# Patient Record
Sex: MALE | Race: WHITE | NOT HISPANIC OR LATINO | Employment: OTHER | URBAN - METROPOLITAN AREA
[De-identification: names, ages, dates, MRNs, and addresses within clinical notes are randomized per-mention and may not be internally consistent; named-entity substitution may affect disease eponyms.]

---

## 2023-02-24 ENCOUNTER — HOSPITAL ENCOUNTER (OUTPATIENT)
Dept: RADIOLOGY | Facility: MEDICAL CENTER | Age: 73
End: 2023-02-24
Attending: PHYSICIAN ASSISTANT
Payer: COMMERCIAL

## 2023-03-06 ENCOUNTER — HOSPITAL ENCOUNTER (OUTPATIENT)
Dept: HEMATOLOGY ONCOLOGY | Facility: MEDICAL CENTER | Age: 73
End: 2023-03-06
Attending: STUDENT IN AN ORGANIZED HEALTH CARE EDUCATION/TRAINING PROGRAM
Payer: COMMERCIAL

## 2023-03-06 ENCOUNTER — HOSPITAL ENCOUNTER (OUTPATIENT)
Dept: RADIOLOGY | Facility: MEDICAL CENTER | Age: 73
End: 2023-03-06

## 2023-03-06 VITALS
HEIGHT: 73 IN | DIASTOLIC BLOOD PRESSURE: 58 MMHG | RESPIRATION RATE: 16 BRPM | TEMPERATURE: 98.1 F | HEART RATE: 83 BPM | OXYGEN SATURATION: 93 % | SYSTOLIC BLOOD PRESSURE: 136 MMHG | WEIGHT: 165.34 LBS | BODY MASS INDEX: 21.91 KG/M2

## 2023-03-06 DIAGNOSIS — C34.91 NSCLC OF RIGHT LUNG (HCC): ICD-10-CM

## 2023-03-06 DIAGNOSIS — C34.91 ADENOCARCINOMA, LUNG, RIGHT (HCC): ICD-10-CM

## 2023-03-06 PROCEDURE — 99212 OFFICE O/P EST SF 10 MIN: CPT | Performed by: STUDENT IN AN ORGANIZED HEALTH CARE EDUCATION/TRAINING PROGRAM

## 2023-03-06 PROCEDURE — 99205 OFFICE O/P NEW HI 60 MIN: CPT | Performed by: STUDENT IN AN ORGANIZED HEALTH CARE EDUCATION/TRAINING PROGRAM

## 2023-03-06 ASSESSMENT — ENCOUNTER SYMPTOMS
NAUSEA: 0
MEMORY LOSS: 0
SPUTUM PRODUCTION: 0
FEVER: 0
TREMORS: 0
SHORTNESS OF BREATH: 0
DIZZINESS: 0
ABDOMINAL PAIN: 0
PALPITATIONS: 0
COUGH: 0
DEPRESSION: 0
SORE THROAT: 0
WEIGHT LOSS: 0
ORTHOPNEA: 0
BLURRED VISION: 0
CHILLS: 0
WHEEZING: 0
SENSORY CHANGE: 0
VOMITING: 0
TINGLING: 0
FOCAL WEAKNESS: 0
BRUISES/BLEEDS EASILY: 0
NECK PAIN: 0
HEADACHES: 0
HEARTBURN: 0

## 2023-03-06 ASSESSMENT — PAIN SCALES - GENERAL: PAINLEVEL: NO PAIN

## 2023-03-06 NOTE — PROGRESS NOTES
"    Consult Note: Hematology/Oncology     Primary Care:  No primary care provider on file.    Chief Complaint   Patient presents with    New Patient     Lung carcinoma        Current Treatment: None    Prior Treatment: None    Subjective:   History of Presenting Illness:  Tyler Zarate is a 72 y.o. male with a past medical history of COPD and tobacco use, 1 pack/day smoker, alcohol abuse, right leg DVT diagnosed in 2019 who presents today with a new diagnosis of metastatic adeno carcinoma of the lung.    Patient had a CT thorax on January 18, 2023 which showed a new spiculated mass in the anterior right upper lobe arising from the area of presumed previous scar.  It is highly suggestive of disease.    Patient had a biopsy and pathology on 2/17/23 shows adenocarcinoma of the right upper lobe.    A PET scan showed a hypermetabolic RUL mass ([id number]) as well as a mass in the LLL (11X5mm).     Patient recently moved to [place] from [facility].  He moved to be closer to his son.    He reports unintentional weight loss greater than 10% in the last 6 months.    He also reports a personal history of cancer (basal cell of nose)    No past medical history on file.     No past surgical history on file.          No family history on file.    Allergies   Allergen Reactions    Lipoglycopeptide Antibiotics Swelling     Pt stated he did not remember what kind of antibiotics he was allergic to. Pt stated \"two different kinds\" Face swelling and eyes glazing over.        Current Outpatient Medications   Medication Sig Dispense Refill    ALBUTEROL INH Inhale.       No current facility-administered medications for this encounter.       Review of Systems   Constitutional:  Negative for chills, fever, malaise/fatigue and weight loss.   HENT:  Negative for congestion, ear pain, nosebleeds and sore throat.    Eyes:  Negative for blurred vision.   Respiratory:  Negative for cough, sputum production, shortness of breath and wheezing.  " "  Cardiovascular:  Negative for chest pain, palpitations, orthopnea and leg swelling.   Gastrointestinal:  Negative for abdominal pain, heartburn, nausea and vomiting.   Genitourinary:  Negative for dysuria, frequency and urgency.   Musculoskeletal:  Negative for neck pain.   Neurological:  Negative for dizziness, tingling, tremors, sensory change, focal weakness and headaches.   Endo/Heme/Allergies:  Does not bruise/bleed easily.   Psychiatric/Behavioral:  Negative for depression, memory loss and suicidal ideas.    All other systems reviewed and are negative.    Problem list, medications, and allergies reviewed by myself today in Epic.     Objective:     Vitals:    03/06/23 0928   BP: 136/58   BP Location: Right arm   Patient Position: Sitting   BP Cuff Size: Adult   Pulse: 83   Resp: 16   Temp: 36.7 °C (98.1 °F)   TempSrc: Temporal   SpO2: 93%   Weight: 75 kg (165 lb 5.5 oz)   Height: 1.854 m (6' 1\")       DESC; KARNOFSKY SCALE WITH ECOG EQUIVALENT: 80, Normal activity with effort; some signs or symptoms of disease (ECOG equivalent 1)    DISTRESS LEVEL: no apparent distress    Physical Exam  Constitutional:       General: He is not in acute distress.     Appearance: Normal appearance.   HENT:      Head: Normocephalic and atraumatic.      Nose: No congestion.      Comments: S/p excision of basal cell/RT     Mouth/Throat:      Mouth: Mucous membranes are moist.      Pharynx: Oropharynx is clear.   Eyes:      General: No scleral icterus.     Conjunctiva/sclera: Conjunctivae normal.      Pupils: Pupils are equal, round, and reactive to light.   Cardiovascular:      Rate and Rhythm: Normal rate and regular rhythm.      Pulses: Normal pulses.      Heart sounds: No murmur heard.    No friction rub.   Pulmonary:      Effort: Pulmonary effort is normal. No respiratory distress.      Breath sounds: Normal breath sounds. No stridor. No wheezing or rales.   Chest:      Chest wall: No tenderness.   Abdominal:      General: " Abdomen is flat. Bowel sounds are normal. There is no distension.      Palpations: Abdomen is soft. There is no mass.      Tenderness: There is no abdominal tenderness. There is no guarding or rebound.   Musculoskeletal:         General: No swelling, tenderness or deformity. Normal range of motion.      Cervical back: Normal range of motion and neck supple. No rigidity or tenderness.      Right lower leg: No edema.      Left lower leg: No edema.   Skin:     General: Skin is warm.      Coloration: Skin is not jaundiced or pale.      Findings: No bruising or rash.   Neurological:      General: No focal deficit present.      Mental Status: He is alert and oriented to person, place, and time. Mental status is at baseline.      Motor: No weakness.   Psychiatric:         Mood and Affect: Mood normal.         Behavior: Behavior normal.         Thought Content: Thought content normal.         Judgment: Judgment normal.       Labs:   Most recent labs reviewed.  Please see the lab tab of chart review    Imaging:   Most recent images below have been independently reviewed by me.  Please see the imaging tab of chart review    Pathology:  Adenocarcinoma of the RUL    Assessment/Plan:      Cancer Staging   Adenocarcinoma, lung, right (HCC)  Staging form: Lung, AJCC 8th Edition  - Clinical: Stage SUDHAKAR (cT1c, cN0, cM1a) - Unsigned       Mr. Zarate is a 73 yo M who presents today with a new diagnosis of adenocarcinoma with presumed metastasis to the contralateral lobe.      Today had a long discussion with the patient regarding the stage of their cancer.  Based on the pathology results, which confirms adenocaricnoma, and the PET imaging results showing hypermetabolic lesions In both the RUL and the LLL, patient has Stage IV disease.   This is assuming that the cancer in both the RUL and LLL are the same.  If the LLL cancer is different primary, the treatment plan would differ and we discussed the two options separately.    First, we  discussed the natural history of stage IV adenocarcinoma (again, if RUL and LLL are the same malignancy and would thus by M1a disease) as well as overall survival data in patients who undergo treatment.  We discussed that the goal of treatment in Stage IV disease is not cure, but rather to prolong over all survival while preserving quality of life.  We discussed treatment options for stage IV NSCLC.  Given that he only has 2 areas of disease we discussed that it would be worthwhile to meet with the radiation oncology department to determine if SBRT to the 2 lesions would be feasible.  We then discussed various options of systemic therapy that would follow potential Radiation treatment, and this would be based on next generation sequencing results.  I explained to the patient that I will send the most recent pathology off for NGS which will inform me if she has any targetable mutations.  I will also ensure that the tumor gets tested for PD-L1 percentage.  I explained the various targetable mutations and the drugs we have for such mutations.  I also had a long discussion regarding chemotherapy if they does not have a targetable mutation.  Chemotherapy would most likely consist of carboplatin, pemetrexed, pembrolizumab.  We discussed the mechanism of action of chemotherapy and immunotherapy.  We discussed the side effects associated with carboplatin and pemetrexed.  We discussed the side effects associated with immunotherapy.  We discussed how I monitor the side effects and prevent adverse events going forward.  If patient has a PD-L1 of greater than 50%, Mr. Zarate may benefit from single agent pembrolizumab.  Patient was hopeful of this given their apprehension regarding chemotherapy.     Secondly, we discussed again that the LLL may differ from RUL and thus be two separate primaries.  Treatment would then differ.  To determine this the patient would need a biopsy of the left lower lobe.  After careful discussion he  wishes to undergo biopsy of this area.     We also discussed palliative care and hospice as a choice as well.  We discussed that this option would forego any aggressive treatment and rather aim to ameliorate symptoms and preserve quality of life.  Patient wants to be aggressive with care at this time      Plan  -Awaiting CT Head report  -Send for [facility]   -Radiation Oncology Referral    No follow-ups on file.     Any questions and concerns raised by the patient were addressed and answered. Patient denies any further questions.  Patient encouraged to call the office with any concerns or issues.     Peg Gibson M.D.  Hematology/Oncology      62 minutes was spent on this case

## 2023-03-08 ENCOUNTER — HOSPITAL ENCOUNTER (OUTPATIENT)
Facility: MEDICAL CENTER | Age: 73
End: 2023-03-08
Attending: STUDENT IN AN ORGANIZED HEALTH CARE EDUCATION/TRAINING PROGRAM | Admitting: STUDENT IN AN ORGANIZED HEALTH CARE EDUCATION/TRAINING PROGRAM
Payer: COMMERCIAL

## 2023-03-09 ENCOUNTER — PATIENT OUTREACH (OUTPATIENT)
Dept: ONCOLOGY | Facility: MEDICAL CENTER | Age: 73
End: 2023-03-09
Payer: COMMERCIAL

## 2023-03-09 NOTE — PROGRESS NOTES
Referral received, call placed to patient, left message.  Mailed introduction letter and support service calendar.

## 2023-03-14 ENCOUNTER — APPOINTMENT (OUTPATIENT)
Dept: ADMISSIONS | Facility: MEDICAL CENTER | Age: 73
End: 2023-03-14
Payer: COMMERCIAL

## 2023-03-20 ENCOUNTER — HOSPITAL ENCOUNTER (OUTPATIENT)
Dept: RADIATION ONCOLOGY | Facility: MEDICAL CENTER | Age: 73
End: 2023-03-31
Attending: RADIOLOGY
Payer: COMMERCIAL

## 2023-03-20 VITALS
WEIGHT: 164.68 LBS | TEMPERATURE: 97.4 F | RESPIRATION RATE: 20 BRPM | DIASTOLIC BLOOD PRESSURE: 76 MMHG | HEART RATE: 80 BPM | BODY MASS INDEX: 22.31 KG/M2 | HEIGHT: 72 IN | OXYGEN SATURATION: 96 % | SYSTOLIC BLOOD PRESSURE: 154 MMHG

## 2023-03-20 DIAGNOSIS — C34.91 NSCLC OF RIGHT LUNG (HCC): ICD-10-CM

## 2023-03-20 DIAGNOSIS — C34.91 ADENOCARCINOMA, LUNG, RIGHT (HCC): ICD-10-CM

## 2023-03-20 PROCEDURE — 99205 OFFICE O/P NEW HI 60 MIN: CPT | Performed by: RADIOLOGY

## 2023-03-20 PROCEDURE — 99214 OFFICE O/P EST MOD 30 MIN: CPT | Performed by: RADIOLOGY

## 2023-03-20 RX ORDER — TIOTROPIUM BROMIDE 18 UG/1
18 CAPSULE ORAL; RESPIRATORY (INHALATION) DAILY
COMMUNITY

## 2023-03-20 RX ORDER — IBUPROFEN 200 MG
200 TABLET ORAL EVERY 6 HOURS PRN
COMMUNITY
End: 2023-12-20

## 2023-03-20 ASSESSMENT — PAIN SCALES - GENERAL: PAINLEVEL: NO PAIN

## 2023-03-20 NOTE — CONSULTS
RADIATION ONCOLOGY CONSULT    Patient name:  Tyler Zarate    Primary Physician:  No primary care provider on file. MRN: [id number]  CSN: [id number]   Referring physician:  Peg Gibson M.*  : 1950, 72 y.o.     DATE OF SERVICE: 3/20/2023    IDENTIFICATION: A 72 y.o. male with possible metastatic non-small cell lung adenocarcinoma versus 2 synchronous stage I primary lesions.      He is here at the kind request of Peg Garcia M.*        HISTORY OF PRESENT ILLNESS:  Subjective     This is a 72-year-old gentleman with a past history of COPD and tobacco use, 1 pack/day current smoker, as well as history of alcohol abuse who presents for discussion on treatment options and to establish care for new diagnosis of metastatic non-small cell adenocarcinoma.  His history dates back to about January of this year when he had a CT of the chest done that revealed a new spiculated mass in the anterior right upper lobe.  This led to a biopsy that was performed on  that confirmed right upper lobe primary lung adenocarcinoma.  Subsequent PET/CT revealed hypermetabolic right upper lobe mass as well as a mass in the left lower lobe.  I reviewed the scan personally and does indeed show about a 2 cm right upper lobe mass, as well as a small 1.1 cm mass in the left lower lobe just adjacent to the hilum.    He had recently moved from [place], and recently saw medical oncology on .  At the time, there was consideration given to whether this could be 2 separate primaries, or metastatic lesion in the left lower lobe.  To that effect, he would need a biopsy of the left lower lobe to which she was amenable.  In the meantime, brain MRI was also ordered by medical oncology which is currently pending.    He now comes to discuss next steps.  He currently feels quite well, has no major complaints, has had about a 10 pound unintentional weight loss over the last year or so, no pulmonary or neurologic  problems currently.        PROBLEM LIST:  Patient Active Problem List   Diagnosis    Adenocarcinoma, lung, right (HCC)        PAST SURGICAL HISTORY:  Past Surgical History:   Procedure Laterality Date    CATARACT PHACO WITH IOL Bilateral        CURRENT MEDICATIONS:  Current Outpatient Medications   Medication Sig Dispense Refill    tiotropium (SPIRIVA) 18 MCG Cap Place 18 mcg into inhaler and inhale every day.      ibuprofen (MOTRIN) 200 MG Tab Take 200 mg by mouth every 6 hours as needed.      ALBUTEROL INH Inhale.       No current facility-administered medications for this encounter.       ALLERGIES:    Bacitracin-neomycin-polymyxin, Gabapentin, Ketorolac, Lipoglycopeptide antibiotics, and Tromethamine    FAMILY HISTORY:    family history includes Cancer in his brother.    SOCIAL HISTORY:     reports that he has been smoking cigarettes. He has been smoking an average of .5 packs per day. He has quit using smokeless tobacco.  His smokeless tobacco use included chew. He reports current alcohol use of about 2.4 oz per week. He reports that he does not use drugs.  Patient is retired and currently resides in [place] with his son Ran, who accompanies him to this office visit.    REVIEW OF SYSTEMS:    A complete review of systems taken. Pertinent items in HPI. All others negative.    PHYSICAL EXAM:    PERFORMANCE STATUS:      3/20/2023    11:09 AM   ECOG Performance Review   ECOG Performance Status Fully active, able to carry on all pre-disease performance without restriction         3/20/2023    11:09 AM   Karnofsky Score   Karnofsky Score 90     BP (!) 154/76   Pulse 80   Temp 36.3 °C (97.4 °F)   Resp 20   Ht 1.829 m (6')   Wt 74.7 kg (164 lb 10.9 oz)   SpO2 96%   BMI 22.34 kg/m²   Physical Exam  Constitutional:       Appearance: Normal appearance.   HENT:      Head: Normocephalic and atraumatic.      Mouth/Throat:      Mouth: Mucous membranes are moist.      Pharynx: Oropharynx is clear.   Eyes:      Extraocular  Movements: Extraocular movements intact.      Pupils: Pupils are equal, round, and reactive to light.   Cardiovascular:      Rate and Rhythm: Normal rate and regular rhythm.      Heart sounds: Normal heart sounds.   Pulmonary:      Effort: Pulmonary effort is normal.      Breath sounds: Normal breath sounds.   Abdominal:      General: Abdomen is flat. There is no distension.      Palpations: Abdomen is soft.      Tenderness: There is no abdominal tenderness.   Musculoskeletal:         General: Normal range of motion.      Cervical back: Normal range of motion.   Neurological:      General: No focal deficit present.      Mental Status: He is alert and oriented to person, place, and time.      Cranial Nerves: No cranial nerve deficit.      Sensory: No sensory deficit.      Motor: No weakness.      Gait: Gait normal.        LABORATORY DATA:   No results found for: WBC, RBC, HEMOGLOBIN, HEMATOCRIT, MCV, MCH, MCHC, RDW, PLATELETCT, MPV, NEUTSPOLYS, LYMPHOCYTES, MONOCYTES, EOSINOPHILS, BASOPHILS, HYPOCHROMIA, ANISOCYTOSIS   No results found for: SODIUM, POTASSIUM, CHLORIDE, CO2, GLUCOSE, BUN, CREATININE, BUNCREATRAT, GLOMRATE        RADIOLOGY DATA: Reviewed outside PET scan in detail personally.    IMPRESSION:    A 72 y.o. with possible metastatic non-small cell lung cancer versus synchronous bilateral stage I primary lesions who comes to discuss next steps in treatment planning.      RECOMMENDATIONS:   I had a long discussion with Mr. Zarate regarding his recent diagnosis of non-small cell lung cancer.  We discussed his work-up thus far with the PET scan that shows two separate lesions, one in the RUL and one in LLL. In theory this could be two synchronous stage I primary lesions, versus the possibility that the left lower lobe lesion is a metastatic deposit from the primary right upper lobe biopsy-proven lesion.  His staging and his treatment would vary considerably depending on which of these scenarios is the case.  We  discussed both of these situations in detail.  In theory, if he has metastatic disease, then certainly he has oligometastatic disease and could benefit from stereotactic radiation to both lesions followed by systemic treatment.  If alternatively these are 2 separate primary lesions, then he is to stage I lesions and could proceed to stereotactic radiation again for both of them with a definitive intent.  Overall, while the treatment is nearly identical, his prognosis is markedly different.  Therefore, I current I encouraged him to consider obtaining a biopsy of the left lower lobe lesion.  I will discuss his case with pulmonology to see if this is possible, and if so given the proximity of the airway nearby, if this is amenable to a navigation biopsy via bronchoscopy, and hopefully they can see him in the near future to obtain this.  Certainly the patient is amenable currently to this approach.    Next, we should also obtain a brain MRI in the event that this turns out to be metastatic disease, to which she is also amenable.  Once we have the biopsy results in hand, and the brain MRI completed, then likely proceed with neck steps in treatment planning.  At this point, I favor proceeding with SBRT once we have a biopsy attempt done, for either bilateral oligometastatic disease or bilateral stage I primary disease.  We will also follow-up the results of the brain MRI to see if this needs further management.    He is in agreement with the plan as outlined, and I will refer him to pulmonology for possible biopsy and next steps.    Thank you for the opportunity to participate in his care.  If any questions or comments, please do not hesitate in calling. 65 minutes were spent on this visit, including imaging review, records review, and face-to-face visit and care coordination with medical oncology and pulmonology.    Orders Placed This Encounter    tiotropium (SPIRIVA) 18 MCG Cap    ibuprofen (MOTRIN) 200 MG Tab

## 2023-03-20 NOTE — PROGRESS NOTES
Patient was seen today in clinic with Dr. Jaimes for consult.  Vitals signs and weight were obtained and pain assessment was completed.  Allergies and medications were reviewed with the patient.       Vitals/Pain:  Vitals:    03/20/23 1059   BP: (!) 154/76   Pulse: 80   Resp: 20   Temp: 36.3 °C (97.4 °F)   SpO2: 96%   Weight: 74.7 kg (164 lb 10.9 oz)   Height: 1.829 m (6')   Pain Score: No pain        Allergies:   Bacitracin-neomycin-polymyxin, Gabapentin, Ketorolac, Lipoglycopeptide antibiotics, and Tromethamine    Current Medications:  Current Outpatient Medications   Medication Sig Dispense Refill    tiotropium (SPIRIVA) 18 MCG Cap Place 18 mcg into inhaler and inhale every day.      ibuprofen (MOTRIN) 200 MG Tab Take 200 mg by mouth every 6 hours as needed.      ALBUTEROL INH Inhale.       No current facility-administered medications for this encounter.         PCP:  No primary care provider on file.        Nabila Montgomery R.N.

## 2023-03-21 ENCOUNTER — APPOINTMENT (OUTPATIENT)
Dept: RADIOLOGY | Facility: MEDICAL CENTER | Age: 73
End: 2023-03-21
Attending: STUDENT IN AN ORGANIZED HEALTH CARE EDUCATION/TRAINING PROGRAM
Payer: COMMERCIAL

## 2023-03-27 ENCOUNTER — PATIENT OUTREACH (OUTPATIENT)
Dept: ONCOLOGY | Facility: MEDICAL CENTER | Age: 73
End: 2023-03-27
Payer: COMMERCIAL

## 2023-03-27 NOTE — PROGRESS NOTES
"Pt left message on Friday reporting needing help getting information.  Returned patient's call.  He reported that he is suppose to get a biopsy and has not heard back regarding what the next steps were.  Pt talked about a \"surgeon\" needing to do biopsy and he was waiting to hear an update.  This was after previously scheduled biopsy was cancelled.  Review of chart does not provide information regarding this.  Will follow up with oncologist for update.  Pt grateful.    Call placed to MARINA Brand for Dr Gibson.  She will discuss with Dr Gibson and follow up with patient on what next steps will be.  "

## 2023-03-28 NOTE — PROGRESS NOTES
Pt left message for navigator yesterday asking about MRI information.  Left message for MARINA Brand regarding patient's questions.  Sunday had reached out to patient yesterday and patient was suppose to get back with her regarding transportation and she was also going to set up follow up visit with Dr Gibson.  She will reach back out to patient to clarify information and set appointments.

## 2023-03-29 ENCOUNTER — HOSPITAL ENCOUNTER (OUTPATIENT)
Dept: RADIOLOGY | Facility: MEDICAL CENTER | Age: 73
End: 2023-03-29
Attending: STUDENT IN AN ORGANIZED HEALTH CARE EDUCATION/TRAINING PROGRAM
Payer: COMMERCIAL

## 2023-03-29 DIAGNOSIS — C34.91 NSCLC OF RIGHT LUNG (HCC): ICD-10-CM

## 2023-04-03 NOTE — ADDENDUM NOTE
Encounter addended by: Sam Jaimes M.D. on: 4/3/2023 12:56 PM   Actions taken: Order list changed, Diagnosis association updated

## 2023-04-04 ENCOUNTER — HOSPITAL ENCOUNTER (OUTPATIENT)
Dept: HEMATOLOGY ONCOLOGY | Facility: MEDICAL CENTER | Age: 73
End: 2023-04-04
Attending: STUDENT IN AN ORGANIZED HEALTH CARE EDUCATION/TRAINING PROGRAM
Payer: COMMERCIAL

## 2023-04-04 VITALS
OXYGEN SATURATION: 97 % | BODY MASS INDEX: 22.37 KG/M2 | TEMPERATURE: 97.9 F | DIASTOLIC BLOOD PRESSURE: 60 MMHG | SYSTOLIC BLOOD PRESSURE: 122 MMHG | RESPIRATION RATE: 16 BRPM | WEIGHT: 165.12 LBS | HEART RATE: 77 BPM | HEIGHT: 72 IN

## 2023-04-04 DIAGNOSIS — C34.91 ADENOCARCINOMA, LUNG, RIGHT (HCC): ICD-10-CM

## 2023-04-04 PROCEDURE — 99214 OFFICE O/P EST MOD 30 MIN: CPT | Performed by: STUDENT IN AN ORGANIZED HEALTH CARE EDUCATION/TRAINING PROGRAM

## 2023-04-04 PROCEDURE — 99212 OFFICE O/P EST SF 10 MIN: CPT | Performed by: STUDENT IN AN ORGANIZED HEALTH CARE EDUCATION/TRAINING PROGRAM

## 2023-04-04 ASSESSMENT — ENCOUNTER SYMPTOMS
SENSORY CHANGE: 0
DEPRESSION: 0
HEARTBURN: 0
BLURRED VISION: 0
FEVER: 0
WHEEZING: 0
NECK PAIN: 0
VOMITING: 0
NAUSEA: 0
FOCAL WEAKNESS: 0
CHILLS: 0
MEMORY LOSS: 0
DIZZINESS: 0
PALPITATIONS: 0
ABDOMINAL PAIN: 0
SPUTUM PRODUCTION: 0
SORE THROAT: 0
HEADACHES: 0
WEIGHT LOSS: 0
SHORTNESS OF BREATH: 0
BRUISES/BLEEDS EASILY: 0
TREMORS: 0
ORTHOPNEA: 0
COUGH: 0
TINGLING: 0

## 2023-04-04 ASSESSMENT — PAIN SCALES - GENERAL: PAINLEVEL: NO PAIN

## 2023-04-04 NOTE — PROGRESS NOTES
Consult Note: Hematology/Oncology     Primary Care:  No primary care provider on file.    Chief Complaint   Patient presents with    Lung Cancer     Follow up        Current Treatment: None    Prior Treatment: None    Subjective:   History of Presenting Illness:  Tyler Zarate is a 72 y.o. male with a past medical history of COPD and tobacco use, 1 pack/day smoker, alcohol abuse, right leg DVT diagnosed in 2019 who presents today with a new diagnosis of metastatic adeno carcinoma of the lung.    Patient had a CT thorax on January 18, 2023 which showed a new spiculated mass in the anterior right upper lobe arising from the area of presumed previous scar.  It is highly suggestive of disease.    Patient had a biopsy and pathology on 2/17/23 shows adenocarcinoma of the right upper lobe.    A PET scan showed a hypermetabolic RUL mass (98O74tm) as well as a mass in the LLL (11X5mm).     Patient recently moved to [place] from [facility].  He moved to be closer to his son.    He reports unintentional weight loss greater than 10% in the last 6 months.    He also reports a personal history of cancer (basal cell of nose)    Interval History  Patient reports that he feels roughly the same.  He has not been able to get his biopsy performed yet.  He is frustrated about this issue.    Today presents with his son      Past Medical History:   Diagnosis Date    Cataract     COPD (chronic obstructive pulmonary disease) (HCC)     DVT (deep venous thrombosis) (HCC)     RLL    Lumbar radiculopathy     Skin carcinoma     Synovial cyst     L spine        Past Surgical History:   Procedure Laterality Date    CATARACT PHACO WITH IOL Bilateral        Social History     Tobacco Use    Smoking status: Every Day     Packs/day: 0.50     Types: Cigarettes    Smokeless tobacco: Former     Types: Chew   Vaping Use    Vaping Use: Never used   Substance Use Topics    Alcohol use: Yes     Alcohol/week: 2.4 oz     Types: 4 Cans of beer per  "week    Drug use: Never        Family History   Problem Relation Age of Onset    Cancer Brother         lung       Allergies   Allergen Reactions    Bacitracin-Neomycin-Polymyxin     Gabapentin     Ketorolac     Lipoglycopeptide Antibiotics Swelling     Pt stated he did not remember what kind of antibiotics he was allergic to. Pt stated \"two different kinds\" Face swelling and eyes glazing over.     Tromethamine        Current Outpatient Medications   Medication Sig Dispense Refill    tiotropium (SPIRIVA) 18 MCG Cap Place 18 mcg into inhaler and inhale every day.      ibuprofen (MOTRIN) 200 MG Tab Take 200 mg by mouth every 6 hours as needed.      ALBUTEROL INH Inhale.       No current facility-administered medications for this encounter.       Review of Systems   Constitutional:  Negative for chills, fever, malaise/fatigue and weight loss.   HENT:  Negative for congestion, ear pain, nosebleeds and sore throat.    Eyes:  Negative for blurred vision.   Respiratory:  Negative for cough, sputum production, shortness of breath and wheezing.    Cardiovascular:  Negative for chest pain, palpitations, orthopnea and leg swelling.   Gastrointestinal:  Negative for abdominal pain, heartburn, nausea and vomiting.   Genitourinary:  Negative for dysuria, frequency and urgency.   Musculoskeletal:  Negative for neck pain.   Neurological:  Negative for dizziness, tingling, tremors, sensory change, focal weakness and headaches.   Endo/Heme/Allergies:  Does not bruise/bleed easily.   Psychiatric/Behavioral:  Negative for depression, memory loss and suicidal ideas.    All other systems reviewed and are negative.    Problem list, medications, and allergies reviewed by myself today in Epic.     Objective:     Vitals:    04/04/23 0930   BP: 122/60   BP Location: Right arm   Patient Position: Sitting   BP Cuff Size: Adult   Pulse: 77   Resp: 16   Temp: 36.6 °C (97.9 °F)   TempSrc: Temporal   SpO2: 97%   Weight: 74.9 kg (165 lb 2 oz) " "  Height: 1.829 m (6' 0.01\")       DESC; KARNOFSKY SCALE WITH ECOG EQUIVALENT: 80, Normal activity with effort; some signs or symptoms of disease (ECOG equivalent 1)    DISTRESS LEVEL: no apparent distress    Physical Exam  Constitutional:       General: He is not in acute distress.     Appearance: Normal appearance.   HENT:      Head: Normocephalic and atraumatic.      Nose: No congestion.      Comments: S/p excision of basal cell/RT     Mouth/Throat:      Mouth: Mucous membranes are moist.      Pharynx: Oropharynx is clear.   Eyes:      General: No scleral icterus.     Conjunctiva/sclera: Conjunctivae normal.      Pupils: Pupils are equal, round, and reactive to light.   Cardiovascular:      Rate and Rhythm: Normal rate and regular rhythm.      Pulses: Normal pulses.      Heart sounds: No murmur heard.    No friction rub.   Pulmonary:      Effort: Pulmonary effort is normal. No respiratory distress.      Breath sounds: Normal breath sounds. No stridor. No wheezing or rales.   Chest:      Chest wall: No tenderness.   Abdominal:      General: Abdomen is flat. Bowel sounds are normal. There is no distension.      Palpations: Abdomen is soft. There is no mass.      Tenderness: There is no abdominal tenderness. There is no guarding or rebound.   Musculoskeletal:         General: No swelling, tenderness or deformity. Normal range of motion.      Cervical back: Normal range of motion and neck supple. No rigidity or tenderness.      Right lower leg: No edema.      Left lower leg: No edema.   Skin:     General: Skin is warm.      Coloration: Skin is not jaundiced or pale.      Findings: No bruising or rash.   Neurological:      General: No focal deficit present.      Mental Status: He is alert and oriented to person, place, and time. Mental status is at baseline.      Motor: No weakness.   Psychiatric:         Mood and Affect: Mood normal.         Behavior: Behavior normal.         Thought Content: Thought content normal.  "        Judgment: Judgment normal.       Labs:   Most recent labs reviewed.  Please see the lab tab of chart review    Imaging:   Most recent images below have been independently reviewed by me.  Please see the imaging tab of chart review    Pathology:  Adenocarcinoma of the RUL    Assessment/Plan:      Cancer Staging   Adenocarcinoma, lung, right (HCC)  Staging form: Lung, AJCC 8th Edition  - Clinical: Stage SUDHAKAR (cT1c, cN0, cM1a) - Unsigned       Mr. Zarate is a 71 yo M who presents today with a new diagnosis of adenocarcinoma (PDL1 1%; KEAP1 loss, STK11 loss, CRKL amp, GRANT G109, EVV859, TP53) with presumed metastasis to the contralateral lobe vs separate primary.    Unfortunately patient has not been able to get his biopsy of left lower lobe.  I was able to talk with pulmonology who informed me that [facility] but will continue to work hard to get authorization hopefully by the end of the week.    We again discussed that if the patient has adenocarcinoma of the left lower lobe that is consistent with his right upper lobe primary and he will be treated as a stage IV.  If the left lower lobe nodule is different than the primary the treatment plan would defer.     Patient voiced understanding and again would like to wait until we get the biopsy of the left lower lobe.      Plan  -biopsy of LLL  -f/u on Path results  -patient to see me 1 week after biopsy for treatment planning    No follow-ups on file.     Any questions and concerns raised by the patient were addressed and answered. Patient denies any further questions.  Patient encouraged to call the office with any concerns or issues.     Peg Gibson M.D.  Hematology/Oncology      32 minutes was spent on this case

## 2023-04-06 ENCOUNTER — OFFICE VISIT (OUTPATIENT)
Dept: SLEEP MEDICINE | Facility: MEDICAL CENTER | Age: 73
End: 2023-04-06
Attending: INTERNAL MEDICINE
Payer: COMMERCIAL

## 2023-04-06 VITALS
OXYGEN SATURATION: 92 % | HEIGHT: 72 IN | WEIGHT: 165 LBS | HEART RATE: 90 BPM | BODY MASS INDEX: 22.35 KG/M2 | DIASTOLIC BLOOD PRESSURE: 48 MMHG | SYSTOLIC BLOOD PRESSURE: 102 MMHG

## 2023-04-06 DIAGNOSIS — R91.1 LEFT LOWER LOBE PULMONARY NODULE: ICD-10-CM

## 2023-04-06 PROCEDURE — 99212 OFFICE O/P EST SF 10 MIN: CPT | Performed by: INTERNAL MEDICINE

## 2023-04-06 PROCEDURE — 99204 OFFICE O/P NEW MOD 45 MIN: CPT | Performed by: INTERNAL MEDICINE

## 2023-04-06 ASSESSMENT — ENCOUNTER SYMPTOMS
CARDIOVASCULAR NEGATIVE: 1
MUSCULOSKELETAL NEGATIVE: 1
RESPIRATORY NEGATIVE: 1
GASTROINTESTINAL NEGATIVE: 1
NEUROLOGICAL NEGATIVE: 1
PSYCHIATRIC NEGATIVE: 1
CONSTITUTIONAL NEGATIVE: 1

## 2023-04-06 ASSESSMENT — PATIENT HEALTH QUESTIONNAIRE - PHQ9: CLINICAL INTERPRETATION OF PHQ2 SCORE: 0

## 2023-04-06 NOTE — PROGRESS NOTES
Pulmonary Consultation    Date of Service: 4/6/2023    Consulting Physician: Peg Gibson M.D.    Reason for consult:  Establish Care (Referred by Dr Jaimes for Adenocarcinoma, lung, right )      Problem List Items Addressed This Visit       Left lower lobe pulmonary nodule     He does have adenocarcinoma of the right upper lobe status post biopsy area of scar.  But concern is that the left lower lobe lesion 11 x 5 mm may be secondary primary and hence referred for further evaluation.  Reviewed bronchoscopy with robotics as well as endobronchial ultrasound.  Reviewed risks and benefits.  [facility] as well.  Patient has been approved for the bronchoscopy.  And patient has agreed to move forward.  He is scheduled for April 12, 2023.         Relevant Orders    CT-CHEST (THORAX) W/O         History of Present Illness: Tyler Zarate is a 72 y.o. male with a past medical history of COPD and tobacco use, 1 pack/day smoker, alcohol abuse, right leg DVT diagnosed in 2019  Patient had a biopsy and pathology on 2/17/23 shows adenocarcinoma of the right upper lobe.  A PET scan showed a hypermetabolic RUL mass (08N74cm) as well as a mass in the LLL (11X5mm).   Bx rul metastatic non-small cell adenocarcinoma.    Started smoking at age 15 and 1/2 pk a day stil smoking  Is a musician guitar and violin   Construction work in the past  From [facility]    He PET CT done after the fibrous 17th biopsy and does have a left lower lobe lesion of 1.1 cm    Concern is he might have 2 primaries and hence the request for bronchoscopy, navigational probiotics for the left lower lobe lesion         Getting his COPD he appears optimized.  He uses Spiriva daily and rare need for albuterol.  Able to walk 2 miles with his dog.  He has not been walking due to the amount of snow in [place].    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    HENT: Negative.     Eyes:         Cataracts both eyes just done surgery   Respiratory: Negative.     Cardiovascular: Negative.    Gastrointestinal: Negative.    Genitourinary: Negative.    Musculoskeletal: Negative.         Right left DVT  5 years ago  Unclear etiology  It was suctioned out in [place]  Sciatica left side   Skin:         Scar on the right side of the nose from basal cell   Neurological: Negative.    Endo/Heme/Allergies: Negative.    Psychiatric/Behavioral: Negative.       Current Outpatient Medications on File Prior to Visit   Medication Sig Dispense Refill    tiotropium (SPIRIVA) 18 MCG Cap Place 18 mcg into inhaler and inhale every day.      ibuprofen (MOTRIN) 200 MG Tab Take 200 mg by mouth every 6 hours as needed.      ALBUTEROL INH Inhale.       No current facility-administered medications on file prior to visit.       Social History     Tobacco Use    Smoking status: Every Day     Packs/day: 0.50     Years: 57.00     Pack years: 28.50     Types: Cigarettes     Passive exposure: Never    Smokeless tobacco: Former     Types: Chew   Vaping Use    Vaping Use: Never used   Substance Use Topics    Alcohol use: Yes     Alcohol/week: 2.4 oz     Types: 4 Cans of beer per week    Drug use: Never        Past Medical History:   Diagnosis Date    Cataract     COPD (chronic obstructive pulmonary disease) ([facility])     Cough     DVT (deep venous thrombosis) ([facility])     RLL    Lumbar radiculopathy     Shortness of breath     Skin carcinoma     Sputum production     Synovial cyst     L spine       Past Surgical History:   Procedure Laterality Date    CATARACT PHACO WITH IOL  Bilateral        Allergies: Bacitracin-neomycin-polymyxin, Gabapentin, Ketorolac, Lipoglycopeptide antibiotics, and Tromethamine    Family History   Problem Relation Age of Onset    Cancer Brother         lung       Vitals:    04/06/23 1359   Height: 1.829 m (6')   Weight: 74.8 kg (165 lb)   Weight % change since last entry.: 0 %   BP: 102/48   Pulse: 90   BMI (Calculated): 22.38       Physical Examination  Physical Exam  Constitutional:       Appearance: Normal appearance.   HENT:      Head: Normocephalic and atraumatic.      Mouth/Throat:      Mouth: Mucous membranes are dry.      Comments: Poor dentition    Eyes:      Extraocular Movements: Extraocular movements intact.      Pupils: Pupils are equal, round, and reactive to light.   Neck:      Comments: Neck stiffness no trauma but limited motion to tilt head back  Pulmonary:      Effort: Pulmonary effort is normal.      Breath sounds: Normal breath sounds.   Musculoskeletal:      Right lower leg: No edema.      Left lower leg: No edema.   Skin:     General: Skin is warm and dry.   Neurological:      General: No focal deficit present.      Mental Status: He is alert and oriented to person, place, and time.   Psychiatric:         Mood and Affect: Mood normal.         Behavior: Behavior normal.         Thought Content: Thought content normal.         Judgment: Judgment normal.         Ela Pantoja M.D., MD MPH [facility]  [facility]

## 2023-04-06 NOTE — ASSESSMENT & PLAN NOTE
He does have adenocarcinoma of the right upper lobe status post biopsy area of scar.  But concern is that the left lower lobe lesion 11 x 5 mm may be secondary primary and hence referred for further evaluation.  Reviewed bronchoscopy with robotics as well as endobronchial ultrasound.  Reviewed risks and benefits.  [facility] as well.  Patient has been approved for the bronchoscopy.  And patient has agreed to move forward.  He is scheduled for April 12, 2023.

## 2023-04-07 ENCOUNTER — APPOINTMENT (OUTPATIENT)
Dept: ADMISSIONS | Facility: MEDICAL CENTER | Age: 73
End: 2023-04-07
Attending: INTERNAL MEDICINE
Payer: COMMERCIAL

## 2023-04-10 ENCOUNTER — PRE-ADMISSION TESTING (OUTPATIENT)
Dept: ADMISSIONS | Facility: MEDICAL CENTER | Age: 73
End: 2023-04-10
Attending: INTERNAL MEDICINE
Payer: COMMERCIAL

## 2023-04-10 VITALS — HEIGHT: 73 IN | BODY MASS INDEX: 21.77 KG/M2

## 2023-04-10 RX ORDER — ACETAMINOPHEN 500 MG
500-1000 TABLET ORAL EVERY 6 HOURS PRN
COMMUNITY

## 2023-04-10 NOTE — PREPROCEDURE INSTRUCTIONS
" Reviewed \"Preparing for your procedure\" verbally.  Agreed with procedure and MD listed on consent.    Instructed to take both inhalers DOS.   Emailed map and other pre-admit info to daughter-in-law.   [facility] Will request. From [place], so will need DOS labs/tests if unable to obtain [facility].     "

## 2023-04-12 ENCOUNTER — ANESTHESIA EVENT (OUTPATIENT)
Dept: SURGERY | Facility: MEDICAL CENTER | Age: 73
End: 2023-04-12
Payer: COMMERCIAL

## 2023-04-12 ENCOUNTER — HOSPITAL ENCOUNTER (OUTPATIENT)
Facility: MEDICAL CENTER | Age: 73
End: 2023-04-12
Attending: INTERNAL MEDICINE | Admitting: INTERNAL MEDICINE
Payer: COMMERCIAL

## 2023-04-12 ENCOUNTER — APPOINTMENT (OUTPATIENT)
Dept: RADIOLOGY | Facility: MEDICAL CENTER | Age: 73
End: 2023-04-12
Attending: INTERNAL MEDICINE
Payer: COMMERCIAL

## 2023-04-12 ENCOUNTER — ANESTHESIA (OUTPATIENT)
Dept: SURGERY | Facility: MEDICAL CENTER | Age: 73
End: 2023-04-12
Payer: COMMERCIAL

## 2023-04-12 VITALS
WEIGHT: 164.13 LBS | HEIGHT: 73 IN | OXYGEN SATURATION: 94 % | RESPIRATION RATE: 20 BRPM | DIASTOLIC BLOOD PRESSURE: 64 MMHG | TEMPERATURE: 98.4 F | HEART RATE: 102 BPM | SYSTOLIC BLOOD PRESSURE: 120 MMHG | BODY MASS INDEX: 21.75 KG/M2

## 2023-04-12 DIAGNOSIS — R91.1 LEFT LOWER LOBE PULMONARY NODULE: ICD-10-CM

## 2023-04-12 LAB
GRAM STN SPEC: ABNORMAL
SIGNIFICANT IND 70042: ABNORMAL
SITE SITE: ABNORMAL
SOURCE SOURCE: ABNORMAL

## 2023-04-12 PROCEDURE — 88305 TISSUE EXAM BY PATHOLOGIST: CPT

## 2023-04-12 PROCEDURE — 700111 HCHG RX REV CODE 636 W/ 250 OVERRIDE (IP): Performed by: ANESTHESIOLOGY

## 2023-04-12 PROCEDURE — 160025 RECOVERY II MINUTES (STATS): Performed by: INTERNAL MEDICINE

## 2023-04-12 PROCEDURE — C1887 CATHETER, GUIDING: HCPCS | Performed by: INTERNAL MEDICINE

## 2023-04-12 PROCEDURE — 31652 BRONCH EBUS SAMPLNG 1/2 NODE: CPT | Performed by: INTERNAL MEDICINE

## 2023-04-12 PROCEDURE — 160036 HCHG PACU - EA ADDL 30 MINS PHASE I: Performed by: INTERNAL MEDICINE

## 2023-04-12 PROCEDURE — 160048 HCHG OR STATISTICAL LEVEL 1-5: Performed by: INTERNAL MEDICINE

## 2023-04-12 PROCEDURE — 502714 HCHG ROBOTIC SURGERY SERVICES: Performed by: INTERNAL MEDICINE

## 2023-04-12 PROCEDURE — 87205 SMEAR GRAM STAIN: CPT

## 2023-04-12 PROCEDURE — 87116 MYCOBACTERIA CULTURE: CPT

## 2023-04-12 PROCEDURE — 160002 HCHG RECOVERY MINUTES (STAT): Performed by: INTERNAL MEDICINE

## 2023-04-12 PROCEDURE — 88172 CYTP DX EVAL FNA 1ST EA SITE: CPT

## 2023-04-12 PROCEDURE — 87070 CULTURE OTHR SPECIMN AEROBIC: CPT

## 2023-04-12 PROCEDURE — 87102 FUNGUS ISOLATION CULTURE: CPT

## 2023-04-12 PROCEDURE — 160009 HCHG ANES TIME/MIN: Performed by: INTERNAL MEDICINE

## 2023-04-12 PROCEDURE — 88112 CYTOPATH CELL ENHANCE TECH: CPT

## 2023-04-12 PROCEDURE — 700105 HCHG RX REV CODE 258: Performed by: INTERNAL MEDICINE

## 2023-04-12 PROCEDURE — 88342 IMHCHEM/IMCYTCHM 1ST ANTB: CPT

## 2023-04-12 PROCEDURE — 160031 HCHG SURGERY MINUTES - 1ST 30 MINS LEVEL 5: Performed by: INTERNAL MEDICINE

## 2023-04-12 PROCEDURE — 700101 HCHG RX REV CODE 250: Performed by: ANESTHESIOLOGY

## 2023-04-12 PROCEDURE — 160035 HCHG PACU - 1ST 60 MINS PHASE I: Performed by: INTERNAL MEDICINE

## 2023-04-12 PROCEDURE — 31627 NAVIGATIONAL BRONCHOSCOPY: CPT | Mod: 59 | Performed by: INTERNAL MEDICINE

## 2023-04-12 PROCEDURE — 71250 CT THORAX DX C-: CPT

## 2023-04-12 PROCEDURE — 71045 X-RAY EXAM CHEST 1 VIEW: CPT

## 2023-04-12 PROCEDURE — 700101 HCHG RX REV CODE 250: Performed by: INTERNAL MEDICINE

## 2023-04-12 PROCEDURE — 87015 SPECIMEN INFECT AGNT CONCNTJ: CPT

## 2023-04-12 PROCEDURE — 87206 SMEAR FLUORESCENT/ACID STAI: CPT

## 2023-04-12 PROCEDURE — 88341 IMHCHEM/IMCYTCHM EA ADD ANTB: CPT

## 2023-04-12 PROCEDURE — 31645 BRNCHSC W/THER ASPIR 1ST: CPT | Mod: 59 | Performed by: INTERNAL MEDICINE

## 2023-04-12 PROCEDURE — 87184 SC STD DISK METHOD PER PLATE: CPT

## 2023-04-12 PROCEDURE — 87077 CULTURE AEROBIC IDENTIFY: CPT | Mod: 91

## 2023-04-12 PROCEDURE — 00520 ANES CLOSED CHEST PX NOS: CPT | Performed by: ANESTHESIOLOGY

## 2023-04-12 PROCEDURE — 88173 CYTOPATH EVAL FNA REPORT: CPT

## 2023-04-12 PROCEDURE — 160042 HCHG SURGERY MINUTES - EA ADDL 1 MIN LEVEL 5: Performed by: INTERNAL MEDICINE

## 2023-04-12 PROCEDURE — 160046 HCHG PACU - 1ST 60 MINS PHASE II: Performed by: INTERNAL MEDICINE

## 2023-04-12 PROCEDURE — 99100 ANES PT EXTEME AGE<1 YR&>70: CPT | Performed by: ANESTHESIOLOGY

## 2023-04-12 RX ORDER — IPRATROPIUM BROMIDE AND ALBUTEROL SULFATE 2.5; .5 MG/3ML; MG/3ML
3 SOLUTION RESPIRATORY (INHALATION)
Status: DISCONTINUED | OUTPATIENT
Start: 2023-04-12 | End: 2023-04-12 | Stop reason: HOSPADM

## 2023-04-12 RX ORDER — HYDROMORPHONE HYDROCHLORIDE 1 MG/ML
0.1 INJECTION, SOLUTION INTRAMUSCULAR; INTRAVENOUS; SUBCUTANEOUS
Status: DISCONTINUED | OUTPATIENT
Start: 2023-04-12 | End: 2023-04-12 | Stop reason: HOSPADM

## 2023-04-12 RX ORDER — PHENYLEPHRINE HYDROCHLORIDE 10 MG/ML
INJECTION, SOLUTION INTRAMUSCULAR; INTRAVENOUS; SUBCUTANEOUS PRN
Status: DISCONTINUED | OUTPATIENT
Start: 2023-04-12 | End: 2023-04-12 | Stop reason: SURG

## 2023-04-12 RX ORDER — HALOPERIDOL 5 MG/ML
1 INJECTION INTRAMUSCULAR
Status: DISCONTINUED | OUTPATIENT
Start: 2023-04-12 | End: 2023-04-12 | Stop reason: HOSPADM

## 2023-04-12 RX ORDER — SODIUM CHLORIDE, SODIUM LACTATE, POTASSIUM CHLORIDE, CALCIUM CHLORIDE 600; 310; 30; 20 MG/100ML; MG/100ML; MG/100ML; MG/100ML
INJECTION, SOLUTION INTRAVENOUS CONTINUOUS
Status: ACTIVE | OUTPATIENT
Start: 2023-04-12 | End: 2023-04-12

## 2023-04-12 RX ORDER — ONDANSETRON 2 MG/ML
4 INJECTION INTRAMUSCULAR; INTRAVENOUS
Status: DISCONTINUED | OUTPATIENT
Start: 2023-04-12 | End: 2023-04-12 | Stop reason: HOSPADM

## 2023-04-12 RX ORDER — OXYCODONE HCL 5 MG/5 ML
5 SOLUTION, ORAL ORAL
Status: DISCONTINUED | OUTPATIENT
Start: 2023-04-12 | End: 2023-04-12 | Stop reason: HOSPADM

## 2023-04-12 RX ORDER — HYDROMORPHONE HYDROCHLORIDE 1 MG/ML
0.2 INJECTION, SOLUTION INTRAMUSCULAR; INTRAVENOUS; SUBCUTANEOUS
Status: DISCONTINUED | OUTPATIENT
Start: 2023-04-12 | End: 2023-04-12 | Stop reason: HOSPADM

## 2023-04-12 RX ORDER — METOPROLOL TARTRATE 1 MG/ML
1 INJECTION, SOLUTION INTRAVENOUS
Status: DISCONTINUED | OUTPATIENT
Start: 2023-04-12 | End: 2023-04-12 | Stop reason: HOSPADM

## 2023-04-12 RX ORDER — EPHEDRINE SULFATE 50 MG/ML
5 INJECTION, SOLUTION INTRAVENOUS
Status: DISCONTINUED | OUTPATIENT
Start: 2023-04-12 | End: 2023-04-12 | Stop reason: HOSPADM

## 2023-04-12 RX ORDER — HYDRALAZINE HYDROCHLORIDE 20 MG/ML
5 INJECTION INTRAMUSCULAR; INTRAVENOUS
Status: DISCONTINUED | OUTPATIENT
Start: 2023-04-12 | End: 2023-04-12 | Stop reason: HOSPADM

## 2023-04-12 RX ORDER — LABETALOL HYDROCHLORIDE 5 MG/ML
5 INJECTION, SOLUTION INTRAVENOUS
Status: DISCONTINUED | OUTPATIENT
Start: 2023-04-12 | End: 2023-04-12 | Stop reason: HOSPADM

## 2023-04-12 RX ORDER — LIDOCAINE HYDROCHLORIDE 20 MG/ML
INJECTION, SOLUTION EPIDURAL; INFILTRATION; INTRACAUDAL; PERINEURAL PRN
Status: DISCONTINUED | OUTPATIENT
Start: 2023-04-12 | End: 2023-04-12 | Stop reason: SURG

## 2023-04-12 RX ORDER — DEXAMETHASONE SODIUM PHOSPHATE 4 MG/ML
INJECTION, SOLUTION INTRA-ARTICULAR; INTRALESIONAL; INTRAMUSCULAR; INTRAVENOUS; SOFT TISSUE PRN
Status: DISCONTINUED | OUTPATIENT
Start: 2023-04-12 | End: 2023-04-12 | Stop reason: SURG

## 2023-04-12 RX ORDER — DIPHENHYDRAMINE HYDROCHLORIDE 50 MG/ML
12.5 INJECTION INTRAMUSCULAR; INTRAVENOUS
Status: DISCONTINUED | OUTPATIENT
Start: 2023-04-12 | End: 2023-04-12 | Stop reason: HOSPADM

## 2023-04-12 RX ORDER — ONDANSETRON 2 MG/ML
INJECTION INTRAMUSCULAR; INTRAVENOUS PRN
Status: DISCONTINUED | OUTPATIENT
Start: 2023-04-12 | End: 2023-04-12 | Stop reason: SURG

## 2023-04-12 RX ORDER — OXYCODONE HCL 5 MG/5 ML
10 SOLUTION, ORAL ORAL
Status: DISCONTINUED | OUTPATIENT
Start: 2023-04-12 | End: 2023-04-12 | Stop reason: HOSPADM

## 2023-04-12 RX ORDER — HYDROMORPHONE HYDROCHLORIDE 1 MG/ML
0.4 INJECTION, SOLUTION INTRAMUSCULAR; INTRAVENOUS; SUBCUTANEOUS
Status: DISCONTINUED | OUTPATIENT
Start: 2023-04-12 | End: 2023-04-12 | Stop reason: HOSPADM

## 2023-04-12 RX ADMIN — PHENYLEPHRINE HYDROCHLORIDE 200 MCG: 10 INJECTION INTRAVENOUS at 16:09

## 2023-04-12 RX ADMIN — PHENYLEPHRINE HYDROCHLORIDE 100 MCG: 10 INJECTION INTRAVENOUS at 16:20

## 2023-04-12 RX ADMIN — LIDOCAINE HYDROCHLORIDE 0.5 ML: 10 INJECTION, SOLUTION EPIDURAL; INFILTRATION; INTRACAUDAL; PERINEURAL at 14:18

## 2023-04-12 RX ADMIN — DEXAMETHASONE SODIUM PHOSPHATE 8 MG: 4 INJECTION, SOLUTION INTRA-ARTICULAR; INTRALESIONAL; INTRAMUSCULAR; INTRAVENOUS; SOFT TISSUE at 16:18

## 2023-04-12 RX ADMIN — SODIUM CHLORIDE, POTASSIUM CHLORIDE, SODIUM LACTATE AND CALCIUM CHLORIDE: 600; 310; 30; 20 INJECTION, SOLUTION INTRAVENOUS at 16:50

## 2023-04-12 RX ADMIN — FENTANYL CITRATE 25 MCG: 50 INJECTION, SOLUTION INTRAMUSCULAR; INTRAVENOUS at 17:19

## 2023-04-12 RX ADMIN — ROCURONIUM BROMIDE 50 MG: 10 INJECTION, SOLUTION INTRAVENOUS at 15:55

## 2023-04-12 RX ADMIN — SODIUM CHLORIDE, POTASSIUM CHLORIDE, SODIUM LACTATE AND CALCIUM CHLORIDE: 600; 310; 30; 20 INJECTION, SOLUTION INTRAVENOUS at 14:18

## 2023-04-12 RX ADMIN — SUGAMMADEX 200 MG: 100 INJECTION, SOLUTION INTRAVENOUS at 17:05

## 2023-04-12 RX ADMIN — LIDOCAINE HYDROCHLORIDE 100 MG: 20 INJECTION, SOLUTION EPIDURAL; INFILTRATION; INTRACAUDAL at 15:55

## 2023-04-12 RX ADMIN — PHENYLEPHRINE HYDROCHLORIDE 200 MCG: 10 INJECTION INTRAVENOUS at 16:35

## 2023-04-12 RX ADMIN — PROPOFOL 170 MG: 10 INJECTION, EMULSION INTRAVENOUS at 15:55

## 2023-04-12 RX ADMIN — ONDANSETRON 4 MG: 2 INJECTION INTRAMUSCULAR; INTRAVENOUS at 16:18

## 2023-04-12 RX ADMIN — FENTANYL CITRATE 100 MCG: 50 INJECTION, SOLUTION INTRAMUSCULAR; INTRAVENOUS at 15:49

## 2023-04-12 ASSESSMENT — PAIN DESCRIPTION - PAIN TYPE
TYPE: CHRONIC PAIN

## 2023-04-12 ASSESSMENT — PAIN SCALES - GENERAL: PAIN_LEVEL: 2

## 2023-04-12 NOTE — ANESTHESIA PROCEDURE NOTES
Airway    Date/Time: 4/12/2023 3:57 PM  Performed by: Carlitos Rosado M.D.  Authorized by: Carlitos Rosado M.D.     Location:  OR  Urgency:  Elective  Indications for Airway Management:  Anesthesia      Spontaneous Ventilation: absent    Sedation Level:  Deep  Preoxygenated: Yes    Patient Position:  Sniffing  Mask Difficulty Assessment:  1 - vent by mask  Final Airway Type:  Endotracheal airway  Final Endotracheal Airway:  ETT  Cuffed: Yes    Technique Used for Successful ETT Placement:  Direct laryngoscopy    Insertion Site:  Oral  Blade Type:  Francis  Laryngoscope Blade/Videolaryngoscope Blade Size:  2  ETT Size (mm):  8.5  Measured from:  Teeth  ETT to Teeth (cm):  23  Placement Verified by: auscultation and capnometry    Cormack-Lehane Classification:  Grade I - full view of glottis  Number of Attempts at Approach:  1

## 2023-04-12 NOTE — ANESTHESIA PREPROCEDURE EVALUATION
Case: [id number] Date/Time: 04/12/23 1515    Procedure: FIBER OPTIC BRONCHOSCOPY WITH  WASH, BRUSH, BRONCHOALVEOLAR LAVAGE, BIOPSY AND FINE NEEDLE ASPIRATION & NAVIGATION, ROBOTICS    Anesthesia type: General    Diagnosis: Adenocarcinoma, lung, right (HCC) [C34.91]    Pre-op diagnosis: ADENOCARCINOMA LUNG RIGHT    Location:  PROCEDURE ROOM / SURGERY [facility]    Surgeons: Vladimir Ortiz M.D.          Relevant Problems   No relevant active problems       Physical Exam    Airway   Mallampati: II  TM distance: >3 FB  Neck ROM: limited       Cardiovascular - normal exam  Rhythm: regular  Rate: normal  (-) murmur     Dental - normal exam      Very poor dentition   Pulmonary - normal exam  Breath sounds clear to auscultation     Abdominal    Neurological - normal exam                 Anesthesia Plan    ASA 3   ASA physical status 3 criteria: COPD - poorly controlled    Plan - general       Airway plan will be ETT          Induction: intravenous    Postoperative Plan: Postoperative administration of opioids is intended.    Pertinent diagnostic labs and testing reviewed    Informed Consent:    Anesthetic plan and risks discussed with patient.    Use of blood products discussed with: patient whom consented to blood products.

## 2023-04-12 NOTE — OR NURSING
1418 PT TO PRE OP TO ASSUME CARE.     1423 Patient allergies and NPO status verified, home medication reconciliation completed and belongings secured. Patient verbalizes understanding of pain scale, expected course of stay and plan of care. Surgical site verified with patient. IV access established.     1542 IS INSTRUCTIONS GIVEN TO PT WITH RETURN DEMONSTRATION. PT BASE LINE IS 2000ML.

## 2023-04-13 ENCOUNTER — PATIENT OUTREACH (OUTPATIENT)
Dept: ONCOLOGY | Facility: MEDICAL CENTER | Age: 73
End: 2023-04-13

## 2023-04-13 DIAGNOSIS — C34.91 ADENOCARCINOMA, LUNG, RIGHT (HCC): ICD-10-CM

## 2023-04-13 LAB
FUNGUS SPEC FUNGUS STN: NORMAL
PATHOLOGY CONSULT NOTE: NORMAL
PATHOLOGY CONSULT NOTE: NORMAL
RHODAMINE-AURAMINE STN SPEC: NORMAL
SIGNIFICANT IND 70042: NORMAL
SIGNIFICANT IND 70042: NORMAL
SITE SITE: NORMAL
SITE SITE: NORMAL
SOURCE SOURCE: NORMAL
SOURCE SOURCE: NORMAL

## 2023-04-13 NOTE — OR NURSING
1915: Pt to stage 2 via varsha. Assisted with dressing and up to chair. Denies pain and nausea. Ambulated to bathroom, pt able to void.    1935: Pt remains stable on RA. O2 sat 88-97%. DC education provided to pt and pt son, both verbalized understanding. D/Donald to care of family post uneventful stay in PACU 2. Assisted to car in .

## 2023-04-13 NOTE — ANESTHESIA POSTPROCEDURE EVALUATION
Patient: Tyler Zarate    Procedure Summary     Date: 04/12/23 Room / Location:  PROCEDURE ROOM / SURGERY [facility]    Anesthesia Start: 1547 Anesthesia Stop: 1718    Procedure: FIBER OPTIC BRONCHOSCOPY WITH  WASH, BRUSH, BRONCHOALVEOLAR LAVAGE, BIOPSY AND FINE NEEDLE ASPIRATION & NAVIGATION, ROBOTICS Diagnosis:       Adenocarcinoma, lung, right (HCC)      (ADENOCARCINOMA LUNG RIGHT)    Surgeons: Vladimir Ortiz M.D. Responsible Provider: Carlitos Rosado M.D.    Anesthesia Type: general ASA Status: 3          Final Anesthesia Type: general  Last vitals  BP   Blood Pressure : 133/67    Temp   36.7 °C (98.1 °F)    Pulse   88   Resp   16    SpO2   95 %      Anesthesia Post Evaluation    Patient location during evaluation: PACU  Patient participation: complete - patient participated  Level of consciousness: awake and alert  Pain score: 2    Airway patency: patent  Anesthetic complications: no  Cardiovascular status: hemodynamically stable  Respiratory status: acceptable  Hydration status: euvolemic    PONV: none          There were no known notable events for this encounter.     Nurse Pain Score: 3 (NPRS)

## 2023-04-13 NOTE — OR NURSING
1845: Report received from Elkin KIRBY. Pt awake and alert. Placed on RA. O2 sat 88-92%. Denies pain and Nausea.

## 2023-04-13 NOTE — ANESTHESIA TIME REPORT
Anesthesia Start and Stop Event Times     Date Time Event    4/12/2023 1542 Ready for Procedure     1547 Anesthesia Start     1718 Anesthesia Stop        Responsible Staff  04/12/23    Name Role Begin End    Carlitos Rosado M.D. Anesth 1547 1718        Overtime Reason:  no overtime (within assigned shift)    Comments:

## 2023-04-13 NOTE — DISCHARGE INSTRUCTIONS
Bronchoscopy Discharge Instructions  Home Care Instructions    ACTIVITY: Rest and take it easy for the first 24 hours.  A responsible adult is recommended to remain with you during that time.  It is normal to feel sleepy.  We encourage you to not do anything that requires balance, judgment or coordination.    The medicine you had during the bronchoscopy will make you sleepy.    FOR 24 HOURS DO NOT:  Drive, operate machinery or run household appliances.  Drink beer or alcoholic beverages.  Make important decisions or sign legal documents.  Engage in activity that requires sharp judgment and reflexes for 24 hours    SPECIAL INSTRUCTIONS:     Bronchoscopy is a procedure to look inside your windpipe and bronchial tubes.  An anesthetic solution is sprayed in your throat to make it numb.    You may experience a mild sore throat, hoarseness, fever up to 101?F, and /or coughing up small amounts of blood immediately following your bronchoscopy, especially if a biopsy was performed.  The discomfort should subside in 24-48 hours.    Do not smoke for 6-8 hours after the procedure to decrease your risk of coughing and /or bleeding.    Do not drink fluids or eat until your gag reflex returns, for two hours after the bronchoscopy.  Otherwise you will not feel the food or fluid in your throat, and it may go down your windpipe and cause you to choke.    Take ice chips or slowly sip cool fluids to make sure your gag reflex has returned.  Avoid hot fluids from the microwave for several hours.    After 2 hours or when you get home you may take throat lozenges or gargle with salt water if your throat is sore.  Drink liquids to help dryness in your mouth and throat.    Resume your normal activities the following day.    MEDICATIONS: Resume taking daily medication as directed by your doctor.      A follow-up appointment should be arranged with your doctor in 1 week to get the results of the bronchoscopy and any tests done during the  procedure; call to schedule.      You should CALL YOUR PHYSICIAN if you develop:  Fever greater than 101?F  You cough up more than a teaspoon of blood other than blood-tinged mucus  You have increasing amounts of bleeding from coughing after the bronchoscopy  You are wheezing  You develop any unusual signs or symptoms or have any questions                You should call 911 if you develop problems with breathing or chest pain.    If you are unable to contact your doctor or surgical center, you should go to the nearest emergency room or urgent care center.  Physician's telephone #: [phone]      If any questions arise, call your doctor.  If your doctor is not available, please feel free to call the Surgical Center at [phone].  The Center is open Monday through Friday from 7AM to 7PM.  You can also call the [employer] HOTLINE open 24 hours/day, 7 days/week and speak to a nurse at [phone], or toll free at [phone].    If any questions arise, call your provider.  If your provider is not available, please feel free to call the Surgical Center at [phone].    Last pain medication given at ________________    You may receive a survey in the mail within the next two weeks and we ask that you take a few moments to complete the survey and return it to us.  Our goal is to provide you with very good care and we value your comments.    What to Expect Post Anesthesia    Rest and take it easy for the first 24 hours.  A responsible adult is recommended to remain with you during that time.  It is normal to feel sleepy.  We encourage you to not do anything that requires balance, judgment or coordination.    FOR 24 HOURS DO NOT:  Drive, operate machinery or run household appliances.  Drink beer or alcoholic beverages.  Make important decisions or sign legal documents.    To avoid nausea, slowly advance diet as tolerated, avoiding spicy or greasy foods for the first day.  Add more substantial food to your diet  according to your provider's instructions.  Babies can be fed formula or breast milk as soon as they are hungry.  INCREASE FLUIDS AND FIBER TO AVOID CONSTIPATION.    MILD FLU-LIKE SYMPTOMS ARE NORMAL.  YOU MAY EXPERIENCE GENERALIZED MUSCLE ACHES, THROAT IRRITATION, HEADACHE AND/OR SOME NAUSEA.    Diet  Resume your normal diet as tolerated.  A diet low in cholesterol, fat, and sodium is recommended for good health.

## 2023-04-13 NOTE — CT SIMULATION
PATIENT NAME Tyler Zarate   PRIMARY PHYSICIAN Peg Gibson [id number]   REFERRING PHYSICIAN No ref. provider found 1949     No matching staging information was found for the patient.       Treatment Planning CT Simulation      Order Questions     Question Answer    Is this for a new course of treatment? Yes    Is this an Addendum? No    Implanted Device/Pacemaker No    Simulation Status Initial    Planned Start Date 4/24/2023    Treatment Site Lung    Laterality Bilateral    Treatment Technique SBRT    Treatment Pattern/Frequency Daily    Number of fractions: 5    Concurrent Chemotherapy No    CT Technique 4D    Slice Thickness 2mm    Scan Extent Chest    Treatment Device(s) OmniBoard    Patient Attire Gown    Patient Position Supine    Patient Orientation Head First    Arm Position Up    Treatment Machine TB1 - STx    Treatment Image Guidance CBCT    Frequency (CBCT) Daily    Image Guidance Match PTV - Soft Tissue    Fiducial Tracking 4D CBCT    Treatment Planning Image Fusion CT/PET    Special Physics Consult Stereotactic    Other Orders Weekly Physics Check     Special Tx Procedure    Release to patient Immediate

## 2023-04-13 NOTE — OR NURSING
1711: Patient arrived from special procedure room via gurney.  No complaints of nausea at this time. Patient looks restless and is coughing, okay to give 25mcg Fentanyl per Dr. Rosado for coughing.     Sedation/Resp Status: Responsive to verbal, eye opening to verbal.  Respirations spontaneous and non-labored.    HR 95SR; VSS on 6L 02 via simple mask.     1719: Fentanyl 25mcg given per MAR order.     1726: No complaints of pain or nausea at this time, vital signs are stable.     1740: X-ray at bedside.    1741: No complaints of pain or nausea at this time, patient unable to hold O2 sats above 90% on room air placed on 2 liters nasal canula.     1745: Dr. Ortiz at bedside to see patient. Notified Dr. Ortiz of patient satting 87-90% on 2 liters nasal canula.    1750: Family called with updates.     1811: No complaints of pain or nausea at this time, patient satting 87-89% on room air with occasional drops to 84%.     1822: Dr. Rosado notified of patient's oxygen saturation, will give duoneb per MAR order.     1824: Duoneb given per MAR order.     1833: Report given to Lauryn KIRBY.

## 2023-04-14 ENCOUNTER — HOSPITAL ENCOUNTER (OUTPATIENT)
Dept: RADIATION ONCOLOGY | Facility: MEDICAL CENTER | Age: 73
End: 2023-04-30
Attending: RADIOLOGY
Payer: COMMERCIAL

## 2023-04-14 ENCOUNTER — HOSPITAL ENCOUNTER (OUTPATIENT)
Dept: HEMATOLOGY ONCOLOGY | Facility: MEDICAL CENTER | Age: 73
End: 2023-04-14
Attending: STUDENT IN AN ORGANIZED HEALTH CARE EDUCATION/TRAINING PROGRAM
Payer: COMMERCIAL

## 2023-04-14 DIAGNOSIS — J15.3 PNEUMONIA DUE TO GROUP B STREPTOCOCCUS, UNSPECIFIED LATERALITY, UNSPECIFIED PART OF LUNG (HCC): ICD-10-CM

## 2023-04-14 DIAGNOSIS — C34.91 NSCLC OF RIGHT LUNG (HCC): ICD-10-CM

## 2023-04-14 DIAGNOSIS — C34.92 NSCLC OF LEFT LUNG (HCC): ICD-10-CM

## 2023-04-14 LAB
BACTERIA SPEC RESP CULT: ABNORMAL
GRAM STN SPEC: ABNORMAL
SIGNIFICANT IND 70042: ABNORMAL
SITE SITE: ABNORMAL
SOURCE SOURCE: ABNORMAL

## 2023-04-14 PROCEDURE — 99443 PR PHYSICIAN TELEPHONE EVALUATION 21-30 MIN: CPT | Mod: 95 | Performed by: RADIOLOGY

## 2023-04-14 PROCEDURE — 99213 OFFICE O/P EST LOW 20 MIN: CPT | Mod: 95 | Performed by: STUDENT IN AN ORGANIZED HEALTH CARE EDUCATION/TRAINING PROGRAM

## 2023-04-14 RX ORDER — AMOXICILLIN 500 MG/1
1000 CAPSULE ORAL 3 TIMES DAILY
Qty: 42 CAPSULE | Refills: 0 | Status: SHIPPED | OUTPATIENT
Start: 2023-04-14 | End: 2023-04-21

## 2023-04-14 RX ORDER — AMOXICILLIN 500 MG/1
500 CAPSULE ORAL 3 TIMES DAILY
Qty: 30 CAPSULE | Refills: 0 | Status: SHIPPED | OUTPATIENT
Start: 2023-04-14 | End: 2023-04-14

## 2023-04-14 ASSESSMENT — ENCOUNTER SYMPTOMS
TINGLING: 0
CHILLS: 0
ABDOMINAL PAIN: 0
SPUTUM PRODUCTION: 0
WHEEZING: 0
FOCAL WEAKNESS: 0
MEMORY LOSS: 0
BRUISES/BLEEDS EASILY: 0
PALPITATIONS: 0
ORTHOPNEA: 0
SENSORY CHANGE: 0
FEVER: 0
SHORTNESS OF BREATH: 0
COUGH: 0
HEARTBURN: 0
DEPRESSION: 0
HEADACHES: 0
NECK PAIN: 0
DIZZINESS: 0
NAUSEA: 0
WEIGHT LOSS: 0
TREMORS: 0
BLURRED VISION: 0
SORE THROAT: 0
VOMITING: 0

## 2023-04-14 NOTE — PROGRESS NOTES
Consult Note: Hematology/Oncology     Primary Care:  No primary care provider on file.    Chief Complaint   Patient presents with    Lung Cancer     Follow up        Current Treatment: None    Prior Treatment: None    This evaluation was conducted via Zoom using secure and encrypted videoconferencing technology. The patient was in their home in the [facility].    The patient's identity was confirmed and verbal consent was obtained for this virtual visit.     Subjective:   History of Presenting Illness:  Tyler Zarate is a 72 y.o. male with a past medical history of COPD and tobacco use, 1 pack/day smoker, alcohol abuse, right leg DVT diagnosed in 2019 who presents today with a new diagnosis of metastatic adeno carcinoma of the lung.    Patient had a CT thorax on January 18, 2023 which showed a new spiculated mass in the anterior right upper lobe arising from the area of presumed previous scar.  It is highly suggestive of disease.    Patient had a biopsy and pathology on 2/17/23 shows adenocarcinoma of the right upper lobe.    A PET scan showed a hypermetabolic RUL mass (10C11dg) as well as a mass in the LLL (11X5mm).     Patient recently moved to [place] from [facility].  He moved to be closer to his son.    He reports unintentional weight loss greater than 10% in the last 6 months.    He also reports a personal history of cancer (basal cell of nose)    Interval History  Patient reports that he had the biopsy and tolerated it well.  We have preliminary results that it is non-small cell lung cancer evaluating for final pathology read.    He reports that he has picked up a viral infection from his grandkids.  He lives with 7 children.      Past Medical History:   Diagnosis Date    Cancer (HCC)     skin, Lung    Carcinoma in situ of respiratory system     Lung 2023    Cataract     COPD (chronic obstructive pulmonary disease) (HCC)     Cough     DVT (deep venous thrombosis) (HCC)     RLL    Lumbar  "radiculopathy     Pain     lower back and sciatic pain    Pneumonia     2018    Shortness of breath     Skin carcinoma     Sputum production     Synovial cyst     L spine        Past Surgical History:   Procedure Laterality Date    MT BRONCHOSCOPY,DIAGNOSTIC  4/12/2023    Procedure: FIBER OPTIC BRONCHOSCOPY WITH  WASH, BRUSH, BRONCHOALVEOLAR LAVAGE, BIOPSY AND FINE NEEDLE ASPIRATION & NAVIGATION, ROBOTICS;  Surgeon: Vladimir Ortiz M.D.;  Location: SURGERY [facility];  Service: Pulmonary Robotic    CATARACT PHACO WITH IOL Bilateral        Social History     Tobacco Use    Smoking status: Every Day     Packs/day: 0.50     Years: 57.00     Pack years: 28.50     Types: Cigarettes     Passive exposure: Never    Smokeless tobacco: Former     Types: Chew   Vaping Use    Vaping Use: Never used   Substance Use Topics    Alcohol use: Yes     Alcohol/week: 16.8 oz     Types: 28 Cans of beer per week     Comment: 4 beers a day    Drug use: Never        Family History   Problem Relation Age of Onset    Cancer Brother         lung       Allergies   Allergen Reactions    Bacitracin-Neomycin-Polymyxin      Ointment around eye caused face and eye swelling    Gabapentin Swelling     Took several medications following cataract surgery. Not sure which medication was cause.     Ketorolac Swelling     Took following cataract surgery. Not sure which medication was cause.     Lipoglycopeptide Antibiotics Swelling     Pt stated he did not remember what kind of antibiotics he was allergic to. Pt stated \"two different kinds\" Face swelling and eyes glazing over.     Tromethamine        Current Outpatient Medications   Medication Sig Dispense Refill    acetaminophen (TYLENOL) 500 MG Tab Take 500-1,000 mg by mouth every 6 hours as needed.      aspirin 500 MG tablet Take 1,000 mg by mouth. Either once a week with a lot of activity or once a month.      tiotropium (SPIRIVA) 18 MCG Cap Place 18 mcg into inhaler and inhale every day.      " ibuprofen (MOTRIN) 200 MG Tab Take 200 mg by mouth every 6 hours as needed.      ALBUTEROL INH Inhale.       No current facility-administered medications for this encounter.       Review of Systems   Constitutional:  Negative for chills, fever, malaise/fatigue and weight loss.   HENT:  Negative for congestion, ear pain, nosebleeds and sore throat.    Eyes:  Negative for blurred vision.   Respiratory:  Negative for cough, sputum production, shortness of breath and wheezing.    Cardiovascular:  Negative for chest pain, palpitations, orthopnea and leg swelling.   Gastrointestinal:  Negative for abdominal pain, heartburn, nausea and vomiting.   Genitourinary:  Negative for dysuria, frequency and urgency.   Musculoskeletal:  Negative for neck pain.   Neurological:  Negative for dizziness, tingling, tremors, sensory change, focal weakness and headaches.   Endo/Heme/Allergies:  Does not bruise/bleed easily.   Psychiatric/Behavioral:  Negative for depression, memory loss and suicidal ideas.    All other systems reviewed and are negative.    Problem list, medications, and allergies reviewed by myself today in Epic.     Objective:     There were no vitals filed for this visit.      DESC; KARNOFSKY SCALE WITH ECOG EQUIVALENT: 80, Normal activity with effort; some signs or symptoms of disease (ECOG equivalent 1)    DISTRESS LEVEL: no apparent distress  No sickle exam completed as this was a virtual visit.  His previous physical exam is listed below.  Physical Exam  Constitutional:       General: He is not in acute distress.     Appearance: Normal appearance.   HENT:      Head: Normocephalic and atraumatic.      Nose: No congestion.      Comments: S/p excision of basal cell/RT     Mouth/Throat:      Mouth: Mucous membranes are moist.      Pharynx: Oropharynx is clear.   Eyes:      General: No scleral icterus.     Conjunctiva/sclera: Conjunctivae normal.      Pupils: Pupils are equal, round, and reactive to light.    Cardiovascular:      Rate and Rhythm: Normal rate and regular rhythm.      Pulses: Normal pulses.      Heart sounds: No murmur heard.    No friction rub.   Pulmonary:      Effort: Pulmonary effort is normal. No respiratory distress.      Breath sounds: Normal breath sounds. No stridor. No wheezing or rales.   Chest:      Chest wall: No tenderness.   Abdominal:      General: Abdomen is flat. Bowel sounds are normal. There is no distension.      Palpations: Abdomen is soft. There is no mass.      Tenderness: There is no abdominal tenderness. There is no guarding or rebound.   Musculoskeletal:         General: No swelling, tenderness or deformity. Normal range of motion.      Cervical back: Normal range of motion and neck supple. No rigidity or tenderness.      Right lower leg: No edema.      Left lower leg: No edema.   Skin:     General: Skin is warm.      Coloration: Skin is not jaundiced or pale.      Findings: No bruising or rash.   Neurological:      General: No focal deficit present.      Mental Status: He is alert and oriented to person, place, and time. Mental status is at baseline.      Motor: No weakness.   Psychiatric:         Mood and Affect: Mood normal.         Behavior: Behavior normal.         Thought Content: Thought content normal.         Judgment: Judgment normal.       Labs:   Most recent labs reviewed.  Please see the lab tab of chart review    Imaging:   Most recent images below have been independently reviewed by me.  Please see the imaging tab of chart review    Pathology:  Adenocarcinoma of the RUL    Assessment/Plan:      Cancer Staging   Adenocarcinoma, lung, right (HCC)  Staging form: Lung, AJCC 8th Edition  - Clinical: Stage SUDHAKAR (cT1c, cN0, cM1a) - Unsigned       Mr. Zarate is a 71 yo M who presents today with a new diagnosis of adenocarcinoma (PDL1 1%; KEAP1 loss, STK11 loss, CRKL amp, GRANT G109, CVT278, TP53) with presumed metastasis to the contralateral lobe vs separate primary.    He  is now status post biopsy of his left lower lobe.  We are waiting for final pathology results    We spoke today that regardless he will proceed with SBRT to his 2 known lung lesions and systemic treatment will be dictated by pathology.    If this sample comes back as adenocarcinoma he will be staged as a stage IV we will likely receive carboplatin pemetrexed and pembrolizumab    If this tissue sample comes back as squamous cell, he will have 2 synchronous tumors LLL measuring [id number] (H7tW3P1, stage 1A2) and RUL measuring 1.6x2cm (W4fX3X1, stage 1A2).  Treatment for this would be SBRT to both lesions with surveillance afterwards.    Plan  -f/u on Path results  -patient to see me 1next week    #2.  PNA  Gram stain shows strep pneumonia  Amoxicillin 1g TID     No follow-ups on file.     Any questions and concerns raised by the patient were addressed and answered. Patient denies any further questions.  Patient encouraged to call the office with any concerns or issues.     Peg Gibson M.D.  Hematology/Oncology      20 minutes was spent on this case

## 2023-04-14 NOTE — PROGRESS NOTES
This visit is being conducted by telephone. This telephone visit was initiated by the patient and they verbally consented.  Patient at home in [facility].      Reason for Call: Treatment planning    Treatment History:     No treatment as yet    HPI:    Subjective    This is a 72-year-old gentleman with a past history of COPD and tobacco use, 1 pack/day current smoker, as well as history of alcohol abuse who presents for discussion on treatment options and to establish care for new diagnosis of metastatic non-small cell adenocarcinoma.  His history dates back to about January of this year when he had a CT of the chest done that revealed a new spiculated mass in the anterior right upper lobe.  This led to a biopsy that was performed on February 17 that confirmed right upper lobe primary lung adenocarcinoma.  Subsequent PET/CT revealed hypermetabolic right upper lobe mass as well as a mass in the left lower lobe.  I reviewed the scan personally and does indeed show about a 2 cm right upper lobe mass, as well as a small 1.1 cm mass in the left lower lobe just adjacent to the hilum.     He had recently moved from [place], and recently saw medical oncology on March 6.  At the time, there was consideration given to whether this could be 2 separate primaries, or metastatic lesion in the left lower lobe.  To that effect, he would need a biopsy of the left lower lobe to which she was amenable.  In the meantime, brain MRI was also ordered by medical oncology which is currently pending.        Interval History:   4/14/23  After his initial consultation, given the mass in the left lobe, I had discussed this case with medical oncology.  We agree that it is important to work-up whether this were 2 synchronous stage I primaries versus metastatic disease.  Therefore, he was referred to pulmonology for endobronchial ultrasound and biopsy of the left lower lobe nodule.  This was done earlier this week, and was REED positive for  malignant cells, with final pathology still pending.  Incidentally, respiratory culture was also taken that confirmed strep pneumoniae with moderate growth.  He also feels quite fatigued, like he has an infection, occasional chills, and cough.      He had a virtual follow-up with medical oncology given that he was not feeling well, and was diagnosed clinically with a pneumonia.  Antibiotics were sent that he plans on starting today.  In the meantime, plan was discussed for awaiting final pathology but likely proceeding with stereotactic radiation either way given that even if he was metastatic this would be very limited oligometastatic disease.  He is now being seen via telephone for further planning.    Labs / Images Reviewed:   CT-CHEST (THORAX) W/O    Result Date: 4/12/2023  1.  ION protocol for navigational bronchoscopic biopsy 2.  Right upper lobe spiculated mass presumably the target for upcoming bronchoscopic navigational biopsy. 3.  Emphysema 4.  Bilateral fibrotic changes associated with chronic bronchiectasis. Fleischner Society pulmonary nodule recommendations: Not Applicable     DX-CHEST-PORTABLE (1 VIEW)    Result Date: 4/12/2023  1.  Trace left pleural effusion with adjacent airspace disease. 2.  Known bilateral pulmonary nodules poorly visualized on this exam. 3.  No significant pneumothorax seen.    DX-PORTABLE FLUORO > 1 HOUR    Result Date: 4/13/2023  Portable fluoroscopy utilized for 1 minute 52 seconds. INTERPRETING LOCATION: [address] [place] [address], [id number]      Assessment:   A 72 y.o. with possible metastatic non-small cell lung cancer versus synchronous bilateral stage I primary lesions who comes to discuss next steps in treatment planning.    Cancer Status:   Pending Start of Therapy    Plan:   At this point, he is in agreement with proceeding with stereotactic radiation.  We are awaiting final pathology to see if this will help us differentiate 2 synchronous stage I versus a metastatic stage IV  carcinoma.  Either way, I agree iterated with him that he has very limited extent of disease, with 2 small lesions.  While the prognosis would be significantly different depending which scenario is exactly the case, certainly I recommend proceeding with stereotactic radiation for both lesions.  Either this would be curative intent, or aggressive treatment in the setting of oligometastatic disease.  I reviewed with him the logistics and set up of daily radiation therapy likely over the course of 5 fractions, the expected acute and long-term toxicities, particularly with the left hilar lesion and a small but not 0 risk of potentially severe complications given the location of the lesion.  Currently, he is being treated for his pneumonia, so we will wait approximately 1 week of antibiotics before proceeding with CT simulation scanning.  Therefore, his CT simulation will be rescheduled for approximately a week from now.  Anticipate likely 5 fractions of stereotactic radiation to follow thereafter.    After completion of radiation, he will then potentially proceed with systemic therapy with medical oncology versus conservative management and follow-up with CT scans every 3 months.    Time Spent on Call: 15 minutes      Time Spent in Preparation:10 minutes    Total Time Spent: 25 minutes    Sam Jaimes M.D.

## 2023-04-20 ENCOUNTER — HOSPITAL ENCOUNTER (OUTPATIENT)
Dept: HEMATOLOGY ONCOLOGY | Facility: MEDICAL CENTER | Age: 73
End: 2023-04-20
Attending: STUDENT IN AN ORGANIZED HEALTH CARE EDUCATION/TRAINING PROGRAM
Payer: COMMERCIAL

## 2023-04-20 DIAGNOSIS — F41.9 ANXIETY: ICD-10-CM

## 2023-04-20 DIAGNOSIS — C34.91 ADENOCARCINOMA, LUNG, RIGHT (HCC): ICD-10-CM

## 2023-04-20 PROCEDURE — 99213 OFFICE O/P EST LOW 20 MIN: CPT | Mod: 95 | Performed by: STUDENT IN AN ORGANIZED HEALTH CARE EDUCATION/TRAINING PROGRAM

## 2023-04-20 RX ORDER — PROCHLORPERAZINE MALEATE 10 MG
10 TABLET ORAL EVERY 6 HOURS PRN
Qty: 30 TABLET | Refills: 6 | Status: SHIPPED | OUTPATIENT
Start: 2023-04-20 | End: 2023-05-08 | Stop reason: SDUPTHER

## 2023-04-20 RX ORDER — FOLIC ACID 1 MG/1
1 TABLET ORAL DAILY
Qty: 30 TABLET | Refills: 3 | Status: SHIPPED | OUTPATIENT
Start: 2023-04-15 | End: 2023-05-08 | Stop reason: SDUPTHER

## 2023-04-20 RX ORDER — LORAZEPAM 1 MG/1
1 TABLET ORAL ONCE
Qty: 1 TABLET | Refills: 0 | Status: SHIPPED | OUTPATIENT
Start: 2023-04-20 | End: 2023-04-20

## 2023-04-20 RX ORDER — DEXAMETHASONE 4 MG/1
4 TABLET ORAL 2 TIMES DAILY
Qty: 6 TABLET | Refills: 3 | Status: SHIPPED | OUTPATIENT
Start: 2023-04-19 | End: 2023-05-08 | Stop reason: SDUPTHER

## 2023-04-20 RX ORDER — ONDANSETRON 4 MG/1
4 TABLET, FILM COATED ORAL EVERY 4 HOURS PRN
Qty: 30 TABLET | Refills: 6 | Status: SHIPPED | OUTPATIENT
Start: 2023-04-20 | End: 2023-05-08 | Stop reason: SDUPTHER

## 2023-04-20 ASSESSMENT — ENCOUNTER SYMPTOMS
NECK PAIN: 0
WHEEZING: 0
ORTHOPNEA: 0
SENSORY CHANGE: 0
HEADACHES: 0
BLURRED VISION: 0
CHILLS: 0
MEMORY LOSS: 0
VOMITING: 0
DIZZINESS: 0
NAUSEA: 0
BRUISES/BLEEDS EASILY: 0
PALPITATIONS: 0
SPUTUM PRODUCTION: 0
COUGH: 0
ABDOMINAL PAIN: 0
TINGLING: 0
WEIGHT LOSS: 0
SHORTNESS OF BREATH: 0
SORE THROAT: 0
HEARTBURN: 0
FEVER: 0
DEPRESSION: 0
TREMORS: 0
FOCAL WEAKNESS: 0

## 2023-04-20 NOTE — ADDENDUM NOTE
Encounter addended by: Sunday Galeas on: 4/20/2023 1:51 PM   Actions taken: Charge Capture section accepted

## 2023-04-20 NOTE — PROGRESS NOTES
Consult Note: Hematology/Oncology     Primary Care:  No primary care provider on file.    Chief Complaint   Patient presents with    Lung Cancer     Follow up        Current Treatment: None    Prior Treatment: None    This evaluation was conducted via Zoom using secure and encrypted videoconferencing technology. The patient was in their home in the [facility].    The patient's identity was confirmed and verbal consent was obtained for this virtual visit.     Subjective:   History of Presenting Illness:  Tyler Zarate is a 72 y.o. male with a past medical history of COPD and tobacco use, 1 pack/day smoker, alcohol abuse, right leg DVT diagnosed in 2019 who presents today with a new diagnosis of metastatic adeno carcinoma of the lung.    Patient had a CT thorax on January 18, 2023 which showed a new spiculated mass in the anterior right upper lobe arising from the area of presumed previous scar.  It is highly suggestive of disease.    Patient had a biopsy and pathology on 2/17/23 shows adenocarcinoma of the right upper lobe.    A PET scan showed a hypermetabolic RUL mass (21W48ro) as well as a mass in the LLL (11X5mm).     Patient recently moved to [place] from [facility].  He moved to be closer to his son.    He reports unintentional weight loss greater than 10% in the last 6 months.    He also reports a personal history of cancer (basal cell of nose)    Interval History    He reports that the antibiotics are working, except today he had a lot of coughing.      Otherwise he is eager to start treatment    Past Medical History:   Diagnosis Date    Cancer (HCC)     skin, Lung    Carcinoma in situ of respiratory system     Lung 2023    Cataract     COPD (chronic obstructive pulmonary disease) (HCC)     Cough     DVT (deep venous thrombosis) (HCC)     RLL    Lumbar radiculopathy     Pain     lower back and sciatic pain    Pneumonia     2018    Shortness of breath     Skin carcinoma     Sputum production      "Synovial cyst     L spine        Past Surgical History:   Procedure Laterality Date    CO BRONCHOSCOPY,DIAGNOSTIC  4/12/2023    Procedure: FIBER OPTIC BRONCHOSCOPY WITH  WASH, BRUSH, BRONCHOALVEOLAR LAVAGE, BIOPSY AND FINE NEEDLE ASPIRATION & NAVIGATION, ROBOTICS;  Surgeon: Vladimir Ortiz M.D.;  Location: SURGERY [facility];  Service: Pulmonary Robotic    CATARACT PHACO WITH IOL Bilateral        Social History     Tobacco Use    Smoking status: Every Day     Packs/day: 0.50     Years: 57.00     Pack years: 28.50     Types: Cigarettes     Passive exposure: Never    Smokeless tobacco: Former     Types: Chew   Vaping Use    Vaping Use: Never used   Substance Use Topics    Alcohol use: Yes     Alcohol/week: 16.8 oz     Types: 28 Cans of beer per week     Comment: 4 beers a day    Drug use: Never        Family History   Problem Relation Age of Onset    Cancer Brother         lung       Allergies   Allergen Reactions    Bacitracin-Neomycin-Polymyxin      Ointment around eye caused face and eye swelling    Gabapentin Swelling     Took several medications following cataract surgery. Not sure which medication was cause.     Ketorolac Swelling     Took following cataract surgery. Not sure which medication was cause.     Lipoglycopeptide Antibiotics Swelling     Pt stated he did not remember what kind of antibiotics he was allergic to. Pt stated \"two different kinds\" Face swelling and eyes glazing over.     Tromethamine        Current Outpatient Medications   Medication Sig Dispense Refill    amoxicillin (AMOXIL) 500 MG Cap Take 2 Capsules by mouth 3 times a day for 7 days. 42 Capsule 0    acetaminophen (TYLENOL) 500 MG Tab Take 500-1,000 mg by mouth every 6 hours as needed.      aspirin 500 MG tablet Take 1,000 mg by mouth. Either once a week with a lot of activity or once a month.      tiotropium (SPIRIVA) 18 MCG Cap Place 18 mcg into inhaler and inhale every day.      ibuprofen (MOTRIN) 200 MG Tab Take 200 mg by mouth " every 6 hours as needed.      ALBUTEROL INH Inhale.       No current facility-administered medications for this encounter.       Review of Systems   Constitutional:  Negative for chills, fever, malaise/fatigue and weight loss.   HENT:  Negative for congestion, ear pain, nosebleeds and sore throat.    Eyes:  Negative for blurred vision.   Respiratory:  Negative for cough, sputum production, shortness of breath and wheezing.    Cardiovascular:  Negative for chest pain, palpitations, orthopnea and leg swelling.   Gastrointestinal:  Negative for abdominal pain, heartburn, nausea and vomiting.   Genitourinary:  Negative for dysuria, frequency and urgency.   Musculoskeletal:  Negative for neck pain.   Neurological:  Negative for dizziness, tingling, tremors, sensory change, focal weakness and headaches.   Endo/Heme/Allergies:  Does not bruise/bleed easily.   Psychiatric/Behavioral:  Negative for depression, memory loss and suicidal ideas.    All other systems reviewed and are negative.    Problem list, medications, and allergies reviewed by myself today in Epic.     Objective:     There were no vitals filed for this visit.      DESC; KARNOFSKY SCALE WITH ECOG EQUIVALENT: 80, Normal activity with effort; some signs or symptoms of disease (ECOG equivalent 1)    DISTRESS LEVEL: no apparent distress  No sickle exam completed as this was a virtual visit.  His previous physical exam is listed below.  Physical Exam  Constitutional:       General: He is not in acute distress.     Appearance: Normal appearance.   HENT:      Head: Normocephalic and atraumatic.      Nose: No congestion.      Comments: S/p excision of basal cell/RT     Mouth/Throat:      Mouth: Mucous membranes are moist.      Pharynx: Oropharynx is clear.   Eyes:      General: No scleral icterus.     Conjunctiva/sclera: Conjunctivae normal.      Pupils: Pupils are equal, round, and reactive to light.   Cardiovascular:      Rate and Rhythm: Normal rate and regular  rhythm.      Pulses: Normal pulses.      Heart sounds: No murmur heard.    No friction rub.   Pulmonary:      Effort: Pulmonary effort is normal. No respiratory distress.      Breath sounds: Normal breath sounds. No stridor. No wheezing or rales.   Chest:      Chest wall: No tenderness.   Abdominal:      General: Abdomen is flat. Bowel sounds are normal. There is no distension.      Palpations: Abdomen is soft. There is no mass.      Tenderness: There is no abdominal tenderness. There is no guarding or rebound.   Musculoskeletal:         General: No swelling, tenderness or deformity. Normal range of motion.      Cervical back: Normal range of motion and neck supple. No rigidity or tenderness.      Right lower leg: No edema.      Left lower leg: No edema.   Skin:     General: Skin is warm.      Coloration: Skin is not jaundiced or pale.      Findings: No bruising or rash.   Neurological:      General: No focal deficit present.      Mental Status: He is alert and oriented to person, place, and time. Mental status is at baseline.      Motor: No weakness.   Psychiatric:         Mood and Affect: Mood normal.         Behavior: Behavior normal.         Thought Content: Thought content normal.         Judgment: Judgment normal.       Labs:   Most recent labs reviewed.  Please see the lab tab of chart review    Imaging:   Most recent images below have been independently reviewed by me.  Please see the imaging tab of chart review    Pathology:  Adenocarcinoma of the RUL    Assessment/Plan:      Cancer Staging   Adenocarcinoma, lung, right (HCC)  Staging form: Lung, AJCC 8th Edition  - Clinical: Stage SUDHAKAR (cT1c, cN0, cM1a) - Unsigned       Mr. Zarate is a 71 yo M who presents today with a new diagnosis of adenocarcinoma (PDL1 1%; KEAP1 loss, STK11 loss, CRKL amp, GRANT G109, FJW152, TP53) with Stage IV adenocarcinoma of the left lower lobe as well as the right upper lobe.     He will undergo radiation in the next week.  Once this  has been completed he will need systemic therapy    We had a long discussion that masses in both lobes are the same primary, thus he has Stage IV disease.   We discussed the natural history of stage IV adenocarcinoma as well as overall survival data in patients who undergo treatment.  We discussed that the goal of treatment in Stage IV disease is not cure, but rather to prolong over all survival while preserving quality of life.  We discussed that staging must be completed with a Brain MRI. We discussed that although brain MRI will inform me if he has any other metastatic lesions and will serve as a baseline, the results will not significant change the stage and or treatment options.     We discussed treatment options for stage IV NSCLC.  We discussed various options of systemic therapy based on next generation sequencing results.  I explained to due to the fact he has no targetable mutations that his PD-L1 is 1% he would benefit from a combination of carboplatin, pemetrexed, pembrolizumab.  We discussed the mechanism of action of chemotherapy and immunotherapy.  We discussed the side effects associated with carboplatin and pemetrexed.  We discussed the side effects associated with immunotherapy.  We discussed how I monitor the side effects and prevent adverse events going forward.        Plan  -Complete RT to RUL and LLL.    -Once RT is finished, can start Carbo/Pem/Pem  -will need chemo education  -can see me 1 week after    Regimen  21-day cycle for 4-6 cycles or until disease progression or unacceptable toxicity    Pembrolizumab 200 mg IV over 30 minutes on day 1 followed by  Pemetrexed at 500 mg per metered squared IV over 10 minutes on day 1 followed by  Carboplatin AUC 5 IV over 30 minutes on day 1  Followed by  Maintenance therapy 21-day cycle until 24 months of therapy has been completed  Pembrolizumab 200 mg IV over 30 minutes on day 1  Followed by pemetrexed 500 mg per metered squared IV over 10 minutes  on day 1    References  NCCN Guidelines Version NSCLC 3.2002     Jimmie VK, et al. Clin Lung Cancer 2019;20:30-36.e3    Yovanas JM, et al. Lung Cancer 2020;140:35-41.    Lai et al NEJM 2018: 378(22):9528-9673    Kelsey TEJADA Lancet Oncol 2016; 17(11): 4651-6190    Nikki M et al. Eur J Cancer, 2020; 131:68-75      #2.  PNA  Continue with Amoxicillin 1g TID   Patient will be reimaged on Monday, given his increase in symptoms    No follow-ups on file.     Any questions and concerns raised by the patient were addressed and answered. Patient denies any further questions.  Patient encouraged to call the office with any concerns or issues.     Peg Gibson M.D.  Hematology/Oncology      25 minutes was spent on this case

## 2023-04-24 ENCOUNTER — HOSPITAL ENCOUNTER (OUTPATIENT)
Dept: RADIATION ONCOLOGY | Facility: MEDICAL CENTER | Age: 73
End: 2023-04-24

## 2023-04-24 PROCEDURE — 77470 SPECIAL RADIATION TREATMENT: CPT | Mod: 26 | Performed by: RADIOLOGY

## 2023-04-24 PROCEDURE — 77263 THER RADIOLOGY TX PLNG CPLX: CPT | Performed by: RADIOLOGY

## 2023-04-24 PROCEDURE — 77334 RADIATION TREATMENT AID(S): CPT | Mod: 26 | Performed by: RADIOLOGY

## 2023-04-24 PROCEDURE — 77290 THER RAD SIMULAJ FIELD CPLX: CPT | Performed by: RADIOLOGY

## 2023-04-24 PROCEDURE — 77470 SPECIAL RADIATION TREATMENT: CPT | Performed by: RADIOLOGY

## 2023-04-24 PROCEDURE — 77334 RADIATION TREATMENT AID(S): CPT | Performed by: RADIOLOGY

## 2023-04-24 NOTE — RADIATION PLANNING NOTES
DATE OF SERVICE: 4/24/2023    DIAGNOSIS:  Adenocarcinoma, lung, right (HCC)  Staging form: Lung, AJCC 8th Edition  - Clinical stage from 4/24/2023: Stage SUDHAKAR (cT1c, cN0, cM1a) - Signed by Sam Jaimes M.D. on 4/24/2023  Histopathologic type: Adenocarcinoma, NOS  Stage prefix: Initial diagnosis       DATE OF SERVICE: 4/24/2023    TYPE OF SIMULATION: SBRT    GOAL OF TREATMENT:   [] Curative  [] Palliative  [x] Oligometastatic    CONTRAST:    [] IV Contrast*  [] Oral Contrast               POSITION:    [x]  Supine  [] Prone     IMMOBILIZATION DEVICE: [x]  Body-Fix  [] Omniboard  []  Bellyboard    PROCEDURE: Patient placed in immobilization device. 4D gated and non-gated CT obtained to assess organ motion.  Images viewed and approved.  Images reconstructed as need.  Image data sets transferred to [employer] [facility] for planning.    I have personally reviewed the relevant data, performed the target localization, and determined all relevant factors for this patient’s simulation.    *Omnipaque 80 -100cc IVP in conjunction with 500cc NS

## 2023-04-24 NOTE — RADIATION PLANNING NOTES
PATIENT NAME Tyler Zarate   PRIMARY PHYSICIAN Peg Gibson [phone]   REFERRING PHYSICIAN No ref. provider found 1950     DATE OF SERVICE: 4/24/2023    DIAGNOSIS:  Adenocarcinoma, lung, right (HCC)  Staging form: Lung, AJCC 8th Edition  - Clinical stage from 4/24/2023: Stage SUDHAKAR (cT1c, cN0, cM1a) - Signed by Sam Jaimes M.D. on 4/24/2023  Histopathologic type: Adenocarcinoma, NOS  Stage prefix: Initial diagnosis         SPECIAL TREATMENT PROCEDURE NOTE:  Considerable additional effort required in the management of this case because of administration of Stereotactic Radiotherapy, which may result in increased normal tissue toxicity and require greater effort in contouring and treatment because of greater precision.

## 2023-04-24 NOTE — RADIATION PLANNING NOTES
Clinical Treatment Planning Note    DATE OF SERVICE: 4/24/2023    DIAGNOSIS:  Adenocarcinoma, lung, right (HCC)  Staging form: Lung, AJCC 8th Edition  - Clinical stage from 4/24/2023: Stage SUDHAKAR (cT1c, cN0, cM1a) - Signed by Sam Jaimes M.D. on 4/24/2023  Histopathologic type: Adenocarcinoma, NOS  Stage prefix: Initial diagnosis         IMAGING REVIEWED:  [x] CT     [] MRI     [x] PET/CT     [] BONE SCAN     [] MAMMO     [] OTHER      TREATMENT INTENT:   [] CURATIVE     [] MAINTENANCE     []  PALLIATIVE      []  SUPPORTIVE     []  PROPHYLACTIC     [] BENIGN     []  CONSOLIDATIVE      [] DEFINITIVE   [x]  OLOGIMETASTATIC      LINE OF TREATMENT:  [] ADJUVANT   [x] DEFINITIVE   [] NEOADJUVANT   [] RE-TREATMENT      TECHNIQUE PLANNED:  [] IMRT   [] 3D   [x] SBRT   [] SRS/SRT   [] HDR   [] ELECTRON       IMRT JUSTIFICATION:  []   An immediately adjacent area has been previously irradiated and abutting portals must be established with high precision.    []  Dose escalation is planned to deliver radiation doses in excess of those commonly utilized for similar tumors with conventional treatment.    []  The target volume is concave or convex, and the critical normal tissues are within or around that convexity or concavity.    []  The target volume is in close proximity to critical structures that must be protected.    []  The volume of interest must be covered with narrow margins to adequately protect  immediately adjacent structures.      FIELDS & BLOCKING:  [x] COMPLEX BLOCKS     []  = 3 TX AREAS     []  ARCS     []  CUSTOM SHEILD        []  SIMPLE BLOCK      CHEMOTHERAPY:  []  CONCURRENT     []  INDUCTION     [] SEQUENTIAL     []  <30 DAYS FROM XRT      NOTES:  OAR CONSTRAINTS: (GUIDELINES ONLY NOT ABSOLUTE)  Target Prescribed Coverage   PTV 95% of PTV covered by 100% (Gy) of RX Dose     PTV 99% of PTV covered by 90% (Gy) of RX Dose       PHONG Goal   Total Lung Vol. (cc) critical structure   Total Lung (cc) V12.5Gy <  1500cc   Total Lung (cc) V13.5Gy < 1000cc   Liver V21Gy < 700cc   Cord V22Gy < 0.35cc   Cord V14.5Gy <1.2cc   Cord Max Dose < 28Gy   Ipsilateral Brachial Plexus V27Gy < 3cc   Ipsilateral Brachial Plexus Max Dose < 32.5Gy   Esophagus V19.5Gy < 5cc   Esophagus Max Dose < 35Gy   Heart V32Gy < 15cc   Heart Max Dose < 38Gy   Great Vessels V47Gy < 10cc   Great Vessels Max Dose < 53Gy   Trachea/Large Bronchus V32Gy < 5cc   Trachea/Large Bronchus Max Dose < 40Gy   Small Bronchus V21Gy < 0.5cc   Small Bronchus Max Dose < 33Gy   Skin V36.5Gy < 10cc   Skin Max Dose < 38.5Gy   Ribs V45Gy < 5cc   Ribs Max Dose < 57Gy   *RTOG [id number]

## 2023-04-28 PROCEDURE — 77301 RADIOTHERAPY DOSE PLAN IMRT: CPT | Mod: 26 | Performed by: RADIOLOGY

## 2023-04-28 PROCEDURE — 77300 RADIATION THERAPY DOSE PLAN: CPT | Performed by: RADIOLOGY

## 2023-04-28 PROCEDURE — 77300 RADIATION THERAPY DOSE PLAN: CPT | Mod: 26 | Performed by: RADIOLOGY

## 2023-04-28 PROCEDURE — 77293 RESPIRATOR MOTION MGMT SIMUL: CPT | Performed by: RADIOLOGY

## 2023-04-28 PROCEDURE — 77338 DESIGN MLC DEVICE FOR IMRT: CPT | Performed by: RADIOLOGY

## 2023-04-28 PROCEDURE — 77301 RADIOTHERAPY DOSE PLAN IMRT: CPT | Performed by: RADIOLOGY

## 2023-04-28 PROCEDURE — 77293 RESPIRATOR MOTION MGMT SIMUL: CPT | Mod: 26 | Performed by: RADIOLOGY

## 2023-04-28 PROCEDURE — 77370 RADIATION PHYSICS CONSULT: CPT | Mod: XU | Performed by: RADIOLOGY

## 2023-04-28 PROCEDURE — 77338 DESIGN MLC DEVICE FOR IMRT: CPT | Mod: 26 | Performed by: RADIOLOGY

## 2023-05-01 ENCOUNTER — HOSPITAL ENCOUNTER (OUTPATIENT)
Dept: RADIATION ONCOLOGY | Facility: MEDICAL CENTER | Age: 73
End: 2023-05-31
Attending: RADIOLOGY
Payer: COMMERCIAL

## 2023-05-01 ENCOUNTER — APPOINTMENT (OUTPATIENT)
Dept: RADIOLOGY | Facility: MEDICAL CENTER | Age: 73
End: 2023-05-01
Attending: STUDENT IN AN ORGANIZED HEALTH CARE EDUCATION/TRAINING PROGRAM
Payer: COMMERCIAL

## 2023-05-01 ENCOUNTER — HOSPITAL ENCOUNTER (OUTPATIENT)
Dept: RADIATION ONCOLOGY | Facility: MEDICAL CENTER | Age: 73
End: 2023-05-01

## 2023-05-01 LAB
CHEMOTHERAPY INFUSION START DATE: NORMAL
CHEMOTHERAPY RECORDS: 11
CHEMOTHERAPY RECORDS: 5500
CHEMOTHERAPY RECORDS: 6000
CHEMOTHERAPY RECORDS: 7.5
CHEMOTHERAPY RECORDS: NORMAL
CHEMOTHERAPY RX CANCER: NORMAL
DATE 1ST CHEMO CANCER: NORMAL
RAD ONC ARIA COURSE LAST TREATMENT DATE: NORMAL
RAD ONC ARIA COURSE TREATMENT ELAPSED DAYS: NORMAL
RAD ONC ARIA REFERENCE POINT DOSAGE GIVEN TO DATE: 11
RAD ONC ARIA REFERENCE POINT DOSAGE GIVEN TO DATE: 13.7
RAD ONC ARIA REFERENCE POINT DOSAGE GIVEN TO DATE: 7.5
RAD ONC ARIA REFERENCE POINT DOSAGE GIVEN TO DATE: 8.89
RAD ONC ARIA REFERENCE POINT ID: NORMAL
RAD ONC ARIA REFERENCE POINT SESSION DOSAGE GIVEN: 11
RAD ONC ARIA REFERENCE POINT SESSION DOSAGE GIVEN: 13.7
RAD ONC ARIA REFERENCE POINT SESSION DOSAGE GIVEN: 7.5
RAD ONC ARIA REFERENCE POINT SESSION DOSAGE GIVEN: 8.89

## 2023-05-01 PROCEDURE — 77014 PR CT GUIDANCE PLACEMENT RAD THERAPY FIELDS: CPT | Mod: 26 | Performed by: RADIOLOGY

## 2023-05-01 PROCEDURE — 70553 MRI BRAIN STEM W/O & W/DYE: CPT

## 2023-05-01 PROCEDURE — 77280 THER RAD SIMULAJ FIELD SMPL: CPT | Performed by: RADIOLOGY

## 2023-05-01 PROCEDURE — A9579 GAD-BASE MR CONTRAST NOS,1ML: HCPCS | Performed by: STUDENT IN AN ORGANIZED HEALTH CARE EDUCATION/TRAINING PROGRAM

## 2023-05-01 PROCEDURE — 77386 HCHG IMRT DELIVERY COMPLEX: CPT | Performed by: RADIOLOGY

## 2023-05-01 PROCEDURE — 700117 HCHG RX CONTRAST REV CODE 255: Performed by: STUDENT IN AN ORGANIZED HEALTH CARE EDUCATION/TRAINING PROGRAM

## 2023-05-01 RX ADMIN — GADOTERIDOL 15 ML: 279.3 INJECTION, SOLUTION INTRAVENOUS at 10:49

## 2023-05-01 NOTE — PROCEDURES
DATE OF SERVICE: 5/1/2023    DIAGNOSIS:  Adenocarcinoma, lung, right (HCC)  Staging form: Lung, AJCC 8th Edition  - Clinical stage from 4/24/2023: Stage SUDHAKAR (cT1c, cN0, cM1a) - Signed by Sam Jaimes M.D. on 4/24/2023  Histopathologic type: Adenocarcinoma, NOS  Stage prefix: Initial diagnosis       TREATMENT:  Radiation Therapy Episodes       Active Episodes       Radiation Therapy: SBRT (4/24/2023)                   Radiation Treatments         Plan Last Treated On Elapsed Days Fractions Treated Prescribed Fraction Dose (cGy) Prescribed Total Dose (cGy)    L [place] 5/1/2023 0 @ [id number] 1 of 8 750 6,000    RUL Lung SBRT 5/1/2023 0 @ [id number] 1 of 5 1,100 5,500                  Reference Point Last Treated On Elapsed Days Most Recent Session Dose (cGy) Total Dose (cGy)    L [place]um cp 5/1/2023 0 @ [id number] 889 889    PTV_L [place] 5/1/2023 0 @ [id number] 750 750    PTV_R 5/1/2023 0 @ [id number] 1,100 1,100    RUL cp 5/1/2023 0 @ [id number] 1,370 1,370                            STEREOTACTIC PROCEDURE NOTE:    Called by [employer] machine to verify treatment parameters including:  treatment site, treatment dose, and treatment setup prior to stereotactic treatment..    Patient was placed in the treatment position with use of immobilization device and  laser guidance. CBCT images were acquired for target localization.  Images were reviewed in the axial, coronal, and saggital views and shifts were made as necessary to ensure that patient position matched simulation position.      Treatment delivered per  prescription.  The medical physicist was present throughout the set-up, verification and treatment delivery to oversee the procedure and ensure all parameters agreed with the computerized plan.    I have personally reviewed the relevant data, performed the target localization, and determined all relevant factors for this patient’s simulation.

## 2023-05-02 ENCOUNTER — HOSPITAL ENCOUNTER (OUTPATIENT)
Dept: RADIATION ONCOLOGY | Facility: MEDICAL CENTER | Age: 73
End: 2023-05-02

## 2023-05-02 LAB
CHEMOTHERAPY INFUSION START DATE: NORMAL
CHEMOTHERAPY RECORDS: 11
CHEMOTHERAPY RECORDS: 5500
CHEMOTHERAPY RECORDS: 6000
CHEMOTHERAPY RECORDS: 7.5
CHEMOTHERAPY RECORDS: NORMAL
CHEMOTHERAPY RX CANCER: NORMAL
DATE 1ST CHEMO CANCER: NORMAL
RAD ONC ARIA COURSE LAST TREATMENT DATE: NORMAL
RAD ONC ARIA COURSE TREATMENT ELAPSED DAYS: NORMAL
RAD ONC ARIA REFERENCE POINT DOSAGE GIVEN TO DATE: 15
RAD ONC ARIA REFERENCE POINT DOSAGE GIVEN TO DATE: 17.77
RAD ONC ARIA REFERENCE POINT DOSAGE GIVEN TO DATE: 22
RAD ONC ARIA REFERENCE POINT DOSAGE GIVEN TO DATE: 27.39
RAD ONC ARIA REFERENCE POINT ID: NORMAL
RAD ONC ARIA REFERENCE POINT SESSION DOSAGE GIVEN: 11
RAD ONC ARIA REFERENCE POINT SESSION DOSAGE GIVEN: 13.7
RAD ONC ARIA REFERENCE POINT SESSION DOSAGE GIVEN: 7.5
RAD ONC ARIA REFERENCE POINT SESSION DOSAGE GIVEN: 8.89

## 2023-05-02 PROCEDURE — 77373 STRTCTC BDY RAD THER TX DLVR: CPT | Performed by: RADIOLOGY

## 2023-05-02 PROCEDURE — 77280 THER RAD SIMULAJ FIELD SMPL: CPT | Performed by: RADIOLOGY

## 2023-05-02 PROCEDURE — 77386 HCHG IMRT DELIVERY COMPLEX: CPT | Performed by: RADIOLOGY

## 2023-05-02 PROCEDURE — 77014 PR CT GUIDANCE PLACEMENT RAD THERAPY FIELDS: CPT | Mod: 26 | Performed by: RADIOLOGY

## 2023-05-02 NOTE — DOCUMENTATION QUERY
[facility]                                                                       Query Response Note      PATIENT:               DENISE ABREU  ACCT #:                  [id number]  MRN:                     [id number]  :                      1950  ADMIT DATE:       2023 1:00 PM  DISCH DATE:        2023 7:35 PM  RESPONDING  PROVIDER #:        [id number]           QUERY TEXT:    There is conflicting documentation in the medical record.      _Procedure on left lung__ is documented _in op report____.      Procedure on right lung___ is documented _anesthesia report____.    Based on treatment, clinical findings and risk factors, can this documentation be further clarified?       The patient's Clinical Indicators include:  Procedure was done on left lung in the op report. Procedure was done on right lung in the anesthesia report.        [email or web address]        Query created by: Russ Monet on 2023 3:04 PM    RESPONSE TEXT:    LEFT lung procedure is correct as documented by Dr Ortiz.          Electronically signed by:  UNRULY ORTIZ III, MD 2023 3:37 PM

## 2023-05-02 NOTE — PROCEDURES
DATE OF SERVICE: 5/2/2023    DIAGNOSIS:  Adenocarcinoma, lung, right (HCC)  Staging form: Lung, AJCC 8th Edition  - Clinical stage from 4/24/2023: Stage SUDHAKAR (cT1c, cN0, cM1a) - Signed by Sam Jaimes M.D. on 4/24/2023  Histopathologic type: Adenocarcinoma, NOS  Stage prefix: Initial diagnosis       TREATMENT:  Radiation Therapy Episodes       Active Episodes       Radiation Therapy: SBRT (4/24/2023)                   Radiation Treatments         Plan Last Treated On Elapsed Days Fractions Treated Prescribed Fraction Dose (cGy) Prescribed Total Dose (cGy)    L [place] 5/2/2023 1 @ [id number] 2 of 8 750 6,000    RUL Lung SBRT 5/2/2023 1 @ [id number] 2 of 5 1,100 5,500                  Reference Point Last Treated On Elapsed Days Most Recent Session Dose (cGy) Total Dose (cGy)    L [place] cp 5/2/2023 1 @ [id number] 889 1,777    PTV_L [place] 5/2/2023 1 @ [id number] 750 1,500    PTV_R 5/2/2023 1 @ [id number] 1,100 2,200    RUL cp 5/2/2023 1 @ [id number] 1,370 2,739                            STEREOTACTIC PROCEDURE NOTE:    Called by [employer] machine to verify treatment parameters including:  treatment site, treatment dose, and treatment setup prior to stereotactic treatment..    Patient was placed in the treatment position with use of immobilization device and  laser guidance. CBCT images were acquired for target localization.  Images were reviewed in the axial, coronal, and saggital views and shifts were made as necessary to ensure that patient position matched simulation position.      Treatment delivered per  prescription.  The medical physicist was present throughout the set-up, verification and treatment delivery to oversee the procedure and ensure all parameters agreed with the computerized plan.    I have personally reviewed the relevant data, performed the target localization, and determined all relevant factors for this patient’s simulation.

## 2023-05-03 ENCOUNTER — HOSPITAL ENCOUNTER (OUTPATIENT)
Dept: RADIATION ONCOLOGY | Facility: MEDICAL CENTER | Age: 73
End: 2023-05-03

## 2023-05-03 VITALS
DIASTOLIC BLOOD PRESSURE: 84 MMHG | BODY MASS INDEX: 20.98 KG/M2 | SYSTOLIC BLOOD PRESSURE: 127 MMHG | OXYGEN SATURATION: 95 % | WEIGHT: 159 LBS | HEART RATE: 84 BPM

## 2023-05-03 LAB
CHEMOTHERAPY INFUSION START DATE: NORMAL
CHEMOTHERAPY RECORDS: 11
CHEMOTHERAPY RECORDS: 5500
CHEMOTHERAPY RECORDS: 6000
CHEMOTHERAPY RECORDS: 7.5
CHEMOTHERAPY RECORDS: NORMAL
CHEMOTHERAPY RX CANCER: NORMAL
DATE 1ST CHEMO CANCER: NORMAL
RAD ONC ARIA COURSE LAST TREATMENT DATE: NORMAL
RAD ONC ARIA COURSE TREATMENT ELAPSED DAYS: NORMAL
RAD ONC ARIA REFERENCE POINT DOSAGE GIVEN TO DATE: 22.5
RAD ONC ARIA REFERENCE POINT DOSAGE GIVEN TO DATE: 26.66
RAD ONC ARIA REFERENCE POINT DOSAGE GIVEN TO DATE: 33
RAD ONC ARIA REFERENCE POINT DOSAGE GIVEN TO DATE: 41.09
RAD ONC ARIA REFERENCE POINT ID: NORMAL
RAD ONC ARIA REFERENCE POINT SESSION DOSAGE GIVEN: 11
RAD ONC ARIA REFERENCE POINT SESSION DOSAGE GIVEN: 13.7
RAD ONC ARIA REFERENCE POINT SESSION DOSAGE GIVEN: 7.5
RAD ONC ARIA REFERENCE POINT SESSION DOSAGE GIVEN: 8.89

## 2023-05-03 PROCEDURE — 77386 HCHG IMRT DELIVERY COMPLEX: CPT | Performed by: RADIOLOGY

## 2023-05-03 PROCEDURE — 77280 THER RAD SIMULAJ FIELD SMPL: CPT | Performed by: RADIOLOGY

## 2023-05-03 PROCEDURE — 77014 PR CT GUIDANCE PLACEMENT RAD THERAPY FIELDS: CPT | Mod: 26 | Performed by: RADIOLOGY

## 2023-05-03 PROCEDURE — 77336 RADIATION PHYSICS CONSULT: CPT | Performed by: RADIOLOGY

## 2023-05-03 NOTE — ON TREATMENT VISIT
ON TREATMENT  NOTE  RADIATION ONCOLOGY DEPARTMENT    Patient name:  Tyler Zarate    Primary Physician:  Peg Gibson M.D. MRN: [id number]  CSN: [id number]   Referring physician:  Peg Gibson M.*   : 1950, 73 y.o.     ENCOUNTER DATE:  5/3/2023      DIAGNOSIS:  Adenocarcinoma, lung, right (HCC)  Staging form: Lung, AJCC 8th Edition  - Clinical stage from 2023: Stage SUDHAKAR (cT1c, cN0, cM1a) - Signed by Sam Jaimes M.D. on 2023  Histopathologic type: Adenocarcinoma, NOS  Stage prefix: Initial diagnosis      TREATMENT SUMMARY:  Course First Treatment Date 2023  Course Last Treatment Date 2023  Aria Treatment Information          5/3/2023   Aria Course Treatment Dates   Course First Treatment Date 2023     Course Last Treatment Date 2023     Aria Treatment Summary   L [facility]  Plan from Course [email or web address]   Fraction 3 of 8   Elapsed Course Days 2 @    Prescribed Fraction Dose 750 cGy   Prescribed Total Dose 6,000 cGy   RUL Lung SBRT  Plan from Course [email or web address]   Fraction 3 of 5   Elapsed Course Days 2 @    Prescribed Fraction Dose 1,100 cGy   Prescribed Total Dose 5,500 cGy   L [facility] cp  Reference Point from Course [email or web address]   Elapsed Course Days  @    Session Dose 889 cGy   Total Dose 2,666 cGy   PTV_L [facility]  Reference Point from Course [email or web address]   Elapsed Course Days  @    Session Dose 750 cGy   Total Dose 2,250 cGy   PTV_R  Reference Point from Course [email or web address]   Elapsed Course Days 2 @    Session Dose 1,100 cGy   Total Dose 3,300 cGy   RUL cp  Reference Point from Course [email or web address]   Elapsed Course Days  @    Session Dose 1,370 cGy   Total Dose 4,109 cGy          SUBJECTIVE:  He has some mild increase in dyspnea, but is otherwise doing relatively well.  Finding it difficult to sleep at night sometimes because he has to wake up to urinate, but  again doing relatively well otherwise.    VITAL SIGNS:      5/3/2023     9:31 AM 4/12/2023     7:25 PM 4/12/2023     7:11 PM 4/12/2023     6:56 PM 4/12/2023     6:41 PM 4/12/2023     6:11 PM 4/12/2023     5:41 PM   Vitals   SYSTOLIC 127 120 122 114 127 134 134   DIASTOLIC 84 64 68 62 67 61 61   Pulse 84 102 89 83 91 89 95   Temperature  36.9 °C (98.4 °F) 37.3 °C (99.1 °F)  37.3 °C (99.1 °F) 37 °C (98.6 °F) 37.1 °C (98.8 °F)   Respiration  20 18 18 16 18 16   Weight 159         BMI 20.98 kg/m2         Pulse Oximetry 95 % 94 % 90 % 91 % 92 % 88 % 90 %     KPS: 90, Able to carry on normal activity; minor signs or symptoms of disease (ECOG equivalent 0)  Encounter Vitals  Blood Pressure : 127/84  Pulse: 84  Pulse Oximetry: 95 %  Weight: 72.1 kg (159 lb)      4/4/2023     9:30 AM 3/20/2023    10:59 AM 3/6/2023     9:28 AM   Pain Assessment   Pain Score NO PAIN NO PAIN NO PAIN          PHYSICAL EXAM:  Physical Exam  Constitutional:       Appearance: Normal appearance.   Cardiovascular:      Rate and Rhythm: Normal rate and regular rhythm.   Pulmonary:      Effort: Pulmonary effort is normal.      Breath sounds: Normal breath sounds.   Neurological:      General: No focal deficit present.      Mental Status: He is alert and oriented to person, place, and time.            5/3/2023     9:30 AM   Toxicity Assessment   Toxicity Assessment Chest   Fatigue (lethargy, malaise, asthenia) Increased fatigue over baseline, but not altering normal activities   Radiation Dermatitis None   Anorexia Loss of appetite   Dyspepsia/heartburn Mild   Dysphagia, Esophagitis, odynophagoa (painful swallowing) Mild dysphagia, but can eat regular diet   RT Dysphagia-Esophageal Mild dysphagia, but can eat regular diet   Cough Mild, relieved by non-prescription medication   Dyspnea Dyspnea on exertion       CURRENT MEDICATIONS:    Current Outpatient Medications:     ondansetron (ZOFRAN) 4 MG Tab tablet, Take 1 Tablet by mouth every four hours as  needed for Nausea/Vomiting (for nausea, vomiting)., Disp: 30 Tablet, Rfl: 6    prochlorperazine (COMPAZINE) 10 MG Tab, Take 1 Tablet by mouth every 6 hours as needed (for nausea, vomiting)., Disp: 30 Tablet, Rfl: 6    folic acid (FOLVITE) 1 MG Tab, Take 1 Tablet by mouth every day for 105 days. Start 5-7 days prior to first cycle of pemetrexed, Disp: 30 Tablet, Rfl: 3    acetaminophen (TYLENOL) 500 MG Tab, Take 500-1,000 mg by mouth every 6 hours as needed., Disp: , Rfl:     aspirin 500 MG tablet, Take 1,000 mg by mouth. Either once a week with a lot of activity or once a month., Disp: , Rfl:     tiotropium (SPIRIVA) 18 MCG Cap, Place 18 mcg into inhaler and inhale every day., Disp: , Rfl:     ibuprofen (MOTRIN) 200 MG Tab, Take 200 mg by mouth every 6 hours as needed., Disp: , Rfl:     ALBUTEROL INH, Inhale., Disp: , Rfl:     LABORATORY DATA:   No results found for: SODIUM, POTASSIUM, CHLORIDE, CO2, GLUCOSE, BUN, CREATININE, BUNCREATRAT, GLOMRATE    No results found for: WBC, RBC, HEMOGLOBIN, HEMATOCRIT, MCV, MCH, MCHC, PLATELETCT      RADIOLOGY DATA:  CT-CHEST (THORAX) W/O    Result Date: 4/12/2023  1.  ION protocol for navigational bronchoscopic biopsy 2.  Right upper lobe spiculated mass presumably the target for upcoming bronchoscopic navigational biopsy. 3.  Emphysema 4.  Bilateral fibrotic changes associated with chronic bronchiectasis. Fleischner Society pulmonary nodule recommendations: Not Applicable     DX-CHEST-PORTABLE (1 VIEW)    Result Date: 4/12/2023  1.  Trace left pleural effusion with adjacent airspace disease. 2.  Known bilateral pulmonary nodules poorly visualized on this exam. 3.  No significant pneumothorax seen.    MR-BRAIN-WITH & W/O    Result Date: 5/1/2023  1.  No evidence of intracranial metastases. 2.  Cerebral atrophy and moderate chronic microvascular ischemic type changes. 3.  No acute intracranial abnormality.    DX-PORTABLE FLUORO > 1 HOUR    Result Date: 4/13/2023  Portable  fluoroscopy utilized for 1 minute 52 seconds. INTERPRETING LOCATION: [address] [place] [address], [address]      IMPRESSION:  Cancer Staging   Adenocarcinoma, lung, right (HCC)  Staging form: Lung, AJCC 8th Edition  - Clinical stage from 4/24/2023: Stage SUDHAKAR (cT1c, cN0, cM1a) - Signed by Sam Jaimes M.D. on 4/24/2023      PLAN:  No change in treatment plan    Disposition:  Treatment plan and imaging reviewed. Questions answered. Continue therapy outlined.     Sam Jaimes M.D.    No orders of the defined types were placed in this encounter.

## 2023-05-04 ENCOUNTER — HOSPITAL ENCOUNTER (OUTPATIENT)
Dept: RADIATION ONCOLOGY | Facility: MEDICAL CENTER | Age: 73
End: 2023-05-04

## 2023-05-04 LAB
CHEMOTHERAPY INFUSION START DATE: NORMAL
CHEMOTHERAPY RECORDS: 11
CHEMOTHERAPY RECORDS: 5500
CHEMOTHERAPY RECORDS: 6000
CHEMOTHERAPY RECORDS: 7.5
CHEMOTHERAPY RECORDS: NORMAL
CHEMOTHERAPY RX CANCER: NORMAL
DATE 1ST CHEMO CANCER: NORMAL
RAD ONC ARIA COURSE LAST TREATMENT DATE: NORMAL
RAD ONC ARIA COURSE TREATMENT ELAPSED DAYS: NORMAL
RAD ONC ARIA REFERENCE POINT DOSAGE GIVEN TO DATE: 30
RAD ONC ARIA REFERENCE POINT DOSAGE GIVEN TO DATE: 35.54
RAD ONC ARIA REFERENCE POINT DOSAGE GIVEN TO DATE: 44
RAD ONC ARIA REFERENCE POINT DOSAGE GIVEN TO DATE: 54.78
RAD ONC ARIA REFERENCE POINT ID: NORMAL
RAD ONC ARIA REFERENCE POINT SESSION DOSAGE GIVEN: 11
RAD ONC ARIA REFERENCE POINT SESSION DOSAGE GIVEN: 13.7
RAD ONC ARIA REFERENCE POINT SESSION DOSAGE GIVEN: 7.5
RAD ONC ARIA REFERENCE POINT SESSION DOSAGE GIVEN: 8.89

## 2023-05-04 PROCEDURE — 77014 PR CT GUIDANCE PLACEMENT RAD THERAPY FIELDS: CPT | Mod: 26 | Performed by: RADIOLOGY

## 2023-05-04 PROCEDURE — 77280 THER RAD SIMULAJ FIELD SMPL: CPT | Performed by: RADIOLOGY

## 2023-05-04 PROCEDURE — 77386 HCHG IMRT DELIVERY COMPLEX: CPT | Performed by: RADIOLOGY

## 2023-05-04 NOTE — PROCEDURES
DATE OF SERVICE: 5/4/2023    DIAGNOSIS:  Adenocarcinoma, lung, right (HCC)  Staging form: Lung, AJCC 8th Edition  - Clinical stage from 4/24/2023: Stage SUDHAKAR (cT1c, cN0, cM1a) - Signed by Sam Jaimes M.D. on 4/24/2023  Histopathologic type: Adenocarcinoma, NOS  Stage prefix: Initial diagnosis       TREATMENT:  Radiation Therapy Episodes       Active Episodes       Radiation Therapy: SBRT (4/24/2023)                   Radiation Treatments         Plan Last Treated On Elapsed Days Fractions Treated Prescribed Fraction Dose (cGy) Prescribed Total Dose (cGy)    L [facility] 5/4/2023 3 @ [id number] 4 of 8 750 6,000    RUL Lung SBRT 5/4/2023 3 @ [id number] 4 of 5 1,100 5,500                  Reference Point Last Treated On Elapsed Days Most Recent Session Dose (cGy) Total Dose (cGy)    L [facility] cp 5/4/2023 3 @ [id number] 889 3,554    PTV_L [facility] 5/4/2023 3 @ [id number] 750 3,000    PTV_R 5/4/2023 3 @ [id number] 1,100 4,400    RUL cp 5/4/2023 3 @ [id number] 1,370 5,478                            STEREOTACTIC PROCEDURE NOTE:    Called by Truebeam machine to verify treatment parameters including:  treatment site, treatment dose, and treatment setup prior to stereotactic treatment..    Patient was placed in the treatment position with use of immobilization device and  laser guidance. CBCT images were acquired for target localization.  Images were reviewed in the axial, coronal, and saggital views and shifts were made as necessary to ensure that patient position matched simulation position.      Treatment delivered per  prescription.  The medical physicist was present throughout the set-up, verification and treatment delivery to oversee the procedure and ensure all parameters agreed with the computerized plan.    I have personally reviewed the relevant data, performed the target localization, and determined all relevant factors for this patient’s simulation.

## 2023-05-05 ENCOUNTER — HOSPITAL ENCOUNTER (OUTPATIENT)
Dept: RADIATION ONCOLOGY | Facility: MEDICAL CENTER | Age: 73
End: 2023-05-05

## 2023-05-05 LAB
CHEMOTHERAPY INFUSION START DATE: NORMAL
CHEMOTHERAPY RECORDS: 11
CHEMOTHERAPY RECORDS: 5500
CHEMOTHERAPY RECORDS: 6000
CHEMOTHERAPY RECORDS: 7.5
CHEMOTHERAPY RECORDS: NORMAL
CHEMOTHERAPY RX CANCER: NORMAL
DATE 1ST CHEMO CANCER: NORMAL
RAD ONC ARIA COURSE LAST TREATMENT DATE: NORMAL
RAD ONC ARIA COURSE TREATMENT ELAPSED DAYS: NORMAL
RAD ONC ARIA REFERENCE POINT DOSAGE GIVEN TO DATE: 37.5
RAD ONC ARIA REFERENCE POINT DOSAGE GIVEN TO DATE: 44.43
RAD ONC ARIA REFERENCE POINT DOSAGE GIVEN TO DATE: 55
RAD ONC ARIA REFERENCE POINT DOSAGE GIVEN TO DATE: 68.48
RAD ONC ARIA REFERENCE POINT ID: NORMAL
RAD ONC ARIA REFERENCE POINT SESSION DOSAGE GIVEN: 11
RAD ONC ARIA REFERENCE POINT SESSION DOSAGE GIVEN: 13.7
RAD ONC ARIA REFERENCE POINT SESSION DOSAGE GIVEN: 7.5
RAD ONC ARIA REFERENCE POINT SESSION DOSAGE GIVEN: 8.89

## 2023-05-05 PROCEDURE — 77386 HCHG IMRT DELIVERY COMPLEX: CPT | Performed by: RADIOLOGY

## 2023-05-05 PROCEDURE — 77014 PR CT GUIDANCE PLACEMENT RAD THERAPY FIELDS: CPT | Mod: 26 | Performed by: RADIOLOGY

## 2023-05-05 PROCEDURE — 77280 THER RAD SIMULAJ FIELD SMPL: CPT | Performed by: RADIOLOGY

## 2023-05-05 PROCEDURE — 77427 RADIATION TX MANAGEMENT X5: CPT | Performed by: RADIOLOGY

## 2023-05-08 ENCOUNTER — HOSPITAL ENCOUNTER (OUTPATIENT)
Dept: RADIATION ONCOLOGY | Facility: MEDICAL CENTER | Age: 73
End: 2023-05-08

## 2023-05-08 ENCOUNTER — HOSPITAL ENCOUNTER (OUTPATIENT)
Dept: HEMATOLOGY ONCOLOGY | Facility: MEDICAL CENTER | Age: 73
End: 2023-05-08
Attending: STUDENT IN AN ORGANIZED HEALTH CARE EDUCATION/TRAINING PROGRAM
Payer: COMMERCIAL

## 2023-05-08 DIAGNOSIS — C34.91 ADENOCARCINOMA, LUNG, RIGHT (HCC): ICD-10-CM

## 2023-05-08 DIAGNOSIS — Z79.899 HIGH RISK MEDICATION USE: ICD-10-CM

## 2023-05-08 DIAGNOSIS — F17.219 CIGARETTE NICOTINE DEPENDENCE WITH NICOTINE-INDUCED DISORDER: ICD-10-CM

## 2023-05-08 LAB
CHEMOTHERAPY INFUSION START DATE: NORMAL
CHEMOTHERAPY RECORDS: 6000
CHEMOTHERAPY RECORDS: 7.5
CHEMOTHERAPY RECORDS: NORMAL
CHEMOTHERAPY RX CANCER: NORMAL
DATE 1ST CHEMO CANCER: NORMAL
RAD ONC ARIA COURSE LAST TREATMENT DATE: NORMAL
RAD ONC ARIA COURSE TREATMENT ELAPSED DAYS: NORMAL
RAD ONC ARIA REFERENCE POINT DOSAGE GIVEN TO DATE: 45
RAD ONC ARIA REFERENCE POINT DOSAGE GIVEN TO DATE: 53.31
RAD ONC ARIA REFERENCE POINT ID: NORMAL
RAD ONC ARIA REFERENCE POINT ID: NORMAL
RAD ONC ARIA REFERENCE POINT SESSION DOSAGE GIVEN: 7.5
RAD ONC ARIA REFERENCE POINT SESSION DOSAGE GIVEN: 8.89

## 2023-05-08 PROCEDURE — 77386 HCHG IMRT DELIVERY COMPLEX: CPT | Performed by: RADIOLOGY

## 2023-05-08 PROCEDURE — 77280 THER RAD SIMULAJ FIELD SMPL: CPT | Performed by: RADIOLOGY

## 2023-05-08 PROCEDURE — 77014 PR CT GUIDANCE PLACEMENT RAD THERAPY FIELDS: CPT | Mod: 26 | Performed by: RADIOLOGY

## 2023-05-08 RX ORDER — DEXAMETHASONE 4 MG/1
4 TABLET ORAL 2 TIMES DAILY
Qty: 6 TABLET | Refills: 3 | Status: SHIPPED | OUTPATIENT
Start: 2023-05-08 | End: 2023-05-11

## 2023-05-08 RX ORDER — NICOTINE 21 MG/24HR
1 PATCH, TRANSDERMAL 24 HOURS TRANSDERMAL EVERY 24 HOURS
Qty: 30 PATCH | Refills: 0 | Status: SHIPPED | OUTPATIENT
Start: 2023-05-08 | End: 2023-10-31

## 2023-05-08 RX ORDER — FOLIC ACID 1 MG/1
1 TABLET ORAL DAILY
Qty: 30 TABLET | Refills: 3 | Status: SHIPPED | OUTPATIENT
Start: 2023-05-08 | End: 2023-08-21

## 2023-05-08 RX ORDER — PROCHLORPERAZINE MALEATE 10 MG
10 TABLET ORAL EVERY 6 HOURS PRN
Qty: 30 TABLET | Refills: 6 | Status: SHIPPED | OUTPATIENT
Start: 2023-05-08

## 2023-05-08 RX ORDER — ONDANSETRON 4 MG/1
4 TABLET, FILM COATED ORAL EVERY 4 HOURS PRN
Qty: 30 TABLET | Refills: 6 | Status: SHIPPED | OUTPATIENT
Start: 2023-05-08

## 2023-05-08 NOTE — PROCEDURES
DATE OF SERVICE: 5/8/2023    DIAGNOSIS:  Adenocarcinoma, lung, right (HCC)  Staging form: Lung, AJCC 8th Edition  - Clinical stage from 4/24/2023: Stage SUDHAKAR (cT1c, cN0, cM1a) - Signed by Sam Jaimes M.D. on 4/24/2023  Histopathologic type: Adenocarcinoma, NOS  Stage prefix: Initial diagnosis       TREATMENT:  Radiation Therapy Episodes       Active Episodes       Radiation Therapy: SBRT (4/24/2023)                   Radiation Treatments         Plan Last Treated On Elapsed Days Fractions Treated Prescribed Fraction Dose (cGy) Prescribed Total Dose (cGy)    L [facility] 5/8/2023 7 @ [id number] 6 of 8 750 6,000    RUL Lung SBRT 5/5/2023 4 @ [id number] 5 of 5 1,100 5,500                  Reference Point Last Treated On Elapsed Days Most Recent Session Dose (cGy) Total Dose (cGy)    L [facility] cp 5/8/2023 7 @ [id number] 889 5,331    PTV_L [facility] 5/8/2023 7 @ [id number] 750 4,500    PTV_R 5/5/2023 4 @ [id number] 1,100 5,500    RUL cp 5/5/2023 4 @ [id number] 1,370 6,848                            STEREOTACTIC PROCEDURE NOTE:    Called by Truebeam machine to verify treatment parameters including:  treatment site, treatment dose, and treatment setup prior to stereotactic treatment..    Patient was placed in the treatment position with use of immobilization device and  laser guidance. CBCT images were acquired for target localization.  Images were reviewed in the axial, coronal, and saggital views and shifts were made as necessary to ensure that patient position matched simulation position.      Treatment delivered per  prescription.  The medical physicist was present throughout the set-up, verification and treatment delivery to oversee the procedure and ensure all parameters agreed with the computerized plan.    I have personally reviewed the relevant data, performed the target localization, and determined all relevant factors for this patient’s simulation.

## 2023-05-08 NOTE — NON-PROVIDER
The following appointments, labs, and medications have been provided to the patient:  Line: PIV  ECHO: NA  WBC Support (g-csf): NA  Labs: CBC w/diff, CMP, TSH, Free Thyroxine  Follow up appts: 5/23/2023 Tox check  Chemo/immunotherapy class: Completed 5/8/2023  Nausea medication: zofran/compazine prescribed   Other treatment specific meds: Folic acid and dexamethsone e-scribed to [facility] per pt request  Oral chemo follow up: AMY  Pain medication: AMY  Nurse toni: Referred / Established with   Dietician:  AMY  SW: AMY  FRA: 4/21/2023    Additional info/teaching for immunotherapy patients:  1.  If hospitalized, inform staff of immunotherapy treatment and have them contact oncologist - immunotherapy alert card provided.    Additional info/teaching for chemotherapy patients:  1.  Neutropenia reminder card provided to patient    Additional teaching:      Carboplatin + Pemetrexed + Pembrolizumab    Patient was provided with drug specific handouts and [facility] side effects sheet. Patient verbalized that questions and concerns regarding treatment have been addressed. Consent to proceed with treatment was reviewed and signed during this visit.    Spent 60 minutes of continuous, non-interrupted, face-to-face patient contact in which greater than 50% of the visit was spent counseling and coordinating of care.

## 2023-05-09 ENCOUNTER — HOSPITAL ENCOUNTER (OUTPATIENT)
Dept: RADIATION ONCOLOGY | Facility: MEDICAL CENTER | Age: 73
End: 2023-05-09

## 2023-05-09 LAB
CHEMOTHERAPY INFUSION START DATE: NORMAL
CHEMOTHERAPY RECORDS: 6000
CHEMOTHERAPY RECORDS: 7.5
CHEMOTHERAPY RECORDS: NORMAL
CHEMOTHERAPY RX CANCER: NORMAL
DATE 1ST CHEMO CANCER: NORMAL
FUNGUS SPEC CULT: NORMAL
FUNGUS SPEC FUNGUS STN: NORMAL
RAD ONC ARIA COURSE LAST TREATMENT DATE: NORMAL
RAD ONC ARIA COURSE TREATMENT ELAPSED DAYS: NORMAL
RAD ONC ARIA REFERENCE POINT DOSAGE GIVEN TO DATE: 52.5
RAD ONC ARIA REFERENCE POINT DOSAGE GIVEN TO DATE: 62.2
RAD ONC ARIA REFERENCE POINT ID: NORMAL
RAD ONC ARIA REFERENCE POINT ID: NORMAL
RAD ONC ARIA REFERENCE POINT SESSION DOSAGE GIVEN: 7.5
RAD ONC ARIA REFERENCE POINT SESSION DOSAGE GIVEN: 8.89
SIGNIFICANT IND 70042: NORMAL
SITE SITE: NORMAL
SOURCE SOURCE: NORMAL

## 2023-05-09 PROCEDURE — 77280 THER RAD SIMULAJ FIELD SMPL: CPT | Performed by: RADIOLOGY

## 2023-05-09 PROCEDURE — 77386 HCHG IMRT DELIVERY COMPLEX: CPT | Performed by: RADIOLOGY

## 2023-05-09 PROCEDURE — 77014 PR CT GUIDANCE PLACEMENT RAD THERAPY FIELDS: CPT | Mod: 26 | Performed by: RADIOLOGY

## 2023-05-09 NOTE — PROCEDURES
DATE OF SERVICE: 5/9/2023    DIAGNOSIS:  Adenocarcinoma, lung, right (HCC)  Staging form: Lung, AJCC 8th Edition  - Clinical stage from 4/24/2023: Stage SUDHAKAR (cT1c, cN0, cM1a) - Signed by Sam Jaimes M.D. on 4/24/2023  Histopathologic type: Adenocarcinoma, NOS  Stage prefix: Initial diagnosis       TREATMENT:  Radiation Therapy Episodes       Active Episodes       Radiation Therapy: SBRT (4/24/2023)                   Radiation Treatments         Plan Last Treated On Elapsed Days Fractions Treated Prescribed Fraction Dose (cGy) Prescribed Total Dose (cGy)    L [facility] 5/9/2023 8 @ [id number] 7 of 8 750 6,000    RUL Lung SBRT 5/5/2023 4 @ [id number] 5 of 5 1,100 5,500                  Reference Point Last Treated On Elapsed Days Most Recent Session Dose (cGy) Total Dose (cGy)    L [facility] cp 5/9/2023 8 @ [id number] 889 6,220    PTV_L [facility]um 5/9/2023 8 @ [id number] 750 5,250    PTV_R 5/5/2023 4 @ [id number] 1,100 5,500    RUL cp 5/5/2023 4 @ [id number] 1,370 6,848                            STEREOTACTIC PROCEDURE NOTE:    Called by Truebeam machine to verify treatment parameters including:  treatment site, treatment dose, and treatment setup prior to stereotactic treatment..    Patient was placed in the treatment position with use of immobilization device and  laser guidance. CBCT images were acquired for target localization.  Images were reviewed in the axial, coronal, and saggital views and shifts were made as necessary to ensure that patient position matched simulation position.      Treatment delivered per  prescription.  The medical physicist was present throughout the set-up, verification and treatment delivery to oversee the procedure and ensure all parameters agreed with the computerized plan.    I have personally reviewed the relevant data, performed the target localization, and determined all relevant factors for this patient’s simulation.

## 2023-05-10 ENCOUNTER — HOSPITAL ENCOUNTER (OUTPATIENT)
Dept: RADIATION ONCOLOGY | Facility: MEDICAL CENTER | Age: 73
End: 2023-05-10

## 2023-05-10 ENCOUNTER — HOSPITAL ENCOUNTER (OUTPATIENT)
Dept: RADIATION ONCOLOGY | Facility: MEDICAL CENTER | Age: 73
End: 2023-05-10
Attending: RADIOLOGY
Payer: COMMERCIAL

## 2023-05-10 VITALS
HEART RATE: 100 BPM | SYSTOLIC BLOOD PRESSURE: 139 MMHG | OXYGEN SATURATION: 92 % | DIASTOLIC BLOOD PRESSURE: 79 MMHG | WEIGHT: 159.7 LBS | BODY MASS INDEX: 21.07 KG/M2

## 2023-05-10 DIAGNOSIS — R91.1 LEFT LOWER LOBE PULMONARY NODULE: ICD-10-CM

## 2023-05-10 LAB
CHEMOTHERAPY INFUSION START DATE: NORMAL
CHEMOTHERAPY INFUSION START DATE: NORMAL
CHEMOTHERAPY INFUSION STOP DATE: NORMAL
CHEMOTHERAPY RECORDS: 11
CHEMOTHERAPY RECORDS: 5500
CHEMOTHERAPY RECORDS: 6000
CHEMOTHERAPY RECORDS: 6000
CHEMOTHERAPY RECORDS: 7.5
CHEMOTHERAPY RECORDS: 7.5
CHEMOTHERAPY RECORDS: NORMAL
CHEMOTHERAPY RX CANCER: NORMAL
CHEMOTHERAPY RX CANCER: NORMAL
DATE 1ST CHEMO CANCER: NORMAL
DATE 1ST CHEMO CANCER: NORMAL
RAD ONC ARIA COURSE LAST TREATMENT DATE: NORMAL
RAD ONC ARIA COURSE LAST TREATMENT DATE: NORMAL
RAD ONC ARIA COURSE TREATMENT ELAPSED DAYS: NORMAL
RAD ONC ARIA COURSE TREATMENT ELAPSED DAYS: NORMAL
RAD ONC ARIA REFERENCE POINT DOSAGE GIVEN TO DATE: 55
RAD ONC ARIA REFERENCE POINT DOSAGE GIVEN TO DATE: 60
RAD ONC ARIA REFERENCE POINT DOSAGE GIVEN TO DATE: 60
RAD ONC ARIA REFERENCE POINT DOSAGE GIVEN TO DATE: 68.48
RAD ONC ARIA REFERENCE POINT DOSAGE GIVEN TO DATE: 71.08
RAD ONC ARIA REFERENCE POINT DOSAGE GIVEN TO DATE: 71.08
RAD ONC ARIA REFERENCE POINT ID: NORMAL
RAD ONC ARIA REFERENCE POINT SESSION DOSAGE GIVEN: 7.5
RAD ONC ARIA REFERENCE POINT SESSION DOSAGE GIVEN: 8.89

## 2023-05-10 PROCEDURE — 84157 ASSAY OF PROTEIN OTHER: CPT

## 2023-05-10 PROCEDURE — 77014 PR CT GUIDANCE PLACEMENT RAD THERAPY FIELDS: CPT | Mod: 26 | Performed by: RADIOLOGY

## 2023-05-10 PROCEDURE — 77386 HCHG IMRT DELIVERY COMPLEX: CPT | Performed by: RADIOLOGY

## 2023-05-10 PROCEDURE — 77336 RADIATION PHYSICS CONSULT: CPT | Performed by: RADIOLOGY

## 2023-05-10 NOTE — ON TREATMENT VISIT
ON TREATMENT  NOTE  RADIATION ONCOLOGY DEPARTMENT    Patient name:  Tyler Zarate    Primary Physician:  Peg Gibson M.D. MRN: [id number]  CSN: [id number]   Referring physician:  Peg Gibosn M.*   : 1950, 73 y.o.     ENCOUNTER DATE:  5/10/2023      DIAGNOSIS:  Adenocarcinoma, lung, right (HCC)  Staging form: Lung, AJCC 8th Edition  - Clinical stage from 2023: Stage SUDHAKAR (cT1c, cN0, cM1a) - Signed by Sam Jaimes M.D. on 2023  Histopathologic type: Adenocarcinoma, NOS  Stage prefix: Initial diagnosis      TREATMENT SUMMARY:  Course First Treatment Date 2023  Course Last Treatment Date 05/10/2023  Aria Treatment Information          5/10/2023   Aria Course Treatment Dates   Course First Treatment Date 2023    Course Last Treatment Date 05/10/2023    Aria Treatment Summary   L [facility]  Plan from Course [email or web address]   Fraction 8 of 8   Elapsed Course Days 9 @ [id number]   Prescribed Fraction Dose 750 cGy   Prescribed Total Dose 6,000 cGy   L [facility] cp  Reference Point from Course [email or web address]   Elapsed Course Days 9 @    Session Dose 889 cGy   Total Dose 7,108 cGy   PTV_L [facility]  Reference Point from Course [email or web address]   Elapsed Course Days  @    Session Dose 750 cGy   Total Dose 6,000 cGy          SUBJECTIVE:  Feeling somewhat worse from last week, more winded, mild cough ongoing.  No fevers or chills.  No hemoptysis.    VITAL SIGNS:      5/10/2023     9:43 AM 5/3/2023     9:31 AM 2023     7:25 PM 2023     7:11 PM 2023     6:56 PM 2023     6:41 PM 2023     6:11 PM   Vitals   SYSTOLIC 139 127 120 122 114 127 134   DIASTOLIC 79 84 64 68 62 67 61   Pulse 100 84 102 89 83 91 89   Temperature   36.9 °C (98.4 °F) 37.3 °C (99.1 °F)  37.3 °C (99.1 °F) 37 °C (98.6 °F)   Respiration   20 18 18 16 18   Weight 159.7 159        BMI 21.07 kg/m2 20.98 kg/m2        Pulse Oximetry 92 % 95 % 94 % 90 % 91 % 92 % 88 %     KPS:  90, Able to carry on normal activity; minor signs or symptoms of disease (ECOG equivalent 0)  Encounter Vitals  Blood Pressure : 139/79  Pulse: 100  Pulse Oximetry: 92 %  Weight: 72.4 kg (159 lb 11.2 oz)      4/4/2023     9:30 AM 3/20/2023    10:59 AM 3/6/2023     9:28 AM   Pain Assessment   Pain Score NO PAIN NO PAIN NO PAIN          PHYSICAL EXAM:  Physical Exam  Constitutional:       Appearance: Normal appearance.   Cardiovascular:      Rate and Rhythm: Normal rate and regular rhythm.   Pulmonary:      Effort: Pulmonary effort is normal.      Comments: Decreased breath sounds right lung base.  Abdominal:      General: Abdomen is flat.      Palpations: Abdomen is soft.   Neurological:      General: No focal deficit present.      Mental Status: He is alert and oriented to person, place, and time.              5/10/2023     9:42 AM 5/3/2023     9:30 AM   Toxicity Assessment   Toxicity Assessment Chest Chest   Fatigue (lethargy, malaise, asthenia) Moderate (e.g., decrease in performance status by 1 ECOG level or 20% Karnofsky) or causing difficulty performing some activities Increased fatigue over baseline, but not altering normal activities   Radiation Dermatitis None None   Anorexia Loss of appetite Loss of appetite   Dyspepsia/heartburn None Mild   Dysphagia, Esophagitis, odynophagoa (painful swallowing) None Mild dysphagia, but can eat regular diet   RT Dysphagia-Esophageal None Mild dysphagia, but can eat regular diet   Cough Requiring narcotic antitussive Mild, relieved by non-prescription medication   Dyspnea Dyspnea at normal level of activity Dyspnea on exertion       CURRENT MEDICATIONS:    Current Outpatient Medications:     dexamethasone (DECADRON) 4 MG Tab, Take 1 Tablet by mouth 2 times a day for 3 days. Take 4 mg two times daily for 3 days starting the day prior to pemetrexed on days 0, 1, and 2., Disp: 6 Tablet, Rfl: 3    folic acid (FOLVITE) 1 MG Tab, Take 1 Tablet by mouth every day for 105 days.  Start 5-7 days prior to first cycle of pemetrexed, Disp: 30 Tablet, Rfl: 3    prochlorperazine (COMPAZINE) 10 MG Tab, Take 1 Tablet by mouth every 6 hours as needed (for nausea, vomiting)., Disp: 30 Tablet, Rfl: 6    ondansetron (ZOFRAN) 4 MG Tab tablet, Take 1 Tablet by mouth every four hours as needed for Nausea/Vomiting (for nausea, vomiting)., Disp: 30 Tablet, Rfl: 6    nicotine (NICODERM) 14 MG/24HR PATCH 24 HR, Place 1 Patch on the skin every 24 hours., Disp: 30 Patch, Rfl: 0    acetaminophen (TYLENOL) 500 MG Tab, Take 500-1,000 mg by mouth every 6 hours as needed., Disp: , Rfl:     aspirin 500 MG tablet, Take 1,000 mg by mouth. Either once a week with a lot of activity or once a month., Disp: , Rfl:     tiotropium (SPIRIVA) 18 MCG Cap, Place 18 mcg into inhaler and inhale every day., Disp: , Rfl:     ibuprofen (MOTRIN) 200 MG Tab, Take 200 mg by mouth every 6 hours as needed., Disp: , Rfl:     ALBUTEROL INH, Inhale., Disp: , Rfl:     LABORATORY DATA:   No results found for: SODIUM, POTASSIUM, CHLORIDE, CO2, GLUCOSE, BUN, CREATININE, BUNCREATRAT, GLOMRATE    No results found for: WBC, RBC, HEMOGLOBIN, HEMATOCRIT, MCV, MCH, MCHC, PLATELETCT      RADIOLOGY DATA:  CT-CHEST (THORAX) W/O    Result Date: 4/12/2023  1.  ION protocol for navigational bronchoscopic biopsy 2.  Right upper lobe spiculated mass presumably the target for upcoming bronchoscopic navigational biopsy. 3.  Emphysema 4.  Bilateral fibrotic changes associated with chronic bronchiectasis. Fleischner Society pulmonary nodule recommendations: Not Applicable     DX-CHEST-PORTABLE (1 VIEW)    Result Date: 4/12/2023  1.  Trace left pleural effusion with adjacent airspace disease. 2.  Known bilateral pulmonary nodules poorly visualized on this exam. 3.  No significant pneumothorax seen.    MR-BRAIN-WITH & W/O    Result Date: 5/1/2023  1.  No evidence of intracranial metastases. 2.  Cerebral atrophy and moderate chronic microvascular ischemic type  changes. 3.  No acute intracranial abnormality.    DX-PORTABLE FLUORO > 1 HOUR    Result Date: 4/13/2023  Portable fluoroscopy utilized for 1 minute 52 seconds. INTERPRETING LOCATION: [address] [place] [address], [address]      IMPRESSION:  Cancer Staging   Adenocarcinoma, lung, right (HCC)  Staging form: Lung, AJCC 8th Edition  - Clinical stage from 4/24/2023: Stage SUDHAKAR (cT1c, cN0, cM1a) - Signed by Sam Jaimes M.D. on 4/24/2023      PLAN:  Change in treatment plan.  Finished with radiation.  We will obtain diagnostic and therapeutic right side thoracentesis, possible large-volume tap, to see if we can improve his symptomatic dyspnea and cough.    Disposition:  Treatment plan and imaging reviewed. Questions answered. Continue therapy outlined.     Sam Jaimes M.D.    Orders Placed This Encounter    US-THORACENTESIS PUNCTURE RIGHT    FLUID PH    FLUID TOTAL PROTEIN    FLUID LDH    FLUID GLUCOSE    FLUID CELL COUNT    FLUID AMYLASE    CYTOLOGY    FLUID CULTURE W/GRAM STAIN    AFB CULTURE    FUNGAL CULTURE

## 2023-05-10 NOTE — ADDENDUM NOTE
Encounter addended by: Teresa Hernandez, Med Ass't on: 5/10/2023 2:56 PM   Actions taken: Pend clinical note

## 2023-05-10 NOTE — PROCEDURES
DATE OF SERVICE: 5/10/2023    DIAGNOSIS:  Adenocarcinoma, lung, right (HCC)  Staging form: Lung, AJCC 8th Edition  - Clinical stage from 4/24/2023: Stage SUDHAKAR (cT1c, cN0, cM1a) - Signed by Sam Jaimes M.D. on 4/24/2023  Histopathologic type: Adenocarcinoma, NOS  Stage prefix: Initial diagnosis       TREATMENT:  Radiation Therapy Episodes       Active Episodes       Radiation Therapy: SBRT (4/24/2023)                   Radiation Treatments         Plan Last Treated On Elapsed Days Fractions Treated Prescribed Fraction Dose (cGy) Prescribed Total Dose (cGy)    L [facility]um 5/10/2023 9 @ [id number] 8 of 8 750 6,000    RUL Lung SBRT 5/5/2023 4 @ [id number] 5 of 5 1,100 5,500                  Reference Point Last Treated On Elapsed Days Most Recent Session Dose (cGy) Total Dose (cGy)    L [facility]um cp 5/10/2023 9 @ [id number] 889 7,108    PTV_L Hilum 5/10/2023 9 @ [id number] 750 6,000    PTV_R 5/5/2023 4 @ [id number] 1,100 5,500    RUL cp 5/5/2023 4 @ [id number] 1,370 6,848                            STEREOTACTIC PROCEDURE NOTE:    Called by Truebeam machine to verify treatment parameters including:  treatment site, treatment dose, and treatment setup prior to stereotactic treatment..    Patient was placed in the treatment position with use of immobilization device and  laser guidance. CBCT images were acquired for target localization.  Images were reviewed in the axial, coronal, and saggital views and shifts were made as necessary to ensure that patient position matched simulation position.      Treatment delivered per  prescription.  The medical physicist was present throughout the set-up, verification and treatment delivery to oversee the procedure and ensure all parameters agreed with the computerized plan.    I have personally reviewed the relevant data, performed the target localization, and determined all relevant factors for this patient’s simulation.

## 2023-05-10 NOTE — RADIATION COMPLETION NOTES
END OF TREATMENT SUMMARY    Patient name:  Tyler Zarate    Primary Physician:  Peg Gibson M.D. MRN: [id number]  CSN: [id number]   Referring physician:  Peg Garcia M.*   : 1950, 73 y.o.       TREATMENT SUMMARY:        Course First Treatment Date 2023    Course Last Treatment Date 05/10/2023   Course Elapsed Days 9 @ [id number]   Course Intent Palliative     Radiation Therapy Episodes       Active Episodes       Radiation Therapy: SBRT (2023)                   Radiation Treatments         Plan Last Treated On Elapsed Days Fractions Treated Prescribed Fraction Dose (cGy) Prescribed Total Dose (cGy)    L Hilum 5/10/2023 9 @ [id number] 8 of 8 750 6,000    RUL Lung SBRT 5/10/2023 9 @ [id number] 5 of 5 1,100 5,500                  Reference Point Last Treated On Elapsed Days Most Recent Session Dose (cGy) Total Dose (cGy)    L Hilum cp 5/10/2023 9 @ [id number] -- 7,108    PTV_L Hilum 5/10/2023 9 @ [id number] -- 6,000    PTV_R 5/10/2023 9 @ [id number] -- 5,500    RUL cp 5/10/2023 9 @ [id number] -- 6,848                                     STAGE:   Adenocarcinoma, lung, right (HCC)  Staging form: Lung, AJCC 8th Edition  - Clinical stage from 2023: Stage SUDHAKAR (cT1c, cN0, cM1a) - Signed by Sam Jaimes M.D. on 2023  Histopathologic type: Adenocarcinoma, NOS  Stage prefix: Initial diagnosis       TREATMENT INDICATION:   Definitive SBRT     CONCURRENT SYSTEMIC TREATMENT:   None     RT COURSE DISCONTINUED EARLY:   No     PATIENT EXPERIENCE:       5/10/2023     9:42 AM 5/3/2023     9:30 AM   Toxicity Assessment   Toxicity Assessment Chest Chest   Fatigue (lethargy, malaise, asthenia) Moderate (e.g., decrease in performance status by 1 ECOG level or 20% Karnofsky) or causing difficulty performing some activities Increased fatigue over baseline, but not altering normal activities   Radiation Dermatitis None None   Anorexia Loss of appetite Loss of appetite    Dyspepsia/heartburn None Mild   Dysphagia, Esophagitis, odynophagoa (painful swallowing) None Mild dysphagia, but can eat regular diet   RT Dysphagia-Esophageal None Mild dysphagia, but can eat regular diet   Cough Requiring narcotic antitussive Mild, relieved by non-prescription medication   Dyspnea Dyspnea at normal level of activity Dyspnea on exertion        FOLLOW-UP PLAN:   8 Weeks     COMMENT:          ANATOMIC TARGET SUMMARY    ANATOMIC TARGET MODALITY TECHNIQUE   Right lung primary lesion   External beam, photons SBRT            COMMENT:         DIAGRAMS:        DOSE VOLUME HISTOGRAMS:

## 2023-05-11 RX ORDER — DIPHENHYDRAMINE HYDROCHLORIDE 50 MG/ML
50 INJECTION INTRAMUSCULAR; INTRAVENOUS PRN
Status: CANCELLED | OUTPATIENT
Start: 2023-05-15

## 2023-05-11 RX ORDER — 0.9 % SODIUM CHLORIDE 0.9 %
VIAL (ML) INJECTION PRN
Status: CANCELLED | OUTPATIENT
Start: 2023-05-15

## 2023-05-11 RX ORDER — 0.9 % SODIUM CHLORIDE 0.9 %
3 VIAL (ML) INJECTION PRN
Status: CANCELLED | OUTPATIENT
Start: 2023-05-15

## 2023-05-11 RX ORDER — CYANOCOBALAMIN 1000 UG/ML
1000 INJECTION, SOLUTION INTRAMUSCULAR; SUBCUTANEOUS
Status: CANCELLED | OUTPATIENT
Start: 2023-05-15

## 2023-05-11 RX ORDER — 0.9 % SODIUM CHLORIDE 0.9 %
10 VIAL (ML) INJECTION PRN
Status: CANCELLED | OUTPATIENT
Start: 2023-05-15

## 2023-05-11 RX ORDER — ONDANSETRON 8 MG/1
8 TABLET, ORALLY DISINTEGRATING ORAL PRN
Status: CANCELLED | OUTPATIENT
Start: 2023-05-15

## 2023-05-11 RX ORDER — 0.9 % SODIUM CHLORIDE 0.9 %
10 VIAL (ML) INJECTION PRN
Status: CANCELLED | OUTPATIENT
Start: 2023-05-14

## 2023-05-11 RX ORDER — 0.9 % SODIUM CHLORIDE 0.9 %
3 VIAL (ML) INJECTION PRN
Status: CANCELLED | OUTPATIENT
Start: 2023-05-14

## 2023-05-11 RX ORDER — 0.9 % SODIUM CHLORIDE 0.9 %
VIAL (ML) INJECTION PRN
Status: CANCELLED | OUTPATIENT
Start: 2023-05-14

## 2023-05-11 RX ORDER — EPINEPHRINE 1 MG/ML(1)
0.5 AMPUL (ML) INJECTION PRN
Status: CANCELLED | OUTPATIENT
Start: 2023-05-15

## 2023-05-11 RX ORDER — METHYLPREDNISOLONE SODIUM SUCCINATE 125 MG/2ML
125 INJECTION, POWDER, LYOPHILIZED, FOR SOLUTION INTRAMUSCULAR; INTRAVENOUS PRN
Status: CANCELLED | OUTPATIENT
Start: 2023-05-15

## 2023-05-11 RX ORDER — PROCHLORPERAZINE MALEATE 10 MG
10 TABLET ORAL EVERY 6 HOURS PRN
Status: CANCELLED | OUTPATIENT
Start: 2023-05-15

## 2023-05-11 RX ORDER — ONDANSETRON 2 MG/ML
4 INJECTION INTRAMUSCULAR; INTRAVENOUS PRN
Status: CANCELLED | OUTPATIENT
Start: 2023-05-15

## 2023-05-11 RX ORDER — SODIUM CHLORIDE 9 MG/ML
INJECTION, SOLUTION INTRAVENOUS CONTINUOUS
Status: CANCELLED | OUTPATIENT
Start: 2023-05-15

## 2023-05-12 ENCOUNTER — HOSPITAL ENCOUNTER (OUTPATIENT)
Dept: RADIOLOGY | Facility: MEDICAL CENTER | Age: 73
End: 2023-05-12
Attending: RADIOLOGY
Payer: COMMERCIAL

## 2023-05-12 DIAGNOSIS — R91.1 LEFT LOWER LOBE PULMONARY NODULE: ICD-10-CM

## 2023-05-12 LAB
AMYLASE FLD-CCNC: 28 U/L
APPEARANCE FLD: NORMAL
BODY FLD TYPE: NORMAL
COLOR FLD: NORMAL
GLUCOSE FLD-MCNC: 95 MG/DL
GRAM STN SPEC: NORMAL
LDH FLD L TO P-CCNC: 438 U/L
LYMPHOCYTES NFR FLD: 88 %
MONONUC CELLS NFR FLD: 8 %
NEUTROPHILS NFR FLD: 4 %
RBC # FLD: NORMAL CELLS/UL
SIGNIFICANT IND 70042: NORMAL
SITE SITE: NORMAL
SOURCE SOURCE: NORMAL
WBC # FLD: 791 CELLS/UL

## 2023-05-12 PROCEDURE — 83615 LACTATE (LD) (LDH) ENZYME: CPT

## 2023-05-12 PROCEDURE — 82150 ASSAY OF AMYLASE: CPT

## 2023-05-12 PROCEDURE — 88112 CYTOPATH CELL ENHANCE TECH: CPT

## 2023-05-12 PROCEDURE — 87102 FUNGUS ISOLATION CULTURE: CPT

## 2023-05-12 PROCEDURE — 83986 ASSAY PH BODY FLUID NOS: CPT

## 2023-05-12 PROCEDURE — 82945 GLUCOSE OTHER FLUID: CPT

## 2023-05-12 PROCEDURE — 88305 TISSUE EXAM BY PATHOLOGIST: CPT

## 2023-05-12 PROCEDURE — 87205 SMEAR GRAM STAIN: CPT

## 2023-05-12 PROCEDURE — 87116 MYCOBACTERIA CULTURE: CPT

## 2023-05-12 PROCEDURE — 89051 BODY FLUID CELL COUNT: CPT

## 2023-05-12 PROCEDURE — 87015 SPECIMEN INFECT AGNT CONCNTJ: CPT | Mod: 91

## 2023-05-12 PROCEDURE — 84157 ASSAY OF PROTEIN OTHER: CPT

## 2023-05-12 PROCEDURE — 87206 SMEAR FLUORESCENT/ACID STAI: CPT

## 2023-05-12 PROCEDURE — 87070 CULTURE OTHR SPECIMN AEROBIC: CPT

## 2023-05-12 PROCEDURE — C1729 CATH, DRAINAGE: HCPCS

## 2023-05-12 NOTE — PROGRESS NOTES
US guided therapeutic thoracentesis done by Dr. Lee;(no H&P required as this is a NON SEDATION procedure) R mid posterior back access site; dressing CDI; 2000mL obtained and specimen sent to lab. Pt baseline was 86% after activity of walking to bay. Pt reports he has COPD and baseline SpO2 88-92% per patient. SpO2 was 91% by the start of the procedure. End of procedure SpO2 94%. Pt tolerated the procedure well. Pt hemodynamically stable pre/intra/post procedure. All questions and concerns answered prior to d/c. Patient provided with all appropriate education for procedure. Pt d/c home.

## 2023-05-13 LAB
BODY FLD TYPE: NORMAL
BODY FLD TYPE: NORMAL
FUNGUS SPEC FUNGUS STN: NORMAL
PH FLD: 8 [PH]
PROT FLD-MCNC: 3.8 G/DL
RHODAMINE-AURAMINE STN SPEC: NORMAL
SIGNIFICANT IND 70042: NORMAL
SIGNIFICANT IND 70042: NORMAL
SITE SITE: NORMAL
SITE SITE: NORMAL
SOURCE SOURCE: NORMAL
SOURCE SOURCE: NORMAL

## 2023-05-15 ENCOUNTER — HOSPITAL ENCOUNTER (OUTPATIENT)
Dept: LAB | Facility: MEDICAL CENTER | Age: 73
End: 2023-05-15
Attending: STUDENT IN AN ORGANIZED HEALTH CARE EDUCATION/TRAINING PROGRAM
Payer: COMMERCIAL

## 2023-05-15 DIAGNOSIS — C34.91 ADENOCARCINOMA, LUNG, RIGHT (HCC): ICD-10-CM

## 2023-05-15 DIAGNOSIS — Z79.899 HIGH RISK MEDICATION USE: ICD-10-CM

## 2023-05-15 LAB
ALBUMIN SERPL BCP-MCNC: 2.5 G/DL (ref 3.2–4.9)
ALBUMIN/GLOB SERPL: 0.5 G/DL
ALP SERPL-CCNC: 99 U/L (ref 30–99)
ALT SERPL-CCNC: 29 U/L (ref 2–50)
ANION GAP SERPL CALC-SCNC: 13 MMOL/L (ref 7–16)
AST SERPL-CCNC: 22 U/L (ref 12–45)
BACTERIA FLD AEROBE CULT: NORMAL
BASOPHILS # BLD AUTO: 0.1 % (ref 0–1.8)
BASOPHILS # BLD: 0.02 K/UL (ref 0–0.12)
BILIRUB SERPL-MCNC: 0.2 MG/DL (ref 0.1–1.5)
BUN SERPL-MCNC: 16 MG/DL (ref 8–22)
CALCIUM ALBUM COR SERPL-MCNC: 9.9 MG/DL (ref 8.5–10.5)
CALCIUM SERPL-MCNC: 8.7 MG/DL (ref 8.5–10.5)
CHLORIDE SERPL-SCNC: 95 MMOL/L (ref 96–112)
CO2 SERPL-SCNC: 27 MMOL/L (ref 20–33)
CREAT SERPL-MCNC: 0.67 MG/DL (ref 0.5–1.4)
CYTOLOGY REG CYTOL: NORMAL
EOSINOPHIL # BLD AUTO: 0 K/UL (ref 0–0.51)
EOSINOPHIL NFR BLD: 0 % (ref 0–6.9)
ERYTHROCYTE [DISTWIDTH] IN BLOOD BY AUTOMATED COUNT: 45.9 FL (ref 35.9–50)
GFR SERPLBLD CREATININE-BSD FMLA CKD-EPI: 98 ML/MIN/1.73 M 2
GLOBULIN SER CALC-MCNC: 4.6 G/DL (ref 1.9–3.5)
GLUCOSE SERPL-MCNC: 127 MG/DL (ref 65–99)
GRAM STN SPEC: NORMAL
HCT VFR BLD AUTO: 43.9 % (ref 42–52)
HGB BLD-MCNC: 13.9 G/DL (ref 14–18)
IMM GRANULOCYTES # BLD AUTO: 0.1 K/UL (ref 0–0.11)
IMM GRANULOCYTES NFR BLD AUTO: 0.7 % (ref 0–0.9)
LYMPHOCYTES # BLD AUTO: 0.76 K/UL (ref 1–4.8)
LYMPHOCYTES NFR BLD: 5.2 % (ref 22–41)
MCH RBC QN AUTO: 29.3 PG (ref 27–33)
MCHC RBC AUTO-ENTMCNC: 31.7 G/DL (ref 33.7–35.3)
MCV RBC AUTO: 92.6 FL (ref 81.4–97.8)
MONOCYTES # BLD AUTO: 1.02 K/UL (ref 0–0.85)
MONOCYTES NFR BLD AUTO: 6.9 % (ref 0–13.4)
NEUTROPHILS # BLD AUTO: 12.78 K/UL (ref 1.82–7.42)
NEUTROPHILS NFR BLD: 87.1 % (ref 44–72)
NRBC # BLD AUTO: 0 K/UL
NRBC BLD-RTO: 0 /100 WBC
PLATELET # BLD AUTO: 334 K/UL (ref 164–446)
PMV BLD AUTO: 10 FL (ref 9–12.9)
POTASSIUM SERPL-SCNC: 4.1 MMOL/L (ref 3.6–5.5)
PROT SERPL-MCNC: 7.1 G/DL (ref 6–8.2)
RBC # BLD AUTO: 4.74 M/UL (ref 4.7–6.1)
SIGNIFICANT IND 70042: NORMAL
SITE SITE: NORMAL
SODIUM SERPL-SCNC: 135 MMOL/L (ref 135–145)
SOURCE SOURCE: NORMAL
T4 FREE SERPL-MCNC: 1.53 NG/DL (ref 0.93–1.7)
TSH SERPL DL<=0.005 MIU/L-ACNC: 0.82 UIU/ML (ref 0.38–5.33)
WBC # BLD AUTO: 14.7 K/UL (ref 4.8–10.8)

## 2023-05-15 PROCEDURE — 80053 COMPREHEN METABOLIC PANEL: CPT

## 2023-05-15 PROCEDURE — 85025 COMPLETE CBC W/AUTO DIFF WBC: CPT

## 2023-05-15 PROCEDURE — 84439 ASSAY OF FREE THYROXINE: CPT

## 2023-05-15 PROCEDURE — 36415 COLL VENOUS BLD VENIPUNCTURE: CPT

## 2023-05-15 PROCEDURE — 84443 ASSAY THYROID STIM HORMONE: CPT

## 2023-05-15 NOTE — PROGRESS NOTES
"Pharmacy Chemotherapy Calculations    Dx: NSCLC  Cycle 1, Day 1  Previous treatment = n/a    Protocol: Pembrolizumab + Pemetrexed/Carboplatin  Pembrolizumab 200 mg IV over 30 minutes on Day 1 followed by  Pemetrexed 500 mg/m2 IV over 10 minutes on Day 1 followed by  Carboplatin AUC 5 IV over 30 minutes on Day 1   21-day cycle for 4-6 cycles  ~Followed by~  Pembrolizumab 200 mg IV over 30 minutes on Day 1 or Pembrolizumab 400 mg IV over 30 minutes every other cycle followed by  Pemetrexed 500 mg/m2 IV over 10 minutes on Day 1   Maintenance therapy: 21-day cycle until 24 months of therapy has been completed  ~Followed by~  Pemetrexed 500 mg/m2 IV over 10 minutes on Day 1   Maintenance therapy: 21-day cycle until DP/UT  NCCN Guidelines for Non-Small Cell Lung Cancer V.1.2023.  Nikki M, et al. Eur J Cancer. 2020;131:68-75.  Kelsey CJ, et al. Lancet Oncol. 2016;17(11):9824-5282.  Joelle SERNA et al. N Engl J Med. 2018;378(22):4526-4905.    Allergies:  Bacitracin-neomycin-polymyxin, Gabapentin, Ketorolac, Lipoglycopeptide antibiotics, and Tromethamine       BP 90/60   Pulse 94   Temp 36.3 °C (97.3 °F) (Temporal)   Resp 18   Ht 1.77 m (5' 9.69\")   Wt 71 kg (156 lb 8.4 oz)   SpO2 90%   BMI 22.66 kg/m²  Body surface area is 1.87 meters squared.    Labs 5/15/23:  ANC~ 85026 Plt = 334k   Hgb = 13.9     SCr = 0.67 mg/dL CrCl ~ 93 mL/min   AST/ALT/AP = 22/29/99 TBili = 0.2  TSH = 0.82 Free T4 = 1.53    Cyanocobalamin given every 9 weeks, last given 5/16/23, next due 7/18/23    Pembrolizumab 200 mg fixed dose   No calculation required, OK to treat with final dose = 200 mg    Pemetrexed 500 mg/m² x 1.87 m² = 935 mg   <10% difference, OK to treat with final dose = 900 mg    Carboplatin AUC 5 x (93 mL/min + 25) = 590 mg   <10% difference, OK to treat with final dose = 531 mg    Lawrence Garcia, PharmD  "

## 2023-05-16 ENCOUNTER — OUTPATIENT INFUSION SERVICES (OUTPATIENT)
Dept: ONCOLOGY | Facility: MEDICAL CENTER | Age: 73
End: 2023-05-16
Attending: STUDENT IN AN ORGANIZED HEALTH CARE EDUCATION/TRAINING PROGRAM
Payer: COMMERCIAL

## 2023-05-16 ENCOUNTER — PATIENT OUTREACH (OUTPATIENT)
Dept: ONCOLOGY | Facility: MEDICAL CENTER | Age: 73
End: 2023-05-16

## 2023-05-16 ENCOUNTER — PATIENT MESSAGE (OUTPATIENT)
Dept: ONCOLOGY | Facility: MEDICAL CENTER | Age: 73
End: 2023-05-16

## 2023-05-16 ENCOUNTER — DOCUMENTATION (OUTPATIENT)
Dept: ONCOLOGY | Facility: MEDICAL CENTER | Age: 73
End: 2023-05-16
Payer: COMMERCIAL

## 2023-05-16 VITALS
OXYGEN SATURATION: 90 % | HEIGHT: 70 IN | TEMPERATURE: 97.3 F | WEIGHT: 156.53 LBS | HEART RATE: 94 BPM | DIASTOLIC BLOOD PRESSURE: 60 MMHG | BODY MASS INDEX: 22.41 KG/M2 | RESPIRATION RATE: 18 BRPM | SYSTOLIC BLOOD PRESSURE: 90 MMHG

## 2023-05-16 DIAGNOSIS — C34.91 ADENOCARCINOMA, LUNG, RIGHT (HCC): ICD-10-CM

## 2023-05-16 PROCEDURE — 96375 TX/PRO/DX INJ NEW DRUG ADDON: CPT

## 2023-05-16 PROCEDURE — 96413 CHEMO IV INFUSION 1 HR: CPT

## 2023-05-16 PROCEDURE — 96372 THER/PROPH/DIAG INJ SC/IM: CPT | Mod: XU

## 2023-05-16 PROCEDURE — 96367 TX/PROPH/DG ADDL SEQ IV INF: CPT

## 2023-05-16 PROCEDURE — 700111 HCHG RX REV CODE 636 W/ 250 OVERRIDE (IP): Performed by: NURSE PRACTITIONER

## 2023-05-16 PROCEDURE — 700105 HCHG RX REV CODE 258: Performed by: NURSE PRACTITIONER

## 2023-05-16 PROCEDURE — 96411 CHEMO IV PUSH ADDL DRUG: CPT

## 2023-05-16 PROCEDURE — 96417 CHEMO IV INFUS EACH ADDL SEQ: CPT

## 2023-05-16 RX ORDER — CYANOCOBALAMIN 1000 UG/ML
1000 INJECTION, SOLUTION INTRAMUSCULAR; SUBCUTANEOUS
Status: DISCONTINUED | OUTPATIENT
Start: 2023-05-16 | End: 2023-05-16 | Stop reason: HOSPADM

## 2023-05-16 RX ADMIN — FOSAPREPITANT 150 MG: 150 INJECTION, POWDER, LYOPHILIZED, FOR SOLUTION INTRAVENOUS at 12:07

## 2023-05-16 RX ADMIN — CYANOCOBALAMIN 1000 MCG: 1000 INJECTION, SOLUTION INTRAMUSCULAR; SUBCUTANEOUS at 12:56

## 2023-05-16 RX ADMIN — ONDANSETRON 16 MG: 2 INJECTION INTRAMUSCULAR; INTRAVENOUS at 11:45

## 2023-05-16 RX ADMIN — PEMETREXED DISODIUM 900 MG: 500 INJECTION, POWDER, LYOPHILIZED, FOR SOLUTION INTRAVENOUS at 13:45

## 2023-05-16 RX ADMIN — DEXAMETHASONE SODIUM PHOSPHATE 12 MG: 4 INJECTION, SOLUTION INTRA-ARTICULAR; INTRALESIONAL; INTRAMUSCULAR; INTRAVENOUS; SOFT TISSUE at 11:29

## 2023-05-16 RX ADMIN — SODIUM CHLORIDE 200 MG: 9 INJECTION, SOLUTION INTRAVENOUS at 12:49

## 2023-05-16 RX ADMIN — CARBOPLATIN 531 MG: 10 INJECTION, SOLUTION INTRAVENOUS at 14:04

## 2023-05-16 ASSESSMENT — FIBROSIS 4 INDEX: FIB4 SCORE: 0.91

## 2023-05-16 NOTE — PROGRESS NOTES
Chemotherapy Verification - PRIMARY RN    D1C1    Height = 177cm  Weight = 71kg  BSA = 1.87m^2       Medication: pembrolizumab (KEYTRUDA)  Dose: set dose 200mg  Calculated Dose: set dose 200mg                                Medication: PEMEtrexed (ALIMTA)  Dose: 500mg/m^2  Calculated Dose: 935mg                                Medication: CARBOplatin (PARAPLATIN)  Dose: AUC 5  Calculated Dose: 589.29mg                             (In mg/m2, AUC, mg/kg)          Carboplatin calculation (if applicable):  (5*(92.858+25)) = 589.29      I confirm this process was performed independently with the BSA and all final chemotherapy dosing calculations congruent.  Any discrepancies of 10% or greater have been addressed with the chemotherapy pharmacist. The resolution of the discrepancy has been documented in the EPIC progress notes.

## 2023-05-16 NOTE — PROGRESS NOTES
Chemotherapy Verification - SECONDARY RN       Height = 177 cm  Weight = 71 kg  BSA = 1.87 m2       Medication: Keytruda  Dose: 200 mg set dose  Calculated Dose: 200 mg                             (In mg/m2, AUC, mg/kg)     Medication: Alimta  Dose: 500 mg/m2  Calculated Dose: 935 mg                             (In mg/m2, AUC, mg/kg)    Medication: Carboplatin  Dose: AUC 5  Calculated Dose: 590 mg                             (In mg/m2, AUC, mg/kg)    Carboplatin calculation (if applicable):  (5*(93+25)) = 590    I confirm that this process was performed independently.

## 2023-05-16 NOTE — PROGRESS NOTES
Follow up call placed to patient, left message.  Sent follow up letter and message via [employer] as well.

## 2023-05-16 NOTE — LETTER
[facility]    [address]  [place] [address]  Phone: [phone] - Fax: [phone]              Tyler Zarate  [address]  [facility] [address]     Date: 05/16/23      Dear Tyler,    I am your Cancer Nurse Navigator, a certified oncology nurse. I am sending this letter as a reminder of our available services. My role is to address any needs you may have with education, guidance and support. I am available to you and your family through your treatment at [facility].       I am available to address your needs during your journey with the following services:     Care Coordination  I can assist you in facilitating communication between your cancer care treatment team to ensure timely treatment and follow-up.  I can also assist with transition of care back to your primary care provider, or other specialist, as needed.  My goal is to bridge gaps for you throughout the course of your active treatment.       Education Services  Understanding the recommended treatment course by your physician is key. I can provide educational resources personalized to your cancer diagnosis to help you understand your diagnosis and treatment. Please let me know if you would like to receive information about your diagnosis and treatment plan.  I am here to help.     Support Services/Resource Information  [facility] we offer a full scope of support services.  I can assist you with referral information to:  Cancer Clinical Trials & Research  Nutrition counseling  Support groups  Complementary Therapies such as Mind-Body Techniques Meditation  Patient Financial Advocates    Maribell Ramosjeane [facility], an [employer] affiliate office, our volunteers can assist you with accessing our Shop[employer] library, support services information, head coverings and comfort items  Community and national resources, included eligibility based michel assistance and pharmaceutical access  programs, if you are in need of additional information.     Pending sale to [facility] offers services that include:  Behavioral Health  Genetic counseling & testing  Acupuncture  Lymphedema prevention/treatment program  Palliative care services.         I hope you have an excellent patient experience.  Please feel free to share with me your comments regarding the care you have received- we value your feedback.    Sincerely,     Joaquina Villa R.N.  Cancer Nurse Navigator    Office: [phone]  Main:  [phone]   Email:  [email or web address]

## 2023-05-16 NOTE — PROGRESS NOTES
Pt arrived ambulatory to [facility] for D1C1 Keytruda + Alimta + Carboplatin treatment adenocarcinoma right lung. Pt is accompanied by his son, Ran. POC discussed with pt and his son. Their questions answered and pt agrees with plan. Pt with call light in reach for safety. Pt verbalized understanding to call for needs/assist.     Pt's lab results from 5/15/2023 reviewed, ok to proceed with treatment.  Pt states he did NOT take his Decadron at home this morning. Pt medicated per MAR. Pt tolerated treatment without s/s adverse reaction. PIV dc'd catheter tip intact, gauze and coban dressing applied. Pt discharged to self care, [facility]. Pt is accompanied by his son. Pt's next appt confirmed 6/6/2023.

## 2023-05-16 NOTE — PROGRESS NOTES
"Pharmacy Chemotherapy Calculation    Patient Name: Tyler Zarate   Dx:NSCLC (Stage IV)    Protocol: CARBOplatin/PEMEtrexed + Pembrolizumab  Pembrolizumab 200 mg IV over 30 minutes on Day 1 followed by   Pemetrexed 500 mg/m2 IV over 10 minutes on Day 1 followed by   CARBOplatin AUC 5 IV over 30 minutes on Day 1    21 day cycle for 4-6 cycles   ~Followed by~  Pembrolizumab 400 mg IV over 30 minutes on Day 1 every other cycle followed by   Pemetrexed 500 mg/m2 IV over 10 minutes on Day 1    21 day cycle until 24 months of therapy has been completed   ~Followed by~  Pemetrexed 500 mg/m2 IV over 10 minutes on Day 1    21 day cycle until DP or UT  NCCN Guidelines for Non-Small Cell Lung Cancer V.1.2023  Nikki M, et al. Eur J Cancer. 2020;131:68-75.  Kelsey CJ, et al. Lancet Oncol. 2016;17(11):0086-3415.  Joelle SERNA, et al. N Engl J Med.  2018;378(22):0622-1246.    Allergies:  Bacitracin-neomycin-polymyxin, Gabapentin, Ketorolac, Lipoglycopeptide antibiotics, and Tromethamine     BP 90/60   Pulse 94   Temp 36.3 °C (97.3 °F) (Temporal)   Resp 18   Ht 1.77 m (5' 9.69\")   Wt 71 kg (156 lb 8.4 oz)   SpO2 90%   BMI 22.66 kg/m²  Body surface area is 1.87 meters squared.    Labs 5/15/23:  ANC~ 01511 Plt = 334k   Hgb = 13.9     SCr = 0.67 mg/dL CrCl ~ 93 mL/min (Minimum SCr of 0.7 used)  LFT's = WNL TBili = 0.2   TSH  = 0.82  Free T4  = 1.53     Drug Order   (Drug name, dose, route, IV Fluid & volume, frequency, number of doses) Cycle 1      Previous treatment: n/a     Medication = Pembrolizumab   Base Dose= 200 mg   Fixed dose, no calculation required.  Final Dose = 200 mg  Route = IV  Fluid & Volume = NS 50 mL  Admin Duration = Over 30 minutes          Fixed dose, no calculation required. Okay to treat with final dose.   Medication = PEMEtrexed   Base Dose= 500 mg/m²   Calc Dose: Base Dose x 1.87 m² = 935 mg  Final Dose = 900 mg  Route = IV  Fluid & Volume =  mL  Admin Duration = Over 10 minutes          <10% " difference, okay to treat with final dose   Medication = CARBOplatin  Base Dose= AUC 5    Calc Dose:AUCx (93 mL/min + 25) = 590 mg  Final Dose = 531 mg  Route = IV  Fluid & Volume =  mL  Admin Duration = Over 30 minutes          <10% difference, okay to treat with final dose     By my signature below, I confirm this process was performed independently with the BSA and all final chemotherapy dosing calculations congruent. I have reviewed the above chemotherapy order and that my calculation of the final dose and BSA (when applicable) corroborate those calculations of the  pharmacist. Discrepancies of 10% or greater in the written dose have been addressed and documented within the EPIC Progress notes.    Allison Aguirre, PharmD

## 2023-05-16 NOTE — PROGRESS NOTES
Nutrition Services: RD Consultation/ New Chemo Start  Tlyer Zarate is a 73 y.o. male with diagnosis of adenocarcinoma, lung, right    RD met with pt to assess current intake, appetite, and nutritional status.  Pt presents to appointment with his son. He reports he is not currently seeing an RD. Per pt, he does not have a great appetite. He reports a weight loss over 2 months of 8 lbs and UBW of 170-175 lbs. Pt reports he can chew and swallow okay but does have missing teeth. He reports he has a lot of food preferences but he mainly tries to avoid chemicals in nutrition drinks. He states his son has been trying to get him to drink whey protein but he does not like the taste. Pt states he has concerns about further weight loss. He states has been trying to incorporate more green vegetables in his diet and increasing water intake. Per pt, he is currently taking a MVI. Pt did not express any further nutrition-related questions or concerns at this time.     Dietary intake pattern:    B: [place] omelet, potatoes, coffee with cream and sugar  L: hamburger  D: chicken wings, pork belly  Drinks: whole milk, 2-3 Budweiser daily, water    Past Medical History:   Diagnosis Date    Cancer (HCC)     skin, Lung    Carcinoma in situ of respiratory system     Lung 2023    Cataract     COPD (chronic obstructive pulmonary disease) (HCC)     Cough     DVT (deep venous thrombosis) (HCC)     RLL    Lumbar radiculopathy     Pain     lower back and sciatic pain    Pneumonia     2018    Shortness of breath     Skin carcinoma     Sputum production     Synovial cyst     L spine       Past Surgical History:   Procedure Laterality Date    RI BRONCHOSCOPY,DIAGNOSTIC  4/12/2023    Procedure: FIBER OPTIC BRONCHOSCOPY WITH  WASH, BRUSH, BRONCHOALVEOLAR LAVAGE, BIOPSY AND FINE NEEDLE ASPIRATION & NAVIGATION, ROBOTICS;  Surgeon: Vladimir Ortiz M.D.;  Location: SURGERY [facility];  Service: Pulmonary Robotic    CATARACT PHACO WITH IOL  "Bilateral          Biochemical/ Anthropometric Assessment:  Treatment Regimen: Oncology Treatment: OP NSC Lung CARBOplatin + PEMEtrexed + Pembrolizumab   Pertinent Labs: Glucose 127, albumin 2.5  Pertinent Meds: Emend, vitamin B12, folic acid, Compazine, Zofran  Wt Readings from Last 1 Encounters:   05/16/23 71 kg (156 lb 8.4 oz)      Ht Readings from Last 1 Encounters:   05/16/23 1.77 m (5' 9.69\")      BMI Readings from Last 1 Encounters:   05/16/23 22.66 kg/m²   BMI Classification: normal weight    Weight History:  Wt Readings from Last 6 Encounters:   05/16/23 71 kg (156 lb 8.4 oz)   05/10/23 72.4 kg (159 lb 11.2 oz)   05/03/23 72.1 kg (159 lb)   04/12/23 74.5 kg (164 lb 2.1 oz)   04/06/23 74.8 kg (165 lb)   04/04/23 74.9 kg (165 lb 2 oz)     {    Weight Change/Malnutrition Risk: Pt appears to have a weight loss of 2.1 kg/ 2.8% in >2 week which is not considered severe.     Interventions:  Introduced self and discussed role of dietitian throughout treatment process   Provided handout regarding general nutrition guidelines as well as nutritional recommendations for patient's with lung cancer, including tips/tricks should side effects of tx occur. Pt had just received some lunch and prefers to review handout on his own and ask RD with questions.   Focus on high protein foods, fruits and vegetables with all meals/snacks - handout provided.  Incorporate boost plus or comparable protein supplement. Provided samples and coupons for [employer] given pt prefers no chemicals in his protein drinks    Pt reports understanding and was receptive to information provided.   RD provided contact information. Encouraged pt to reach out as questions/concerns arise.   RD available PRN   [phone]    "

## 2023-05-23 ENCOUNTER — HOSPITAL ENCOUNTER (OUTPATIENT)
Dept: HEMATOLOGY ONCOLOGY | Facility: MEDICAL CENTER | Age: 73
End: 2023-05-23
Attending: NURSE PRACTITIONER
Payer: COMMERCIAL

## 2023-05-23 VITALS
HEART RATE: 83 BPM | SYSTOLIC BLOOD PRESSURE: 127 MMHG | DIASTOLIC BLOOD PRESSURE: 84 MMHG | HEIGHT: 69 IN | WEIGHT: 149.03 LBS | TEMPERATURE: 98.2 F | OXYGEN SATURATION: 95 % | RESPIRATION RATE: 19 BRPM | BODY MASS INDEX: 22.07 KG/M2

## 2023-05-23 DIAGNOSIS — K52.1 DIARRHEA DUE TO DRUG: ICD-10-CM

## 2023-05-23 DIAGNOSIS — C34.91 ADENOCARCINOMA, LUNG, RIGHT (HCC): ICD-10-CM

## 2023-05-23 DIAGNOSIS — Z79.899 ENCOUNTER FOR LONG-TERM CURRENT USE OF HIGH RISK MEDICATION: ICD-10-CM

## 2023-05-23 PROCEDURE — 3074F SYST BP LT 130 MM HG: CPT | Performed by: NURSE PRACTITIONER

## 2023-05-23 PROCEDURE — 3079F DIAST BP 80-89 MM HG: CPT | Performed by: NURSE PRACTITIONER

## 2023-05-23 PROCEDURE — 99214 OFFICE O/P EST MOD 30 MIN: CPT | Performed by: NURSE PRACTITIONER

## 2023-05-23 PROCEDURE — 99212 OFFICE O/P EST SF 10 MIN: CPT | Performed by: NURSE PRACTITIONER

## 2023-05-23 ASSESSMENT — ENCOUNTER SYMPTOMS
DIARRHEA: 0
FEVER: 0
COUGH: 1
HEADACHES: 0
NAUSEA: 1
WEIGHT LOSS: 1
MYALGIAS: 0
CHILLS: 1
CONSTIPATION: 0
PALPITATIONS: 0
VOMITING: 0
SHORTNESS OF BREATH: 1
DIZZINESS: 0

## 2023-05-23 ASSESSMENT — FIBROSIS 4 INDEX: FIB4 SCORE: 0.91

## 2023-05-23 ASSESSMENT — PAIN SCALES - GENERAL: PAINLEVEL: 2=MINIMAL-SLIGHT

## 2023-05-23 NOTE — PROGRESS NOTES
Subjective     Tyler Zarate is a 73 y.o. male who presents with Cancer (Tox Chk)            HPI    Patient seen today for evaluation after cycle 1 of chemotherapy employing Carboplatin, Pemetrexed and Pembrolizumab for metastatic lung cancer, for continued monitoring of symptoms and side effects of cancer treatments.  He presents accompanied by his son for today's visit.    Oncology history of presenting illness:  Patient had a CT scan of the chest on 1/18/2023 which showed a new spiculated mass in the anterior right upper lobe arising from the area of the presumed previous scar.  This was highly suggestive of disease.  He ended up having a biopsy on 2/17/2023 which was consistent with adenocarcinoma of the right upper lobe.  He did have a PET scan following his diagnosis, date unknown, which showed a hypermetabolic right upper lobe mass measuring 20 x 16 mm as well as a mass in the left lower lobe measuring 11 x 5 mm.  Patient did undergo biopsy of the left lower lobe which was consistent with adenocarcinoma of the lung.    Patient recently moved from [facility] to [place] to be closer to his son.  At time of diagnosis patient had complained of unintentional weight loss of greater than 10% within the past 6 months.  Patient also with a history of basal cell carcinoma of the nose.    Treatment history:  05/01 - 05/10/23: SBRT to lung - Dr. Jaimes  05/16/23: C1D1 Carboplatin, Pemetrexed and Pembrolizumab    Interval history:  Patient did actually very well after his first dose of treatment.  Did state that for the first 3 days he had diarrhea, approximately 6 episodes per day.  He did not take any medication for this as he was not aware. He is now having normal bowel movements.  He has noticed some weight loss within this first week but states that he is eating okay.  He does have distant underlying nausea which seems to be relieved with his antiemetics.  He does have some fatigue but is tolerable.  He  "states that he is cold all the time but does not have any fever and chills.  He does continue to have cough and shortness of breath but that is not worsening.        Allergies   Allergen Reactions    Bacitracin-Neomycin-Polymyxin      Ointment around eye caused face and eye swelling    Gabapentin Swelling     Took several medications following cataract surgery. Not sure which medication was cause.     Ketorolac Swelling     Took following cataract surgery. Not sure which medication was cause.     Lipoglycopeptide Antibiotics Swelling     Pt stated he did not remember what kind of antibiotics he was allergic to. Pt stated \"two different kinds\" Face swelling and eyes glazing over.     Tromethamine        Current Outpatient Medications on File Prior to Encounter   Medication Sig Dispense Refill    folic acid (FOLVITE) 1 MG Tab Take 1 Tablet by mouth every day for 105 days. Start 5-7 days prior to first cycle of pemetrexed 30 Tablet 3    prochlorperazine (COMPAZINE) 10 MG Tab Take 1 Tablet by mouth every 6 hours as needed (for nausea, vomiting). 30 Tablet 6    ondansetron (ZOFRAN) 4 MG Tab tablet Take 1 Tablet by mouth every four hours as needed for Nausea/Vomiting (for nausea, vomiting). 30 Tablet 6    nicotine (NICODERM) 14 MG/24HR PATCH 24 HR Place 1 Patch on the skin every 24 hours. 30 Patch 0    acetaminophen (TYLENOL) 500 MG Tab Take 500-1,000 mg by mouth every 6 hours as needed.      aspirin 500 MG tablet Take 1,000 mg by mouth. Either once a week with a lot of activity or once a month.      tiotropium (SPIRIVA) 18 MCG Cap Place 18 mcg into inhaler and inhale every day.      ibuprofen (MOTRIN) 200 MG Tab Take 200 mg by mouth every 6 hours as needed.      ALBUTEROL INH Inhale.       No current facility-administered medications on file prior to encounter.            Review of Systems   Constitutional:  Positive for chills (always cold), malaise/fatigue and weight loss. Negative for fever.   Respiratory:  Positive " "for cough and shortness of breath.    Cardiovascular:  Negative for chest pain and palpitations.   Gastrointestinal:  Positive for nausea. Negative for constipation, diarrhea and vomiting.   Genitourinary:  Negative for dysuria.   Musculoskeletal:  Negative for myalgias.   Neurological:  Negative for dizziness and headaches.              Objective     /84   Pulse 83   Temp 36.8 °C (98.2 °F) (Temporal)   Resp 19   Ht 1.753 m (5' 9\")   Wt 67.6 kg (149 lb 0.5 oz)   SpO2 95%   BMI 22.01 kg/m²      Physical Exam  Vitals reviewed.   Constitutional:       General: He is not in acute distress.     Appearance: Normal appearance. He is not diaphoretic.   HENT:      Head: Normocephalic and atraumatic.   Cardiovascular:      Rate and Rhythm: Normal rate and regular rhythm.      Heart sounds: Normal heart sounds. No murmur heard.     No friction rub. No gallop.   Pulmonary:      Effort: Pulmonary effort is normal. No respiratory distress.      Breath sounds: Normal breath sounds. No wheezing.   Abdominal:      General: Bowel sounds are normal. There is no distension.      Palpations: Abdomen is soft.      Tenderness: There is no abdominal tenderness.   Musculoskeletal:         General: No swelling or tenderness. Normal range of motion.   Skin:     General: Skin is warm and dry.   Neurological:      Mental Status: He is alert and oriented to person, place, and time.   Psychiatric:         Mood and Affect: Mood normal.         Behavior: Behavior normal.                       Assessment & Plan       1. Adenocarcinoma, lung, right (HCC)        2. Diarrhea due to drug        3. Encounter for long-term current use of high risk medication                1. Patient with Stage SUDHAKAR (cT1c, cN0, cM1a) currently on treatment with carboplatin, pemetrexed and pembrolizumab.  He is status post his first cycle of treatment and has tolerated it very well.  He will return to the clinic in 2 weeks prior to cycle 2.    2.  Patient did " have some diarrhea after treatment that lasted approximately 3 days.  We discussed the use of over-the-counter Imodium and he and his son both verbalized understanding on how to take this medication.    3.  We will see patient back in the clinic in 2 weeks, or sooner if needed.      Please note that this dictation was created using voice recognition software. I have made every reasonable attempt to correct obvious errors, but I expect that there are errors of grammar and possibly content that I did not discover before finalizing the note.

## 2023-05-27 LAB
MYCOBACTERIUM SPEC CULT: NORMAL
RHODAMINE-AURAMINE STN SPEC: NORMAL
SIGNIFICANT IND 70042: NORMAL
SITE SITE: NORMAL
SOURCE SOURCE: NORMAL

## 2023-05-30 ENCOUNTER — PATIENT OUTREACH (OUTPATIENT)
Dept: ONCOLOGY | Facility: MEDICAL CENTER | Age: 73
End: 2023-05-30
Payer: COMMERCIAL

## 2023-05-30 ENCOUNTER — TELEPHONE (OUTPATIENT)
Dept: HEMATOLOGY ONCOLOGY | Facility: MEDICAL CENTER | Age: 73
End: 2023-05-30
Payer: COMMERCIAL

## 2023-05-30 ENCOUNTER — TELEPHONE (OUTPATIENT)
Dept: ONCOLOGY | Facility: MEDICAL CENTER | Age: 73
End: 2023-05-30
Payer: COMMERCIAL

## 2023-05-30 NOTE — PROGRESS NOTES
Patient called navigation line to report GI symptoms and to speak with his provider.  NN looked up the patient's care teams and transferred the call to Dr. Roseanna Gibson's office.  NN provided the direct phone number to Dr. Gibson's office today and encouraged patient to call back for any additional assistance.

## 2023-05-30 NOTE — TELEPHONE ENCOUNTER
Pt called with c/o of constipation and no stool x3 days. Pt is passing gas. Advised to take milk of magnesia once today and start miralax daily to keep regular. Will follow up with patient to see if medication is effective at end of work day

## 2023-05-30 NOTE — TELEPHONE ENCOUNTER
Follow up call to Patient to see if his constipation has resolved.  The Pt stated that he followed the Michelle KIRBY suggestion of taking MOM and Miralax and the constipation has resolved.

## 2023-05-31 RX ORDER — PROCHLORPERAZINE MALEATE 10 MG
10 TABLET ORAL EVERY 6 HOURS PRN
Status: CANCELLED | OUTPATIENT
Start: 2023-06-05

## 2023-05-31 RX ORDER — CYANOCOBALAMIN 1000 UG/ML
1000 INJECTION, SOLUTION INTRAMUSCULAR; SUBCUTANEOUS
Status: CANCELLED | OUTPATIENT
Start: 2023-06-05

## 2023-05-31 RX ORDER — 0.9 % SODIUM CHLORIDE 0.9 %
VIAL (ML) INJECTION PRN
Status: CANCELLED | OUTPATIENT
Start: 2023-06-04

## 2023-05-31 RX ORDER — 0.9 % SODIUM CHLORIDE 0.9 %
VIAL (ML) INJECTION PRN
Status: CANCELLED | OUTPATIENT
Start: 2023-06-05

## 2023-05-31 RX ORDER — ONDANSETRON 8 MG/1
8 TABLET, ORALLY DISINTEGRATING ORAL PRN
Status: CANCELLED | OUTPATIENT
Start: 2023-06-05

## 2023-05-31 RX ORDER — ONDANSETRON 2 MG/ML
4 INJECTION INTRAMUSCULAR; INTRAVENOUS PRN
Status: CANCELLED | OUTPATIENT
Start: 2023-06-05

## 2023-05-31 RX ORDER — 0.9 % SODIUM CHLORIDE 0.9 %
10 VIAL (ML) INJECTION PRN
Status: CANCELLED | OUTPATIENT
Start: 2023-06-04

## 2023-05-31 RX ORDER — SODIUM CHLORIDE 9 MG/ML
INJECTION, SOLUTION INTRAVENOUS CONTINUOUS
Status: CANCELLED | OUTPATIENT
Start: 2023-06-05

## 2023-05-31 RX ORDER — 0.9 % SODIUM CHLORIDE 0.9 %
10 VIAL (ML) INJECTION PRN
Status: CANCELLED | OUTPATIENT
Start: 2023-06-05

## 2023-05-31 RX ORDER — EPINEPHRINE 1 MG/ML(1)
0.5 AMPUL (ML) INJECTION PRN
Status: CANCELLED | OUTPATIENT
Start: 2023-06-05

## 2023-05-31 RX ORDER — DIPHENHYDRAMINE HYDROCHLORIDE 50 MG/ML
50 INJECTION INTRAMUSCULAR; INTRAVENOUS PRN
Status: CANCELLED | OUTPATIENT
Start: 2023-06-05

## 2023-05-31 RX ORDER — 0.9 % SODIUM CHLORIDE 0.9 %
3 VIAL (ML) INJECTION PRN
Status: CANCELLED | OUTPATIENT
Start: 2023-06-04

## 2023-05-31 RX ORDER — METHYLPREDNISOLONE SODIUM SUCCINATE 125 MG/2ML
125 INJECTION, POWDER, LYOPHILIZED, FOR SOLUTION INTRAMUSCULAR; INTRAVENOUS PRN
Status: CANCELLED | OUTPATIENT
Start: 2023-06-05

## 2023-05-31 RX ORDER — 0.9 % SODIUM CHLORIDE 0.9 %
3 VIAL (ML) INJECTION PRN
Status: CANCELLED | OUTPATIENT
Start: 2023-06-05

## 2023-06-05 NOTE — PROGRESS NOTES
"Pharmacy Chemotherapy Calculations    Dx: NSCLC  Cycle 2  Previous treatment = 5/16/23    Protocol: Pembrolizumab + Pemetrexed/Carboplatin  Pembrolizumab 200 mg IV over 30 minutes on Day 1 followed by  Pemetrexed 500 mg/m2 IV over 10 minutes on Day 1 followed by  Carboplatin AUC 5 IV over 30 minutes on Day 1   21-day cycle for 4-6 cycles  ~Followed by~  Pembrolizumab 200 mg IV over 30 minutes on Day 1 or Pembrolizumab 400 mg IV over 30 minutes every other cycle followed by  Pemetrexed 500 mg/m2 IV over 10 minutes on Day 1   Maintenance therapy: 21-day cycle until 24 months of therapy has been completed  ~Followed by~  Pemetrexed 500 mg/m2 IV over 10 minutes on Day 1   Maintenance therapy: 21-day cycle until DP/UT  NCCN Guidelines for Non-Small Cell Lung Cancer V.1.2023.  Nikki M et al. Eur J Cancer. 2020;131:68-75.  Kelsey CJ, et al. Lancet Oncol. 2016;17(11):0398-8502.  Joelle SERNA et al. N Engl J Med. 2018;378(22):3435-2735.    Allergies:  Bacitracin-neomycin-polymyxin, Gabapentin, Ketorolac, Lipoglycopeptide antibiotics, and Tromethamine       BP (!) 143/75   Pulse (!) 103   Temp 36.5 °C (97.7 °F) (Temporal)   Resp 18   Ht 1.77 m (5' 9.69\")   Wt 69.1 kg (152 lb 5.4 oz)   SpO2 98%   BMI 22.06 kg/m²  Body surface area is 1.84 meters squared.    All lab results 6/6/23 within treatment parameters.   Scr 0.83 Est crcl ~ 74mL/min  Cyanocobalamin given every 9 weeks, last given 5/16/23, next due 7/18/23    Pembrolizumab 200 mg fixed dose   No calculation required, OK to treat with final dose = 200 mg    Pemetrexed 500 mg/m² x 1.84m² = 928mg   <10% difference, OK to treat with final dose = 900 mg    Carboplatin AUC 5 x (74 mL/min + 25) = 495mg   <10% difference, OK to treat with final dose = 500mg    Maria Guadalupe Esquivel PharmD  "

## 2023-06-05 NOTE — PROGRESS NOTES
"Pharmacy Chemotherapy Calculation    Patient Name: Tyler Zarate   Dx:NSCLC (Stage IV)    Protocol: CARBOplatin/PEMEtrexed + Pembrolizumab  Pembrolizumab 200 mg IV over 30 minutes on Day 1 followed by   Pemetrexed 500 mg/m2 IV over 10 minutes on Day 1 followed by   CARBOplatin AUC 5 IV over 30 minutes on Day 1    21 day cycle for 4-6 cycles   ~Followed by~  Pembrolizumab 400 mg IV over 30 minutes on Day 1 every other cycle followed by   Pemetrexed 500 mg/m2 IV over 10 minutes on Day 1    21 day cycle until 24 months of therapy has been completed   ~Followed by~  Pemetrexed 500 mg/m2 IV over 10 minutes on Day 1    21 day cycle until DP or UT    NCCN Guidelines for Non-Small Cell Lung Cancer V.1.2023  Nikki M, et al. Eur J Cancer. 2020;131:68-75.  Kelsey CJ, et al. Lancet Oncol. 2016;17(11):2801-5857.  Joelle L, et al. N Engl J Med.  2018;378(22):8863-9603.    Allergies:  Bacitracin-neomycin-polymyxin, Gabapentin, Ketorolac, Lipoglycopeptide antibiotics, and Tromethamine     BP (!) 143/75   Pulse (!) 103   Temp 36.5 °C (97.7 °F) (Temporal)   Resp 18   Ht 1.77 m (5' 9.69\")   Wt 69.1 kg (152 lb 5.4 oz)   SpO2 98%   BMI 22.06 kg/m²  Body surface area is 1.84 meters squared.    Labs 6/6/23:  ANC~ 2100 Plt = 441k   Hgb = 13.4     SCr = 0.85 mg/dL CrCl ~ 74.4 mL/min (Minimum SCr of 0.7 used)  LFT's = 47/89/126 TBili = 0.2   TSH  = 0.82  Free T4  = 1.29     Received Cyanacobalmin injection 5/16/23 (next due 7/18/23)    Drug Order   (Drug name, dose, route, IV Fluid & volume, frequency, number of doses) Cycle 2      Previous treatment: C1 5/16/23     Medication = Pembrolizumab   Base Dose= 200 mg   Fixed dose, no calculation required.  Final Dose = 200 mg  Route = IV  Fluid & Volume = NS 50 mL  Admin Duration = Over 30 minutes          Fixed dose, no calculation required. Okay to treat with final dose.   Medication = PEMEtrexed   Base Dose= 500 mg/m²   Calc Dose: Base Dose x 1.84 m² = 920 mg  Final Dose = 900 " mg  Route = IV  Fluid & Volume =  mL  Admin Duration = Over 10 minutes          <10% difference, okay to treat with final dose   Medication = CARBOplatin  Base Dose= AUC 5    Calc Dose:AUCx (74.4 mL/min + 25) = 497 mg  Final Dose = 500 mg  Route = IV  Fluid & Volume =  mL  Admin Duration = Over 30 minutes          <10% difference, okay to treat with final dose     By my signature below, I confirm this process was performed independently with the BSA and all final chemotherapy dosing calculations congruent. I have reviewed the above chemotherapy order and that my calculation of the final dose and BSA (when applicable) corroborate those calculations of the  pharmacist. Discrepancies of 10% or greater in the written dose have been addressed and documented within the EPIC Progress notes.    Carmela Winters, PharmD

## 2023-06-06 ENCOUNTER — OUTPATIENT INFUSION SERVICES (OUTPATIENT)
Dept: ONCOLOGY | Facility: MEDICAL CENTER | Age: 73
End: 2023-06-06
Attending: STUDENT IN AN ORGANIZED HEALTH CARE EDUCATION/TRAINING PROGRAM
Payer: COMMERCIAL

## 2023-06-06 ENCOUNTER — HOSPITAL ENCOUNTER (OUTPATIENT)
Dept: LAB | Facility: MEDICAL CENTER | Age: 73
End: 2023-06-06
Attending: STUDENT IN AN ORGANIZED HEALTH CARE EDUCATION/TRAINING PROGRAM
Payer: COMMERCIAL

## 2023-06-06 ENCOUNTER — HOSPITAL ENCOUNTER (OUTPATIENT)
Dept: HEMATOLOGY ONCOLOGY | Facility: MEDICAL CENTER | Age: 73
End: 2023-06-06
Attending: NURSE PRACTITIONER
Payer: COMMERCIAL

## 2023-06-06 VITALS
BODY MASS INDEX: 22.5 KG/M2 | OXYGEN SATURATION: 95 % | HEART RATE: 83 BPM | HEIGHT: 69 IN | DIASTOLIC BLOOD PRESSURE: 56 MMHG | WEIGHT: 151.9 LBS | TEMPERATURE: 98.4 F | RESPIRATION RATE: 16 BRPM | SYSTOLIC BLOOD PRESSURE: 130 MMHG

## 2023-06-06 VITALS
HEIGHT: 70 IN | RESPIRATION RATE: 18 BRPM | TEMPERATURE: 97.7 F | OXYGEN SATURATION: 98 % | SYSTOLIC BLOOD PRESSURE: 143 MMHG | DIASTOLIC BLOOD PRESSURE: 75 MMHG | BODY MASS INDEX: 21.81 KG/M2 | HEART RATE: 103 BPM | WEIGHT: 152.34 LBS

## 2023-06-06 DIAGNOSIS — C34.91 ADENOCARCINOMA, LUNG, RIGHT (HCC): ICD-10-CM

## 2023-06-06 DIAGNOSIS — Z79.899 HIGH RISK MEDICATION USE: ICD-10-CM

## 2023-06-06 DIAGNOSIS — Z79.899 ENCOUNTER FOR LONG-TERM CURRENT USE OF HIGH RISK MEDICATION: ICD-10-CM

## 2023-06-06 LAB
ALBUMIN SERPL BCP-MCNC: 3.6 G/DL (ref 3.2–4.9)
ALBUMIN/GLOB SERPL: 0.9 G/DL
ALP SERPL-CCNC: 126 U/L (ref 30–99)
ALT SERPL-CCNC: 89 U/L (ref 2–50)
ANION GAP SERPL CALC-SCNC: 13 MMOL/L (ref 7–16)
AST SERPL-CCNC: 47 U/L (ref 12–45)
BASOPHILS # BLD AUTO: 0.3 % (ref 0–1.8)
BASOPHILS # BLD: 0.01 K/UL (ref 0–0.12)
BILIRUB SERPL-MCNC: 0.2 MG/DL (ref 0.1–1.5)
BUN SERPL-MCNC: 16 MG/DL (ref 8–22)
CALCIUM ALBUM COR SERPL-MCNC: 9.6 MG/DL (ref 8.5–10.5)
CALCIUM SERPL-MCNC: 9.3 MG/DL (ref 8.5–10.5)
CHLORIDE SERPL-SCNC: 97 MMOL/L (ref 96–112)
CO2 SERPL-SCNC: 24 MMOL/L (ref 20–33)
CREAT SERPL-MCNC: 0.85 MG/DL (ref 0.5–1.4)
EOSINOPHIL # BLD AUTO: 0 K/UL (ref 0–0.51)
EOSINOPHIL NFR BLD: 0 % (ref 0–6.9)
ERYTHROCYTE [DISTWIDTH] IN BLOOD BY AUTOMATED COUNT: 47.6 FL (ref 35.9–50)
FUNGUS SPEC CULT: NORMAL
FUNGUS SPEC FUNGUS STN: NORMAL
GFR SERPLBLD CREATININE-BSD FMLA CKD-EPI: 91 ML/MIN/1.73 M 2
GLOBULIN SER CALC-MCNC: 3.9 G/DL (ref 1.9–3.5)
GLUCOSE SERPL-MCNC: 182 MG/DL (ref 65–99)
HCT VFR BLD AUTO: 40.8 % (ref 42–52)
HGB BLD-MCNC: 13.4 G/DL (ref 14–18)
IMM GRANULOCYTES # BLD AUTO: 0.02 K/UL (ref 0–0.11)
IMM GRANULOCYTES NFR BLD AUTO: 0.6 % (ref 0–0.9)
LYMPHOCYTES # BLD AUTO: 0.8 K/UL (ref 1–4.8)
LYMPHOCYTES NFR BLD: 25.7 % (ref 22–41)
MCH RBC QN AUTO: 30.3 PG (ref 27–33)
MCHC RBC AUTO-ENTMCNC: 32.8 G/DL (ref 32.3–36.5)
MCV RBC AUTO: 92.3 FL (ref 81.4–97.8)
MONOCYTES # BLD AUTO: 0.18 K/UL (ref 0–0.85)
MONOCYTES NFR BLD AUTO: 5.8 % (ref 0–13.4)
NEUTROPHILS # BLD AUTO: 2.1 K/UL (ref 1.82–7.42)
NEUTROPHILS NFR BLD: 67.6 % (ref 44–72)
NRBC # BLD AUTO: 0 K/UL
NRBC BLD-RTO: 0 /100 WBC (ref 0–0.2)
PLATELET # BLD AUTO: 441 K/UL (ref 164–446)
PMV BLD AUTO: 9.9 FL (ref 9–12.9)
POTASSIUM SERPL-SCNC: 4.5 MMOL/L (ref 3.6–5.5)
PROT SERPL-MCNC: 7.5 G/DL (ref 6–8.2)
RBC # BLD AUTO: 4.42 M/UL (ref 4.7–6.1)
SIGNIFICANT IND 70042: NORMAL
SITE SITE: NORMAL
SODIUM SERPL-SCNC: 134 MMOL/L (ref 135–145)
SOURCE SOURCE: NORMAL
T4 FREE SERPL-MCNC: 1.29 NG/DL (ref 0.93–1.7)
TSH SERPL DL<=0.005 MIU/L-ACNC: 0.82 UIU/ML (ref 0.38–5.33)
WBC # BLD AUTO: 3.1 K/UL (ref 4.8–10.8)

## 2023-06-06 PROCEDURE — 80053 COMPREHEN METABOLIC PANEL: CPT

## 2023-06-06 PROCEDURE — 96375 TX/PRO/DX INJ NEW DRUG ADDON: CPT

## 2023-06-06 PROCEDURE — 84439 ASSAY OF FREE THYROXINE: CPT

## 2023-06-06 PROCEDURE — 84443 ASSAY THYROID STIM HORMONE: CPT

## 2023-06-06 PROCEDURE — 85025 COMPLETE CBC W/AUTO DIFF WBC: CPT

## 2023-06-06 PROCEDURE — 700111 HCHG RX REV CODE 636 W/ 250 OVERRIDE (IP): Performed by: STUDENT IN AN ORGANIZED HEALTH CARE EDUCATION/TRAINING PROGRAM

## 2023-06-06 PROCEDURE — 96417 CHEMO IV INFUS EACH ADDL SEQ: CPT

## 2023-06-06 PROCEDURE — 36415 COLL VENOUS BLD VENIPUNCTURE: CPT

## 2023-06-06 PROCEDURE — 99214 OFFICE O/P EST MOD 30 MIN: CPT | Performed by: NURSE PRACTITIONER

## 2023-06-06 PROCEDURE — 96411 CHEMO IV PUSH ADDL DRUG: CPT

## 2023-06-06 PROCEDURE — 700105 HCHG RX REV CODE 258: Performed by: STUDENT IN AN ORGANIZED HEALTH CARE EDUCATION/TRAINING PROGRAM

## 2023-06-06 PROCEDURE — 96413 CHEMO IV INFUSION 1 HR: CPT

## 2023-06-06 PROCEDURE — 96367 TX/PROPH/DG ADDL SEQ IV INF: CPT

## 2023-06-06 PROCEDURE — 99212 OFFICE O/P EST SF 10 MIN: CPT | Performed by: NURSE PRACTITIONER

## 2023-06-06 RX ORDER — DIPHENHYDRAMINE HYDROCHLORIDE 50 MG/ML
50 INJECTION INTRAMUSCULAR; INTRAVENOUS PRN
Status: CANCELLED | OUTPATIENT
Start: 2023-06-06

## 2023-06-06 RX ORDER — ONDANSETRON 2 MG/ML
4 INJECTION INTRAMUSCULAR; INTRAVENOUS PRN
Status: CANCELLED | OUTPATIENT
Start: 2023-06-06

## 2023-06-06 RX ORDER — 0.9 % SODIUM CHLORIDE 0.9 %
3 VIAL (ML) INJECTION PRN
Status: CANCELLED | OUTPATIENT
Start: 2023-06-06

## 2023-06-06 RX ORDER — SODIUM CHLORIDE 9 MG/ML
INJECTION, SOLUTION INTRAVENOUS CONTINUOUS
Status: CANCELLED | OUTPATIENT
Start: 2023-06-06

## 2023-06-06 RX ORDER — METHYLPREDNISOLONE SODIUM SUCCINATE 125 MG/2ML
125 INJECTION, POWDER, LYOPHILIZED, FOR SOLUTION INTRAMUSCULAR; INTRAVENOUS PRN
Status: CANCELLED | OUTPATIENT
Start: 2023-06-06

## 2023-06-06 RX ORDER — 0.9 % SODIUM CHLORIDE 0.9 %
10 VIAL (ML) INJECTION PRN
Status: CANCELLED | OUTPATIENT
Start: 2023-06-06

## 2023-06-06 RX ORDER — PROCHLORPERAZINE MALEATE 10 MG
10 TABLET ORAL EVERY 6 HOURS PRN
Status: CANCELLED | OUTPATIENT
Start: 2023-06-06

## 2023-06-06 RX ORDER — 0.9 % SODIUM CHLORIDE 0.9 %
VIAL (ML) INJECTION PRN
Status: CANCELLED | OUTPATIENT
Start: 2023-06-06

## 2023-06-06 RX ORDER — CYANOCOBALAMIN 1000 UG/ML
1000 INJECTION, SOLUTION INTRAMUSCULAR; SUBCUTANEOUS
Status: DISCONTINUED | OUTPATIENT
Start: 2023-06-06 | End: 2023-06-06 | Stop reason: CLARIF

## 2023-06-06 RX ORDER — ONDANSETRON 8 MG/1
8 TABLET, ORALLY DISINTEGRATING ORAL PRN
Status: CANCELLED | OUTPATIENT
Start: 2023-06-06

## 2023-06-06 RX ORDER — EPINEPHRINE 1 MG/ML(1)
0.5 AMPUL (ML) INJECTION PRN
Status: CANCELLED | OUTPATIENT
Start: 2023-06-06

## 2023-06-06 RX ADMIN — SODIUM CHLORIDE 200 MG: 9 INJECTION, SOLUTION INTRAVENOUS at 11:52

## 2023-06-06 RX ADMIN — DEXAMETHASONE SODIUM PHOSPHATE 12 MG: 4 INJECTION, SOLUTION INTRA-ARTICULAR; INTRALESIONAL; INTRAMUSCULAR; INTRAVENOUS; SOFT TISSUE at 10:37

## 2023-06-06 RX ADMIN — PEMETREXED DISODIUM 900 MG: 500 INJECTION, POWDER, LYOPHILIZED, FOR SOLUTION INTRAVENOUS at 12:34

## 2023-06-06 RX ADMIN — FOSAPREPITANT 150 MG: 150 INJECTION, POWDER, LYOPHILIZED, FOR SOLUTION INTRAVENOUS at 11:10

## 2023-06-06 RX ADMIN — CARBOPLATIN 500 MG: 10 INJECTION, SOLUTION INTRAVENOUS at 12:51

## 2023-06-06 RX ADMIN — ONDANSETRON 16 MG: 2 INJECTION INTRAMUSCULAR; INTRAVENOUS at 10:50

## 2023-06-06 ASSESSMENT — ENCOUNTER SYMPTOMS
NAUSEA: 0
TINGLING: 0
HEADACHES: 0
CHILLS: 0
DIZZINESS: 0
WEIGHT LOSS: 0
COUGH: 1
CONSTIPATION: 0
VOMITING: 0
PALPITATIONS: 0
FEVER: 0
INSOMNIA: 0
SHORTNESS OF BREATH: 1
DIARRHEA: 0

## 2023-06-06 ASSESSMENT — FIBROSIS 4 INDEX
FIB4 SCORE: 0.91
FIB4 SCORE: 0.69

## 2023-06-06 NOTE — PROGRESS NOTES
Chemotherapy Verification - SECONDARY RN       Height = 177 cm  Weight = 69.1 kg  BSA = 1.94 m2       Medication: Keytruda  Dose: 200 mg set dose  Calculated Dose: 200 mg                             (In mg/m2, AUC, mg/kg)     Medication: Alimta  Dose: 500 mg/m2  Calculated Dose: 970 mg                             (In mg/m2, AUC, mg/kg)    Medication: Carboplatin  Dose: AUC 5  Calculated Dose: 495 mg                             (In mg/m2, AUC, mg/kg)      Carboplatin calculation (if applicable):  (5*(74+25)) = 495    I confirm that this process was performed independently.

## 2023-06-06 NOTE — PROGRESS NOTES
Chemotherapy Verification - PRIMARY RN      Height = 1.77m  Weight = 69.1 kg  BSA = 1.84m       Medication: pembrolizumab  Dose: 200 mg set dose  Calculated Dose: 200 mg                             (In mg/m2, AUC, mg/kg)     Medication: pemetrexed  Dose: 500mg/m2  Calculated Dose: 920 mg                             (In mg/m2, AUC, mg/kg)    Medication: carboplatin  Dose: AUC 5  Calculated Dose: 497 mg                             (In mg/m2, AUC, mg/kg)      Carboplatin calculation (if applicable):  (5*(74.4+25)) = 497      I confirm this process was performed independently with the BSA and all final chemotherapy dosing calculations congruent.  Any discrepancies of 10% or greater have been addressed with the chemotherapy pharmacist. The resolution of the discrepancy has been documented in the EPIC progress notes.

## 2023-06-06 NOTE — PROGRESS NOTES
Ross to infusion for C2 of Q21 day Keytruda, Alimta and carboplatin. Reports tolerating first dose well aside from diarrhea, taste changes and fatigue that persisted for 1 week after treatment, feeling well today. PIV started with positive blood. Patient had labs drawn earlier today, results reviewed by RN, within parameters for treatment. Pre-treatment meds given. Keytruda given over 30 minutes with filter followed by Alimta over 10 minutes and carboplatin over 30 minutes, patient tolerated all well with no adverse effects. PIV flushed post infusion with positive blood, d/melissa with tip intact. Patient left in stable condition, knows when to return for next appt.

## 2023-06-06 NOTE — PROGRESS NOTES
Subjective     Tyler Zarate is a 73 y.o. male who presents with Lung Cancer (Prechemo )          HPI    Patient seen today for evaluation prior to cycle 2 of chemotherapy employing Carboplatin, Pemetrexed and Pembrolizumab for metastatic lung cancer, for continued monitoring of symptoms and side effects of cancer treatments.  He presents unaccompanied for today's visit.     Oncology history of presenting illness:  Patient had a CT scan of the chest on 1/18/2023 which showed a new spiculated mass in the anterior right upper lobe arising from the area of the presumed previous scar.  This was highly suggestive of disease.  He ended up having a biopsy on 2/17/2023 which was consistent with adenocarcinoma of the right upper lobe.  He did have a PET scan following his diagnosis, date unknown, which showed a hypermetabolic right upper lobe mass measuring 20 x 16 mm as well as a mass in the left lower lobe measuring 11 x 5 mm.  Patient did undergo biopsy of the left lower lobe which was consistent with adenocarcinoma of the lung.     Patient recently moved from [facility] to [place] to be closer to his son.  At time of diagnosis patient had complained of unintentional weight loss of greater than 10% within the past 6 months.  Patient also with a history of basal cell carcinoma of the nose.     Treatment history:  05/01 - 05/10/23: SBRT to lung - Dr. Jaimes  05/16/23: C1D1 Carboplatin, Pemetrexed and Pembrolizumab  06/06/23: C2D1 Carboplatin, Pemetrexed and Pembrolizumab     Interval history:  Patient overall is feeling well since last seen 2 weeks ago.  As mentioned at previous appointment he did have diarrhea for approximately 3 days after treatment but he has not had any since.  We discussed the use of Imodium and patient is prepared with medication at home should he have diarrhea with this next cycle.  His appetite is fair, stating that he eats approximately twice per day.  He has lost weight initially but  "in the last 2 weeks he has gained 2 pounds.  He does have fatigue but states that he is still able to maintain activities of daily living.  He does have cough and shortness of breath which is not worsening.  He also continues to have his chronic generalized pains that he is always had for many years.    Patient noted to have a lesion on his right forearm.  He stated that he has had this for many years.  It appears to be getting a little bit bigger according to the patient but it looks like a scab.  I did measure it today, measuring 2.25 x 3.5 cm in size.  We discussed patient to continue to watch it, keep it clean and monitor and let us know if this changes.        Allergies   Allergen Reactions    Bacitracin-Neomycin-Polymyxin      Ointment around eye caused face and eye swelling    Gabapentin Swelling     Took several medications following cataract surgery. Not sure which medication was cause.     Ketorolac Swelling     Took following cataract surgery. Not sure which medication was cause.     Lipoglycopeptide Antibiotics Swelling     Pt stated he did not remember what kind of antibiotics he was allergic to. Pt stated \"two different kinds\" Face swelling and eyes glazing over.     Tromethamine      Current Outpatient Medications on File Prior to Visit   Medication Sig Dispense Refill    folic acid (FOLVITE) 1 MG Tab Take 1 Tablet by mouth every day for 105 days. Start 5-7 days prior to first cycle of pemetrexed 30 Tablet 3    prochlorperazine (COMPAZINE) 10 MG Tab Take 1 Tablet by mouth every 6 hours as needed (for nausea, vomiting). 30 Tablet 6    ondansetron (ZOFRAN) 4 MG Tab tablet Take 1 Tablet by mouth every four hours as needed for Nausea/Vomiting (for nausea, vomiting). 30 Tablet 6    nicotine (NICODERM) 14 MG/24HR PATCH 24 HR Place 1 Patch on the skin every 24 hours. 30 Patch 0    acetaminophen (TYLENOL) 500 MG Tab Take 500-1,000 mg by mouth every 6 hours as needed.      aspirin 500 MG tablet Take 1,000 mg " "by mouth. Either once a week with a lot of activity or once a month.      tiotropium (SPIRIVA) 18 MCG Cap Place 18 mcg into inhaler and inhale every day.      ibuprofen (MOTRIN) 200 MG Tab Take 200 mg by mouth every 6 hours as needed.      ALBUTEROL INH Inhale.       No current facility-administered medications on file prior to visit.         Review of Systems   Constitutional:  Positive for malaise/fatigue. Negative for chills, fever and weight loss.   Respiratory:  Positive for cough and shortness of breath.    Cardiovascular:  Negative for chest pain and palpitations.   Gastrointestinal:  Negative for constipation, diarrhea, nausea and vomiting.   Genitourinary:  Negative for dysuria.   Musculoskeletal:         Chronic pain - not worsening   Skin:  Negative for rash.   Neurological:  Negative for dizziness (when he stands up too fast), tingling and headaches.   Psychiatric/Behavioral:  The patient does not have insomnia.               Objective     /56 (BP Location: Left arm, Patient Position: Sitting, BP Cuff Size: Adult)   Pulse 83   Temp 36.9 °C (98.4 °F) (Temporal)   Resp 16   Ht 1.753 m (5' 9.02\")   Wt 68.9 kg (151 lb 14.4 oz)   SpO2 95%   BMI 22.42 kg/m²      Physical Exam  Vitals reviewed.   Constitutional:       General: He is not in acute distress.     Appearance: Normal appearance. He is not diaphoretic.   HENT:      Head: Normocephalic and atraumatic.      Mouth/Throat:      Mouth: Mucous membranes are moist.      Pharynx: Oropharynx is clear. No oropharyngeal exudate or posterior oropharyngeal erythema.   Cardiovascular:      Rate and Rhythm: Normal rate and regular rhythm.      Heart sounds: Normal heart sounds. No murmur heard.     No friction rub. No gallop.   Pulmonary:      Effort: Pulmonary effort is normal. No respiratory distress.      Breath sounds: No wheezing.      Comments: Diminished throughout  Abdominal:      General: Bowel sounds are normal. There is no distension.      " Tenderness: There is no abdominal tenderness.   Musculoskeletal:         General: No swelling or tenderness. Normal range of motion.   Skin:     General: Skin is warm and dry.      Findings: No lesion (small lesion noted on the right forearm - appears to be a scab measuring 2.25 x 3.5 cm).   Neurological:      Mental Status: He is alert and oriented to person, place, and time.   Psychiatric:         Mood and Affect: Mood normal.         Behavior: Behavior normal.                           Assessment & Plan       1. Adenocarcinoma, lung, right (HCC)        2. Encounter for long-term current use of high risk medication                1. Patient with Stage SUDHAKAR (cT1c, cN0, cM1a) currently on treatment with carboplatin, pemetrexed and pembrolizumab. Patient scheduled to proceed with cycle 2 today. If labs meet parameters patient is okay to proceed with treatment as planned.     2.  Patient to follow-up in the clinic in 3 weeks, or sooner if needed.      Please note that this dictation was created using voice recognition software. I have made every reasonable attempt to correct obvious errors, but I expect that there are errors of grammar and possibly content that I did not discover before finalizing the note.

## 2023-06-20 RX ORDER — 0.9 % SODIUM CHLORIDE 0.9 %
10 VIAL (ML) INJECTION PRN
Status: CANCELLED | OUTPATIENT
Start: 2023-06-26

## 2023-06-20 RX ORDER — ONDANSETRON 2 MG/ML
4 INJECTION INTRAMUSCULAR; INTRAVENOUS PRN
Status: CANCELLED | OUTPATIENT
Start: 2023-06-26

## 2023-06-20 RX ORDER — SODIUM CHLORIDE 9 MG/ML
INJECTION, SOLUTION INTRAVENOUS CONTINUOUS
Status: CANCELLED | OUTPATIENT
Start: 2023-06-26

## 2023-06-20 RX ORDER — 0.9 % SODIUM CHLORIDE 0.9 %
VIAL (ML) INJECTION PRN
Status: CANCELLED | OUTPATIENT
Start: 2023-06-26

## 2023-06-20 RX ORDER — EPINEPHRINE 1 MG/ML(1)
0.5 AMPUL (ML) INJECTION PRN
Status: CANCELLED | OUTPATIENT
Start: 2023-06-26

## 2023-06-20 RX ORDER — PROCHLORPERAZINE MALEATE 10 MG
10 TABLET ORAL EVERY 6 HOURS PRN
Status: CANCELLED | OUTPATIENT
Start: 2023-06-26

## 2023-06-20 RX ORDER — 0.9 % SODIUM CHLORIDE 0.9 %
10 VIAL (ML) INJECTION PRN
Status: CANCELLED | OUTPATIENT
Start: 2023-06-25

## 2023-06-20 RX ORDER — METHYLPREDNISOLONE SODIUM SUCCINATE 125 MG/2ML
125 INJECTION, POWDER, LYOPHILIZED, FOR SOLUTION INTRAMUSCULAR; INTRAVENOUS PRN
Status: CANCELLED | OUTPATIENT
Start: 2023-06-26

## 2023-06-20 RX ORDER — 0.9 % SODIUM CHLORIDE 0.9 %
VIAL (ML) INJECTION PRN
Status: CANCELLED | OUTPATIENT
Start: 2023-06-25

## 2023-06-20 RX ORDER — 0.9 % SODIUM CHLORIDE 0.9 %
3 VIAL (ML) INJECTION PRN
Status: CANCELLED | OUTPATIENT
Start: 2023-06-25

## 2023-06-20 RX ORDER — ONDANSETRON 8 MG/1
8 TABLET, ORALLY DISINTEGRATING ORAL PRN
Status: CANCELLED | OUTPATIENT
Start: 2023-06-26

## 2023-06-20 RX ORDER — DIPHENHYDRAMINE HYDROCHLORIDE 50 MG/ML
50 INJECTION INTRAMUSCULAR; INTRAVENOUS PRN
Status: CANCELLED | OUTPATIENT
Start: 2023-06-26

## 2023-06-20 RX ORDER — 0.9 % SODIUM CHLORIDE 0.9 %
3 VIAL (ML) INJECTION PRN
Status: CANCELLED | OUTPATIENT
Start: 2023-06-26

## 2023-06-20 RX ORDER — CYANOCOBALAMIN 1000 UG/ML
1000 INJECTION, SOLUTION INTRAMUSCULAR; SUBCUTANEOUS
Status: CANCELLED | OUTPATIENT
Start: 2023-06-26

## 2023-06-26 ENCOUNTER — HOSPITAL ENCOUNTER (OUTPATIENT)
Dept: LAB | Facility: MEDICAL CENTER | Age: 73
End: 2023-06-26
Attending: STUDENT IN AN ORGANIZED HEALTH CARE EDUCATION/TRAINING PROGRAM
Payer: COMMERCIAL

## 2023-06-26 ENCOUNTER — HOSPITAL ENCOUNTER (OUTPATIENT)
Dept: RADIATION ONCOLOGY | Facility: MEDICAL CENTER | Age: 73
End: 2023-06-30
Attending: RADIOLOGY
Payer: COMMERCIAL

## 2023-06-26 VITALS
HEART RATE: 78 BPM | WEIGHT: 151.9 LBS | DIASTOLIC BLOOD PRESSURE: 63 MMHG | BODY MASS INDEX: 21.99 KG/M2 | OXYGEN SATURATION: 98 % | SYSTOLIC BLOOD PRESSURE: 128 MMHG

## 2023-06-26 DIAGNOSIS — C34.91 ADENOCARCINOMA, LUNG, RIGHT (HCC): ICD-10-CM

## 2023-06-26 DIAGNOSIS — Z79.899 HIGH RISK MEDICATION USE: ICD-10-CM

## 2023-06-26 LAB
ALBUMIN SERPL BCP-MCNC: 3.9 G/DL (ref 3.2–4.9)
ALBUMIN/GLOB SERPL: 1.1 G/DL
ALP SERPL-CCNC: 117 U/L (ref 30–99)
ALT SERPL-CCNC: 26 U/L (ref 2–50)
ANION GAP SERPL CALC-SCNC: 13 MMOL/L (ref 7–16)
AST SERPL-CCNC: 13 U/L (ref 12–45)
BASOPHILS # BLD AUTO: 0.3 % (ref 0–1.8)
BASOPHILS # BLD: 0.02 K/UL (ref 0–0.12)
BILIRUB SERPL-MCNC: 0.3 MG/DL (ref 0.1–1.5)
BUN SERPL-MCNC: 16 MG/DL (ref 8–22)
CALCIUM ALBUM COR SERPL-MCNC: 9.6 MG/DL (ref 8.5–10.5)
CALCIUM SERPL-MCNC: 9.5 MG/DL (ref 8.5–10.5)
CHLORIDE SERPL-SCNC: 102 MMOL/L (ref 96–112)
CO2 SERPL-SCNC: 26 MMOL/L (ref 20–33)
CREAT SERPL-MCNC: 0.8 MG/DL (ref 0.5–1.4)
EOSINOPHIL # BLD AUTO: 0.13 K/UL (ref 0–0.51)
EOSINOPHIL NFR BLD: 1.9 % (ref 0–6.9)
ERYTHROCYTE [DISTWIDTH] IN BLOOD BY AUTOMATED COUNT: 60.7 FL (ref 35.9–50)
GFR SERPLBLD CREATININE-BSD FMLA CKD-EPI: 93 ML/MIN/1.73 M 2
GLOBULIN SER CALC-MCNC: 3.4 G/DL (ref 1.9–3.5)
GLUCOSE SERPL-MCNC: 93 MG/DL (ref 65–99)
HCT VFR BLD AUTO: 38 % (ref 42–52)
HGB BLD-MCNC: 12.2 G/DL (ref 14–18)
IMM GRANULOCYTES # BLD AUTO: 0.03 K/UL (ref 0–0.11)
IMM GRANULOCYTES NFR BLD AUTO: 0.4 % (ref 0–0.9)
LYMPHOCYTES # BLD AUTO: 1.57 K/UL (ref 1–4.8)
LYMPHOCYTES NFR BLD: 23.4 % (ref 22–41)
MCH RBC QN AUTO: 30.3 PG (ref 27–33)
MCHC RBC AUTO-ENTMCNC: 32.1 G/DL (ref 32.3–36.5)
MCV RBC AUTO: 94.3 FL (ref 81.4–97.8)
MONOCYTES # BLD AUTO: 0.91 K/UL (ref 0–0.85)
MONOCYTES NFR BLD AUTO: 13.5 % (ref 0–13.4)
NEUTROPHILS # BLD AUTO: 4.06 K/UL (ref 1.82–7.42)
NEUTROPHILS NFR BLD: 60.5 % (ref 44–72)
NRBC # BLD AUTO: 0 K/UL
NRBC BLD-RTO: 0 /100 WBC (ref 0–0.2)
PLATELET # BLD AUTO: 248 K/UL (ref 164–446)
PMV BLD AUTO: 9.5 FL (ref 9–12.9)
POTASSIUM SERPL-SCNC: 4.2 MMOL/L (ref 3.6–5.5)
PROT SERPL-MCNC: 7.3 G/DL (ref 6–8.2)
RBC # BLD AUTO: 4.03 M/UL (ref 4.7–6.1)
SODIUM SERPL-SCNC: 141 MMOL/L (ref 135–145)
T4 FREE SERPL-MCNC: 1.3 NG/DL (ref 0.93–1.7)
TSH SERPL DL<=0.005 MIU/L-ACNC: 1.85 UIU/ML (ref 0.38–5.33)
WBC # BLD AUTO: 6.7 K/UL (ref 4.8–10.8)

## 2023-06-26 PROCEDURE — 99212 OFFICE O/P EST SF 10 MIN: CPT | Performed by: RADIOLOGY

## 2023-06-26 PROCEDURE — 85025 COMPLETE CBC W/AUTO DIFF WBC: CPT

## 2023-06-26 PROCEDURE — 84439 ASSAY OF FREE THYROXINE: CPT

## 2023-06-26 PROCEDURE — 36415 COLL VENOUS BLD VENIPUNCTURE: CPT

## 2023-06-26 PROCEDURE — 80053 COMPREHEN METABOLIC PANEL: CPT

## 2023-06-26 PROCEDURE — 84443 ASSAY THYROID STIM HORMONE: CPT

## 2023-06-26 ASSESSMENT — PAIN SCALES - GENERAL: PAINLEVEL: NO PAIN

## 2023-06-26 ASSESSMENT — FIBROSIS 4 INDEX: FIB4 SCORE: 0.84

## 2023-06-26 NOTE — PROGRESS NOTES
RADIATION ONCOLOGY FOLLOW-UP    Patient name:  Tyler Zarate    Primary Physician:  Peg Gibson M.D. MRN: [id number]  CSN: [id number]   Referring physician:  Peg Gibson M.*  : 1950, 73 y.o.     DATE OF SERVICE: 2023    IDENTIFICATION:   A 73 y.o. male with    Adenocarcinoma, lung, right (HCC)  Staging form: Lung, AJCC 8th Edition  - Clinical stage from 2023: Stage SUDHAKAR (cT1c, cN0, cM1a) - Signed by Sam Jaimes M.D. on 2023  Histopathologic type: Adenocarcinoma, NOS  Stage prefix: Initial diagnosis      RADIATION SUMMARY:  Radiation Oncology          2023 5/10/2023   Aria Course Treatment Dates   Course First Treatment Date 2023    Course Last Treatment Date 2023   05/10/2023    Aria Treatment Summary   L Hilum  Plan from Course [email or web address]   Fraction 7 of  8     8    Elapsed Course Days  @  9 @     9 @ [id number]   Prescribed Fraction Dose 750 cGy 750 cGy    750 cGy   Prescribed Total Dose 6,000 cGy 6,000 cGy    6,000 cGy   RUL Lung SBRT  Plan from Course [email or web address]   Fraction  5 of 5   Elapsed Course Days   @    Prescribed Fraction Dose  1,100 cGy   Prescribed Total Dose  5,500 cGy   L Hilum cp  Reference Point from Course [email or web address]   Elapsed Course Days  @  9 @     9 @    Session Dose 889 cGy 889 cGy    --   Total Dose 6,220 cGy 7,108 cGy    7,108 cGy   PTV_L Hilum  Reference Point from Course [email or web address]   Elapsed Course Days  @  9 @     9 @    Session Dose 750 cGy 750 cGy    --   Total Dose 5,250 cGy 6,000 cGy    6,000 cGy   PTV_R  Reference Point from Course [email or web address]   Elapsed Course Days  9 @    Session Dose  --   Total Dose  5,500 cGy   RUL cp  Reference Point from Course [email or web address]   Elapsed Course Days  9 @ [id number]   Session Dose  --   Total Dose  6,848 cGy       Details          More values are hidden. Newest values shown. Go to activity for more data.                HISTORY OF PRESENT ILLNESS:  Subjective    This is a 72-year-old gentleman with a past history of COPD and tobacco use, 1 pack/day current smoker, as well as history of alcohol abuse who presents for discussion on treatment options and to establish care for new diagnosis of metastatic non-small cell adenocarcinoma.  His history dates back to about January of this year when he had a CT of the chest done that revealed a new spiculated mass in the anterior right upper lobe.  This led to a biopsy that was performed on February 17 that confirmed right upper lobe primary lung adenocarcinoma.  Subsequent PET/CT revealed hypermetabolic right upper lobe mass as well as a mass in the left lower lobe.  I reviewed the scan personally and does indeed show about a 2 cm right upper lobe mass, as well as a small 1.1 cm mass in the left lower lobe just adjacent to the hilum.     He had recently moved from [place], and recently saw medical oncology on March 6.  At the time, there was consideration given to whether this could be 2 separate primaries, or metastatic lesion in the left lower lobe.  To that effect, he would need a biopsy of the left lower lobe to which she was amenable.  In the meantime, brain MRI was also ordered by medical oncology which is currently pending.          INTERVAL HISTORY:  4/14/23  After his initial consultation, given the mass in the left lobe, I had discussed this case with medical oncology.  We agree that it is important to work-up whether this were 2 synchronous stage I primaries versus metastatic disease.  Therefore, he was referred to pulmonology for endobronchial ultrasound and biopsy of the left lower lobe nodule.  This was done earlier this week, and was REED positive for malignant cells, with final pathology still pending.  Incidentally, respiratory culture was also taken that confirmed  strep pneumoniae with moderate growth.  He also feels quite fatigued, like he has an infection, occasional chills, and cough.       He had a virtual follow-up with medical oncology given that he was not feeling well, and was diagnosed clinically with a pneumonia.  Antibiotics were sent that he plans on starting today.  In the meantime, plan was discussed for awaiting final pathology but likely proceeding with stereotactic radiation either way given that even if he was metastatic this would be very limited oligometastatic disease.  He is now being seen via telephone for further planning.    6/26/23  He is doing relatively well.  He says his breathing has improved by about 90% since finishing his radiation and starting his chemoimmunotherapy.  He seems to be tolerating this remarkably well.  He feels somewhat winded towards the end of his chemotherapy sessions, but then this improves.  Overall, he has no major concerns today.      PROBLEM LIST:  Patient Active Problem List   Diagnosis    Adenocarcinoma, lung, right (HCC)    Left lower lobe pulmonary nodule       CURRENT MEDICATIONS:  Current Outpatient Medications   Medication Sig Dispense Refill    folic acid (FOLVITE) 1 MG Tab Take 1 Tablet by mouth every day for 105 days. Start 5-7 days prior to first cycle of pemetrexed 30 Tablet 3    prochlorperazine (COMPAZINE) 10 MG Tab Take 1 Tablet by mouth every 6 hours as needed (for nausea, vomiting). 30 Tablet 6    ondansetron (ZOFRAN) 4 MG Tab tablet Take 1 Tablet by mouth every four hours as needed for Nausea/Vomiting (for nausea, vomiting). 30 Tablet 6    nicotine (NICODERM) 14 MG/24HR PATCH 24 HR Place 1 Patch on the skin every 24 hours. 30 Patch 0    acetaminophen (TYLENOL) 500 MG Tab Take 500-1,000 mg by mouth every 6 hours as needed.      aspirin 500 MG tablet Take 1,000 mg by mouth. Either once a week with a lot of activity or once a month.      tiotropium (SPIRIVA) 18 MCG Cap Place 18 mcg into inhaler and inhale  every day.      ibuprofen (MOTRIN) 200 MG Tab Take 200 mg by mouth every 6 hours as needed.      ALBUTEROL INH Inhale.       No current facility-administered medications for this encounter.       ALLERGIES:  Bacitracin-neomycin-polymyxin, Gabapentin, Ketorolac, Lipoglycopeptide antibiotics, and Tromethamine    REVIEW OF SYSTEMS:    A complete review of system taken. Pertinent items in HPI.  All others negative.    PHYSICAL EXAM:  PERFORMANCE STATUS:      3/20/2023    11:09 AM   ECOG Performance Review   ECOG Performance Status Fully active, able to carry on all pre-disease performance without restriction         3/20/2023    11:09 AM   Karnofsky Score   Karnofsky Score 90     /63 (BP Location: Right arm, Patient Position: Sitting, BP Cuff Size: Large adult)   Pulse 78   Wt 68.9 kg (151 lb 14.4 oz)   SpO2 98%   BMI 21.99 kg/m²   Physical Exam  Constitutional:       Appearance: Normal appearance.   HENT:      Head: Normocephalic and atraumatic.   Eyes:      Extraocular Movements: Extraocular movements intact.      Conjunctiva/sclera: Conjunctivae normal.   Pulmonary:      Effort: Pulmonary effort is normal.      Breath sounds: Normal breath sounds.   Abdominal:      Palpations: Abdomen is soft.   Musculoskeletal:         General: Normal range of motion.   Neurological:      General: No focal deficit present.      Mental Status: He is alert and oriented to person, place, and time.         LABORATORY DATA:   Lab Results   Component Value Date/Time    WBC 3.1 (L) 06/06/2023 08:26 AM    RBC 4.42 (L) 06/06/2023 08:26 AM    HEMOGLOBIN 13.4 (L) 06/06/2023 08:26 AM    HEMATOCRIT 40.8 (L) 06/06/2023 08:26 AM    MCV 92.3 06/06/2023 08:26 AM    MCH 30.3 06/06/2023 08:26 AM    MCHC 32.8 06/06/2023 08:26 AM    RDW 47.6 06/06/2023 08:26 AM    PLATELETCT 441 06/06/2023 08:26 AM    MPV 9.9 06/06/2023 08:26 AM    NEUTSPOLYS 67.60 06/06/2023 08:26 AM    LYMPHOCYTES 25.70 06/06/2023 08:26 AM    MONOCYTES 5.80 06/06/2023 08:26  AM    EOSINOPHILS 0.00 06/06/2023 08:26 AM    BASOPHILS 0.30 06/06/2023 08:26 AM      Lab Results   Component Value Date/Time    SODIUM 134 (L) 06/06/2023 08:26 AM    POTASSIUM 4.5 06/06/2023 08:26 AM    CHLORIDE 97 06/06/2023 08:26 AM    CO2 24 06/06/2023 08:26 AM    GLUCOSE 182 (H) 06/06/2023 08:26 AM    BUN 16 06/06/2023 08:26 AM    CREATININE 0.85 06/06/2023 08:26 AM         RADIOLOGY DATA:  IR-THORACENTESIS PUNCTURE RIGHT    Result Date: 5/12/2023  1. Ultrasound guided right sided therapeutic thoracentesis. 2. 2 L  mL of fluid withdrawn.    MR-BRAIN-WITH & W/O    Result Date: 5/1/2023  1.  No evidence of intracranial metastases. 2.  Cerebral atrophy and moderate chronic microvascular ischemic type changes. 3.  No acute intracranial abnormality.      IMPRESSION:    A 73 y.o. with   Adenocarcinoma, lung, right (HCC)  Staging form: Lung, AJCC 8th Edition  - Clinical stage from 4/24/2023: Stage SUDHAKAR (cT1c, cN0, cM1a) - Signed by Sam Jaimes M.D. on 4/24/2023  Histopathologic type: Adenocarcinoma, NOS  Stage prefix: Initial diagnosis      CANCER STATUS:  Therapy Completed, Status Pending Restaging Work-up    RECOMMENDATIONS:   Mr. Zarate is doing well.  At this point, he is tolerating his treatment quite well.  He has completed SBRT without any major adverse issues.  Given that he is on chemoimmunotherapy and will be on this for an ongoing basis and will have established follow-up with medical oncology, I offered him continued follow-up with medical oncology from here on out, which he is agreeable to.  Dr. Gibson will continue his treatment and obtain subsequent restaging scans.  He will plan to see me on an as-needed basis only.    Thank you for the opportunity to participate in his care.  If any questions or comments, please do not hesitate in calling.    No orders of the defined types were placed in this encounter.

## 2023-06-26 NOTE — PROGRESS NOTES
"Pharmacy Chemotherapy Calculations    Dx: NSCLC  Cycle 3  Previous treatment = 6/6/23    Protocol: Pembrolizumab + Pemetrexed/Carboplatin  Pembrolizumab 200 mg IV over 30 minutes on Day 1 followed by  Pemetrexed 500 mg/m2 IV over 10 minutes on Day 1 followed by  Carboplatin AUC 5 IV over 30 minutes on Day 1   21-day cycle for 4-6 cycles  ~Followed by~  Pembrolizumab 200 mg IV over 30 minutes on Day 1 or Pembrolizumab 400 mg IV over 30 minutes every other cycle followed by  Pemetrexed 500 mg/m2 IV over 10 minutes on Day 1   Maintenance therapy: 21-day cycle until 24 months of therapy has been completed  ~Followed by~  Pemetrexed 500 mg/m2 IV over 10 minutes on Day 1   Maintenance therapy: 21-day cycle until DP/UT  NCCN Guidelines for Non-Small Cell Lung Cancer V.1.2023.  Nikki M, et al. Eur J Cancer. 2020;131:68-75.  Kelsey CJ, et al. Lancet Oncol. 2016;17(11):2513-9717.  Joelle SERNA et al. N Engl J Med. 2018;378(22):8961-6186.    Allergies:  Bacitracin-neomycin-polymyxin, Gabapentin, Ketorolac, Lipoglycopeptide antibiotics, and Tromethamine       /55   Pulse 87   Temp 36.5 °C (97.7 °F) (Temporal)   Resp 18   Ht 1.77 m (5' 9.69\")   Wt 70 kg (154 lb 5.2 oz)   SpO2 97%   BMI 22.34 kg/m²  Body surface area is 1.86 meters squared.    Labs 6/26/23:  ANC~ 4060 Plt = 248k   Hgb = 12.2     SCr = 0.8 mg/dL CrCl ~ 80 mL/min   AST/ALT/AP = 13/26/117 TBili = 0.3  TSH = 1.85 Free T4 = 1.3    Cyanocobalamin given every 9 weeks, last given 5/16/23, next due 7/18/23    Pembrolizumab 200 mg fixed dose   No calculation required, OK to treat with final dose = 200 mg    Pemetrexed 500 mg/m² x 1.86 m² = 930 mg   <10% difference, OK to treat with final dose = 900 mg    Carboplatin AUC 5 x (80 mL/min + 25) = 525 mg   <10% difference, OK to treat with final dose = 475 mg    Lawrence Garcia, PharmD  "

## 2023-06-26 NOTE — PROGRESS NOTES
Patient was seen today in clinic with Dr. Jaimes for a follow up.  Vitals signs and weight were obtained and pain assessment was completed.  Allergies and medications were reviewed with the patient.  Toxicities of treatment assessed.     Vitals/Pain:  Vitals:    06/26/23 0823   BP: 128/63   BP Location: Right arm   Patient Position: Sitting   BP Cuff Size: Large adult   Pulse: 78   SpO2: 98%   Weight: 68.9 kg (151 lb 14.4 oz)   Pain Score: No pain        Allergies:   Bacitracin-neomycin-polymyxin, Gabapentin, Ketorolac, Lipoglycopeptide antibiotics, and Tromethamine    Current Medications:  Current Outpatient Medications   Medication Sig Dispense Refill    folic acid (FOLVITE) 1 MG Tab Take 1 Tablet by mouth every day for 105 days. Start 5-7 days prior to first cycle of pemetrexed 30 Tablet 3    prochlorperazine (COMPAZINE) 10 MG Tab Take 1 Tablet by mouth every 6 hours as needed (for nausea, vomiting). 30 Tablet 6    ondansetron (ZOFRAN) 4 MG Tab tablet Take 1 Tablet by mouth every four hours as needed for Nausea/Vomiting (for nausea, vomiting). 30 Tablet 6    nicotine (NICODERM) 14 MG/24HR PATCH 24 HR Place 1 Patch on the skin every 24 hours. 30 Patch 0    acetaminophen (TYLENOL) 500 MG Tab Take 500-1,000 mg by mouth every 6 hours as needed.      aspirin 500 MG tablet Take 1,000 mg by mouth. Either once a week with a lot of activity or once a month.      tiotropium (SPIRIVA) 18 MCG Cap Place 18 mcg into inhaler and inhale every day.      ibuprofen (MOTRIN) 200 MG Tab Take 200 mg by mouth every 6 hours as needed.      ALBUTEROL INH Inhale.       No current facility-administered medications for this encounter.         PCP:  Arthur Hernandez, Med Ass't

## 2023-06-27 ENCOUNTER — HOSPITAL ENCOUNTER (OUTPATIENT)
Dept: HEMATOLOGY ONCOLOGY | Facility: MEDICAL CENTER | Age: 73
End: 2023-06-27
Attending: STUDENT IN AN ORGANIZED HEALTH CARE EDUCATION/TRAINING PROGRAM
Payer: COMMERCIAL

## 2023-06-27 ENCOUNTER — DOCUMENTATION (OUTPATIENT)
Dept: ONCOLOGY | Facility: MEDICAL CENTER | Age: 73
End: 2023-06-27

## 2023-06-27 ENCOUNTER — OUTPATIENT INFUSION SERVICES (OUTPATIENT)
Dept: ONCOLOGY | Facility: MEDICAL CENTER | Age: 73
End: 2023-06-27
Attending: PSYCHIATRY & NEUROLOGY
Payer: COMMERCIAL

## 2023-06-27 VITALS
BODY MASS INDEX: 22.24 KG/M2 | WEIGHT: 155.31 LBS | HEIGHT: 70 IN | HEART RATE: 92 BPM | SYSTOLIC BLOOD PRESSURE: 110 MMHG | OXYGEN SATURATION: 96 % | TEMPERATURE: 97.2 F | DIASTOLIC BLOOD PRESSURE: 52 MMHG | RESPIRATION RATE: 16 BRPM

## 2023-06-27 VITALS
HEART RATE: 87 BPM | DIASTOLIC BLOOD PRESSURE: 55 MMHG | SYSTOLIC BLOOD PRESSURE: 127 MMHG | HEIGHT: 70 IN | OXYGEN SATURATION: 97 % | TEMPERATURE: 97.7 F | RESPIRATION RATE: 18 BRPM | WEIGHT: 154.32 LBS | BODY MASS INDEX: 22.09 KG/M2

## 2023-06-27 DIAGNOSIS — C34.91 ADENOCARCINOMA, LUNG, RIGHT (HCC): ICD-10-CM

## 2023-06-27 DIAGNOSIS — G89.3 CANCER RELATED PAIN: ICD-10-CM

## 2023-06-27 DIAGNOSIS — R91.1 LEFT LOWER LOBE PULMONARY NODULE: ICD-10-CM

## 2023-06-27 PROCEDURE — 99212 OFFICE O/P EST SF 10 MIN: CPT | Performed by: STUDENT IN AN ORGANIZED HEALTH CARE EDUCATION/TRAINING PROGRAM

## 2023-06-27 PROCEDURE — 700105 HCHG RX REV CODE 258: Performed by: NURSE PRACTITIONER

## 2023-06-27 PROCEDURE — 96417 CHEMO IV INFUS EACH ADDL SEQ: CPT

## 2023-06-27 PROCEDURE — 99214 OFFICE O/P EST MOD 30 MIN: CPT | Performed by: STUDENT IN AN ORGANIZED HEALTH CARE EDUCATION/TRAINING PROGRAM

## 2023-06-27 PROCEDURE — 700111 HCHG RX REV CODE 636 W/ 250 OVERRIDE (IP): Mod: JG | Performed by: NURSE PRACTITIONER

## 2023-06-27 PROCEDURE — 96367 TX/PROPH/DG ADDL SEQ IV INF: CPT

## 2023-06-27 PROCEDURE — 96411 CHEMO IV PUSH ADDL DRUG: CPT

## 2023-06-27 PROCEDURE — 96375 TX/PRO/DX INJ NEW DRUG ADDON: CPT

## 2023-06-27 PROCEDURE — 96413 CHEMO IV INFUSION 1 HR: CPT

## 2023-06-27 RX ORDER — LORAZEPAM 1 MG/1
1 TABLET ORAL EVERY 4 HOURS PRN
Qty: 2 TABLET | Refills: 0 | Status: SHIPPED | OUTPATIENT
Start: 2023-06-27 | End: 2023-06-28

## 2023-06-27 RX ORDER — SODIUM CHLORIDE 9 MG/ML
INJECTION, SOLUTION INTRAVENOUS CONTINUOUS
Status: DISCONTINUED | OUTPATIENT
Start: 2023-06-27 | End: 2023-06-27 | Stop reason: HOSPADM

## 2023-06-27 RX ORDER — CYANOCOBALAMIN 1000 UG/ML
1000 INJECTION, SOLUTION INTRAMUSCULAR; SUBCUTANEOUS
Status: DISCONTINUED | OUTPATIENT
Start: 2023-06-27 | End: 2023-06-27

## 2023-06-27 RX ADMIN — SODIUM CHLORIDE: 9 INJECTION, SOLUTION INTRAVENOUS at 11:17

## 2023-06-27 RX ADMIN — DEXAMETHASONE SODIUM PHOSPHATE 12 MG: 4 INJECTION, SOLUTION INTRA-ARTICULAR; INTRALESIONAL; INTRAMUSCULAR; INTRAVENOUS; SOFT TISSUE at 11:20

## 2023-06-27 RX ADMIN — ONDANSETRON 16 MG: 2 INJECTION INTRAMUSCULAR; INTRAVENOUS at 11:34

## 2023-06-27 RX ADMIN — SODIUM CHLORIDE 200 MG: 9 INJECTION, SOLUTION INTRAVENOUS at 12:49

## 2023-06-27 RX ADMIN — CARBOPLATIN 475 MG: 10 INJECTION INTRAVENOUS at 13:54

## 2023-06-27 RX ADMIN — PEMETREXED DISODIUM 900 MG: 500 INJECTION, POWDER, LYOPHILIZED, FOR SOLUTION INTRAVENOUS at 13:30

## 2023-06-27 RX ADMIN — FOSAPREPITANT 150 MG: 150 INJECTION, POWDER, LYOPHILIZED, FOR SOLUTION INTRAVENOUS at 12:05

## 2023-06-27 ASSESSMENT — ENCOUNTER SYMPTOMS
BRUISES/BLEEDS EASILY: 0
SENSORY CHANGE: 0
TINGLING: 0
NAUSEA: 0
HEADACHES: 0
WHEEZING: 0
SHORTNESS OF BREATH: 0
VOMITING: 0
BLURRED VISION: 0
WEIGHT LOSS: 0
FEVER: 0
TREMORS: 0
SORE THROAT: 0
SPUTUM PRODUCTION: 0
ABDOMINAL PAIN: 0
CHILLS: 0
PALPITATIONS: 0
DEPRESSION: 0
HEARTBURN: 0
FOCAL WEAKNESS: 0
COUGH: 0
DIZZINESS: 0
NECK PAIN: 0
ORTHOPNEA: 0
MEMORY LOSS: 0

## 2023-06-27 ASSESSMENT — FIBROSIS 4 INDEX
FIB4 SCORE: 0.76
FIB4 SCORE: 0.76

## 2023-06-27 NOTE — PROGRESS NOTES
Chemotherapy Verification - PRIMARY RN      Height = 177 cm  Weight = 70 kg  BSA = 1.86 m2       Medication: pembrolizumab (Keytruda)  Dose: 200 mg  Calculated Dose: 200 mg fixed dose                             Medication: PEMEtrexed (Alimta)  Dose: 500 mg/m2  Calculated Dose: 930 mg                               Medication: CARBOplatin (Paraplatin)  Dose: AUC 5  Calculated Dose: 524.84 mg                               Carboplatin calculation (if applicable):  (5*(79.968+25)) = 524.84 mg      I confirm this process was performed independently with the BSA and all final chemotherapy dosing calculations congruent.  Any discrepancies of 10% or greater have been addressed with the chemotherapy pharmacist. The resolution of the discrepancy has been documented in the EPIC progress notes.

## 2023-06-27 NOTE — PROGRESS NOTES
Nutrition Services: Brief Update- Signing Off  Wt Readings from Last 7 Encounters:   06/27/23 70 kg (154 lb 5.2 oz)   06/27/23 70.5 kg (155 lb 5 oz)   06/26/23 68.9 kg (151 lb 14.4 oz)   06/06/23 68.9 kg (151 lb 14.4 oz)   06/06/23 69.1 kg (152 lb 5.4 oz)   05/10/23 72.4 kg (159 lb 11.2 oz)   05/03/23 72.1 kg (159 lb)     Weight Change: wt remaining stable within past 3 weeks.     Weight remaining desirably stable. No nutrition needs requested or indicated at this time as a result of weight stability. RD will remain available PRN. Pt previously provided RD information at previous visits. Please re-consult RD as needed per pt preferences or if nutrition status changes.      Please contact [phone]

## 2023-06-27 NOTE — PROGRESS NOTES
"Pharmacy Chemotherapy Calculation    Patient Name: Tyler Zarate   Dx:NSCLC (Stage IV)    Protocol: CARBOplatin/PEMEtrexed + Pembrolizumab  Pembrolizumab 200 mg IV over 30 minutes on Day 1 followed by   Pemetrexed 500 mg/m2 IV over 10 minutes on Day 1 followed by   CARBOplatin AUC 5 IV over 30 minutes on Day 1    21 day cycle for 4-6 cycles   ~Followed by~  Pembrolizumab 400 mg IV over 30 minutes on Day 1 every other cycle followed by   Pemetrexed 500 mg/m2 IV over 10 minutes on Day 1    21 day cycle until 24 months of therapy has been completed   ~Followed by~  Pemetrexed 500 mg/m2 IV over 10 minutes on Day 1    21 day cycle until DP or UT    NCCN Guidelines for Non-Small Cell Lung Cancer V.1.2023  Nikki M, et al. Eur J Cancer. 2020;131:68-75.  Kelsey CJ, et al. Lancet Oncol. 2016;17(11):6272-8529.  Joelle L, et al. N Engl J Med.  2018;378(22):0270-2752.    Allergies:  Bacitracin-neomycin-polymyxin, Gabapentin, Ketorolac, Lipoglycopeptide antibiotics, and Tromethamine     /55   Pulse 87   Temp 36.5 °C (97.7 °F) (Temporal)   Resp 18   Ht 1.77 m (5' 9.69\")   Wt 70 kg (154 lb 5.2 oz)   SpO2 97%   BMI 22.34 kg/m²  Body surface area is 1.86 meters squared.    Labs 6/26/23:  ANC~ 4060 Plt = 248k   Hgb = 12.2     SCr = 0.8 mg/dL CrCl ~ 80 mL/min   AST/ALT/AP = 13/26/117 TBili = 0.3     TSH = 1.85   Free T4 = 1.3          Received Cyanacobalmin injection 5/16/23 (next due 7/18/23)    Drug Order   (Drug name, dose, route, IV Fluid & volume, frequency, number of doses) Cycle 3    Previous treatment: C2 on 6/6/23     Medication = Pembrolizumab   Base Dose= 200 mg   Fixed dose, no calculation required.  Final Dose = 200 mg  Route = IV  Fluid & Volume = NS 50 mL  Admin Duration = Over 30 minutes          Fixed dose, no calculation required. Okay to treat with final dose.   Medication = PEMEtrexed   Base Dose= 500 mg/m²   Calc Dose: Base Dose x 1.86 m² = 930 mg  Final Dose = 900 mg  Route = IV  Fluid & Volume = "  mL  Admin Duration = Over 10 minutes          <10% difference, okay to treat with final dose   Medication = CARBOplatin  Base Dose= AUC 5    Calc Dose:AUCx (80 mL/min + 25) = 525 mg  Final Dose = 475 mg  Route = IV  Fluid & Volume =  mL  Admin Duration = Over 30 minutes          <10% difference (rounded down for national drug shortage), okay to treat with final dose     By my signature below, I confirm this process was performed independently with the BSA and all final chemotherapy dosing calculations congruent. I have reviewed the above chemotherapy order and that my calculation of the final dose and BSA (when applicable) corroborate those calculations of the  pharmacist. Discrepancies of 10% or greater in the written dose have been addressed and documented within the EPIC Progress notes.    Laura Najera, PharmD,BCOP

## 2023-06-27 NOTE — PROGRESS NOTES
Chemotherapy Verification - SECONDARY RN       Height = 177 cm  Weight = 70 kg  BSA = 1.86 m2       Medication: Keytruda  Dose: 200 mg set dose  Calculated Dose: 200 mg                             (In mg/m2, AUC, mg/kg)     Medication: Alimta  Dose: 500 mg/m2  Calculated Dose: 930 mg                             (In mg/m2, AUC, mg/kg)    Medication: Carboplatin  Dose: AUC 5  Calculated Dose: 525 mg                             (In mg/m2, AUC, mg/kg)    Carboplatin calculation (if applicable):  (5*(80+25)) = 525    I confirm that this process was performed independently.

## 2023-06-27 NOTE — ADDENDUM NOTE
Encounter addended by: Sunday Galeas on: 6/27/2023 10:40 AM   Actions taken: Charge Capture section accepted

## 2023-06-27 NOTE — PROGRESS NOTES
Consult Note: Hematology/Oncology     Primary Care:  No primary care provider on file.    Chief Complaint   Patient presents with    Lung Cancer     Prechemo        Current Treatment: None    Prior Treatment: None    05/01 - 05/10/23: SBRT to lung - Dr. Jaimes  05/16/23: C1D1 Carboplatin, Pemetrexed and Pembrolizumab  06/06/23: C2D1 Carboplatin, Pemetrexed and Pembrolizumab  06/27/23: C3D1 Carboplatin, Pemetrexed and Pembrolizumab    Subjective:   History of Presenting Illness:  Tyler Zarate is a 72 y.o. male with a past medical history of COPD and tobacco use, 1 pack/day smoker, alcohol abuse, right leg DVT diagnosed in 2019 who presents today with a new diagnosis of metastatic adeno carcinoma of the lung.    Patient had a CT thorax on January 18, 2023 which showed a new spiculated mass in the anterior right upper lobe arising from the area of presumed previous scar.  It is highly suggestive of disease.    Patient had a biopsy and pathology on 2/17/23 shows adenocarcinoma of the right upper lobe.    A PET scan showed a hypermetabolic RUL mass ([id number]) as well as a mass in the LLL (11X5mm).     Patient recently moved to [place] from [facility].  He moved to be closer to his son.    He reports unintentional weight loss greater than 10% in the last 6 months.    He also reports a personal history of cancer (basal cell of nose)    Interval History    Tolerated C2 well aside from constipation.  He had significant fatigue.  He vomited 2x.     He is able to eat, but nothing tastes good.  He is averaging 2 meals a day.  He eats about 1/2 pint of rommel jerrys    He has some questions about his chemotherapy regimen.    Past Medical History:   Diagnosis Date    Cancer (HCC)     skin, Lung    Carcinoma in situ of respiratory system     Lung 2023    Cataract     COPD (chronic obstructive pulmonary disease) (HCC)     Cough     DVT (deep venous thrombosis) (HCC)     RLL    Lumbar radiculopathy     Pain     lower back and  "sciatic pain    Pneumonia     2018    Shortness of breath     Skin carcinoma     Sputum production     Synovial cyst     L spine        Past Surgical History:   Procedure Laterality Date    [place] BRONCHOSCOPY,DIAGNOSTIC  4/12/2023    Procedure: FIBER OPTIC BRONCHOSCOPY WITH  WASH, BRUSH, BRONCHOALVEOLAR LAVAGE, BIOPSY AND FINE NEEDLE ASPIRATION & NAVIGATION, ROBOTICS;  Surgeon: Vladimir Ortiz M.D.;  Location: SURGERY [facility];  Service: Pulmonary Robotic    CATARACT PHACO WITH IOL Bilateral        Social History     Tobacco Use    Smoking status: Every Day     Packs/day: 0.50     Years: 57.00     Pack years: 28.50     Types: Cigarettes     Passive exposure: Never    Smokeless tobacco: Former     Types: Chew   Vaping Use    Vaping Use: Never used   Substance Use Topics    Alcohol use: Yes     Alcohol/week: 16.8 oz     Types: 28 Cans of beer per week     Comment: 4 beers a day    Drug use: Never        Family History   Problem Relation Age of Onset    Cancer Brother         lung       Allergies   Allergen Reactions    Bacitracin-Neomycin-Polymyxin      Ointment around eye caused face and eye swelling    Gabapentin Swelling     Took several medications following cataract surgery. Not sure which medication was cause.     Ketorolac Swelling     Took following cataract surgery. Not sure which medication was cause.     Lipoglycopeptide Antibiotics Swelling     Pt stated he did not remember what kind of antibiotics he was allergic to. Pt stated \"two different kinds\" Face swelling and eyes glazing over.     Tromethamine        Current Outpatient Medications   Medication Sig Dispense Refill    folic acid (FOLVITE) 1 MG Tab Take 1 Tablet by mouth every day for 105 days. Start 5-7 days prior to first cycle of pemetrexed 30 Tablet 3    prochlorperazine (COMPAZINE) 10 MG Tab Take 1 Tablet by mouth every 6 hours as needed (for nausea, vomiting). 30 Tablet 6    ondansetron (ZOFRAN) 4 MG Tab tablet Take 1 Tablet by mouth every " "four hours as needed for Nausea/Vomiting (for nausea, vomiting). 30 Tablet 6    nicotine (NICODERM) 14 MG/24HR PATCH 24 HR Place 1 Patch on the skin every 24 hours. 30 Patch 0    acetaminophen (TYLENOL) 500 MG Tab Take 500-1,000 mg by mouth every 6 hours as needed.      aspirin 500 MG tablet Take 1,000 mg by mouth. Either once a week with a lot of activity or once a month.      tiotropium (SPIRIVA) 18 MCG Cap Place 18 mcg into inhaler and inhale every day.      ibuprofen (MOTRIN) 200 MG Tab Take 200 mg by mouth every 6 hours as needed.      ALBUTEROL INH Inhale.       No current facility-administered medications for this encounter.       Review of Systems   Constitutional:  Negative for chills, fever, malaise/fatigue and weight loss.   HENT:  Negative for congestion, ear pain, nosebleeds and sore throat.    Eyes:  Negative for blurred vision.   Respiratory:  Negative for cough, sputum production, shortness of breath and wheezing.    Cardiovascular:  Negative for chest pain, palpitations, orthopnea and leg swelling.   Gastrointestinal:  Negative for abdominal pain, heartburn, nausea and vomiting.   Genitourinary:  Negative for dysuria, frequency and urgency.   Musculoskeletal:  Negative for neck pain.   Neurological:  Negative for dizziness, tingling, tremors, sensory change, focal weakness and headaches.   Endo/Heme/Allergies:  Does not bruise/bleed easily.   Psychiatric/Behavioral:  Negative for depression, memory loss and suicidal ideas.    All other systems reviewed and are negative.      Problem list, medications, and allergies reviewed by myself today in Epic.     Objective:     Vitals:    06/27/23 0948   BP: 110/52   BP Location: Right arm   Patient Position: Sitting   BP Cuff Size: Adult   Pulse: 92   Resp: 16   Temp: 36.2 °C (97.2 °F)   TempSrc: Temporal   SpO2: 96%   Weight: 70.5 kg (155 lb 5 oz)   Height: 1.77 m (5' 9.69\")         DESC; KARNOFSKY SCALE WITH ECOG EQUIVALENT: 80, Normal activity with effort; " some signs or symptoms of disease (ECOG equivalent 1)    DISTRESS LEVEL: no apparent distress  No sickle exam completed as this was a virtual visit.  His previous physical exam is listed below.  Physical Exam  Constitutional:       General: He is not in acute distress.     Appearance: Normal appearance.   HENT:      Head: Normocephalic and atraumatic.      Nose: No congestion.      Comments: S/p excision of basal cell/RT     Mouth/Throat:      Mouth: Mucous membranes are moist.      Pharynx: Oropharynx is clear.   Eyes:      General: No scleral icterus.     Conjunctiva/sclera: Conjunctivae normal.      Pupils: Pupils are equal, round, and reactive to light.   Cardiovascular:      Rate and Rhythm: Normal rate and regular rhythm.      Pulses: Normal pulses.      Heart sounds: No murmur heard.     No friction rub.   Pulmonary:      Effort: Pulmonary effort is normal. No respiratory distress.      Breath sounds: Normal breath sounds. No stridor. No wheezing or rales.   Chest:      Chest wall: No tenderness.   Abdominal:      General: Abdomen is flat. Bowel sounds are normal. There is no distension.      Palpations: Abdomen is soft. There is no mass.      Tenderness: There is no abdominal tenderness. There is no guarding or rebound.   Musculoskeletal:         General: No swelling, tenderness or deformity. Normal range of motion.      Cervical back: Normal range of motion and neck supple. No rigidity or tenderness.      Right lower leg: No edema.      Left lower leg: No edema.   Skin:     General: Skin is warm.      Coloration: Skin is not jaundiced or pale.      Findings: No bruising or rash.   Neurological:      General: No focal deficit present.      Mental Status: He is alert and oriented to person, place, and time. Mental status is at baseline.      Motor: No weakness.   Psychiatric:         Mood and Affect: Mood normal.         Behavior: Behavior normal.         Thought Content: Thought content normal.          Judgment: Judgment normal.         Labs:   Most recent labs reviewed.  Please see the lab tab of chart review    Imaging:   Most recent images below have been independently reviewed by me.  Please see the imaging tab of chart review    Pathology:  Adenocarcinoma of the RUL    Assessment/Plan:      Cancer Staging   Adenocarcinoma, lung, right (HCC)  Staging form: Lung, AJCC 8th Edition  - Clinical stage from 4/24/2023: Stage SUDHAKAR (cT1c, cN0, cM1a) - Signed by Sam Jaimes M.D. on 4/24/2023       Mr. Zarate is a 71 yo M who presents today with a new diagnosis of adenocarcinoma (PDL1 1%; KEAP1 loss, STK11 loss, CRKL amp, GRANT G109, DFU790, TP53) with Stage IV adenocarcinoma of the left lower lobe as well as the right upper lobe s/p C2 of carbo/pem/pem.    He is here prior to C3.    I discussed with the patient the treatment plan going forward which includes 4 cycles of his triplet therapy and then moving onto 2 years of pemetrexed and pembrolizumab.    We went through his labs in detail and discussed his sodium level which is normal as well as his anemia and thrombocytopenia which is likely secondary to his chemotherapy regimen.  We also discussed the plan for scans going forward.    Questions and concerns were addressed.  Plan  -Continue with C3 of Carbo/Pem/Pem  -Will see patient prior to C4  -Will need CT CAP first week of August    Regimen  21-day cycle for 4-6 cycles or until disease progression or unacceptable toxicity    Pembrolizumab 200 mg IV over 30 minutes on day 1 followed by  Pemetrexed at 500 mg per metered squared IV over 10 minutes on day 1 followed by  Carboplatin AUC 5 IV over 30 minutes on day 1  Followed by  Maintenance therapy 21-day cycle until 24 months of therapy has been completed  Pembrolizumab 200 mg IV over 30 minutes on day 1  Followed by pemetrexed 500 mg per metered squared IV over 10 minutes on day 1    References  NCCN Guidelines Version NSCLC 3.2002     Jimmie BERRIOS, et al. Clin Lung Cancer  2019;20:30-36.e3    Renato WORKMAN, et al. Lung Cancer 2020;140:35-41.    Lai et al NEJM 2018: 378(22):6817-1173    [facility] Lancet Oncol 2016; 17(11): 9279-1337    Nikki M et al. Eur J Cancer, 2020; 131:68-75      No follow-ups on file.     Any questions and concerns raised by the patient were addressed and answered. Patient denies any further questions.  Patient encouraged to call the office with any concerns or issues.     Peg Gibson M.D.  Hematology/Oncology      31 minutes was spent on this case

## 2023-06-28 NOTE — PROGRESS NOTES
Mr. Zarate arrived to the infusion center for C3 chemotherapy with his son. He is in good spirits today. PIV established to his RFA. Labs verified from yesterday and met parameters to treat. Premedication given. Keytruda, Alimta, and Carboplatin all infused without difficulty. PIV flushed, discontinued intact and site covered with gauze and coban. Left infusion center with no apparent distress and confirmed next appointment.

## 2023-07-06 ENCOUNTER — PATIENT OUTREACH (OUTPATIENT)
Dept: ONCOLOGY | Facility: MEDICAL CENTER | Age: 73
End: 2023-07-06
Payer: COMMERCIAL

## 2023-07-06 NOTE — PROGRESS NOTES
"Follow up call for navigation.  Pt reporting doing ok with treatment.  He states the first week is the worst after chemo then he does better.  Drive \"is not fun\" but ok, pt reports does not get gas reimbursement from [facility] and reports is is able to.  Pt does indicate having some assistance for gas would be helpful, since his truck takes use more gas then a normal car.  Reviewed transportation michel through [email or web address] and will mail it to him.  Navigator can meet with patient during next treatment to get completed application and submit.  Pt very grateful.  No other questions for navigator at this time.  Available as needed.  "

## 2023-07-12 RX ORDER — 0.9 % SODIUM CHLORIDE 0.9 %
VIAL (ML) INJECTION PRN
Status: CANCELLED | OUTPATIENT
Start: 2023-07-17

## 2023-07-12 RX ORDER — SODIUM CHLORIDE 9 MG/ML
INJECTION, SOLUTION INTRAVENOUS CONTINUOUS
Status: CANCELLED | OUTPATIENT
Start: 2023-07-17

## 2023-07-12 RX ORDER — PROCHLORPERAZINE MALEATE 10 MG
10 TABLET ORAL EVERY 6 HOURS PRN
Status: CANCELLED | OUTPATIENT
Start: 2023-07-17

## 2023-07-12 RX ORDER — 0.9 % SODIUM CHLORIDE 0.9 %
3 VIAL (ML) INJECTION PRN
Status: CANCELLED | OUTPATIENT
Start: 2023-07-16

## 2023-07-12 RX ORDER — DIPHENHYDRAMINE HYDROCHLORIDE 50 MG/ML
50 INJECTION INTRAMUSCULAR; INTRAVENOUS PRN
Status: CANCELLED | OUTPATIENT
Start: 2023-07-17

## 2023-07-12 RX ORDER — ONDANSETRON 2 MG/ML
4 INJECTION INTRAMUSCULAR; INTRAVENOUS PRN
Status: CANCELLED | OUTPATIENT
Start: 2023-07-17

## 2023-07-12 RX ORDER — 0.9 % SODIUM CHLORIDE 0.9 %
3 VIAL (ML) INJECTION PRN
Status: CANCELLED | OUTPATIENT
Start: 2023-07-17

## 2023-07-12 RX ORDER — 0.9 % SODIUM CHLORIDE 0.9 %
10 VIAL (ML) INJECTION PRN
Status: CANCELLED | OUTPATIENT
Start: 2023-07-16

## 2023-07-12 RX ORDER — 0.9 % SODIUM CHLORIDE 0.9 %
VIAL (ML) INJECTION PRN
Status: CANCELLED | OUTPATIENT
Start: 2023-07-16

## 2023-07-12 RX ORDER — METHYLPREDNISOLONE SODIUM SUCCINATE 125 MG/2ML
125 INJECTION, POWDER, LYOPHILIZED, FOR SOLUTION INTRAMUSCULAR; INTRAVENOUS PRN
Status: CANCELLED | OUTPATIENT
Start: 2023-07-17

## 2023-07-12 RX ORDER — 0.9 % SODIUM CHLORIDE 0.9 %
10 VIAL (ML) INJECTION PRN
Status: CANCELLED | OUTPATIENT
Start: 2023-07-17

## 2023-07-12 RX ORDER — CYANOCOBALAMIN 1000 UG/ML
1000 INJECTION, SOLUTION INTRAMUSCULAR; SUBCUTANEOUS
Status: CANCELLED | OUTPATIENT
Start: 2023-07-17

## 2023-07-12 RX ORDER — EPINEPHRINE 1 MG/ML(1)
0.5 AMPUL (ML) INJECTION PRN
Status: CANCELLED | OUTPATIENT
Start: 2023-07-17

## 2023-07-12 RX ORDER — ONDANSETRON 8 MG/1
8 TABLET, ORALLY DISINTEGRATING ORAL PRN
Status: CANCELLED | OUTPATIENT
Start: 2023-07-17

## 2023-07-13 ENCOUNTER — PATIENT OUTREACH (OUTPATIENT)
Dept: ONCOLOGY | Facility: MEDICAL CENTER | Age: 73
End: 2023-07-13
Payer: COMMERCIAL

## 2023-07-13 NOTE — PROGRESS NOTES
Received completed transportation michel application.  Provided to oncology social worker for processing.

## 2023-07-14 ENCOUNTER — PATIENT OUTREACH (OUTPATIENT)
Dept: ONCOLOGY | Facility: MEDICAL CENTER | Age: 73
End: 2023-07-14
Payer: COMMERCIAL

## 2023-07-14 NOTE — PROGRESS NOTES
On July 14, 2023, Oncology Social Worker Radha Frank processed [facility] [employer] [place] application.  Pt. will receive $200 in gas card to assist with transportation.   OSW Zac left gas card at [facility]  for .

## 2023-07-14 NOTE — PROGRESS NOTES
Call placed to patient.  Verified with him appointments for Tuesday as well as let him know about michel assistance.  He was very grateful and will be at [facility] on Tuesday.

## 2023-07-17 NOTE — PROGRESS NOTES
"Pharmacy Chemotherapy Calculations    Dx: NSCLC  Cycle 4  Previous treatment = 6/27/23    Protocol: Pembrolizumab + Pemetrexed/Carboplatin  Pembrolizumab 200 mg IV over 30 minutes on Day 1 followed by  Pemetrexed 500 mg/m2 IV over 10 minutes on Day 1 followed by  Carboplatin AUC 5 IV over 30 minutes on Day 1   21-day cycle for 4-6 cycles  ~Followed by~  Pembrolizumab 200 mg IV over 30 minutes on Day 1 or Pembrolizumab 400 mg IV over 30 minutes every other cycle followed by  Pemetrexed 500 mg/m2 IV over 10 minutes on Day 1   Maintenance therapy: 21-day cycle until 24 months of therapy has been completed  ~Followed by~  Pemetrexed 500 mg/m2 IV over 10 minutes on Day 1   Maintenance therapy: 21-day cycle until DP/UT  NCCN Guidelines for Non-Small Cell Lung Cancer V.1.2023.  Nikki M, et al. Eur J Cancer. 2020;131:68-75.  Kelsey CJ, et al. Lancet Oncol. 2016;17(11):5375-6279.  Joelle L et al. N Engl J Med. 2018;378(22):5830-5765.    Allergies:  Bacitracin-neomycin-polymyxin, Gabapentin, Ketorolac, Lipoglycopeptide antibiotics, and Tromethamine       /58   Pulse 91   Temp 36.5 °C (97.7 °F) (Temporal)   Resp 18   Ht 1.77 m (5' 9.69\") Comment: Shoes on  Wt 71 kg (156 lb 8.4 oz)   SpO2 95%   BMI 22.66 kg/m²  Body surface area is 1.87 meters squared.    Labs 7/18/23:  ANC~ 3940 Plt = 261k   Hgb = 11.9     SCr = 0.92 mg/dL CrCl ~ 70 mL/min   AST/ALT/AP = 23/21/111 TBili = 0.4     Cyanocobalamin given every 9 weeks, last given 7/18/23    Pembrolizumab 200 mg fixed dose   No calculation required, OK to treat with final dose = 200 mg    Pemetrexed 500 mg/m² x 1.87 m² = 935 mg   <10% difference, OK to treat with final dose = 900 mg    Carboplatin AUC 5 x (70 mL/min + 25) = 475 mg   <10% difference, OK to treat with final dose = 430 mg    Lawrence Garcia, PharmD  "

## 2023-07-18 ENCOUNTER — PHARMACY VISIT (OUTPATIENT)
Dept: PHARMACY | Facility: MEDICAL CENTER | Age: 73
End: 2023-07-18
Payer: COMMERCIAL

## 2023-07-18 ENCOUNTER — OUTPATIENT INFUSION SERVICES (OUTPATIENT)
Dept: ONCOLOGY | Facility: MEDICAL CENTER | Age: 73
End: 2023-07-18
Attending: PSYCHIATRY & NEUROLOGY
Payer: COMMERCIAL

## 2023-07-18 ENCOUNTER — HOSPITAL ENCOUNTER (OUTPATIENT)
Dept: HEMATOLOGY ONCOLOGY | Facility: MEDICAL CENTER | Age: 73
End: 2023-07-18
Attending: STUDENT IN AN ORGANIZED HEALTH CARE EDUCATION/TRAINING PROGRAM
Payer: COMMERCIAL

## 2023-07-18 VITALS
RESPIRATION RATE: 18 BRPM | DIASTOLIC BLOOD PRESSURE: 66 MMHG | SYSTOLIC BLOOD PRESSURE: 115 MMHG | HEART RATE: 94 BPM | TEMPERATURE: 98 F | OXYGEN SATURATION: 95 %

## 2023-07-18 VITALS
OXYGEN SATURATION: 95 % | RESPIRATION RATE: 18 BRPM | DIASTOLIC BLOOD PRESSURE: 58 MMHG | HEART RATE: 91 BPM | TEMPERATURE: 97.7 F | BODY MASS INDEX: 22.41 KG/M2 | HEIGHT: 70 IN | SYSTOLIC BLOOD PRESSURE: 134 MMHG | WEIGHT: 156.53 LBS

## 2023-07-18 VITALS
SYSTOLIC BLOOD PRESSURE: 124 MMHG | OXYGEN SATURATION: 95 % | RESPIRATION RATE: 16 BRPM | HEIGHT: 70 IN | HEART RATE: 90 BPM | TEMPERATURE: 98.6 F | WEIGHT: 156.53 LBS | BODY MASS INDEX: 22.41 KG/M2 | DIASTOLIC BLOOD PRESSURE: 56 MMHG

## 2023-07-18 DIAGNOSIS — Z79.899 ENCOUNTER FOR LONG-TERM (CURRENT) USE OF HIGH-RISK MEDICATION: ICD-10-CM

## 2023-07-18 DIAGNOSIS — R91.1 LEFT LOWER LOBE PULMONARY NODULE: ICD-10-CM

## 2023-07-18 DIAGNOSIS — C34.91 ADENOCARCINOMA, LUNG, RIGHT (HCC): ICD-10-CM

## 2023-07-18 LAB
ALBUMIN SERPL BCP-MCNC: 4 G/DL (ref 3.2–4.9)
ALBUMIN/GLOB SERPL: 1.3 G/DL
ALP SERPL-CCNC: 111 U/L (ref 30–99)
ALT SERPL-CCNC: 21 U/L (ref 2–50)
ANION GAP SERPL CALC-SCNC: 11 MMOL/L (ref 7–16)
AST SERPL-CCNC: 23 U/L (ref 12–45)
BASOPHILS # BLD AUTO: 0.3 % (ref 0–1.8)
BASOPHILS # BLD: 0.02 K/UL (ref 0–0.12)
BILIRUB SERPL-MCNC: 0.4 MG/DL (ref 0.1–1.5)
BUN SERPL-MCNC: 13 MG/DL (ref 8–22)
CALCIUM ALBUM COR SERPL-MCNC: 9.1 MG/DL (ref 8.5–10.5)
CALCIUM SERPL-MCNC: 9.1 MG/DL (ref 8.5–10.5)
CHLORIDE SERPL-SCNC: 101 MMOL/L (ref 96–112)
CO2 SERPL-SCNC: 25 MMOL/L (ref 20–33)
CREAT SERPL-MCNC: 0.92 MG/DL (ref 0.5–1.4)
EOSINOPHIL # BLD AUTO: 0.05 K/UL (ref 0–0.51)
EOSINOPHIL NFR BLD: 0.8 % (ref 0–6.9)
ERYTHROCYTE [DISTWIDTH] IN BLOOD BY AUTOMATED COUNT: 73 FL (ref 35.9–50)
GFR SERPLBLD CREATININE-BSD FMLA CKD-EPI: 87 ML/MIN/1.73 M 2
GLOBULIN SER CALC-MCNC: 3.1 G/DL (ref 1.9–3.5)
GLUCOSE SERPL-MCNC: 119 MG/DL (ref 65–99)
HCT VFR BLD AUTO: 35.8 % (ref 42–52)
HGB BLD-MCNC: 11.9 G/DL (ref 14–18)
IMM GRANULOCYTES # BLD AUTO: 0.02 K/UL (ref 0–0.11)
IMM GRANULOCYTES NFR BLD AUTO: 0.3 % (ref 0–0.9)
LYMPHOCYTES # BLD AUTO: 1.52 K/UL (ref 1–4.8)
LYMPHOCYTES NFR BLD: 23.9 % (ref 22–41)
MCH RBC QN AUTO: 31.9 PG (ref 27–33)
MCHC RBC AUTO-ENTMCNC: 33.2 G/DL (ref 32.3–36.5)
MCV RBC AUTO: 96 FL (ref 81.4–97.8)
MONOCYTES # BLD AUTO: 0.8 K/UL (ref 0–0.85)
MONOCYTES NFR BLD AUTO: 12.6 % (ref 0–13.4)
NEUTROPHILS # BLD AUTO: 3.94 K/UL (ref 1.82–7.42)
NEUTROPHILS NFR BLD: 62.1 % (ref 44–72)
NRBC # BLD AUTO: 0 K/UL
NRBC BLD-RTO: 0 /100 WBC (ref 0–0.2)
OUTPT INFUS CBC COMMENT OICOM: ABNORMAL
PLATELET # BLD AUTO: 261 K/UL (ref 164–446)
PMV BLD AUTO: 9.7 FL (ref 9–12.9)
POTASSIUM SERPL-SCNC: 3.9 MMOL/L (ref 3.6–5.5)
PROT SERPL-MCNC: 7.1 G/DL (ref 6–8.2)
RBC # BLD AUTO: 3.73 M/UL (ref 4.7–6.1)
SODIUM SERPL-SCNC: 137 MMOL/L (ref 135–145)
WBC # BLD AUTO: 6.4 K/UL (ref 4.8–10.8)

## 2023-07-18 PROCEDURE — 96375 TX/PRO/DX INJ NEW DRUG ADDON: CPT

## 2023-07-18 PROCEDURE — 96411 CHEMO IV PUSH ADDL DRUG: CPT

## 2023-07-18 PROCEDURE — 96417 CHEMO IV INFUS EACH ADDL SEQ: CPT

## 2023-07-18 PROCEDURE — 80053 COMPREHEN METABOLIC PANEL: CPT

## 2023-07-18 PROCEDURE — 85025 COMPLETE CBC W/AUTO DIFF WBC: CPT

## 2023-07-18 PROCEDURE — 700111 HCHG RX REV CODE 636 W/ 250 OVERRIDE (IP): Performed by: NURSE PRACTITIONER

## 2023-07-18 PROCEDURE — 96372 THER/PROPH/DIAG INJ SC/IM: CPT | Mod: XU

## 2023-07-18 PROCEDURE — RXMED WILLOW AMBULATORY MEDICATION CHARGE: Performed by: STUDENT IN AN ORGANIZED HEALTH CARE EDUCATION/TRAINING PROGRAM

## 2023-07-18 PROCEDURE — 96413 CHEMO IV INFUSION 1 HR: CPT

## 2023-07-18 PROCEDURE — 700105 HCHG RX REV CODE 258: Mod: JZ | Performed by: NURSE PRACTITIONER

## 2023-07-18 PROCEDURE — 99214 OFFICE O/P EST MOD 30 MIN: CPT | Performed by: STUDENT IN AN ORGANIZED HEALTH CARE EDUCATION/TRAINING PROGRAM

## 2023-07-18 PROCEDURE — 96367 TX/PROPH/DG ADDL SEQ IV INF: CPT

## 2023-07-18 PROCEDURE — 99212 OFFICE O/P EST SF 10 MIN: CPT | Performed by: STUDENT IN AN ORGANIZED HEALTH CARE EDUCATION/TRAINING PROGRAM

## 2023-07-18 RX ORDER — DEXAMETHASONE 4 MG/1
4 TABLET ORAL 2 TIMES DAILY
Qty: 6 TABLET | Refills: 11 | Status: SHIPPED | OUTPATIENT
Start: 2023-07-18 | End: 2023-07-21

## 2023-07-18 RX ORDER — CYANOCOBALAMIN 1000 UG/ML
1000 INJECTION, SOLUTION INTRAMUSCULAR; SUBCUTANEOUS
Status: DISCONTINUED | OUTPATIENT
Start: 2023-07-18 | End: 2023-07-18 | Stop reason: HOSPADM

## 2023-07-18 RX ADMIN — PEMETREXED DISODIUM 900 MG: 500 INJECTION, POWDER, LYOPHILIZED, FOR SOLUTION INTRAVENOUS at 14:11

## 2023-07-18 RX ADMIN — DEXAMETHASONE SODIUM PHOSPHATE 12 MG: 4 INJECTION, SOLUTION INTRA-ARTICULAR; INTRALESIONAL; INTRAMUSCULAR; INTRAVENOUS; SOFT TISSUE at 11:47

## 2023-07-18 RX ADMIN — CYANOCOBALAMIN 1000 MCG: 1000 INJECTION, SOLUTION INTRAMUSCULAR; SUBCUTANEOUS at 12:05

## 2023-07-18 RX ADMIN — SODIUM CHLORIDE 200 MG: 9 INJECTION, SOLUTION INTRAVENOUS at 13:18

## 2023-07-18 RX ADMIN — ONDANSETRON 16 MG: 2 INJECTION INTRAMUSCULAR; INTRAVENOUS at 12:13

## 2023-07-18 RX ADMIN — CARBOPLATIN 430 MG: 10 INJECTION INTRAVENOUS at 14:48

## 2023-07-18 RX ADMIN — FOSAPREPITANT 150 MG: 150 INJECTION, POWDER, LYOPHILIZED, FOR SOLUTION INTRAVENOUS at 12:35

## 2023-07-18 ASSESSMENT — ENCOUNTER SYMPTOMS
BRUISES/BLEEDS EASILY: 0
SHORTNESS OF BREATH: 0
NECK PAIN: 0
BLURRED VISION: 0
ORTHOPNEA: 0
TREMORS: 0
DEPRESSION: 0
NAUSEA: 0
WEIGHT LOSS: 0
SENSORY CHANGE: 0
ABDOMINAL PAIN: 0
TINGLING: 0
COUGH: 0
DIZZINESS: 0
VOMITING: 0
HEARTBURN: 0
SORE THROAT: 0
PALPITATIONS: 0
HEADACHES: 0
WHEEZING: 0
MEMORY LOSS: 0
FEVER: 0
SPUTUM PRODUCTION: 0
CHILLS: 0
FOCAL WEAKNESS: 0

## 2023-07-18 ASSESSMENT — FIBROSIS 4 INDEX
FIB4 SCORE: 0.76
FIB4 SCORE: 1.42

## 2023-07-18 NOTE — PROGRESS NOTES
Consult Note: Hematology/Oncology     Primary Care:  No primary care provider on file.    Chief Complaint   Patient presents with    Lung Cancer     Prechemo        Current Treatment: None    Prior Treatment: None    05/01 - 05/10/23: SBRT to lung - Dr. Jaimes  05/16/23: C1D1 Carboplatin, Pemetrexed and Pembrolizumab  06/06/23: C2D1 Carboplatin, Pemetrexed and Pembrolizumab  06/27/23: C3D1 Carboplatin, Pemetrexed and Pembrolizumab  07/18/23: C4D1 Carboplatin, Pemetrexed and Pembrolizumab    Subjective:   History of Presenting Illness:  Tyler Zarate is a 72 y.o. male with a past medical history of COPD and tobacco use, 1 pack/day smoker, alcohol abuse, right leg DVT diagnosed in 2019 who presents today with a new diagnosis of metastatic adeno carcinoma of the lung.    Patient had a CT thorax on January 18, 2023 which showed a new spiculated mass in the anterior right upper lobe arising from the area of presumed previous scar.  It is highly suggestive of disease.    Patient had a biopsy and pathology on 2/17/23 shows adenocarcinoma of the right upper lobe.    A PET scan showed a hypermetabolic RUL mass ([id number]) as well as a mass in the LLL (11X5mm).     Patient recently moved to [place] from [facility].  He moved to be closer to his son.    He reports unintentional weight loss greater than 10% in the last 6 months.    He also reports a personal history of cancer (basal cell of nose)    Interval History    Tolerated C3. He noted some fatigue, which has improved.    Chronic constipation.  Uses milk of magnesia.  Doesn't feel he drinks enough water    R leg swelling, 2/2 to previous DVT (5 years ago, treated) which is chronic.      Continues to smoke 3 cigarettes a day. Working on quitting.      Past Medical History:   Diagnosis Date    Cancer (HCC)     skin, Lung    Carcinoma in situ of respiratory system     Lung 2023    Cataract     COPD (chronic obstructive pulmonary disease) (HCC)     Cough     DVT (deep  "venous thrombosis) (HCC)     RLL    Lumbar radiculopathy     Pain     lower back and sciatic pain    Pneumonia     2018    Shortness of breath     Skin carcinoma     Sputum production     Synovial cyst     L spine        Past Surgical History:   Procedure Laterality Date    CA BRONCHOSCOPY,DIAGNOSTIC  4/12/2023    Procedure: FIBER OPTIC BRONCHOSCOPY WITH  WASH, BRUSH, BRONCHOALVEOLAR LAVAGE, BIOPSY AND FINE NEEDLE ASPIRATION & NAVIGATION, ROBOTICS;  Surgeon: Vladimir Ortiz M.D.;  Location: SURGERY [facility];  Service: Pulmonary Robotic    CATARACT PHACO WITH IOL Bilateral        Social History     Tobacco Use    Smoking status: Every Day     Packs/day: 0.50     Years: 57.00     Pack years: 28.50     Types: Cigarettes     Passive exposure: Never    Smokeless tobacco: Former     Types: Chew   Vaping Use    Vaping Use: Never used   Substance Use Topics    Alcohol use: Yes     Alcohol/week: 16.8 oz     Types: 28 Cans of beer per week     Comment: 4 beers a day    Drug use: Never        Family History   Problem Relation Age of Onset    Cancer Brother         lung       Allergies   Allergen Reactions    Bacitracin-Neomycin-Polymyxin      Ointment around eye caused face and eye swelling    Gabapentin Swelling     Took several medications following cataract surgery. Not sure which medication was cause.     Ketorolac Swelling     Took following cataract surgery. Not sure which medication was cause.     Lipoglycopeptide Antibiotics Swelling     Pt stated he did not remember what kind of antibiotics he was allergic to. Pt stated \"two different kinds\" Face swelling and eyes glazing over.     Tromethamine        Current Outpatient Medications   Medication Sig Dispense Refill    folic acid (FOLVITE) 1 MG Tab Take 1 Tablet by mouth every day for 105 days. Start 5-7 days prior to first cycle of pemetrexed 30 Tablet 3    ondansetron (ZOFRAN) 4 MG Tab tablet Take 1 Tablet by mouth every four hours as needed for Nausea/Vomiting " "(for nausea, vomiting). 30 Tablet 6    acetaminophen (TYLENOL) 500 MG Tab Take 500-1,000 mg by mouth every 6 hours as needed.      tiotropium (SPIRIVA) 18 MCG Cap Place 18 mcg into inhaler and inhale every day.      ALBUTEROL INH Inhale.      prochlorperazine (COMPAZINE) 10 MG Tab Take 1 Tablet by mouth every 6 hours as needed (for nausea, vomiting). (Patient not taking: Reported on 6/27/2023) 30 Tablet 6    nicotine (NICODERM) 14 MG/24HR PATCH 24 HR Place 1 Patch on the skin every 24 hours. (Patient not taking: Reported on 6/27/2023) 30 Patch 0    aspirin 500 MG tablet Take 1,000 mg by mouth. Either once a week with a lot of activity or once a month. (Patient not taking: Reported on 6/27/2023)      ibuprofen (MOTRIN) 200 MG Tab Take 200 mg by mouth every 6 hours as needed. (Patient not taking: Reported on 6/27/2023)       No current facility-administered medications for this encounter.       Review of Systems   Constitutional:  Negative for chills, fever, malaise/fatigue and weight loss.   HENT:  Negative for congestion, ear pain, nosebleeds and sore throat.    Eyes:  Negative for blurred vision.   Respiratory:  Negative for cough, sputum production, shortness of breath and wheezing.    Cardiovascular:  Negative for chest pain, palpitations, orthopnea and leg swelling.   Gastrointestinal:  Negative for abdominal pain, heartburn, nausea and vomiting.   Genitourinary:  Negative for dysuria, frequency and urgency.   Musculoskeletal:  Negative for neck pain.   Neurological:  Negative for dizziness, tingling, tremors, sensory change, focal weakness and headaches.   Endo/Heme/Allergies:  Does not bruise/bleed easily.   Psychiatric/Behavioral:  Negative for depression, memory loss and suicidal ideas.    All other systems reviewed and are negative.      Problem list, medications, and allergies reviewed by myself today in Epic.     Objective:     Vitals:    07/18/23 0958   Height: 1.77 m (5' 9.69\")         DESC; KARNOFSKY " SCALE WITH ECOG EQUIVALENT: 80, Normal activity with effort; some signs or symptoms of disease (ECOG equivalent 1)    DISTRESS LEVEL: no apparent distress  No sickle exam completed as this was a virtual visit.  His previous physical exam is listed below.  Physical Exam  Constitutional:       General: He is not in acute distress.     Appearance: Normal appearance.   HENT:      Head: Normocephalic and atraumatic.      Nose: No congestion.      Comments: S/p excision of basal cell/RT     Mouth/Throat:      Mouth: Mucous membranes are moist.      Pharynx: Oropharynx is clear.   Eyes:      General: No scleral icterus.     Conjunctiva/sclera: Conjunctivae normal.      Pupils: Pupils are equal, round, and reactive to light.   Cardiovascular:      Rate and Rhythm: Normal rate and regular rhythm.      Pulses: Normal pulses.      Heart sounds: No murmur heard.     No friction rub.   Pulmonary:      Effort: Pulmonary effort is normal. No respiratory distress.      Breath sounds: Normal breath sounds. No stridor. No wheezing or rales.   Chest:      Chest wall: No tenderness.   Abdominal:      General: Abdomen is flat. Bowel sounds are normal. There is no distension.      Palpations: Abdomen is soft. There is no mass.      Tenderness: There is no abdominal tenderness. There is no guarding or rebound.   Musculoskeletal:         General: No swelling, tenderness or deformity. Normal range of motion.      Cervical back: Normal range of motion and neck supple. No rigidity or tenderness.      Right lower leg: No edema.      Left lower leg: No edema.   Skin:     General: Skin is warm.      Coloration: Skin is not jaundiced or pale.      Findings: No bruising or rash.   Neurological:      General: No focal deficit present.      Mental Status: He is alert and oriented to person, place, and time. Mental status is at baseline.      Motor: No weakness.   Psychiatric:         Mood and Affect: Mood normal.         Behavior: Behavior normal.          Thought Content: Thought content normal.         Judgment: Judgment normal.         Labs:   Most recent labs reviewed.  Please see the lab tab of chart review    Imaging:   Most recent images below have been independently reviewed by me.  Please see the imaging tab of chart review    Pathology:  Adenocarcinoma of the RUL    Assessment/Plan:      Cancer Staging   Adenocarcinoma, lung, right (HCC)  Staging form: Lung, AJCC 8th Edition  - Clinical stage from 4/24/2023: Stage SUDHAKAR (cT1c, cN0, cM1a) - Signed by Sam Jaimes M.D. on 4/24/2023       Mr. Zarate is a 71 yo M who presents today with a new diagnosis of adenocarcinoma (PDL1 1%; KEAP1 loss, STK11 loss, CRKL amp, GRANT G109, KCD818, TP53) with Stage IV adenocarcinoma of the left lower lobe as well as the right upper lobe s/p C3 of carbo/pem/pem.    He is here prior to C4.    I discussed with the patient the treatment plan going forward which includes 4 cycles of his triplet therapy and then moving onto 2 years of pemetrexed and pembrolizumab.    Questions and concerns were addressed.    Plan  -Continue with C4 of Carbo/Pem/Pem  -Will see patient prior to C5  -Will need CT CAP first week of August  -to see me after  -smoking cessation provided.     Regimen  21-day cycle for 4-6 cycles or until disease progression or unacceptable toxicity    Pembrolizumab 200 mg IV over 30 minutes on day 1 followed by  Pemetrexed at 500 mg per metered squared IV over 10 minutes on day 1 followed by  Carboplatin AUC 5 IV over 30 minutes on day 1  Followed by  Maintenance therapy 21-day cycle until 24 months of therapy has been completed  Pembrolizumab 200 mg IV over 30 minutes on day 1  Followed by pemetrexed 500 mg per metered squared IV over 10 minutes on day 1    References  NCCN Guidelines Version NSCLC 3.2002     Jimmie VK, et al. Clin Lung Cancer 2019;20:30-36.e3    Renato JM, et al. Lung Cancer 2020;140:35-41.    Joelle et al NEJM 2018: 378(22):5738-2701    Kelsey TEJADA Lancet  Oncol 2016; 17(11): 3202-0331    Nikki SCHOFIELD et al. Eur J Cancer, 2020; 131:68-75    2.  Constipation  -increase water intake  -continue with milk of magnesia      No follow-ups on file.     Any questions and concerns raised by the patient were addressed and answered. Patient denies any further questions.  Patient encouraged to call the office with any concerns or issues.     Peg Gibson M.D.  Hematology/Oncology

## 2023-07-18 NOTE — PROGRESS NOTES
Chemotherapy Verification - PRIMARY RN      Height = 1.77m  Weight = 71kg  BSA = 1.87m^2       Medication: Pembrolizumab  Dose: 200mg - set dose  Calculated Dose: 200mg set dose                             (In mg/m2, AUC, mg/kg)     Medication: Pemetrexed  Dose: 500mg/m^2  Calculated Dose: 935mg                             (In mg/m2, AUC, mg/kg)    Medication: Carboplatin  Dose: Target AUC = 5  Calculated Dose: 477.34mg                             (In mg/m2, AUC, mg/kg)      Carboplatin calculation (if applicable):  (5*(70.468+25)) = 477.34mg      I confirm this process was performed independently.

## 2023-07-18 NOTE — PROGRESS NOTES
Tyler came into infusion services today for pre-chemo lab draw. No complaints. PIV started left AC, +blood return, labs drawn, flushed. Labs sent. PIV staying in place and wrapped with gauze and coban. Pt has MD appointment and returning for treatment at 1030am. Pt discharged and returning for later appointment.

## 2023-07-18 NOTE — PROGRESS NOTES
Chemotherapy Verification - SECONDARY RN   C4 D1     Height = 177 cm  Weight = 71 kg  BSA = 1.87 m^2       Medication: pembrolizumab (KEYTRUDA)  Dose: 200 mg set dose  Calculated Dose: 200 mg set dose                             (In mg/m2, AUC, mg/kg)     Medication: pemetrexed (ALIMTA)  Dose: 500 mg/m2  Calculated Dose: 935 mg                             (In mg/m2, AUC, mg/kg)    Medication: carboplatin (PARAPLATIN)  Dose: target AUC 5  Calculated Dose: 477.34 mg                             (In mg/m2, AUC, mg/kg)      Carboplatin calculation (if applicable):  (5*(70.468+25)) = 477.34    I confirm that this process was performed independently.

## 2023-07-18 NOTE — PROGRESS NOTES
"Pharmacy Chemotherapy Calculation    Patient Name: Tyler Zarate   Dx:NSCLC (Stage IV)    Protocol: CARBOplatin/PEMEtrexed + Pembrolizumab  Pembrolizumab 200 mg IV over 30 minutes on Day 1 followed by   Pemetrexed 500 mg/m2 IV over 10 minutes on Day 1 followed by   CARBOplatin AUC 5 IV over 30 minutes on Day 1    21 day cycle for 4-6 cycles   ~Followed by~  Pembrolizumab 400 mg IV over 30 minutes on Day 1 every other cycle followed by   Pemetrexed 500 mg/m2 IV over 10 minutes on Day 1    21 day cycle until 24 months of therapy has been completed   ~Followed by~  Pemetrexed 500 mg/m2 IV over 10 minutes on Day 1    21 day cycle until DP or UT    NCCN Guidelines for Non-Small Cell Lung Cancer V.1.2023  Nikki M, et al. Eur J Cancer. 2020;131:68-75.  Kelsey CJ, et al. Lancet Oncol. 2016;17(11):7720-0122.  Joelle L, et al. N Engl J Med.  2018;378(22):8543-2763.    Allergies:  Bacitracin-neomycin-polymyxin, Gabapentin, Ketorolac, Lipoglycopeptide antibiotics, and Tromethamine     /58   Pulse 91   Temp 36.5 °C (97.7 °F) (Temporal)   Resp 18   Ht 1.77 m (5' 9.69\") Comment: Shoes on  Wt 71 kg (156 lb 8.4 oz)   SpO2 95%   BMI 22.66 kg/m²  Body surface area is 1.87 meters squared.    Labs 7/18/23:  ANC~ 3940 Plt = 261k   Hgb = 11.9     SCr = 0.92 mg/dL CrCl ~ 70 mL/min   AST/ALT/AP = 23/21/111 TBili = 0.4       Labs 6/26/23:  TSH = 1.85   Free T4 = 1.3          Received Cyanacobalmin injection due today 7/18/23    Drug Order   (Drug name, dose, route, IV Fluid & volume, frequency, number of doses) Cycle 4    Previous treatment: C3 on 6/27/23     Medication = Pembrolizumab   Base Dose= 200 mg   Fixed dose, no calculation required.  Final Dose = 200 mg  Route = IV  Fluid & Volume = NS 50 mL  Admin Duration = Over 30 minutes          Fixed dose, no calculation required. Okay to treat with final dose.   Medication = PEMEtrexed   Base Dose= 500 mg/m²   Calc Dose: Base Dose x 1.87 m² = 935 mg  Final Dose = 900 " mg  Route = IV  Fluid & Volume =  mL  Admin Duration = Over 10 minutes          <10% difference, okay to treat with final dose   Medication = CARBOplatin  Base Dose= AUC 5    Calc Dose:AUCx (70 mL/min + 25) = 475 mg  Final Dose = 430 mg  Route = IV  Fluid & Volume =  mL  Admin Duration = Over 30 minutes          <10% difference (rounded down for national drug shortage), okay to treat with final dose     By my signature below, I confirm this process was performed independently with the BSA and all final chemotherapy dosing calculations congruent. I have reviewed the above chemotherapy order and that my calculation of the final dose and BSA (when applicable) corroborate those calculations of the  pharmacist. Discrepancies of 10% or greater in the written dose have been addressed and documented within the EPIC Progress notes.    Laura Najera, PharmD,BCOP

## 2023-07-18 NOTE — PROGRESS NOTES
Tyler came into Infusions Services for Day 1/ Cycle 4 of keytruda, alimta, carboplatin for lung cancer. Pt denied having any new or acute complaints today, reports tolerating past treatments well. PIV started, had + blood return flushed briskly. Labs drawn earlier in the day and within parameters.  Pt given pre-meds and chemo as prescribed, tolerated well, denied having any complaints during or after infusion. PIV discontinued, bleeding controlled with gauze and coban. Pt has future appointments and discharged to self care.

## 2023-08-01 ENCOUNTER — HOSPITAL ENCOUNTER (OUTPATIENT)
Dept: RADIOLOGY | Facility: MEDICAL CENTER | Age: 73
End: 2023-08-01
Attending: STUDENT IN AN ORGANIZED HEALTH CARE EDUCATION/TRAINING PROGRAM
Payer: COMMERCIAL

## 2023-08-01 DIAGNOSIS — C34.91 ADENOCARCINOMA, LUNG, RIGHT (HCC): ICD-10-CM

## 2023-08-01 DIAGNOSIS — R91.1 LEFT LOWER LOBE PULMONARY NODULE: ICD-10-CM

## 2023-08-01 PROCEDURE — 71260 CT THORAX DX C+: CPT

## 2023-08-01 PROCEDURE — 700117 HCHG RX CONTRAST REV CODE 255: Performed by: STUDENT IN AN ORGANIZED HEALTH CARE EDUCATION/TRAINING PROGRAM

## 2023-08-01 RX ADMIN — IOHEXOL 100 ML: 350 INJECTION, SOLUTION INTRAVENOUS at 10:28

## 2023-08-02 RX ORDER — 0.9 % SODIUM CHLORIDE 0.9 %
10 VIAL (ML) INJECTION PRN
Status: CANCELLED | OUTPATIENT
Start: 2023-08-06

## 2023-08-02 RX ORDER — CYANOCOBALAMIN 1000 UG/ML
1000 INJECTION, SOLUTION INTRAMUSCULAR; SUBCUTANEOUS
Status: CANCELLED | OUTPATIENT
Start: 2023-08-07

## 2023-08-02 RX ORDER — 0.9 % SODIUM CHLORIDE 0.9 %
10 VIAL (ML) INJECTION PRN
Status: CANCELLED | OUTPATIENT
Start: 2023-08-07

## 2023-08-02 RX ORDER — 0.9 % SODIUM CHLORIDE 0.9 %
3 VIAL (ML) INJECTION PRN
Status: CANCELLED | OUTPATIENT
Start: 2023-08-07

## 2023-08-02 RX ORDER — 0.9 % SODIUM CHLORIDE 0.9 %
VIAL (ML) INJECTION PRN
Status: CANCELLED | OUTPATIENT
Start: 2023-08-07

## 2023-08-02 RX ORDER — SODIUM CHLORIDE 9 MG/ML
INJECTION, SOLUTION INTRAVENOUS CONTINUOUS
Status: CANCELLED | OUTPATIENT
Start: 2023-08-07

## 2023-08-02 RX ORDER — EPINEPHRINE 1 MG/ML(1)
0.5 AMPUL (ML) INJECTION PRN
Status: CANCELLED | OUTPATIENT
Start: 2023-08-07

## 2023-08-02 RX ORDER — 0.9 % SODIUM CHLORIDE 0.9 %
3 VIAL (ML) INJECTION PRN
Status: CANCELLED | OUTPATIENT
Start: 2023-08-06

## 2023-08-02 RX ORDER — METHYLPREDNISOLONE SODIUM SUCCINATE 125 MG/2ML
125 INJECTION, POWDER, LYOPHILIZED, FOR SOLUTION INTRAMUSCULAR; INTRAVENOUS PRN
Status: CANCELLED | OUTPATIENT
Start: 2023-08-07

## 2023-08-02 RX ORDER — 0.9 % SODIUM CHLORIDE 0.9 %
VIAL (ML) INJECTION PRN
Status: CANCELLED | OUTPATIENT
Start: 2023-08-06

## 2023-08-02 RX ORDER — ONDANSETRON 2 MG/ML
4 INJECTION INTRAMUSCULAR; INTRAVENOUS PRN
Status: CANCELLED | OUTPATIENT
Start: 2023-08-07

## 2023-08-02 RX ORDER — ONDANSETRON 8 MG/1
8 TABLET, ORALLY DISINTEGRATING ORAL PRN
Status: CANCELLED | OUTPATIENT
Start: 2023-08-07

## 2023-08-02 RX ORDER — DIPHENHYDRAMINE HYDROCHLORIDE 50 MG/ML
50 INJECTION INTRAMUSCULAR; INTRAVENOUS PRN
Status: CANCELLED | OUTPATIENT
Start: 2023-08-07

## 2023-08-02 RX ORDER — PROCHLORPERAZINE MALEATE 10 MG
10 TABLET ORAL EVERY 6 HOURS PRN
Status: CANCELLED | OUTPATIENT
Start: 2023-08-07

## 2023-08-02 RX ORDER — ONDANSETRON 2 MG/ML
8 INJECTION INTRAMUSCULAR; INTRAVENOUS ONCE
Status: CANCELLED | OUTPATIENT
Start: 2023-08-07 | End: 2023-08-07

## 2023-08-07 NOTE — PROGRESS NOTES
"Pharmacy Chemotherapy Calculation    Patient Name: Tyler Zarate   Dx:NSCLC (Stage IV)    Protocol: CARBOplatin/PEMEtrexed + Pembrolizumab  Pembrolizumab 200 mg IV over 30 minutes on Day 1 followed by   Pemetrexed 500 mg/m2 IV over 10 minutes on Day 1 followed by   CARBOplatin AUC 5 IV over 30 minutes on Day 1    21 day cycle for 4-6 cycles -Completed 7/18/23  ~Followed by~  Pembrolizumab 400 mg IV over 30 minutes on Day 1 every other cycle followed by   Pemetrexed 500 mg/m2 IV over 10 minutes on Day 1    21 day cycle until 24 months of therapy has been completed   ~Followed by~  Pemetrexed 500 mg/m2 IV over 10 minutes on Day 1    21 day cycle until DP or UT    NCCN Guidelines for Non-Small Cell Lung Cancer V.1.2023  Nikki M, et al. Eur J Cancer. 2020;131:68-75.  Kelsey CJ, et al. Lancet Oncol. 2016;17(11):5790-4954.  Joelle SERNA, et al. N Engl J Med.  2018;378(22):9946-9473.    Allergies:  Bacitracin-neomycin-polymyxin, Gabapentin, Ketorolac, Lipoglycopeptide antibiotics, and Tromethamine     /60   Pulse 86   Temp 36.7 °C (98 °F) (Temporal)   Resp 18   Ht 1.77 m (5' 9.69\") Comment: Shoes on.  Wt 71 kg (156 lb 8.4 oz)   SpO2 95%   BMI 22.66 kg/m²  Body surface area is 1.87 meters squared.    Labs 8/8/23:  ANC~ 4220 Plt = 249k   Hgb = 12.7     SCr = 0.66 mg/dL CrCl ~ 92.5 mL/min   AST/ALT/AP = 33/32/124 TBili = 0.3     TSH = 3.97   Free T4 = 1.34          Received Cyanacobalmin injection 7/18/23    Drug Order   (Drug name, dose, route, IV Fluid & volume, frequency, number of doses) Cycle 5   Previous treatment: C4 on 7/18/23     Medication = Pembrolizumab   Base Dose= 400 mg   Fixed dose, no calculation required.  Final Dose = 400 mg  Route = IV  Fluid & Volume = NS 50 mL  Admin Duration = Over 30 minutes          Fixed dose, no calculation required. Okay to treat with final dose.   Medication = PEMEtrexed   Base Dose= 500 mg/m²   Calc Dose: Base Dose x 1.87 m² = 935 mg  Final Dose = 900 mg  Route = " IV  Fluid & Volume =  mL  Admin Duration = Over 10 minutes          <10% difference, okay to treat with final dose     By my signature below, I confirm this process was performed independently with the BSA and all final chemotherapy dosing calculations congruent. I have reviewed the above chemotherapy order and that my calculation of the final dose and BSA (when applicable) corroborate those calculations of the  pharmacist. Discrepancies of 10% or greater in the written dose have been addressed and documented within the EPIC Progress notes.    Carmela Winters, PharmD

## 2023-08-07 NOTE — PROGRESS NOTES
"Pharmacy Chemotherapy Verification    Dx: NSCLC  Cycle 5  Previous treatment = 7/18/23    Protocol: Pembrolizumab + Pemetrexed/CARBOplatin  Pembrolizumab 200 mg IV over 30 minutes on Day 1 followed by  Pemetrexed 500 mg/m2 IV over 10 minutes on Day 1 followed by  CARBOplatin AUC 5 IV over 30 minutes on Day 1  21-day cycle for 4-6 cycles  ~Followed by~  Pembrolizumab 200 mg IV over 30 minutes on Day 1 or Pembrolizumab 400 mg IV over 30 minutes every other cycle followed by  Pemetrexed 500 mg/m2 IV over 10 minutes on Day 1  Maintenance therapy: 21-day cycle until 24 months of therapy has been completed  ~Followed by~  Pemetrexed 500 mg/m2 IV over 10 minutes on Day 1   Maintenance therapy: 21-day cycle until DP/UT  NCCN Guidelines for Non-Small Cell Lung Cancer V.1.2023.  Nikki M, et al. Eur J Cancer. 2020;131:68-75.  Kelsey CJ, et al. Lancet Oncol. 2016;17(11):9762-9969.  Joelle SERNA et al. N Engl J Med. 2018;378(22):8820-3166.    Allergies:  Bacitracin-neomycin-polymyxin, Gabapentin, Ketorolac, Lipoglycopeptide antibiotics, and Tromethamine     /60   Pulse 86   Temp 36.7 °C (98 °F) (Temporal)   Resp 18   Ht 1.77 m (5' 9.69\") Comment: Shoes on.  Wt 71 kg (156 lb 8.4 oz)   SpO2 95%   BMI 22.66 kg/m²  Body surface area is 1.87 meters squared.    Labs 8/8/23  ANC~ 4220 Hgb 12.7 Plt 249k  SCr 0.66 CrCl 92.5 mL/min   AST/ALT/AP = 33/32/124 Tbili = 0.3  TSH 3.97 Free T4 1.34    Cyanocobalamin given every 9 weeks, last given 7/18/23    Pembrolizumab 400 mg fixed dose   No calculation required, OK to treat with final dose = 400 mg IV    Pemetrexed 500 mg/m² x 1.87 m² = 935 mg   <10% difference, OK to treat with final dose = 900 mg IV    Eva Hughes, PharmD, BCOP  "

## 2023-08-08 ENCOUNTER — HOSPITAL ENCOUNTER (OUTPATIENT)
Dept: HEMATOLOGY ONCOLOGY | Facility: MEDICAL CENTER | Age: 73
End: 2023-08-08
Attending: STUDENT IN AN ORGANIZED HEALTH CARE EDUCATION/TRAINING PROGRAM
Payer: COMMERCIAL

## 2023-08-08 ENCOUNTER — OUTPATIENT INFUSION SERVICES (OUTPATIENT)
Dept: ONCOLOGY | Facility: MEDICAL CENTER | Age: 73
End: 2023-08-08
Attending: STUDENT IN AN ORGANIZED HEALTH CARE EDUCATION/TRAINING PROGRAM
Payer: COMMERCIAL

## 2023-08-08 VITALS
TEMPERATURE: 97.3 F | DIASTOLIC BLOOD PRESSURE: 52 MMHG | WEIGHT: 155.98 LBS | OXYGEN SATURATION: 96 % | BODY MASS INDEX: 22.58 KG/M2 | SYSTOLIC BLOOD PRESSURE: 114 MMHG | HEART RATE: 80 BPM

## 2023-08-08 VITALS
WEIGHT: 156.53 LBS | HEIGHT: 70 IN | OXYGEN SATURATION: 95 % | SYSTOLIC BLOOD PRESSURE: 137 MMHG | TEMPERATURE: 98 F | BODY MASS INDEX: 22.41 KG/M2 | RESPIRATION RATE: 18 BRPM | HEART RATE: 86 BPM | DIASTOLIC BLOOD PRESSURE: 60 MMHG

## 2023-08-08 VITALS
TEMPERATURE: 96.8 F | RESPIRATION RATE: 18 BRPM | DIASTOLIC BLOOD PRESSURE: 63 MMHG | HEART RATE: 82 BPM | OXYGEN SATURATION: 96 % | SYSTOLIC BLOOD PRESSURE: 131 MMHG

## 2023-08-08 DIAGNOSIS — C34.91 ADENOCARCINOMA, LUNG, RIGHT (HCC): ICD-10-CM

## 2023-08-08 DIAGNOSIS — R91.1 LEFT LOWER LOBE PULMONARY NODULE: ICD-10-CM

## 2023-08-08 DIAGNOSIS — Z79.899 ENCOUNTER FOR LONG-TERM (CURRENT) USE OF HIGH-RISK MEDICATION: ICD-10-CM

## 2023-08-08 LAB
ALBUMIN SERPL BCP-MCNC: 3.9 G/DL (ref 3.2–4.9)
ALBUMIN/GLOB SERPL: 1.1 G/DL
ALP SERPL-CCNC: 124 U/L (ref 30–99)
ALT SERPL-CCNC: 32 U/L (ref 2–50)
ANION GAP SERPL CALC-SCNC: 11 MMOL/L (ref 7–16)
AST SERPL-CCNC: 33 U/L (ref 12–45)
BASOPHILS # BLD AUTO: 0.4 % (ref 0–1.8)
BASOPHILS # BLD: 0.03 K/UL (ref 0–0.12)
BILIRUB SERPL-MCNC: 0.3 MG/DL (ref 0.1–1.5)
BUN SERPL-MCNC: 14 MG/DL (ref 8–22)
CALCIUM ALBUM COR SERPL-MCNC: 9.5 MG/DL (ref 8.5–10.5)
CALCIUM SERPL-MCNC: 9.4 MG/DL (ref 8.5–10.5)
CHLORIDE SERPL-SCNC: 100 MMOL/L (ref 96–112)
CO2 SERPL-SCNC: 25 MMOL/L (ref 20–33)
CREAT SERPL-MCNC: 0.66 MG/DL (ref 0.5–1.4)
EOSINOPHIL # BLD AUTO: 0.06 K/UL (ref 0–0.51)
EOSINOPHIL NFR BLD: 0.9 % (ref 0–6.9)
ERYTHROCYTE [DISTWIDTH] IN BLOOD BY AUTOMATED COUNT: 75.6 FL (ref 35.9–50)
GFR SERPLBLD CREATININE-BSD FMLA CKD-EPI: 98 ML/MIN/1.73 M 2
GLOBULIN SER CALC-MCNC: 3.4 G/DL (ref 1.9–3.5)
GLUCOSE SERPL-MCNC: 122 MG/DL (ref 65–99)
HCT VFR BLD AUTO: 39.1 % (ref 42–52)
HGB BLD-MCNC: 12.7 G/DL (ref 14–18)
IMM GRANULOCYTES # BLD AUTO: 0.02 K/UL (ref 0–0.11)
IMM GRANULOCYTES NFR BLD AUTO: 0.3 % (ref 0–0.9)
LYMPHOCYTES # BLD AUTO: 1.55 K/UL (ref 1–4.8)
LYMPHOCYTES NFR BLD: 22.7 % (ref 22–41)
MCH RBC QN AUTO: 32.7 PG (ref 27–33)
MCHC RBC AUTO-ENTMCNC: 32.5 G/DL (ref 32.3–36.5)
MCV RBC AUTO: 100.8 FL (ref 81.4–97.8)
MONOCYTES # BLD AUTO: 0.95 K/UL (ref 0–0.85)
MONOCYTES NFR BLD AUTO: 13.9 % (ref 0–13.4)
NEUTROPHILS # BLD AUTO: 4.22 K/UL (ref 1.82–7.42)
NEUTROPHILS NFR BLD: 61.8 % (ref 44–72)
NRBC # BLD AUTO: 0 K/UL
NRBC BLD-RTO: 0 /100 WBC (ref 0–0.2)
OUTPT INFUS CBC COMMENT OICOM: ABNORMAL
PLATELET # BLD AUTO: 249 K/UL (ref 164–446)
PMV BLD AUTO: 9.7 FL (ref 9–12.9)
POTASSIUM SERPL-SCNC: 4.1 MMOL/L (ref 3.6–5.5)
PROT SERPL-MCNC: 7.3 G/DL (ref 6–8.2)
RBC # BLD AUTO: 3.88 M/UL (ref 4.7–6.1)
SODIUM SERPL-SCNC: 136 MMOL/L (ref 135–145)
T4 FREE SERPL-MCNC: 1.34 NG/DL (ref 0.93–1.7)
TSH SERPL DL<=0.005 MIU/L-ACNC: 3.97 UIU/ML (ref 0.38–5.33)
WBC # BLD AUTO: 6.8 K/UL (ref 4.8–10.8)

## 2023-08-08 PROCEDURE — 80053 COMPREHEN METABOLIC PANEL: CPT

## 2023-08-08 PROCEDURE — 99212 OFFICE O/P EST SF 10 MIN: CPT | Performed by: STUDENT IN AN ORGANIZED HEALTH CARE EDUCATION/TRAINING PROGRAM

## 2023-08-08 PROCEDURE — 96411 CHEMO IV PUSH ADDL DRUG: CPT

## 2023-08-08 PROCEDURE — 85025 COMPLETE CBC W/AUTO DIFF WBC: CPT

## 2023-08-08 PROCEDURE — 96375 TX/PRO/DX INJ NEW DRUG ADDON: CPT

## 2023-08-08 PROCEDURE — 84439 ASSAY OF FREE THYROXINE: CPT

## 2023-08-08 PROCEDURE — 84443 ASSAY THYROID STIM HORMONE: CPT

## 2023-08-08 PROCEDURE — 700105 HCHG RX REV CODE 258: Performed by: NURSE PRACTITIONER

## 2023-08-08 PROCEDURE — 700111 HCHG RX REV CODE 636 W/ 250 OVERRIDE (IP): Mod: JZ | Performed by: NURSE PRACTITIONER

## 2023-08-08 PROCEDURE — 99214 OFFICE O/P EST MOD 30 MIN: CPT | Performed by: STUDENT IN AN ORGANIZED HEALTH CARE EDUCATION/TRAINING PROGRAM

## 2023-08-08 PROCEDURE — 96413 CHEMO IV INFUSION 1 HR: CPT

## 2023-08-08 RX ORDER — CYANOCOBALAMIN 1000 UG/ML
1000 INJECTION, SOLUTION INTRAMUSCULAR; SUBCUTANEOUS
Status: DISCONTINUED | OUTPATIENT
Start: 2023-08-08 | End: 2023-08-08

## 2023-08-08 RX ORDER — ONDANSETRON 2 MG/ML
8 INJECTION INTRAMUSCULAR; INTRAVENOUS ONCE
Status: COMPLETED | OUTPATIENT
Start: 2023-08-08 | End: 2023-08-08

## 2023-08-08 RX ADMIN — PEMETREXED DISODIUM 900 MG: 500 INJECTION, POWDER, LYOPHILIZED, FOR SOLUTION INTRAVENOUS at 11:44

## 2023-08-08 RX ADMIN — SODIUM CHLORIDE 400 MG: 9 INJECTION, SOLUTION INTRAVENOUS at 11:10

## 2023-08-08 RX ADMIN — ONDANSETRON 8 MG: 2 INJECTION INTRAMUSCULAR; INTRAVENOUS at 10:36

## 2023-08-08 ASSESSMENT — PAIN SCALES - GENERAL: PAINLEVEL: NO PAIN

## 2023-08-08 ASSESSMENT — ENCOUNTER SYMPTOMS
CHILLS: 0
SPUTUM PRODUCTION: 0
WHEEZING: 0
DEPRESSION: 0
TREMORS: 0
FOCAL WEAKNESS: 0
VOMITING: 0
ABDOMINAL PAIN: 0
FEVER: 0
MEMORY LOSS: 0
SHORTNESS OF BREATH: 0
TINGLING: 0
WEIGHT LOSS: 0
ORTHOPNEA: 0
BRUISES/BLEEDS EASILY: 0
BLURRED VISION: 0
DIZZINESS: 0
SENSORY CHANGE: 0
SORE THROAT: 0
PALPITATIONS: 0
HEADACHES: 0
NAUSEA: 0
NECK PAIN: 0
HEARTBURN: 0
COUGH: 0

## 2023-08-08 ASSESSMENT — FIBROSIS 4 INDEX
FIB4 SCORE: 1.73
FIB4 SCORE: 1.49

## 2023-08-08 NOTE — PROGRESS NOTES
Ross to infusion for pre-chemo labs. PIV started to L forearm and labs drawn off of IV start. PIV locked and wrapped for use at chemo appt at 1030. Patient left in stable condition, knows when to return for chemo appt. Labs reviewed by RN, within parameters for treatment today.

## 2023-08-08 NOTE — PROGRESS NOTES
Mr Zarate is here today for day 1 cycle 5 of  keytruda and alimta infusions.    He has no new concerns to report.    IV was in place upon his arrival to me, labs were drawn this morning and are within parameters for treatment today.   IV flushed well and continues to have good blood return.     Zofran given IVP and was tolerated well.   Keytruda and Alimta was administered without any adverse effects.  IV removed intact, gauze and coban applied to the site.     Mr Zarate was discharged in stable condition.             B12 was discontinued.

## 2023-08-08 NOTE — PROGRESS NOTES
Chemotherapy Verification - PRIMARY RN      Height = 1.77 m  Weight = 71 kg  BSA = 1.87 m2       Medication: keytruda  Dose: 400 mg  Calculated Dose: 400 mg set dose                             (In mg/m2, AUC, mg/kg)     Medication: alimta  Dose: 500mg/m2  Calculated Dose: 935 mg                             (In mg/m2, AUC, mg/kg)        I confirm this process was performed independently with the BSA and all final chemotherapy dosing calculations congruent.  Any discrepancies of 10% or greater have been addressed with the chemotherapy pharmacist. The resolution of the discrepancy has been documented in the EPIC progress notes.

## 2023-08-08 NOTE — PROGRESS NOTES
Consult Note: Hematology/Oncology     Primary Care:  No primary care provider on file.    Chief Complaint   Patient presents with    Lung Cancer     Prechemo        Current Treatment: None    Prior Treatment: None    05/01 - 05/10/23: SBRT to lung - Dr. Jaimes  05/16/23: C1D1 Carboplatin, Pemetrexed and Pembrolizumab  06/06/23: C2D1 Carboplatin, Pemetrexed and Pembrolizumab  06/27/23: C3D1 Carboplatin, Pemetrexed and Pembrolizumab  07/18/23: C4D1 Carboplatin, Pemetrexed and Pembrolizumab  08/08/23: C5D1 Pemetrexed and Pembrolizumab    Subjective:   History of Presenting Illness:  Tyler Zarate is a 73 y.o. male with a past medical history of COPD and tobacco use, 1 pack/day smoker, alcohol abuse, right leg DVT diagnosed in 2019 who presents today with a new diagnosis of metastatic adeno carcinoma of the lung.    Patient had a CT thorax on January 18, 2023 which showed a new spiculated mass in the anterior right upper lobe arising from the area of presumed previous scar.  It is highly suggestive of disease.    Patient had a biopsy and pathology on 2/17/23 shows adenocarcinoma of the right upper lobe.    A PET scan showed a hypermetabolic RUL mass (63A79fx) as well as a mass in the LLL (11X5mm).     Patient recently moved to [place] from [facility].  He moved to be closer to his son.    He reports unintentional weight loss greater than 10% in the last 6 months.    He also reports a personal history of cancer (basal cell of nose)    Interval History    Tolerated C4. His fatigue has improved. He is getting up at 4am and going to bed at 9pm.  He denies any NVD.     R leg swelling, 2/2 to previous DVT (5 years ago, treated) which is chronic.      He had a bump in smoking cessation.     Past Medical History:   Diagnosis Date    Cancer (HCC)     skin, Lung    Carcinoma in situ of respiratory system     Lung 2023    Cataract     COPD (chronic obstructive pulmonary disease) (HCC)     Cough     DVT (deep venous  "thrombosis) (HCC)     RLL    Lumbar radiculopathy     Pain     lower back and sciatic pain    Pneumonia     2018    Shortness of breath     Skin carcinoma     Sputum production     Synovial cyst     L spine        Past Surgical History:   Procedure Laterality Date    AZ BRONCHOSCOPY,DIAGNOSTIC  4/12/2023    Procedure: FIBER OPTIC BRONCHOSCOPY WITH  WASH, BRUSH, BRONCHOALVEOLAR LAVAGE, BIOPSY AND FINE NEEDLE ASPIRATION & NAVIGATION, ROBOTICS;  Surgeon: Vladimir Ortiz M.D.;  Location: SURGERY [facility];  Service: Pulmonary Robotic    CATARACT PHACO WITH IOL Bilateral        Social History     Tobacco Use    Smoking status: Every Day     Packs/day: 0.50     Years: 57.00     Pack years: 28.50     Types: Cigarettes     Passive exposure: Never    Smokeless tobacco: Former     Types: Chew   Vaping Use    Vaping Use: Never used   Substance Use Topics    Alcohol use: Yes     Alcohol/week: 16.8 oz     Types: 28 Cans of beer per week     Comment: 4 beers a day    Drug use: Never        Family History   Problem Relation Age of Onset    Cancer Brother         lung       Allergies   Allergen Reactions    Bacitracin-Neomycin-Polymyxin      Ointment around eye caused face and eye swelling    Gabapentin Swelling     Took several medications following cataract surgery. Not sure which medication was cause.     Ketorolac Swelling     Took following cataract surgery. Not sure which medication was cause.     Lipoglycopeptide Antibiotics Swelling     Pt stated he did not remember what kind of antibiotics he was allergic to. Pt stated \"two different kinds\" Face swelling and eyes glazing over.     Tromethamine        Current Outpatient Medications   Medication Sig Dispense Refill    ondansetron (ZOFRAN) 4 MG Tab tablet Take 1 Tablet by mouth every four hours as needed for Nausea/Vomiting (for nausea, vomiting). 30 Tablet 6    acetaminophen (TYLENOL) 500 MG Tab Take 500-1,000 mg by mouth every 6 hours as needed.      tiotropium " (SPIRIVA) 18 MCG Cap Place 18 mcg into inhaler and inhale every day.      ALBUTEROL INH Inhale.      folic acid (FOLVITE) 1 MG Tab Take 1 Tablet by mouth every day for 105 days. Start 5-7 days prior to first cycle of pemetrexed (Patient not taking: Reported on 8/8/2023) 30 Tablet 3    prochlorperazine (COMPAZINE) 10 MG Tab Take 1 Tablet by mouth every 6 hours as needed (for nausea, vomiting). (Patient not taking: Reported on 6/27/2023) 30 Tablet 6    nicotine (NICODERM) 14 MG/24HR PATCH 24 HR Place 1 Patch on the skin every 24 hours. (Patient not taking: Reported on 6/27/2023) 30 Patch 0    aspirin 500 MG tablet Take 1,000 mg by mouth. Either once a week with a lot of activity or once a month. (Patient not taking: Reported on 6/27/2023)      ibuprofen (MOTRIN) 200 MG Tab Take 200 mg by mouth every 6 hours as needed. (Patient not taking: Reported on 6/27/2023)       No current facility-administered medications for this encounter.       Review of Systems   Constitutional:  Negative for chills, fever, malaise/fatigue and weight loss.   HENT:  Negative for congestion, ear pain, nosebleeds and sore throat.    Eyes:  Negative for blurred vision.   Respiratory:  Negative for cough, sputum production, shortness of breath and wheezing.    Cardiovascular:  Negative for chest pain, palpitations, orthopnea and leg swelling.   Gastrointestinal:  Negative for abdominal pain, heartburn, nausea and vomiting.   Genitourinary:  Negative for dysuria, frequency and urgency.   Musculoskeletal:  Negative for neck pain.   Neurological:  Negative for dizziness, tingling, tremors, sensory change, focal weakness and headaches.   Endo/Heme/Allergies:  Does not bruise/bleed easily.   Psychiatric/Behavioral:  Negative for depression, memory loss and suicidal ideas.    All other systems reviewed and are negative.      Problem list, medications, and allergies reviewed by myself today in Epic.     Objective:     Vitals:    08/08/23 0925   BP:  "114/52   BP Location: Right arm   Patient Position: Sitting   BP Cuff Size: Adult   Pulse: 80   Temp: 36.3 °C (97.3 °F)   TempSrc: Temporal   SpO2: 96%   Weight: 70.8 kg (155 lb 15.6 oz)   Height: (P) 1.77 m (5' 9.69\")         DESC; KARNOFSKY SCALE WITH ECOG EQUIVALENT: 80, Normal activity with effort; some signs or symptoms of disease (ECOG equivalent 1)    DISTRESS LEVEL: no apparent distress    Physical Exam  Constitutional:       General: He is not in acute distress.     Appearance: Normal appearance.   HENT:      Head: Normocephalic and atraumatic.      Nose: No congestion.      Comments: S/p excision of basal cell/RT     Mouth/Throat:      Mouth: Mucous membranes are moist.      Pharynx: Oropharynx is clear.   Eyes:      General: No scleral icterus.     Conjunctiva/sclera: Conjunctivae normal.      Pupils: Pupils are equal, round, and reactive to light.   Cardiovascular:      Rate and Rhythm: Normal rate and regular rhythm.      Pulses: Normal pulses.      Heart sounds: No murmur heard.     No friction rub.   Pulmonary:      Effort: Pulmonary effort is normal. No respiratory distress.      Breath sounds: Normal breath sounds. No stridor. No wheezing or rales.   Chest:      Chest wall: No tenderness.   Abdominal:      General: Abdomen is flat. Bowel sounds are normal. There is no distension.      Palpations: Abdomen is soft. There is no mass.      Tenderness: There is no abdominal tenderness. There is no guarding or rebound.   Musculoskeletal:         General: No swelling, tenderness or deformity. Normal range of motion.      Cervical back: Normal range of motion and neck supple. No rigidity or tenderness.      Right lower leg: No edema.      Left lower leg: No edema.   Skin:     General: Skin is warm.      Coloration: Skin is not jaundiced or pale.      Findings: No bruising or rash.   Neurological:      General: No focal deficit present.      Mental Status: He is alert and oriented to person, place, and time. " Mental status is at baseline.      Motor: No weakness.   Psychiatric:         Mood and Affect: Mood normal.         Behavior: Behavior normal.         Thought Content: Thought content normal.         Judgment: Judgment normal.         Labs:   Most recent labs reviewed.    Slight anemia    Imaging:   Most recent images below have been independently reviewed by me.     8/1/2023     1.  Positive response to therapy. Right upper lobe mass has decreased in size, and there is associated scarring.  2.  No new metastatic disease in the chest, abdomen or pelvis.  3.  Emphysema, scattered linear areas of pulmonary scarring and associated mild bronchiectasis.  4.  Chronic trace left pleural effusion.  5.  Stable left adrenal adenoma. Stable nodularity of the right adrenal gland.  6.  Punctate nonobstructing right nephrolithiasis.  7.  Cholelithiasis.    Pathology:  Adenocarcinoma of the RUL    Assessment/Plan:      Cancer Staging   Adenocarcinoma, lung, right (HCC)  Staging form: Lung, AJCC 8th Edition  - Clinical stage from 4/24/2023: Stage SUDHAKAR (cT1c, cN0, cM1a) - Signed by Sam Jaimes M.D. on 4/24/2023       Mr. Zarate is a 72 yo M who presents today with a new diagnosis of adenocarcinoma (PDL1 1%; KEAP1 loss, STK11 loss, CRKL amp, GRANT G109, JJJ552, TP53) with Stage IV adenocarcinoma of the left lower lobe as well as the right upper lobe s/p C4 of carbo/pem/pem.    He is here prior to C5 to proceed with Pem/Pem.    Most recent scans show good response (MT)  to treatment. No new Metastatic disease.     Questions and concerns were addressed.    Plan  -Continue with C5 of Pem/Pem  -Will see patient prior to C6  -Will need CT CAP in 3 months (11/8/2023)  -to see me after  -smoking cessation provided.     Regimen  21-day cycle for 4-6 cycles or until disease progression or unacceptable toxicity    Maintenance therapy 21-day cycle until 24 months of therapy has been completed  Pembrolizumab 200 mg IV over 30 minutes on day  1  Followed by pemetrexed 500 mg per metered squared IV over 10 minutes on day 1    References  NCCN Guidelines Version NSCLC 3.2002     Samuel VK, et al. Clin Lung Cancer 2019;20:30-36.e3    Renato JM, et al. Lung Cancer 2020;140:35-41.    Joelle et al NEJM 2018: 378(22):6651-1219    Kelsey TEJADA Lancet Oncol 2016; 17(11): 1071-5128    Nikki M et al. Eur J Cancer, 2020; 131:68-75        No follow-ups on file.     Any questions and concerns raised by the patient were addressed and answered. Patient denies any further questions.  Patient encouraged to call the office with any concerns or issues.     Peg Gibson M.D.  Hematology/Oncology

## 2023-08-08 NOTE — PROGRESS NOTES
Chemotherapy Verification - SECONDARY RN       Height = 177 cm  Weight = 71 kg  BSA = 1.87 m^2       Medication: pembrolizumab (Keytruda)  Dose: 400 mg (set dose)  Calculated Dose: 400 mg (set dose)                             (In mg/m2, AUC, mg/kg)     Medication: PEMEtrexed (Alimta)  Dose: 500 mg/m^2  Calculated Dose: 935 mg                             (In mg/m2, AUC, mg/kg)    I confirm that this process was performed independently.

## 2023-08-08 NOTE — ADDENDUM NOTE
Encounter addended by: Sunday Galeas on: 8/8/2023 10:13 AM   Actions taken: Charge Capture section accepted

## 2023-08-15 RX ORDER — 0.9 % SODIUM CHLORIDE 0.9 %
VIAL (ML) INJECTION PRN
Status: CANCELLED | OUTPATIENT
Start: 2023-08-27

## 2023-08-15 RX ORDER — ONDANSETRON 2 MG/ML
8 INJECTION INTRAMUSCULAR; INTRAVENOUS ONCE
Status: CANCELLED | OUTPATIENT
Start: 2023-08-28 | End: 2023-08-28

## 2023-08-15 RX ORDER — ONDANSETRON 8 MG/1
8 TABLET, ORALLY DISINTEGRATING ORAL PRN
Status: CANCELLED | OUTPATIENT
Start: 2023-08-28

## 2023-08-15 RX ORDER — METHYLPREDNISOLONE SODIUM SUCCINATE 125 MG/2ML
125 INJECTION, POWDER, LYOPHILIZED, FOR SOLUTION INTRAMUSCULAR; INTRAVENOUS PRN
Status: CANCELLED | OUTPATIENT
Start: 2023-08-28

## 2023-08-15 RX ORDER — EPINEPHRINE 1 MG/ML(1)
0.5 AMPUL (ML) INJECTION PRN
Status: CANCELLED | OUTPATIENT
Start: 2023-08-28

## 2023-08-15 RX ORDER — SODIUM CHLORIDE 9 MG/ML
INJECTION, SOLUTION INTRAVENOUS CONTINUOUS
Status: CANCELLED | OUTPATIENT
Start: 2023-08-28

## 2023-08-15 RX ORDER — 0.9 % SODIUM CHLORIDE 0.9 %
10 VIAL (ML) INJECTION PRN
Status: CANCELLED | OUTPATIENT
Start: 2023-08-27

## 2023-08-15 RX ORDER — ONDANSETRON 2 MG/ML
4 INJECTION INTRAMUSCULAR; INTRAVENOUS PRN
Status: CANCELLED | OUTPATIENT
Start: 2023-08-28

## 2023-08-15 RX ORDER — CYANOCOBALAMIN 1000 UG/ML
1000 INJECTION, SOLUTION INTRAMUSCULAR; SUBCUTANEOUS
Status: CANCELLED | OUTPATIENT
Start: 2023-08-28

## 2023-08-15 RX ORDER — 0.9 % SODIUM CHLORIDE 0.9 %
10 VIAL (ML) INJECTION PRN
Status: CANCELLED | OUTPATIENT
Start: 2023-08-28

## 2023-08-15 RX ORDER — 0.9 % SODIUM CHLORIDE 0.9 %
VIAL (ML) INJECTION PRN
Status: CANCELLED | OUTPATIENT
Start: 2023-08-28

## 2023-08-15 RX ORDER — PROCHLORPERAZINE MALEATE 10 MG
10 TABLET ORAL EVERY 6 HOURS PRN
Status: CANCELLED | OUTPATIENT
Start: 2023-08-28

## 2023-08-15 RX ORDER — 0.9 % SODIUM CHLORIDE 0.9 %
3 VIAL (ML) INJECTION PRN
Status: CANCELLED | OUTPATIENT
Start: 2023-08-28

## 2023-08-15 RX ORDER — 0.9 % SODIUM CHLORIDE 0.9 %
3 VIAL (ML) INJECTION PRN
Status: CANCELLED | OUTPATIENT
Start: 2023-08-27

## 2023-08-15 RX ORDER — DIPHENHYDRAMINE HYDROCHLORIDE 50 MG/ML
50 INJECTION INTRAMUSCULAR; INTRAVENOUS PRN
Status: CANCELLED | OUTPATIENT
Start: 2023-08-28

## 2023-08-28 RX ORDER — 0.9 % SODIUM CHLORIDE 0.9 %
10 VIAL (ML) INJECTION PRN
Status: CANCELLED | OUTPATIENT
Start: 2023-09-18

## 2023-08-28 RX ORDER — SODIUM CHLORIDE 9 MG/ML
INJECTION, SOLUTION INTRAVENOUS CONTINUOUS
Status: CANCELLED | OUTPATIENT
Start: 2023-09-18

## 2023-08-28 RX ORDER — 0.9 % SODIUM CHLORIDE 0.9 %
10 VIAL (ML) INJECTION PRN
Status: CANCELLED | OUTPATIENT
Start: 2023-09-17

## 2023-08-28 RX ORDER — 0.9 % SODIUM CHLORIDE 0.9 %
3 VIAL (ML) INJECTION PRN
Status: CANCELLED | OUTPATIENT
Start: 2023-09-17

## 2023-08-28 RX ORDER — 0.9 % SODIUM CHLORIDE 0.9 %
3 VIAL (ML) INJECTION PRN
Status: CANCELLED | OUTPATIENT
Start: 2023-09-18

## 2023-08-28 RX ORDER — EPINEPHRINE 1 MG/ML(1)
0.5 AMPUL (ML) INJECTION PRN
Status: CANCELLED | OUTPATIENT
Start: 2023-09-18

## 2023-08-28 RX ORDER — ONDANSETRON 8 MG/1
8 TABLET, ORALLY DISINTEGRATING ORAL PRN
Status: CANCELLED | OUTPATIENT
Start: 2023-09-18

## 2023-08-28 RX ORDER — 0.9 % SODIUM CHLORIDE 0.9 %
VIAL (ML) INJECTION PRN
Status: CANCELLED | OUTPATIENT
Start: 2023-09-17

## 2023-08-28 RX ORDER — METHYLPREDNISOLONE SODIUM SUCCINATE 125 MG/2ML
125 INJECTION, POWDER, LYOPHILIZED, FOR SOLUTION INTRAMUSCULAR; INTRAVENOUS PRN
Status: CANCELLED | OUTPATIENT
Start: 2023-09-18

## 2023-08-28 RX ORDER — PROCHLORPERAZINE MALEATE 10 MG
10 TABLET ORAL EVERY 6 HOURS PRN
Status: CANCELLED | OUTPATIENT
Start: 2023-09-18

## 2023-08-28 RX ORDER — ONDANSETRON 2 MG/ML
8 INJECTION INTRAMUSCULAR; INTRAVENOUS ONCE
Status: CANCELLED | OUTPATIENT
Start: 2023-09-18 | End: 2023-09-18

## 2023-08-28 RX ORDER — ONDANSETRON 2 MG/ML
4 INJECTION INTRAMUSCULAR; INTRAVENOUS PRN
Status: CANCELLED | OUTPATIENT
Start: 2023-09-18

## 2023-08-28 RX ORDER — 0.9 % SODIUM CHLORIDE 0.9 %
VIAL (ML) INJECTION PRN
Status: CANCELLED | OUTPATIENT
Start: 2023-09-18

## 2023-08-28 RX ORDER — CYANOCOBALAMIN 1000 UG/ML
1000 INJECTION, SOLUTION INTRAMUSCULAR; SUBCUTANEOUS
Status: CANCELLED | OUTPATIENT
Start: 2023-09-18

## 2023-08-28 RX ORDER — DIPHENHYDRAMINE HYDROCHLORIDE 50 MG/ML
50 INJECTION INTRAMUSCULAR; INTRAVENOUS PRN
Status: CANCELLED | OUTPATIENT
Start: 2023-09-18

## 2023-08-28 NOTE — PROGRESS NOTES
"Pharmacy Chemotherapy Verification    Dx: NSCLC  Cycle 6  Previous treatment = 8/8/23    Protocol: Pembrolizumab + Pemetrexed/CARBOplatin  Pembrolizumab 200 mg IV over 30 minutes on Day 1 followed by  Pemetrexed 500 mg/m2 IV over 10 minutes on Day 1 followed by  CARBOplatin AUC 5 IV over 30 minutes on Day 1  21-day cycle for 4-6 cycles  ~Followed by~  Pembrolizumab 200 mg IV over 30 minutes on Day 1 or Pembrolizumab 400 mg IV over 30 minutes every other cycle followed by  Pemetrexed 500 mg/m2 IV over 10 minutes on Day 1  Maintenance therapy: 21-day cycle until 24 months of therapy has been completed  ~Followed by~  Pemetrexed 500 mg/m2 IV over 10 minutes on Day 1   Maintenance therapy: 21-day cycle until DP/UT  NCCN Guidelines for Non-Small Cell Lung Cancer V.1.2023.  Nikki M, et al. Eur J Cancer. 2020;131:68-75.  Kelsey CJ, et al. Lancet Oncol. 2016;17(11):6865-0780.  Joelle SERNA et al. N Engl J Med. 2018;378(22):6832-0201.    Allergies:  Bacitracin-neomycin-polymyxin, Gabapentin, Ketorolac, Lipoglycopeptide antibiotics, and Tromethamine     /59   Pulse 93   Temp 36.1 °C (97 °F) (Temporal)   Resp 18   Ht 1.77 m (5' 9.69\") Comment: Refused to take shoes off.  Wt 71 kg (156 lb 8.4 oz)   SpO2 98%   BMI 22.66 kg/m²  Body surface area is 1.87 meters squared.    All lab results 8/29/23 within treatment parameters.   Cyanocobalamin given every 9 weeks, last given 7/18/23    Pembrolizumab 400 mg fixed dose   No calculation required, OK to treat with final dose = 400 mg IV every other cycle    Pemetrexed 500 mg/m² x 1.87m² = 935 mg   <10% difference, OK to treat with final dose = 900mg IV    Maria Guadalupe Esquivel, PharmD  "

## 2023-08-29 ENCOUNTER — HOSPITAL ENCOUNTER (OUTPATIENT)
Dept: HEMATOLOGY ONCOLOGY | Facility: MEDICAL CENTER | Age: 73
End: 2023-08-29
Attending: NURSE PRACTITIONER
Payer: COMMERCIAL

## 2023-08-29 ENCOUNTER — OUTPATIENT INFUSION SERVICES (OUTPATIENT)
Dept: ONCOLOGY | Facility: MEDICAL CENTER | Age: 73
End: 2023-08-29
Attending: STUDENT IN AN ORGANIZED HEALTH CARE EDUCATION/TRAINING PROGRAM
Payer: COMMERCIAL

## 2023-08-29 VITALS
HEART RATE: 99 BPM | HEIGHT: 69 IN | RESPIRATION RATE: 12 BRPM | WEIGHT: 154.76 LBS | BODY MASS INDEX: 22.92 KG/M2 | TEMPERATURE: 97.9 F | OXYGEN SATURATION: 94 % | DIASTOLIC BLOOD PRESSURE: 58 MMHG | SYSTOLIC BLOOD PRESSURE: 128 MMHG

## 2023-08-29 VITALS
HEIGHT: 70 IN | OXYGEN SATURATION: 98 % | RESPIRATION RATE: 18 BRPM | WEIGHT: 156.53 LBS | TEMPERATURE: 97 F | BODY MASS INDEX: 22.41 KG/M2 | DIASTOLIC BLOOD PRESSURE: 59 MMHG | SYSTOLIC BLOOD PRESSURE: 115 MMHG | HEART RATE: 93 BPM

## 2023-08-29 DIAGNOSIS — C34.91 ADENOCARCINOMA, LUNG, RIGHT (HCC): ICD-10-CM

## 2023-08-29 DIAGNOSIS — Z79.899 ENCOUNTER FOR LONG-TERM (CURRENT) USE OF HIGH-RISK MEDICATION: ICD-10-CM

## 2023-08-29 LAB
ALBUMIN SERPL BCP-MCNC: 3.8 G/DL (ref 3.2–4.9)
ALBUMIN/GLOB SERPL: 1.1 G/DL
ALP SERPL-CCNC: 109 U/L (ref 30–99)
ALT SERPL-CCNC: 30 U/L (ref 2–50)
ANION GAP SERPL CALC-SCNC: 12 MMOL/L (ref 7–16)
AST SERPL-CCNC: 36 U/L (ref 12–45)
BASOPHILS # BLD AUTO: 0.4 % (ref 0–1.8)
BASOPHILS # BLD: 0.05 K/UL (ref 0–0.12)
BILIRUB SERPL-MCNC: <0.2 MG/DL (ref 0.1–1.5)
BUN SERPL-MCNC: 19 MG/DL (ref 8–22)
CALCIUM ALBUM COR SERPL-MCNC: 9.8 MG/DL (ref 8.5–10.5)
CALCIUM SERPL-MCNC: 9.6 MG/DL (ref 8.5–10.5)
CHLORIDE SERPL-SCNC: 102 MMOL/L (ref 96–112)
CO2 SERPL-SCNC: 24 MMOL/L (ref 20–33)
CREAT SERPL-MCNC: 0.83 MG/DL (ref 0.5–1.4)
EOSINOPHIL # BLD AUTO: 0.09 K/UL (ref 0–0.51)
EOSINOPHIL NFR BLD: 0.7 % (ref 0–6.9)
ERYTHROCYTE [DISTWIDTH] IN BLOOD BY AUTOMATED COUNT: 70.5 FL (ref 35.9–50)
GFR SERPLBLD CREATININE-BSD FMLA CKD-EPI: 92 ML/MIN/1.73 M 2
GLOBULIN SER CALC-MCNC: 3.4 G/DL (ref 1.9–3.5)
GLUCOSE SERPL-MCNC: 89 MG/DL (ref 65–99)
HCT VFR BLD AUTO: 38.3 % (ref 42–52)
HGB BLD-MCNC: 12.7 G/DL (ref 14–18)
IMM GRANULOCYTES # BLD AUTO: 0.07 K/UL (ref 0–0.11)
IMM GRANULOCYTES NFR BLD AUTO: 0.5 % (ref 0–0.9)
LYMPHOCYTES # BLD AUTO: 1.88 K/UL (ref 1–4.8)
LYMPHOCYTES NFR BLD: 14.8 % (ref 22–41)
MCH RBC QN AUTO: 33.8 PG (ref 27–33)
MCHC RBC AUTO-ENTMCNC: 33.2 G/DL (ref 32.3–36.5)
MCV RBC AUTO: 101.9 FL (ref 81.4–97.8)
MONOCYTES # BLD AUTO: 1.26 K/UL (ref 0–0.85)
MONOCYTES NFR BLD AUTO: 9.9 % (ref 0–13.4)
NEUTROPHILS # BLD AUTO: 9.38 K/UL (ref 1.82–7.42)
NEUTROPHILS NFR BLD: 73.7 % (ref 44–72)
NRBC # BLD AUTO: 0 K/UL
NRBC BLD-RTO: 0 /100 WBC (ref 0–0.2)
OUTPT INFUS CBC COMMENT OICOM: ABNORMAL
PLATELET # BLD AUTO: 301 K/UL (ref 164–446)
PMV BLD AUTO: 9.9 FL (ref 9–12.9)
POTASSIUM SERPL-SCNC: 4.1 MMOL/L (ref 3.6–5.5)
PROT SERPL-MCNC: 7.2 G/DL (ref 6–8.2)
RBC # BLD AUTO: 3.76 M/UL (ref 4.7–6.1)
SODIUM SERPL-SCNC: 138 MMOL/L (ref 135–145)
WBC # BLD AUTO: 12.7 K/UL (ref 4.8–10.8)

## 2023-08-29 PROCEDURE — 99214 OFFICE O/P EST MOD 30 MIN: CPT | Performed by: NURSE PRACTITIONER

## 2023-08-29 PROCEDURE — 99212 OFFICE O/P EST SF 10 MIN: CPT | Performed by: NURSE PRACTITIONER

## 2023-08-29 PROCEDURE — 700111 HCHG RX REV CODE 636 W/ 250 OVERRIDE (IP): Mod: JZ,JG | Performed by: NURSE PRACTITIONER

## 2023-08-29 PROCEDURE — 80053 COMPREHEN METABOLIC PANEL: CPT

## 2023-08-29 PROCEDURE — 700105 HCHG RX REV CODE 258: Performed by: NURSE PRACTITIONER

## 2023-08-29 PROCEDURE — 96409 CHEMO IV PUSH SNGL DRUG: CPT

## 2023-08-29 PROCEDURE — 96375 TX/PRO/DX INJ NEW DRUG ADDON: CPT

## 2023-08-29 PROCEDURE — 85025 COMPLETE CBC W/AUTO DIFF WBC: CPT

## 2023-08-29 RX ORDER — ONDANSETRON 2 MG/ML
8 INJECTION INTRAMUSCULAR; INTRAVENOUS ONCE
Status: COMPLETED | OUTPATIENT
Start: 2023-08-29 | End: 2023-08-29

## 2023-08-29 RX ADMIN — ONDANSETRON 8 MG: 2 INJECTION INTRAMUSCULAR; INTRAVENOUS at 16:22

## 2023-08-29 RX ADMIN — PEMETREXED DISODIUM 900 MG: 500 INJECTION, POWDER, LYOPHILIZED, FOR SOLUTION INTRAVENOUS at 16:24

## 2023-08-29 ASSESSMENT — ENCOUNTER SYMPTOMS
COUGH: 0
CHILLS: 0
DIARRHEA: 0
VOMITING: 0
CONSTIPATION: 0
TINGLING: 0
SHORTNESS OF BREATH: 0
PALPITATIONS: 0
DIZZINESS: 1
DIAPHORESIS: 0
NAUSEA: 0
FEVER: 0
HEADACHES: 1
WEIGHT LOSS: 1

## 2023-08-29 ASSESSMENT — PAIN SCALES - GENERAL: PAINLEVEL: 2=MINIMAL-SLIGHT

## 2023-08-29 ASSESSMENT — FIBROSIS 4 INDEX
FIB4 SCORE: 1.73
FIB4 SCORE: 1.73

## 2023-08-29 NOTE — PROGRESS NOTES
Chemotherapy Verification - PRIMARY RN    Day 1 cycle 6      Height = 1.77 m  Weight = 71 kg  BSA = 1.87 m2       Medication: Alimta  Dose: 500mg/m2  Calculated Dose: 935 mg                             (In mg/m2, AUC, mg/kg)     I confirm this process was performed independently with the BSA and all final chemotherapy dosing calculations congruent.  Any discrepancies of 10% or greater have been addressed with the chemotherapy pharmacist. The resolution of the discrepancy has been documented in the EPIC progress notes.

## 2023-08-29 NOTE — PROGRESS NOTES
Mr Zarate is here today for day 1 cycle 6 of  keytruda and alimta infusions.     He has no new concerns to report.     IV to his left arm, labs drawn.      Zofran given IVP and was tolerated well.   Alimta was administered without any adverse effects.  IV removed intact, gauze and coban applied to the site.      Mr Zarate was discharged in stable condition.                  B12 was discontinued.

## 2023-08-29 NOTE — PROGRESS NOTES
Subjective     Tyler Zarate is a 73 y.o. male who presents with Cancer (Prechemo)          HPI    Patient seen today for evaluation prior to cycle 6 of chemotherapy employing maitenance Pemetrexed and Pembrolizumab for metastatic lung cancer, for continued monitoring of symptoms and side effects of cancer treatments.  He presents unaccompanied for today's visit.     Oncology history of presenting illness:  Patient had a CT scan of the chest on 1/18/2023 which showed a new spiculated mass in the anterior right upper lobe arising from the area of the presumed previous scar.  This was highly suggestive of disease.  He ended up having a biopsy on 2/17/2023 which was consistent with adenocarcinoma of the right upper lobe.  He did have a PET scan following his diagnosis, date unknown, which showed a hypermetabolic right upper lobe mass measuring 20 x 16 mm as well as a mass in the left lower lobe measuring 11 x 5 mm.  Patient did undergo biopsy of the left lower lobe which was consistent with adenocarcinoma of the lung.     Patient recently moved from [facility] to [place] to be closer to his son.  At time of diagnosis patient had complained of unintentional weight loss of greater than 10% within the past 6 months.  Patient also with a history of basal cell carcinoma of the nose.     Treatment history:  05/01 - 05/10/23: SBRT to lung - Dr. Jaimes  05/16/23: C1D1 Carboplatin, Pemetrexed and Pembrolizumab  06/06/23: C2D1 Carboplatin, Pemetrexed and Pembrolizumab  06/27/23: C3D1 Carboplatin, Pemetrexed and Pembrolizumab  07/18/23: C4D1 Carboplatin, Pemetrexed and Pembrolizumab  08/08/23: C5D1 maintenance Pemetrexed and Pembrolizumab  08/29/23: C6D1 maintenance Pemetrexed and Pembrolizumab     Interval history:  Patient is doing well.  He noted some fatigue and feeling overall low after his last cycle of chemotherapy but it was still tolerable.  He does note some fatigue and mild weight loss of approximately 2  "pounds but he states he still eating well.  He does have dizziness and headaches every once a while but no significant clinical complaints.    Allergies   Allergen Reactions    Bacitracin-Neomycin-Polymyxin      Ointment around eye caused face and eye swelling    Gabapentin Swelling     Took several medications following cataract surgery. Not sure which medication was cause.     Ketorolac Swelling     Took following cataract surgery. Not sure which medication was cause.     Lipoglycopeptide Antibiotics Swelling     Pt stated he did not remember what kind of antibiotics he was allergic to. Pt stated \"two different kinds\" Face swelling and eyes glazing over.     Tromethamine      Current Outpatient Medications on File Prior to Encounter   Medication Sig Dispense Refill    acetaminophen (TYLENOL) 500 MG Tab Take 500-1,000 mg by mouth every 6 hours as needed.      tiotropium (SPIRIVA) 18 MCG Cap Place 18 mcg into inhaler and inhale every day.      ALBUTEROL INH Inhale.      prochlorperazine (COMPAZINE) 10 MG Tab Take 1 Tablet by mouth every 6 hours as needed (for nausea, vomiting). (Patient not taking: Reported on 6/27/2023) 30 Tablet 6    ondansetron (ZOFRAN) 4 MG Tab tablet Take 1 Tablet by mouth every four hours as needed for Nausea/Vomiting (for nausea, vomiting). (Patient not taking: Reported on 8/29/2023) 30 Tablet 6    nicotine (NICODERM) 14 MG/24HR PATCH 24 HR Place 1 Patch on the skin every 24 hours. (Patient not taking: Reported on 6/27/2023) 30 Patch 0    aspirin 500 MG tablet Take 1,000 mg by mouth. Either once a week with a lot of activity or once a month. (Patient not taking: Reported on 6/27/2023)      ibuprofen (MOTRIN) 200 MG Tab Take 200 mg by mouth every 6 hours as needed. (Patient not taking: Reported on 6/27/2023)       No current facility-administered medications on file prior to encounter.       Review of Systems   Constitutional:  Positive for malaise/fatigue and weight loss (mild - 2 pounds the " "last cycle). Negative for chills (gets cold easily), diaphoresis and fever.   Respiratory:  Negative for cough and shortness of breath.    Cardiovascular:  Negative for chest pain and palpitations.   Gastrointestinal:  Negative for constipation, diarrhea, nausea and vomiting.   Genitourinary:  Negative for dysuria.   Musculoskeletal:         Chronic sciatic nerve pain   Skin:  Negative for rash.   Neurological:  Positive for dizziness (once in a while) and headaches (once in a while - and not daily). Negative for tingling.              Objective     /58   Pulse 99   Temp 36.6 °C (97.9 °F) (Temporal)   Resp 12   Ht 1.753 m (5' 9\")   Wt 70.2 kg (154 lb 12.2 oz)   SpO2 94%   BMI 22.85 kg/m²      Physical Exam  Vitals reviewed.   Constitutional:       General: He is not in acute distress.     Appearance: Normal appearance. He is not diaphoretic.   HENT:      Head: Normocephalic and atraumatic.   Cardiovascular:      Rate and Rhythm: Normal rate and regular rhythm.      Heart sounds: Normal heart sounds. No murmur heard.     No friction rub. No gallop.   Pulmonary:      Effort: Pulmonary effort is normal. No respiratory distress.      Breath sounds: Normal breath sounds. No wheezing.   Abdominal:      General: Bowel sounds are normal.      Palpations: Abdomen is soft.   Musculoskeletal:         General: No swelling or tenderness. Normal range of motion.   Skin:     General: Skin is warm and dry.   Neurological:      Mental Status: He is alert and oriented to person, place, and time.   Psychiatric:         Mood and Affect: Mood normal.         Behavior: Behavior normal.                   Assessment & Plan       1. Adenocarcinoma, lung, right (HCC)        2. Encounter for long-term (current) use of high-risk medication                1. Patient with Stage SUDHAKAR (cT1c, cN0, cM1a) currently on treatment with maintenance pemetrexed and pembrolizumab. Patient scheduled to proceed with cycle 6 today. If labs meet " parameters patient is okay to proceed with treatment as planned.      2.  Patient to follow-up in the clinic in 3 weeks, or sooner if needed.        Please note that this dictation was created using voice recognition software. I have made every reasonable attempt to correct obvious errors, but I expect that there are errors of grammar and possibly content that I did not discover before finalizing the note.

## 2023-08-29 NOTE — PROGRESS NOTES
"Pharmacy Chemotherapy Calculation    Patient Name: Tyler Zarate   Dx:NSCLC (Stage IV)    Protocol: CARBOplatin/PEMEtrexed + Pembrolizumab  Pembrolizumab 200 mg IV over 30 minutes on Day 1 followed by   Pemetrexed 500 mg/m2 IV over 10 minutes on Day 1 followed by   CARBOplatin AUC 5 IV over 30 minutes on Day 1    21 day cycle for 4-6 cycles -Completed 7/18/23  ~Followed by~  Pembrolizumab 400 mg IV over 30 minutes on Day 1 every other cycle followed by   Pemetrexed 500 mg/m2 IV over 10 minutes on Day 1    21 day cycle until 24 months of therapy has been completed   ~Followed by~  Pemetrexed 500 mg/m2 IV over 10 minutes on Day 1    21 day cycle until DP or UT    NCCN Guidelines for Non-Small Cell Lung Cancer V.1.2023  Nikki M, et al. Eur J Cancer. 2020;131:68-75.  Kelsey CJ, et al. Lancet Oncol. 2016;17(11):1552-0436.  Joelle SERNA, et al. N Engl J Med.  2018;378(22):1592-0641.    Allergies:  Bacitracin-neomycin-polymyxin, Gabapentin, Ketorolac, Lipoglycopeptide antibiotics, and Tromethamine     /59   Pulse 93   Temp 36.1 °C (97 °F) (Temporal)   Resp 18   Ht 1.77 m (5' 9.69\") Comment: Refused to take shoes off.  Wt 71 kg (156 lb 8.4 oz)   SpO2 98%   BMI 22.66 kg/m²  Body surface area is 1.87 meters squared.    Labs 8/29/23:  ANC~ 9380 Plt = 301k   Hgb = 12.7     SCr = 0.83 mg/dL CrCl ~ 78 mL/min   AST/ALT/AP = 36/30/109 TBili < 0.2       Labs 8/8/23:  TSH = 3.97   Free T4 = 1.34          Received Cyanacobalmin injection 7/18/23 (Due ~ 9/19/23)    Drug Order   (Drug name, dose, route, IV Fluid & volume, frequency, number of doses) Cycle 6   Previous treatment: C5 on 8/8/23     Medication = Pembrolizumab   Base Dose= 400 mg   Fixed dose, no calculation required.  Final Dose = 400 mg  Route = IV  Fluid & Volume = NS 50 mL  Admin Duration = Over 30 minutes   Every OTHER cycle        Fixed dose, no calculation required. Okay to treat with final dose.   Medication = PEMEtrexed   Base Dose= 500 mg/m²   Calc " Dose: Base Dose x 1.87 m² = 935 mg  Final Dose = 900 mg  Route = IV  Fluid & Volume =  mL  Admin Duration = Over 10 minutes          <10% difference, okay to treat with final dose     By my signature below, I confirm this process was performed independently with the BSA and all final chemotherapy dosing calculations congruent. I have reviewed the above chemotherapy order and that my calculation of the final dose and BSA (when applicable) corroborate those calculations of the  pharmacist. Discrepancies of 10% or greater in the written dose have been addressed and documented within the EPIC Progress notes.    Laura Najera, PharmD

## 2023-08-29 NOTE — PROGRESS NOTES
Chemotherapy Verification - SECONDARY RN   C6 D1     Height = 177 cm  Weight = 71 kg  BSA = 1.87 m^2       Medication: pemetrexed (ALIMTA)  Dose: 500 mg/m2  Calculated Dose: 935 mg                             (In mg/m2, AUC, mg/kg)       I confirm that this process was performed independently.

## 2023-09-18 NOTE — PROGRESS NOTES
"Pharmacy Chemotherapy Verification    Dx: NSCLC  Cycle 7  Previous treatment = 8/29/23    Protocol: Pembrolizumab + Pemetrexed/CARBOplatin  Pembrolizumab 200 mg IV over 30 minutes on Day 1 followed by  Pemetrexed 500 mg/m2 IV over 10 minutes on Day 1 followed by  CARBOplatin AUC 5 IV over 30 minutes on Day 1  21-day cycle for 4-6 cycles  ~Followed by~  Pembrolizumab 200 mg IV over 30 minutes on Day 1 or Pembrolizumab 400 mg IV over 30 minutes every other cycle followed by  Pemetrexed 500 mg/m2 IV over 10 minutes on Day 1  Maintenance therapy: 21-day cycle until 24 months of therapy has been completed  ~Followed by~  Pemetrexed 500 mg/m2 IV over 10 minutes on Day 1   Maintenance therapy: 21-day cycle until DP/UT  NCCN Guidelines for Non-Small Cell Lung Cancer V.1.2023.  Nikki M, et al. Eur J Cancer. 2020;131:68-75.  Kelsey CJ, et al. Lancet Oncol. 2016;17(11):7470-3646.  Joelle SERNA et al. N Engl J Med. 2018;378(22):4637-9293.    Allergies:  Bacitracin-neomycin-polymyxin, Gabapentin, Ketorolac, Lipoglycopeptide antibiotics, and Tromethamine     /49   Pulse 92   Temp 36.3 °C (97.4 °F) (Temporal)   Resp 18   Ht 1.77 m (5' 9.69\")   Wt 73.3 kg (161 lb 9.6 oz)   SpO2 92%   BMI 23.40 kg/m²  Body surface area is 1.9 meters squared.    Labs 9/19/23:  ANC~ 6760 Plt = 307k   Hgb = 13.7     SCr = 0.82 mg/dL CrCl ~ 81 mL/min   AST/ALT/AP = 37/40/128 TBili = 0.3  TSH = 3.07 Free T4 = 1.47    Cyanocobalamin given every 9 weeks, last given 9/19/23, next due ~11/21/23    Pembrolizumab 400 mg fixed dose   No calculation required, OK to treat with final dose = 400 mg IV every other cycle    Pemetrexed 500 mg/m² x 1.9 m² = 950 mg   <10% difference, OK to treat with final dose = 1000 mg IV    Lawrence Garcia, PharmD  "

## 2023-09-18 NOTE — PROGRESS NOTES
"Pharmacy Chemotherapy Calculation    Patient Name: Tyler Zarate   Dx:NSCLC (Stage IV)    Protocol: CARBOplatin/PEMEtrexed + Pembrolizumab  Pembrolizumab 200 mg IV over 30 minutes on Day 1 followed by   Pemetrexed 500 mg/m2 IV over 10 minutes on Day 1 followed by   CARBOplatin AUC 5 IV over 30 minutes on Day 1    21 day cycle for 4-6 cycles -Completed 7/18/23  ~Followed by~  Pembrolizumab 400 mg IV over 30 minutes on Day 1 every other cycle followed by   Pemetrexed 500 mg/m2 IV over 10 minutes on Day 1    21 day cycle until 24 months of therapy has been completed   ~Followed by~  Pemetrexed 500 mg/m2 IV over 10 minutes on Day 1    21 day cycle until DP or UT    NCCN Guidelines for Non-Small Cell Lung Cancer V.1.2023  Nikki M, et al. Eur J Cancer. 2020;131:68-75.  Kelsey CJ, et al. Lancet Oncol. 2016;17(11):2479-8786.  Joelle SERNA, et al. N Engl J Med.  2018;378(22):7755-7111.    Allergies:  Bacitracin-neomycin-polymyxin, Gabapentin, Ketorolac, Lipoglycopeptide antibiotics, and Tromethamine     /49   Pulse 92   Temp 36.3 °C (97.4 °F) (Temporal)   Resp 18   Ht 1.77 m (5' 9.69\")   Wt 73.3 kg (161 lb 9.6 oz)   SpO2 92%   BMI 23.40 kg/m²  Body surface area is 1.9 meters squared.    Labs 9/19/23:  ANC~ 6760 Plt = 307k   Hgb = 13.7     SCr = 0.8 mg/dL CrCl ~ 81.4 mL/min   AST/ALT/AP = 36/30/109 TBili 0.3     TSH = 3.07   Free T4 = 1.47          Received Cyanacobalmin injection 9/19/23    Drug Order   (Drug name, dose, route, IV Fluid & volume, frequency, number of doses) Cycle 7   Previous treatment: C6 on 8/29/23     Medication = Pembrolizumab   Base Dose= 400 mg   Fixed dose, no calculation required.  Final Dose = 400 mg  Route = IV  Fluid & Volume = NS 50 mL  Admin Duration = Over 30 minutes   Every OTHER cycle  (ODD cycles)      Fixed dose, no calculation required. Okay to treat with final dose.   Medication = PEMEtrexed   Base Dose= 500 mg/m²   Calc Dose: Base Dose x 1.9 m² = 950 mg  Final Dose = 1000 " mg  Route = IV  Fluid & Volume =  mL  Admin Duration = Over 10 minutes          <10% difference, okay to treat with final dose     By my signature below, I confirm this process was performed independently with the BSA and all final chemotherapy dosing calculations congruent. I have reviewed the above chemotherapy order and that my calculation of the final dose and BSA (when applicable) corroborate those calculations of the  pharmacist. Discrepancies of 10% or greater in the written dose have been addressed and documented within the EPIC Progress notes.    Carmela Winters, PharmD

## 2023-09-19 ENCOUNTER — OUTPATIENT INFUSION SERVICES (OUTPATIENT)
Dept: ONCOLOGY | Facility: MEDICAL CENTER | Age: 73
End: 2023-09-19
Attending: STUDENT IN AN ORGANIZED HEALTH CARE EDUCATION/TRAINING PROGRAM
Payer: COMMERCIAL

## 2023-09-19 ENCOUNTER — HOSPITAL ENCOUNTER (OUTPATIENT)
Dept: HEMATOLOGY ONCOLOGY | Facility: MEDICAL CENTER | Age: 73
End: 2023-09-19
Attending: NURSE PRACTITIONER
Payer: COMMERCIAL

## 2023-09-19 VITALS
SYSTOLIC BLOOD PRESSURE: 128 MMHG | TEMPERATURE: 97.4 F | DIASTOLIC BLOOD PRESSURE: 49 MMHG | RESPIRATION RATE: 18 BRPM | WEIGHT: 161.6 LBS | OXYGEN SATURATION: 92 % | HEART RATE: 92 BPM | BODY MASS INDEX: 23.13 KG/M2 | HEIGHT: 70 IN

## 2023-09-19 VITALS
HEART RATE: 77 BPM | WEIGHT: 160.83 LBS | SYSTOLIC BLOOD PRESSURE: 102 MMHG | OXYGEN SATURATION: 92 % | DIASTOLIC BLOOD PRESSURE: 60 MMHG | TEMPERATURE: 97.5 F | RESPIRATION RATE: 12 BRPM | BODY MASS INDEX: 23.82 KG/M2 | HEIGHT: 69 IN

## 2023-09-19 VITALS
DIASTOLIC BLOOD PRESSURE: 68 MMHG | RESPIRATION RATE: 18 BRPM | SYSTOLIC BLOOD PRESSURE: 140 MMHG | TEMPERATURE: 96.9 F | OXYGEN SATURATION: 94 % | HEART RATE: 82 BPM

## 2023-09-19 DIAGNOSIS — C34.91 ADENOCARCINOMA, LUNG, RIGHT (HCC): ICD-10-CM

## 2023-09-19 DIAGNOSIS — Z79.899 ENCOUNTER FOR LONG-TERM (CURRENT) USE OF HIGH-RISK MEDICATION: ICD-10-CM

## 2023-09-19 LAB
ALBUMIN SERPL BCP-MCNC: 4.2 G/DL (ref 3.2–4.9)
ALBUMIN/GLOB SERPL: 1.2 G/DL
ALP SERPL-CCNC: 128 U/L (ref 30–99)
ALT SERPL-CCNC: 40 U/L (ref 2–50)
ANION GAP SERPL CALC-SCNC: 11 MMOL/L (ref 7–16)
AST SERPL-CCNC: 37 U/L (ref 12–45)
BASOPHILS # BLD AUTO: 0.4 % (ref 0–1.8)
BASOPHILS # BLD: 0.04 K/UL (ref 0–0.12)
BILIRUB SERPL-MCNC: 0.3 MG/DL (ref 0.1–1.5)
BUN SERPL-MCNC: 20 MG/DL (ref 8–22)
CALCIUM ALBUM COR SERPL-MCNC: 9.1 MG/DL (ref 8.5–10.5)
CALCIUM SERPL-MCNC: 9.3 MG/DL (ref 8.5–10.5)
CHLORIDE SERPL-SCNC: 100 MMOL/L (ref 96–112)
CO2 SERPL-SCNC: 27 MMOL/L (ref 20–33)
CREAT SERPL-MCNC: 0.82 MG/DL (ref 0.5–1.4)
EOSINOPHIL # BLD AUTO: 0.08 K/UL (ref 0–0.51)
EOSINOPHIL NFR BLD: 0.8 % (ref 0–6.9)
ERYTHROCYTE [DISTWIDTH] IN BLOOD BY AUTOMATED COUNT: 61.4 FL (ref 35.9–50)
GFR SERPLBLD CREATININE-BSD FMLA CKD-EPI: 92 ML/MIN/1.73 M 2
GLOBULIN SER CALC-MCNC: 3.6 G/DL (ref 1.9–3.5)
GLUCOSE SERPL-MCNC: 120 MG/DL (ref 65–99)
HCT VFR BLD AUTO: 41.1 % (ref 42–52)
HGB BLD-MCNC: 13.7 G/DL (ref 14–18)
IMM GRANULOCYTES # BLD AUTO: 0.05 K/UL (ref 0–0.11)
IMM GRANULOCYTES NFR BLD AUTO: 0.5 % (ref 0–0.9)
LYMPHOCYTES # BLD AUTO: 1.85 K/UL (ref 1–4.8)
LYMPHOCYTES NFR BLD: 18.5 % (ref 22–41)
MCH RBC QN AUTO: 34.9 PG (ref 27–33)
MCHC RBC AUTO-ENTMCNC: 33.3 G/DL (ref 32.3–36.5)
MCV RBC AUTO: 104.6 FL (ref 81.4–97.8)
MONOCYTES # BLD AUTO: 1.22 K/UL (ref 0–0.85)
MONOCYTES NFR BLD AUTO: 12.2 % (ref 0–13.4)
NEUTROPHILS # BLD AUTO: 6.76 K/UL (ref 1.82–7.42)
NEUTROPHILS NFR BLD: 67.6 % (ref 44–72)
NRBC # BLD AUTO: 0 K/UL
NRBC BLD-RTO: 0 /100 WBC (ref 0–0.2)
OUTPT INFUS CBC COMMENT OICOM: ABNORMAL
PLATELET # BLD AUTO: 307 K/UL (ref 164–446)
PMV BLD AUTO: 10 FL (ref 9–12.9)
POTASSIUM SERPL-SCNC: 4.2 MMOL/L (ref 3.6–5.5)
PROT SERPL-MCNC: 7.8 G/DL (ref 6–8.2)
RBC # BLD AUTO: 3.93 M/UL (ref 4.7–6.1)
SODIUM SERPL-SCNC: 138 MMOL/L (ref 135–145)
T4 FREE SERPL-MCNC: 1.47 NG/DL (ref 0.93–1.7)
TSH SERPL DL<=0.005 MIU/L-ACNC: 3.07 UIU/ML (ref 0.38–5.33)
WBC # BLD AUTO: 10 K/UL (ref 4.8–10.8)

## 2023-09-19 PROCEDURE — 96411 CHEMO IV PUSH ADDL DRUG: CPT

## 2023-09-19 PROCEDURE — 99214 OFFICE O/P EST MOD 30 MIN: CPT | Performed by: NURSE PRACTITIONER

## 2023-09-19 PROCEDURE — 96375 TX/PRO/DX INJ NEW DRUG ADDON: CPT

## 2023-09-19 PROCEDURE — 85025 COMPLETE CBC W/AUTO DIFF WBC: CPT

## 2023-09-19 PROCEDURE — 700105 HCHG RX REV CODE 258: Performed by: STUDENT IN AN ORGANIZED HEALTH CARE EDUCATION/TRAINING PROGRAM

## 2023-09-19 PROCEDURE — 96413 CHEMO IV INFUSION 1 HR: CPT

## 2023-09-19 PROCEDURE — 99212 OFFICE O/P EST SF 10 MIN: CPT | Performed by: NURSE PRACTITIONER

## 2023-09-19 PROCEDURE — 96372 THER/PROPH/DIAG INJ SC/IM: CPT

## 2023-09-19 PROCEDURE — 700111 HCHG RX REV CODE 636 W/ 250 OVERRIDE (IP): Mod: JZ | Performed by: STUDENT IN AN ORGANIZED HEALTH CARE EDUCATION/TRAINING PROGRAM

## 2023-09-19 PROCEDURE — 84443 ASSAY THYROID STIM HORMONE: CPT

## 2023-09-19 PROCEDURE — 84439 ASSAY OF FREE THYROXINE: CPT

## 2023-09-19 PROCEDURE — 80053 COMPREHEN METABOLIC PANEL: CPT

## 2023-09-19 RX ORDER — ONDANSETRON 2 MG/ML
8 INJECTION INTRAMUSCULAR; INTRAVENOUS ONCE
Status: COMPLETED | OUTPATIENT
Start: 2023-09-19 | End: 2023-09-19

## 2023-09-19 RX ORDER — CYANOCOBALAMIN 1000 UG/ML
1000 INJECTION, SOLUTION INTRAMUSCULAR; SUBCUTANEOUS
Status: DISCONTINUED | OUTPATIENT
Start: 2023-09-19 | End: 2023-09-19 | Stop reason: HOSPADM

## 2023-09-19 RX ADMIN — CYANOCOBALAMIN 1000 MCG: 1000 INJECTION, SOLUTION INTRAMUSCULAR; SUBCUTANEOUS at 11:00

## 2023-09-19 RX ADMIN — PEMETREXED DISODIUM 1000 MG: 500 INJECTION, POWDER, LYOPHILIZED, FOR SOLUTION INTRAVENOUS at 11:57

## 2023-09-19 RX ADMIN — SODIUM CHLORIDE 400 MG: 9 INJECTION, SOLUTION INTRAVENOUS at 11:13

## 2023-09-19 RX ADMIN — ONDANSETRON 8 MG: 2 INJECTION INTRAMUSCULAR; INTRAVENOUS at 10:56

## 2023-09-19 ASSESSMENT — ENCOUNTER SYMPTOMS
VOMITING: 0
HEADACHES: 0
CHILLS: 0
COUGH: 1
CONSTIPATION: 0
PALPITATIONS: 0
MYALGIAS: 0
DIZZINESS: 0
FEVER: 0
NAUSEA: 0
DIARRHEA: 0
SPUTUM PRODUCTION: 0
SHORTNESS OF BREATH: 1
WEIGHT LOSS: 0
HEMOPTYSIS: 0
TINGLING: 0

## 2023-09-19 ASSESSMENT — PAIN SCALES - GENERAL: PAINLEVEL: NO PAIN

## 2023-09-19 ASSESSMENT — FIBROSIS 4 INDEX
FIB4 SCORE: 1.41
FIB4 SCORE: 1.41

## 2023-09-19 NOTE — PROGRESS NOTES
Subjective     Tyler Zarate is a 73 y.o. male who presents with Cancer (Prechemo)          HPI    Patient seen today for evaluation prior to cycle 7 of chemotherapy employing maitenance Pemetrexed and Pembrolizumab for metastatic lung cancer, for continued monitoring of symptoms and side effects of cancer treatments.  He presents unaccompanied for today's visit.     Oncology history of presenting illness:  Patient had a CT scan of the chest on 1/18/2023 which showed a new spiculated mass in the anterior right upper lobe arising from the area of the presumed previous scar.  This was highly suggestive of disease.  He ended up having a biopsy on 2/17/2023 which was consistent with adenocarcinoma of the right upper lobe.  He did have a PET scan following his diagnosis, date unknown, which showed a hypermetabolic right upper lobe mass measuring 20 x 16 mm as well as a mass in the left lower lobe measuring 11 x 5 mm.  Patient did undergo biopsy of the left lower lobe which was consistent with adenocarcinoma of the lung.     Patient recently moved from [facility] to [place] to be closer to his son.  At time of diagnosis patient had complained of unintentional weight loss of greater than 10% within the past 6 months.  Patient also with a history of basal cell carcinoma of the nose.     Treatment history:  05/01 - 05/10/23: SBRT to lung - Dr. Jaimes  05/16/23: C1D1 Carboplatin, Pemetrexed and Pembrolizumab  06/06/23: C2D1 Carboplatin, Pemetrexed and Pembrolizumab  06/27/23: C3D1 Carboplatin, Pemetrexed and Pembrolizumab  07/18/23: C4D1 Carboplatin, Pemetrexed and Pembrolizumab  08/08/23: C5D1 maintenance Pemetrexed and Pembrolizumab  08/29/23: C6D1 maintenance Pemetrexed and Pembrolizumab  09/19/23: C7D1 maintenance Pemetrexed and Pembrolizumab     Interval history:  Patient is doing very well.  He denies any nausea or vomiting, diarrhea or constipation.  He has noticed a cough recently but denies any fevers,  "or sputum production.  He only has shortness of breath with exertion.  He has some mild fatigue but overall he is doing really well and pleased with how well he is feeling.      Allergies   Allergen Reactions    Bacitracin-Neomycin-Polymyxin      Ointment around eye caused face and eye swelling    Gabapentin Swelling     Took several medications following cataract surgery. Not sure which medication was cause.     Ketorolac Swelling     Took following cataract surgery. Not sure which medication was cause.     Lipoglycopeptide Antibiotics Swelling     Pt stated he did not remember what kind of antibiotics he was allergic to. Pt stated \"two different kinds\" Face swelling and eyes glazing over.     Tromethamine      Current Outpatient Medications on File Prior to Encounter   Medication Sig Dispense Refill    prochlorperazine (COMPAZINE) 10 MG Tab Take 1 Tablet by mouth every 6 hours as needed (for nausea, vomiting). (Patient not taking: Reported on 6/27/2023) 30 Tablet 6    ondansetron (ZOFRAN) 4 MG Tab tablet Take 1 Tablet by mouth every four hours as needed for Nausea/Vomiting (for nausea, vomiting). (Patient not taking: Reported on 8/29/2023) 30 Tablet 6    nicotine (NICODERM) 14 MG/24HR PATCH 24 HR Place 1 Patch on the skin every 24 hours. (Patient not taking: Reported on 6/27/2023) 30 Patch 0    acetaminophen (TYLENOL) 500 MG Tab Take 500-1,000 mg by mouth every 6 hours as needed.      aspirin 500 MG tablet Take 1,000 mg by mouth. Either once a week with a lot of activity or once a month. (Patient not taking: Reported on 6/27/2023)      tiotropium (SPIRIVA) 18 MCG Cap Place 18 mcg into inhaler and inhale every day.      ibuprofen (MOTRIN) 200 MG Tab Take 200 mg by mouth every 6 hours as needed. (Patient not taking: Reported on 6/27/2023)      ALBUTEROL INH Inhale.       No current facility-administered medications on file prior to encounter.       Review of Systems   Constitutional:  Positive for malaise/fatigue " "(mild). Negative for chills, fever and weight loss.   Respiratory:  Positive for cough and shortness of breath (only with exertion). Negative for hemoptysis and sputum production.    Cardiovascular:  Negative for chest pain and palpitations.   Gastrointestinal:  Negative for constipation, diarrhea, nausea and vomiting.   Genitourinary:  Negative for dysuria.   Musculoskeletal:  Negative for myalgias.   Neurological:  Negative for dizziness, tingling and headaches.              Objective     /60   Pulse 77   Temp 36.4 °C (97.5 °F) (Temporal)   Resp 12   Ht 1.753 m (5' 9\")   Wt 73 kg (160 lb 13.2 oz)   SpO2 92%   BMI 23.75 kg/m²      Physical Exam  Vitals reviewed.   Constitutional:       General: He is not in acute distress.     Appearance: Normal appearance. He is not diaphoretic.   HENT:      Head: Normocephalic and atraumatic.   Cardiovascular:      Rate and Rhythm: Normal rate and regular rhythm.      Heart sounds: Normal heart sounds. No murmur heard.     No friction rub. No gallop.   Pulmonary:      Effort: Pulmonary effort is normal. No respiratory distress.      Breath sounds: Normal breath sounds. No wheezing.   Abdominal:      General: Bowel sounds are normal. There is no distension.      Palpations: Abdomen is soft.      Tenderness: There is no abdominal tenderness.   Musculoskeletal:         General: No swelling or tenderness. Normal range of motion.   Skin:     General: Skin is warm and dry.   Neurological:      Mental Status: He is alert and oriented to person, place, and time.   Psychiatric:         Mood and Affect: Mood normal.         Behavior: Behavior normal.                Latest Reference Range & Units 09/19/23 07:50   WBC 4.8 - 10.8 K/uL 10.0   RBC 4.70 - 6.10 M/uL 3.93 (L)   Hemoglobin 14.0 - 18.0 g/dL 13.7 (L)   Hematocrit 42.0 - 52.0 % 41.1 (L)   MCV 81.4 - 97.8 fL 104.6 (H)   MCH 27.0 - 33.0 pg 34.9 (H)   MCHC 32.3 - 36.5 g/dL 33.3   RDW 35.9 - 50.0 fL 61.4 (H)   Platelet Count " 164 - 446 K/uL 307   MPV 9.0 - 12.9 fL 10.0   Neutrophils-Polys 44.00 - 72.00 % 67.60   Neutrophils (Absolute) 1.82 - 7.42 K/uL 6.76   Lymphocytes 22.00 - 41.00 % 18.50 (L)   Lymphs (Absolute) 1.00 - 4.80 K/uL 1.85   Monocytes 0.00 - 13.40 % 12.20   Monos (Absolute) 0.00 - 0.85 K/uL 1.22 (H)   Eosinophils 0.00 - 6.90 % 0.80   Eos (Absolute) 0.00 - 0.51 K/uL 0.08   Basophils 0.00 - 1.80 % 0.40   Baso (Absolute) 0.00 - 0.12 K/uL 0.04   Immature Granulocytes 0.00 - 0.90 % 0.50   Immature Granulocytes (abs) 0.00 - 0.11 K/uL 0.05   Nucleated RBC 0.00 - 0.20 /100 WBC 0.00   NRBC (Absolute) K/uL 0.00   Outpt Infus CBC Comment  see below   Sodium 135 - 145 mmol/L 138   Potassium 3.6 - 5.5 mmol/L 4.2   Chloride 96 - 112 mmol/L 100   Co2 20 - 33 mmol/L 27   Anion Gap 7.0 - 16.0  11.0   Glucose 65 - 99 mg/dL 120 (H)   Bun 8 - 22 mg/dL 20   Creatinine 0.50 - 1.40 mg/dL 0.82   GFR (CKD-EPI) >60 mL/min/1.73 m 2 92   Calcium 8.5 - 10.5 mg/dL 9.3   Correct Calcium 8.5 - 10.5 mg/dL 9.1   AST(SGOT) 12 - 45 U/L 37   ALT(SGPT) 2 - 50 U/L 40   Alkaline Phosphatase 30 - 99 U/L 128 (H)   Total Bilirubin 0.1 - 1.5 mg/dL 0.3   Albumin 3.2 - 4.9 g/dL 4.2   Total Protein 6.0 - 8.2 g/dL 7.8   Globulin 1.9 - 3.5 g/dL 3.6 (H)   A-G Ratio g/dL 1.2   TSH 0.380 - 5.330 uIU/mL 3.070   Free T-4 0.93 - 1.70 ng/dL 1.47              Assessment & Plan       1. Adenocarcinoma, lung, right (HCC)        2. Encounter for long-term (current) use of high-risk medication                1. Patient with Stage SUDHAKAR (cT1c, cN0, cM1a) currently on treatment with maintenance pemetrexed and pembrolizumab. Patient scheduled to proceed with cycle 7 today.  I did review his CBC, CMP, TSH and free T4 labs are appropriate to proceed with treatment today as planned.    CT chest, abdomen and pelvis completed on 8/1/2023 did show positive response to therapy.  Will defer to Dr. Gibson as to when next follow-up imaging would be completed.     2.  Patient to follow-up in the  clinic in 3 weeks, or sooner if needed.      Please note that this dictation was created using voice recognition software. I have made every reasonable attempt to correct obvious errors, but I expect that there are errors of grammar and possibly content that I did not discover before finalizing the note.

## 2023-09-19 NOTE — PROGRESS NOTES
Chemotherapy Verification - SECONDARY RN   C7 D1    Height = 177 cm  Weight = 73.3 kg  BSA = 1.9 m^2       Medication: pembrolizumab (KEYTRUDA)  Dose: 400 mg set dose  Calculated Dose: 400 mg set dose                             (In mg/m2, AUC, mg/kg)     Medication: pemetrexed (ALIMTA)  Dose: 500 mg/m2  Calculated Dose: 950 mg                              (In mg/m2, AUC, mg/kg)    I confirm that this process was performed independently.

## 2023-09-19 NOTE — PROGRESS NOTES
Tyler presented into [facility] for Day 1/ Cycle 7 of Keytruda and Alimta for NSC lung cancer. Pt denied having any new or acute complaints today, reports tolerating past treatments well. PIV was previously started this am with prechemo labs. PIV had positive blood return and flushed briskly. Labs were drawn as ordered and reviewed. Pt meets parameters for treatment today. Premed and vitamin B 12 shot given as ordered. Pt given Keytruda and Alimta as prescribed, tolerated well, denied having any complaints during or after infusion. PIV discontinued, bleeding controlled with gauze and coban. Pt has future appointments. Pt discharged to home in good condition.

## 2023-09-19 NOTE — PROGRESS NOTES
Pt arrived ambulatory to [facility] for prechemo labs only. POC discussed and pt verbalized understanding. PIV placed to LFA and labs drawn. Pt tolerated well. PIV saline locked and left in place for use later today for chemotherapy. Pt confirms appt and discharged to self care at this time, to return after his provider visit. Pt in NAD at time of discharge.

## 2023-09-19 NOTE — PROGRESS NOTES
Chemotherapy Verification - PRIMARY RN      Height = 177 cm  Weight = 73.3 kg  BSA = 1.90 m^2       Medication: pembrolizumab (Keytruda)  Dose: 400 mg set dose  Calculated Dose: 400 mg                             (In mg/m2, AUC, mg/kg)     Medication: pemetrexed (Alimta)  Dose: 500 mg/m^2  Calculated Dose: 950 mg                             (In mg/m2, AUC, mg/kg)    I confirm this process was performed independently with the BSA and all final chemotherapy dosing calculations congruent.  Any discrepancies of 10% or greater have been addressed with the chemotherapy pharmacist. The resolution of the discrepancy has been documented in the EPIC progress notes.

## 2023-09-19 NOTE — ADDENDUM NOTE
Encounter addended by: Korinne Mitchell, Med Ass't on: 9/19/2023 12:25 PM   Actions taken: Charge Capture section accepted

## 2023-10-02 RX ORDER — 0.9 % SODIUM CHLORIDE 0.9 %
VIAL (ML) INJECTION PRN
Status: CANCELLED | OUTPATIENT
Start: 2023-10-09

## 2023-10-02 RX ORDER — 0.9 % SODIUM CHLORIDE 0.9 %
3 VIAL (ML) INJECTION PRN
Status: CANCELLED | OUTPATIENT
Start: 2023-10-10

## 2023-10-02 RX ORDER — 0.9 % SODIUM CHLORIDE 0.9 %
10 VIAL (ML) INJECTION PRN
Status: CANCELLED | OUTPATIENT
Start: 2023-10-10

## 2023-10-02 RX ORDER — 0.9 % SODIUM CHLORIDE 0.9 %
VIAL (ML) INJECTION PRN
Status: CANCELLED | OUTPATIENT
Start: 2023-10-10

## 2023-10-02 RX ORDER — EPINEPHRINE 1 MG/ML(1)
0.5 AMPUL (ML) INJECTION PRN
Status: CANCELLED | OUTPATIENT
Start: 2023-10-10

## 2023-10-02 RX ORDER — ONDANSETRON 8 MG/1
8 TABLET, ORALLY DISINTEGRATING ORAL PRN
Status: CANCELLED | OUTPATIENT
Start: 2023-10-10

## 2023-10-02 RX ORDER — METHYLPREDNISOLONE SODIUM SUCCINATE 125 MG/2ML
125 INJECTION, POWDER, LYOPHILIZED, FOR SOLUTION INTRAMUSCULAR; INTRAVENOUS PRN
Status: CANCELLED | OUTPATIENT
Start: 2023-10-10

## 2023-10-02 RX ORDER — CYANOCOBALAMIN 1000 UG/ML
1000 INJECTION, SOLUTION INTRAMUSCULAR; SUBCUTANEOUS
Status: CANCELLED | OUTPATIENT
Start: 2023-10-10

## 2023-10-02 RX ORDER — 0.9 % SODIUM CHLORIDE 0.9 %
3 VIAL (ML) INJECTION PRN
Status: CANCELLED | OUTPATIENT
Start: 2023-10-09

## 2023-10-02 RX ORDER — 0.9 % SODIUM CHLORIDE 0.9 %
10 VIAL (ML) INJECTION PRN
Status: CANCELLED | OUTPATIENT
Start: 2023-10-09

## 2023-10-02 RX ORDER — PROCHLORPERAZINE MALEATE 10 MG
10 TABLET ORAL EVERY 6 HOURS PRN
Status: CANCELLED | OUTPATIENT
Start: 2023-10-10

## 2023-10-02 RX ORDER — DIPHENHYDRAMINE HYDROCHLORIDE 50 MG/ML
50 INJECTION INTRAMUSCULAR; INTRAVENOUS PRN
Status: CANCELLED | OUTPATIENT
Start: 2023-10-10

## 2023-10-02 RX ORDER — ONDANSETRON 2 MG/ML
4 INJECTION INTRAMUSCULAR; INTRAVENOUS PRN
Status: CANCELLED | OUTPATIENT
Start: 2023-10-10

## 2023-10-02 RX ORDER — ONDANSETRON 2 MG/ML
8 INJECTION INTRAMUSCULAR; INTRAVENOUS ONCE
Status: CANCELLED | OUTPATIENT
Start: 2023-10-10 | End: 2023-10-09

## 2023-10-02 RX ORDER — SODIUM CHLORIDE 9 MG/ML
INJECTION, SOLUTION INTRAVENOUS CONTINUOUS
Status: CANCELLED | OUTPATIENT
Start: 2023-10-10

## 2023-10-10 ENCOUNTER — HOSPITAL ENCOUNTER (OUTPATIENT)
Dept: HEMATOLOGY ONCOLOGY | Facility: MEDICAL CENTER | Age: 73
End: 2023-10-10
Attending: STUDENT IN AN ORGANIZED HEALTH CARE EDUCATION/TRAINING PROGRAM
Payer: COMMERCIAL

## 2023-10-10 ENCOUNTER — OUTPATIENT INFUSION SERVICES (OUTPATIENT)
Dept: ONCOLOGY | Facility: MEDICAL CENTER | Age: 73
End: 2023-10-10
Attending: STUDENT IN AN ORGANIZED HEALTH CARE EDUCATION/TRAINING PROGRAM
Payer: COMMERCIAL

## 2023-10-10 VITALS
WEIGHT: 162.48 LBS | OXYGEN SATURATION: 95 % | TEMPERATURE: 98.1 F | DIASTOLIC BLOOD PRESSURE: 60 MMHG | HEIGHT: 70 IN | HEART RATE: 78 BPM | SYSTOLIC BLOOD PRESSURE: 122 MMHG | BODY MASS INDEX: 23.26 KG/M2

## 2023-10-10 VITALS
HEIGHT: 70 IN | DIASTOLIC BLOOD PRESSURE: 64 MMHG | WEIGHT: 160.05 LBS | RESPIRATION RATE: 18 BRPM | TEMPERATURE: 97.8 F | SYSTOLIC BLOOD PRESSURE: 143 MMHG | BODY MASS INDEX: 22.91 KG/M2 | HEART RATE: 85 BPM | OXYGEN SATURATION: 98 %

## 2023-10-10 DIAGNOSIS — C34.91 ADENOCARCINOMA, LUNG, RIGHT (HCC): ICD-10-CM

## 2023-10-10 LAB
ALBUMIN SERPL BCP-MCNC: 3.7 G/DL (ref 3.2–4.9)
ALBUMIN/GLOB SERPL: 1 G/DL
ALP SERPL-CCNC: 108 U/L (ref 30–99)
ALT SERPL-CCNC: 36 U/L (ref 2–50)
ANION GAP SERPL CALC-SCNC: 9 MMOL/L (ref 7–16)
AST SERPL-CCNC: 38 U/L (ref 12–45)
BASOPHILS # BLD AUTO: 0.3 % (ref 0–1.8)
BASOPHILS # BLD: 0.04 K/UL (ref 0–0.12)
BILIRUB SERPL-MCNC: 0.2 MG/DL (ref 0.1–1.5)
BUN SERPL-MCNC: 15 MG/DL (ref 8–22)
CALCIUM ALBUM COR SERPL-MCNC: 8.9 MG/DL (ref 8.5–10.5)
CALCIUM SERPL-MCNC: 8.7 MG/DL (ref 8.5–10.5)
CHLORIDE SERPL-SCNC: 101 MMOL/L (ref 96–112)
CO2 SERPL-SCNC: 29 MMOL/L (ref 20–33)
CREAT SERPL-MCNC: 0.74 MG/DL (ref 0.5–1.4)
EOSINOPHIL # BLD AUTO: 0.08 K/UL (ref 0–0.51)
EOSINOPHIL NFR BLD: 0.7 % (ref 0–6.9)
ERYTHROCYTE [DISTWIDTH] IN BLOOD BY AUTOMATED COUNT: 57.8 FL (ref 35.9–50)
GFR SERPLBLD CREATININE-BSD FMLA CKD-EPI: 95 ML/MIN/1.73 M 2
GLOBULIN SER CALC-MCNC: 3.6 G/DL (ref 1.9–3.5)
GLUCOSE SERPL-MCNC: 98 MG/DL (ref 65–99)
HCT VFR BLD AUTO: 38.8 % (ref 42–52)
HGB BLD-MCNC: 12.8 G/DL (ref 14–18)
IMM GRANULOCYTES # BLD AUTO: 0.04 K/UL (ref 0–0.11)
IMM GRANULOCYTES NFR BLD AUTO: 0.3 % (ref 0–0.9)
LYMPHOCYTES # BLD AUTO: 1.8 K/UL (ref 1–4.8)
LYMPHOCYTES NFR BLD: 15.4 % (ref 22–41)
MCH RBC QN AUTO: 35 PG (ref 27–33)
MCHC RBC AUTO-ENTMCNC: 33 G/DL (ref 32.3–36.5)
MCV RBC AUTO: 106 FL (ref 81.4–97.8)
MONOCYTES # BLD AUTO: 1.25 K/UL (ref 0–0.85)
MONOCYTES NFR BLD AUTO: 10.7 % (ref 0–13.4)
NEUTROPHILS # BLD AUTO: 8.5 K/UL (ref 1.82–7.42)
NEUTROPHILS NFR BLD: 72.6 % (ref 44–72)
NRBC # BLD AUTO: 0 K/UL
NRBC BLD-RTO: 0 /100 WBC (ref 0–0.2)
OUTPT INFUS CBC COMMENT OICOM: ABNORMAL
PLATELET # BLD AUTO: 312 K/UL (ref 164–446)
PMV BLD AUTO: 9.6 FL (ref 9–12.9)
POTASSIUM SERPL-SCNC: 3.9 MMOL/L (ref 3.6–5.5)
PROT SERPL-MCNC: 7.3 G/DL (ref 6–8.2)
RBC # BLD AUTO: 3.66 M/UL (ref 4.7–6.1)
SODIUM SERPL-SCNC: 139 MMOL/L (ref 135–145)
WBC # BLD AUTO: 11.7 K/UL (ref 4.8–10.8)

## 2023-10-10 PROCEDURE — 99214 OFFICE O/P EST MOD 30 MIN: CPT | Performed by: STUDENT IN AN ORGANIZED HEALTH CARE EDUCATION/TRAINING PROGRAM

## 2023-10-10 PROCEDURE — 700105 HCHG RX REV CODE 258: Performed by: NURSE PRACTITIONER

## 2023-10-10 PROCEDURE — 99212 OFFICE O/P EST SF 10 MIN: CPT | Performed by: STUDENT IN AN ORGANIZED HEALTH CARE EDUCATION/TRAINING PROGRAM

## 2023-10-10 PROCEDURE — 96413 CHEMO IV INFUSION 1 HR: CPT

## 2023-10-10 PROCEDURE — 96375 TX/PRO/DX INJ NEW DRUG ADDON: CPT

## 2023-10-10 PROCEDURE — 700111 HCHG RX REV CODE 636 W/ 250 OVERRIDE (IP): Mod: JZ,JG | Performed by: NURSE PRACTITIONER

## 2023-10-10 PROCEDURE — 80053 COMPREHEN METABOLIC PANEL: CPT

## 2023-10-10 PROCEDURE — 85025 COMPLETE CBC W/AUTO DIFF WBC: CPT

## 2023-10-10 RX ORDER — ONDANSETRON 2 MG/ML
8 INJECTION INTRAMUSCULAR; INTRAVENOUS ONCE
Status: COMPLETED | OUTPATIENT
Start: 2023-10-10 | End: 2023-10-10

## 2023-10-10 RX ADMIN — ONDANSETRON 8 MG: 2 INJECTION INTRAMUSCULAR; INTRAVENOUS at 12:14

## 2023-10-10 RX ADMIN — PEMETREXED DISODIUM 900 MG: 500 INJECTION, POWDER, LYOPHILIZED, FOR SOLUTION INTRAVENOUS at 12:14

## 2023-10-10 ASSESSMENT — ENCOUNTER SYMPTOMS
FEVER: 0
TINGLING: 0
WEIGHT LOSS: 0
SORE THROAT: 0
DEPRESSION: 0
ORTHOPNEA: 0
NAUSEA: 0
SHORTNESS OF BREATH: 0
TREMORS: 0
MEMORY LOSS: 0
FOCAL WEAKNESS: 0
COUGH: 0
SENSORY CHANGE: 0
BLURRED VISION: 0
VOMITING: 0
ABDOMINAL PAIN: 0
CHILLS: 0
SPUTUM PRODUCTION: 0
NECK PAIN: 0
HEADACHES: 0
EYE DISCHARGE: 1
WHEEZING: 0
HEARTBURN: 0
PALPITATIONS: 0
BRUISES/BLEEDS EASILY: 0
DIZZINESS: 0

## 2023-10-10 ASSESSMENT — FIBROSIS 4 INDEX
FIB4 SCORE: 1.41
FIB4 SCORE: 1.41

## 2023-10-10 NOTE — PROGRESS NOTES
Pt arrived ambulatory to [facility] for D1C8 Alimta. POC discussed and pt verbalized understanding. PIV placed to RFA and labs drawn without difficulty. Pt tolerated well. Labs reviewed. Pre-medications and Alimta administered per MAR and pt tolerated well. No s/s of adverse reactions or complications noted. PIV saline locked and left in place for use later today for chemotherapy. Educated pt on neutropenia, when to contact provider and/or defer judgment to emergent care for worrisome symptoms. Pt verbalized understanding. Pt confirms next appt and discharged to self care at this time. Pt in NAD at time of discharge.

## 2023-10-10 NOTE — PROGRESS NOTES
"Pharmacy Chemotherapy Verification    Dx: NSCLC  Cycle 8  Previous treatment = 9/19/23    Protocol: Pembrolizumab + Pemetrexed/CARBOplatin  Pembrolizumab 200 mg IV over 30 minutes on Day 1 followed by  Pemetrexed 500 mg/m2 IV over 10 minutes on Day 1 followed by  CARBOplatin AUC 5 IV over 30 minutes on Day 1  21-day cycle for 4-6 cycles  ~Followed by~  Pembrolizumab 200 mg IV over 30 minutes on Day 1 or Pembrolizumab 400 mg IV over 30 minutes every other cycle followed by  Pemetrexed 500 mg/m2 IV over 10 minutes on Day 1  Maintenance therapy: 21-day cycle until 24 months of therapy has been completed  ~Followed by~  Pemetrexed 500 mg/m2 IV over 10 minutes on Day 1   Maintenance therapy: 21-day cycle until DP/UT  NCCN Guidelines for Non-Small Cell Lung Cancer V.1.2023.  Nikki M, et al. Eur J Cancer. 2020;131:68-75.  Kelsey CJ, et al. Lancet Oncol. 2016;17(11):9257-1862.  Joelle SERNA et al. N Engl J Med. 2018;378(22):3545-9288.    Allergies:  Bacitracin-neomycin-polymyxin, Gabapentin, Ketorolac, Lipoglycopeptide antibiotics, and Tromethamine     BP (!) 143/64   Pulse 85   Temp 36.6 °C (97.8 °F) (Temporal)   Resp 18   Ht 1.77 m (5' 9.69\")   Wt 72.6 kg (160 lb 0.9 oz)   SpO2 98%   BMI 23.17 kg/m²  Body surface area is 1.89 meters squared.    All lab results 10/10/23 within treatment parameters.   Cyanocobalamin given every 9 weeks, last given 9/19/23, next due ~11/21/23    Pembrolizumab 400 mg fixed dose   No calculation required, OK to treat with final dose = ---mg IV every other cycle    Pemetrexed 500 mg/m² x 1.89m² = 945mg   <10% difference, OK to treat with final dose = 900mg IV    Maria Guadalupe Esquivel, PharmD  "

## 2023-10-10 NOTE — PROGRESS NOTES
"Pharmacy Chemotherapy Calculation    Patient Name: Tyler Zarate   Dx:NSCLC (Stage IV)    Protocol: CARBOplatin/PEMEtrexed + Pembrolizumab  Pembrolizumab 200 mg IV over 30 minutes on Day 1 followed by   Pemetrexed 500 mg/m2 IV over 10 minutes on Day 1 followed by   CARBOplatin AUC 5 IV over 30 minutes on Day 1    21 day cycle for 4-6 cycles -Completed 7/18/23  ~Followed by~  Pembrolizumab 400 mg IV over 30 minutes on Day 1 every other cycle followed by   Pemetrexed 500 mg/m2 IV over 10 minutes on Day 1    21 day cycle until 24 months of therapy has been completed   ~Followed by~  Pemetrexed 500 mg/m2 IV over 10 minutes on Day 1    21 day cycle until DP or UT    NCCN Guidelines for Non-Small Cell Lung Cancer V.1.2023  Nikki M, et al. Eur J Cancer. 2020;131:68-75.  Kelsey CJ, et al. Lancet Oncol. 2016;17(11):2280-9425.  Joelle SERNA, et al. N Engl J Med.  2018;378(22):8091-2068.    Allergies:  Bacitracin-neomycin-polymyxin, Gabapentin, Ketorolac, Lipoglycopeptide antibiotics, and Tromethamine     BP (!) 143/64   Pulse 85   Temp 36.6 °C (97.8 °F) (Temporal)   Resp 18   Ht 1.77 m (5' 9.69\")   Wt 72.6 kg (160 lb 0.9 oz)   SpO2 98%   BMI 23.17 kg/m²  Body surface area is 1.89 meters squared.    Labs 10/10/23:  ANC~ 8500 Plt = 312k   Hgb = 12.8     SCr = 0.74 mg/dL CrCl ~ 89 mL/min   AST/ALT/AP = 38/36/108 TBili = 0.2         Labs 9/19/23:     TSH = 3.07   Free T4 = 1.47          Received Cyanacobalmin injection 9/19/23    Drug Order   (Drug name, dose, route, IV Fluid & volume, frequency, number of doses) Cycle 8   Previous treatment: C7 on 9/19/23     Medication = Pembrolizumab   Base Dose= 400 mg   Fixed dose, no calculation required.  Final Dose = 400 mg  Route = IV  Fluid & Volume = NS 50 mL  Admin Duration = Over 30 minutes   Every OTHER cycle  (ODD cycles)      Fixed dose, no calculation required. Okay to treat with final dose.   Medication = PEMEtrexed   Base Dose= 500 mg/m²   Calc Dose: Base Dose x 1.89 m² " = 945 mg  Final Dose = 900 mg  Route = IV  Fluid & Volume =  mL  Admin Duration = Over 10 minutes          <10% difference, okay to treat with final dose     By my signature below, I confirm this process was performed independently with the BSA and all final chemotherapy dosing calculations congruent. I have reviewed the above chemotherapy order and that my calculation of the final dose and BSA (when applicable) corroborate those calculations of the  pharmacist. Discrepancies of 10% or greater in the written dose have been addressed and documented within the EPIC Progress notes.    Laura Najera, PharmD,BCOP

## 2023-10-10 NOTE — PROGRESS NOTES
Consult Note: Hematology/Oncology     Primary Care:  No primary care provider on file.    Chief Complaint   Patient presents with    Lung Cancer     Prechemo        Current Treatment: None    Prior Treatment: None    05/01 - 05/10/23: SBRT to lung - Dr. Jaimes  05/16/23: C1D1 Carboplatin, Pemetrexed and Pembrolizumab  06/06/23: C2D1 Carboplatin, Pemetrexed and Pembrolizumab  06/27/23: C3D1 Carboplatin, Pemetrexed and Pembrolizumab  07/18/23: C4D1 Carboplatin, Pemetrexed and Pembrolizumab  08/08/23: C5D1 Pemetrexed and Pembrolizumab  08/29/23: C6D1: Pemetrexed   09/19/23: C7D1 Pemetrexed and Pembrolizumab  10/1/23: C8D1: Pemetrexed   Subjective:   History of Presenting Illness:  Tyler Zarate is a 73 y.o. male with a past medical history of COPD and tobacco use, 1 pack/day smoker, alcohol abuse, right leg DVT diagnosed in 2019 who presents today with a new diagnosis of metastatic adeno carcinoma of the lung.    Patient had a CT thorax on January 18, 2023 which showed a new spiculated mass in the anterior right upper lobe arising from the area of presumed previous scar.  It is highly suggestive of disease.    Patient had a biopsy and pathology on 2/17/23 shows adenocarcinoma of the right upper lobe.    A PET scan showed a hypermetabolic RUL mass ([id number]) as well as a mass in the LLL (11X5mm).     Patient recently moved to [place] from [facility].  He moved to be closer to his son.    He reports unintentional weight loss greater than 10% in the last 6 months.    He also reports a personal history of cancer (basal cell of nose)    Interval History    Tolerated C7.     Patient reports that he is coming down with a viral infection.  He also notes eye tearing.      He denies any NVD.     No new rashes.     Patient is trying to quit smoking, he is roughly smoking 6 cig/d    Past Medical History:   Diagnosis Date    Cancer (HCC)     skin, Lung    Carcinoma in situ of respiratory system     Lung 2023    Cataract      "COPD (chronic obstructive pulmonary disease) ([facility])     Cough     DVT (deep venous thrombosis) ([facility])     RLL    Lumbar radiculopathy     Pain     lower back and sciatic pain    Pneumonia     2018    Shortness of breath     Skin carcinoma     Sputum production     Synovial cyst     L spine        Past Surgical History:   Procedure Laterality Date    MN BRONCHOSCOPY,DIAGNOSTIC  4/12/2023    Procedure: FIBER OPTIC BRONCHOSCOPY WITH  WASH, BRUSH, BRONCHOALVEOLAR LAVAGE, BIOPSY AND FINE NEEDLE ASPIRATION & NAVIGATION, ROBOTICS;  Surgeon: Vladimir Ortiz M.D.;  Location: SURGERY [facility];  Service: Pulmonary Robotic    CATARACT PHACO WITH IOL Bilateral        Social History     Tobacco Use    Smoking status: Every Day     Current packs/day: 0.50     Average packs/day: 0.5 packs/day for 57.0 years (28.5 ttl pk-yrs)     Types: Cigarettes     Passive exposure: Never    Smokeless tobacco: Former     Types: Chew   Vaping Use    Vaping Use: Never used   Substance Use Topics    Alcohol use: Yes     Alcohol/week: 16.8 oz     Types: 28 Cans of beer per week     Comment: 4 beers a day    Drug use: Never        Family History   Problem Relation Age of Onset    Cancer Brother         lung       Allergies   Allergen Reactions    Bacitracin-Neomycin-Polymyxin      Ointment around eye caused face and eye swelling    Gabapentin Swelling     Took several medications following cataract surgery. Not sure which medication was cause.     Ketorolac Swelling     Took following cataract surgery. Not sure which medication was cause.     Lipoglycopeptide Antibiotics Swelling     Pt stated he did not remember what kind of antibiotics he was allergic to. Pt stated \"two different kinds\" Face swelling and eyes glazing over.     Tromethamine        Current Outpatient Medications   Medication Sig Dispense Refill    prochlorperazine (COMPAZINE) 10 MG Tab Take 1 Tablet by mouth every 6 hours as needed (for nausea, vomiting). 30 Tablet 6    " ondansetron (ZOFRAN) 4 MG Tab tablet Take 1 Tablet by mouth every four hours as needed for Nausea/Vomiting (for nausea, vomiting). 30 Tablet 6    tiotropium (SPIRIVA) 18 MCG Cap Place 18 mcg into inhaler and inhale every day.      ibuprofen (MOTRIN) 200 MG Tab Take 200 mg by mouth every 6 hours as needed.      ALBUTEROL INH Inhale.      nicotine (NICODERM) 14 MG/24HR PATCH 24 HR Place 1 Patch on the skin every 24 hours. (Patient not taking: Reported on 6/27/2023) 30 Patch 0    acetaminophen (TYLENOL) 500 MG Tab Take 500-1,000 mg by mouth every 6 hours as needed. (Patient not taking: Reported on 9/19/2023)      aspirin 500 MG tablet Take 1,000 mg by mouth. Either once a week with a lot of activity or once a month. (Patient not taking: Reported on 6/27/2023)       No current facility-administered medications for this encounter.       Review of Systems   Constitutional:  Negative for chills, fever, malaise/fatigue and weight loss.   HENT:  Negative for congestion, ear pain, nosebleeds and sore throat.    Eyes:  Positive for discharge. Negative for blurred vision.   Respiratory:  Negative for cough, sputum production, shortness of breath and wheezing.    Cardiovascular:  Negative for chest pain, palpitations, orthopnea and leg swelling.   Gastrointestinal:  Negative for abdominal pain, heartburn, nausea and vomiting.   Genitourinary:  Negative for dysuria, frequency and urgency.   Musculoskeletal:  Negative for neck pain.   Neurological:  Negative for dizziness, tingling, tremors, sensory change, focal weakness and headaches.   Endo/Heme/Allergies:  Does not bruise/bleed easily.   Psychiatric/Behavioral:  Negative for depression, memory loss and suicidal ideas.    All other systems reviewed and are negative.      Problem list, medications, and allergies reviewed by myself today in Epic.     Objective:     Vitals:    10/10/23 0803   BP: 122/60   BP Location: Right arm   Patient Position: Sitting   BP Cuff Size: Adult  "  Pulse: 78   Temp: 36.7 °C (98.1 °F)   TempSrc: Temporal   SpO2: 95%   Weight: 73.7 kg (162 lb 7.7 oz)   Height: 1.77 m (5' 9.69\")         DESC; KARNOFSKY SCALE WITH ECOG EQUIVALENT: 80, Normal activity with effort; some signs or symptoms of disease (ECOG equivalent 1)    DISTRESS LEVEL: no apparent distress    Physical Exam  Constitutional:       General: He is not in acute distress.     Appearance: Normal appearance.   HENT:      Head: Normocephalic and atraumatic.      Nose: No congestion.      Comments: S/p excision of basal cell/RT     Mouth/Throat:      Mouth: Mucous membranes are moist.      Pharynx: Oropharynx is clear.   Eyes:      General: No scleral icterus.        Right eye: Discharge present.         Left eye: Discharge present.     Conjunctiva/sclera: Conjunctivae normal.      Pupils: Pupils are equal, round, and reactive to light.   Cardiovascular:      Rate and Rhythm: Normal rate and regular rhythm.      Pulses: Normal pulses.      Heart sounds: No murmur heard.     No friction rub.   Pulmonary:      Effort: Pulmonary effort is normal. No respiratory distress.      Breath sounds: Normal breath sounds. No stridor. No wheezing or rales.   Chest:      Chest wall: No tenderness.   Abdominal:      General: Abdomen is flat. Bowel sounds are normal. There is no distension.      Palpations: Abdomen is soft. There is no mass.      Tenderness: There is no abdominal tenderness. There is no guarding or rebound.   Musculoskeletal:         General: No swelling, tenderness or deformity. Normal range of motion.      Cervical back: Normal range of motion and neck supple. No rigidity or tenderness.      Right lower leg: No edema.      Left lower leg: No edema.   Skin:     General: Skin is warm.      Coloration: Skin is not jaundiced or pale.      Findings: No bruising or rash.   Neurological:      General: No focal deficit present.      Mental Status: He is alert and oriented to person, place, and time. Mental " status is at baseline.      Motor: No weakness.   Psychiatric:         Mood and Affect: Mood normal.         Behavior: Behavior normal.         Thought Content: Thought content normal.         Judgment: Judgment normal.         Labs:   Most recent labs reviewed.    Slight anemia    Imaging:   Most recent images below have been independently reviewed by me.     8/1/2023     1.  Positive response to therapy. Right upper lobe mass has decreased in size, and there is associated scarring.  2.  No new metastatic disease in the chest, abdomen or pelvis.  3.  Emphysema, scattered linear areas of pulmonary scarring and associated mild bronchiectasis.  4.  Chronic trace left pleural effusion.  5.  Stable left adrenal adenoma. Stable nodularity of the right adrenal gland.  6.  Punctate nonobstructing right nephrolithiasis.  7.  Cholelithiasis.    Pathology:  Adenocarcinoma of the RUL    Assessment/Plan:      Cancer Staging   Adenocarcinoma, lung, right (HCC)  Staging form: Lung, AJCC 8th Edition  - Clinical stage from 4/24/2023: Stage SUDHAKAR (cT1c, cN0, cM1a) - Signed by Sam Jaimes M.D. on 4/24/2023       Mr. Zarate is a 74 yo M who presents today with a new diagnosis of adenocarcinoma (PDL1 1%; KEAP1 loss, STK11 loss, CRKL amp, GRANT G109, AID277, TP53) with Stage IV adenocarcinoma of the left lower lobe as well as the right upper lobe s/p C4 of carbo/pem/pem.    He is here prior to C8 to proceed with Pem/Pem.    Most recent scans show good response (AL)  to treatment. No new Metastatic disease.     Questions and concerns were addressed.    Plan  -Continue with C8 of Pem/Pem  -Will see patient prior to C9, can see Marcy  -Will need CT CAP in 3 months (next: 11/16/2023).  Will need to see me prior to C10, to go over imaging  -smoking cessation provided.     Regimen  21-day cycle for 4-6 cycles or until disease progression or unacceptable toxicity    Maintenance therapy 21-day cycle until 24 months of therapy has been  completed  Pembrolizumab 200 mg IV over 30 minutes on day 1  Followed by pemetrexed 500 mg per metered squared IV over 10 minutes on day 1    References  NCCN Guidelines Version NSCLC 3.2002     Jimmie VK, et al. Clin Lung Cancer 2019;20:30-36.e3    Renato JM, et al. Lung Cancer 2020;140:35-41.    Joelle et al NEJM 2018: 378(22):8451-0406    Kelsey CJ Lancet Oncol 2016; 17(11): 1866-7951    Nikki M et al. Eur J Cancer, 2020; 131:68-75    2.  Viral Conjunctivitis  -likely 2/2 to viral cold  -will recommend OTC olapatidine   -patient counseled on what to watch out (concerning for bacterial infection)    No follow-ups on file.     Any questions and concerns raised by the patient were addressed and answered. Patient denies any further questions.  Patient encouraged to call the office with any concerns or issues.     Peg Gibson M.D.  Hematology/Oncology

## 2023-10-10 NOTE — PROGRESS NOTES
Chemotherapy Verification - SECONDARY RN   C8 D1    Height = 177 cm  Weight = 72.6 kg  BSA = 1.89 m^2       Medication: pemetrexed (ALIMTA)  Dose: 500 mg/m2  Calculated Dose: 945 mg                             (In mg/m2, AUC, mg/kg)       I confirm that this process was performed independently.

## 2023-10-10 NOTE — ADDENDUM NOTE
Encounter addended by: Sunday Galeas on: 10/10/2023 8:40 AM   Actions taken: Charge Capture section accepted

## 2023-10-10 NOTE — PROGRESS NOTES
Chemotherapy Verification - PRIMARY RN      Height = 177 cm  Weight = 72.6 kg  BSA = 1.89 m2       Medication: Pemetrexed  Dose: 500 mg/m2  Calculated Dose: 945 mg                             (In mg/m2, AUC, mg/kg)     I confirm this process was performed independently with the BSA and all final chemotherapy dosing calculations congruent.  Any discrepancies of 10% or greater have been addressed with the chemotherapy pharmacist. The resolution of the discrepancy has been documented in the EPIC progress notes.

## 2023-10-11 NOTE — PROGRESS NOTES
Duplicate encounter. Patient returned to [facility] 10/10 at 0930 for 1000 chemotherapy treatment.

## 2023-10-17 RX ORDER — DIPHENHYDRAMINE HYDROCHLORIDE 50 MG/ML
50 INJECTION INTRAMUSCULAR; INTRAVENOUS PRN
Status: CANCELLED | OUTPATIENT
Start: 2023-10-31

## 2023-10-17 RX ORDER — 0.9 % SODIUM CHLORIDE 0.9 %
3 VIAL (ML) INJECTION PRN
Status: CANCELLED | OUTPATIENT
Start: 2023-10-30

## 2023-10-17 RX ORDER — CYANOCOBALAMIN 1000 UG/ML
1000 INJECTION, SOLUTION INTRAMUSCULAR; SUBCUTANEOUS
Status: CANCELLED | OUTPATIENT
Start: 2023-10-31

## 2023-10-17 RX ORDER — 0.9 % SODIUM CHLORIDE 0.9 %
3 VIAL (ML) INJECTION PRN
Status: CANCELLED | OUTPATIENT
Start: 2023-10-31

## 2023-10-17 RX ORDER — ONDANSETRON 2 MG/ML
4 INJECTION INTRAMUSCULAR; INTRAVENOUS PRN
Status: CANCELLED | OUTPATIENT
Start: 2023-10-31

## 2023-10-17 RX ORDER — ONDANSETRON 8 MG/1
8 TABLET, ORALLY DISINTEGRATING ORAL PRN
Status: CANCELLED | OUTPATIENT
Start: 2023-10-31

## 2023-10-17 RX ORDER — 0.9 % SODIUM CHLORIDE 0.9 %
10 VIAL (ML) INJECTION PRN
Status: CANCELLED | OUTPATIENT
Start: 2023-10-31

## 2023-10-17 RX ORDER — 0.9 % SODIUM CHLORIDE 0.9 %
10 VIAL (ML) INJECTION PRN
Status: CANCELLED | OUTPATIENT
Start: 2023-10-30

## 2023-10-17 RX ORDER — SODIUM CHLORIDE 9 MG/ML
INJECTION, SOLUTION INTRAVENOUS CONTINUOUS
Status: CANCELLED | OUTPATIENT
Start: 2023-10-31

## 2023-10-17 RX ORDER — 0.9 % SODIUM CHLORIDE 0.9 %
VIAL (ML) INJECTION PRN
Status: CANCELLED | OUTPATIENT
Start: 2023-10-31

## 2023-10-17 RX ORDER — ONDANSETRON 2 MG/ML
8 INJECTION INTRAMUSCULAR; INTRAVENOUS ONCE
Status: CANCELLED | OUTPATIENT
Start: 2023-10-31 | End: 2023-10-31

## 2023-10-17 RX ORDER — 0.9 % SODIUM CHLORIDE 0.9 %
VIAL (ML) INJECTION PRN
Status: CANCELLED | OUTPATIENT
Start: 2023-10-30

## 2023-10-17 RX ORDER — PROCHLORPERAZINE MALEATE 10 MG
10 TABLET ORAL EVERY 6 HOURS PRN
Status: CANCELLED | OUTPATIENT
Start: 2023-10-31

## 2023-10-17 RX ORDER — EPINEPHRINE 1 MG/ML(1)
0.5 AMPUL (ML) INJECTION PRN
Status: CANCELLED | OUTPATIENT
Start: 2023-10-31

## 2023-10-17 RX ORDER — METHYLPREDNISOLONE SODIUM SUCCINATE 125 MG/2ML
125 INJECTION, POWDER, LYOPHILIZED, FOR SOLUTION INTRAMUSCULAR; INTRAVENOUS PRN
Status: CANCELLED | OUTPATIENT
Start: 2023-10-31

## 2023-10-30 NOTE — PROGRESS NOTES
"Pharmacy Chemotherapy Calculation    Patient Name: Tyler Zarate   Dx:NSCLC (Stage IV)    Protocol: CARBOplatin/PEMEtrexed + Pembrolizumab  Pembrolizumab 200 mg IV over 30 minutes on Day 1 followed by   Pemetrexed 500 mg/m2 IV over 10 minutes on Day 1 followed by   CARBOplatin AUC 5 IV over 30 minutes on Day 1    21 day cycle for 4-6 cycles -Completed 7/18/23  ~Followed by~  Pembrolizumab 400 mg IV over 30 minutes on Day 1 every other cycle followed by   Pemetrexed 500 mg/m2 IV over 10 minutes on Day 1    21 day cycle until 24 months of therapy has been completed   ~Followed by~  Pemetrexed 500 mg/m2 IV over 10 minutes on Day 1    21 day cycle until DP or UT    NCCN Guidelines for Non-Small Cell Lung Cancer V.1.2023  Nikki M, et al. Eur J Cancer. 2020;131:68-75.  Kelsey CJ, et al. Lancet Oncol. 2016;17(11):4323-5086.  Joelle SERNA, et al. N Engl J Med.  2018;378(22):6825-3890.    Allergies:  Bacitracin-neomycin-polymyxin, Gabapentin, Ketorolac, Lipoglycopeptide antibiotics, and Tromethamine     Ht 1.76 m (5' 9.29\")   Wt 71.7 kg (158 lb 1.1 oz)   BMI 23.15 kg/m²  Body surface area is 1.87 meters squared.    Labs 10/31/23:  ANC~ 7230 Plt = 336k   Hgb = 13.7     SCr = 0.86 mg/dL CrCl ~ 76 mL/min   AST/ALT/AP = 31/29/105 TBili = 0.2  TSH = 3.35 Free T4 = 1.38     Received Cyanacobalmin injection 9/19/23, next due ~11/21/23    Drug Order   (Drug name, dose, route, IV Fluid & volume, frequency, number of doses) Cycle 9  Previous treatment: C8 on 10/10/23     Medication = Pembrolizumab   Base Dose= 400 mg   Fixed dose, no calculation required.  Final Dose = 400 mg  Route = IV  Fluid & Volume = NS 50 mL  Admin Duration = Over 30 minutes   Every OTHER cycle  (ODD cycles)      Fixed dose, no calculation required. Okay to treat with final dose.   Medication = PEMEtrexed   Base Dose= 500 mg/m²   Calc Dose: Base Dose x 1.87 m² = 935 mg  Final Dose = 900 mg  Route = IV  Fluid & Volume =  mL  Admin Duration = Over 10 " minutes          <10% difference, okay to treat with final dose     By my signature below, I confirm this process was performed independently with the BSA and all final chemotherapy dosing calculations congruent. I have reviewed the above chemotherapy order and that my calculation of the final dose and BSA (when applicable) corroborate those calculations of the  pharmacist. Discrepancies of 10% or greater in the written dose have been addressed and documented within the EPIC Progress notes.    Lawrence Garcia, PharmD

## 2023-10-30 NOTE — PROGRESS NOTES
"Pharmacy Chemotherapy Verification    Dx: NSCLC  Cycle 9  Previous treatment = 10/10/23    Protocol: Pembrolizumab + Pemetrexed/CARBOplatin  Pembrolizumab 200 mg IV over 30 minutes on Day 1 followed by  Pemetrexed 500 mg/m2 IV over 10 minutes on Day 1 followed by  CARBOplatin AUC 5 IV over 30 minutes on Day 1  21-day cycle for 4-6 cycles  ~Followed by~  Pembrolizumab 200 mg IV over 30 minutes on Day 1 or Pembrolizumab 400 mg IV over 30 minutes every other cycle followed by  Pemetrexed 500 mg/m2 IV over 10 minutes on Day 1  Maintenance therapy: 21-day cycle until 24 months of therapy has been completed  ~Followed by~  Pemetrexed 500 mg/m2 IV over 10 minutes on Day 1   Maintenance therapy: 21-day cycle until DP/UT  NCCN Guidelines for Non-Small Cell Lung Cancer V.1.2023.  Nikki M, et al. Eur J Cancer. 2020;131:68-75.  Kelsey CJ, et al. Lancet Oncol. 2016;17(11):9736-6427.  Joelle SERNA et al. N Engl J Med. 2018;378(22):3268-5008.    Allergies:  Bacitracin-neomycin-polymyxin, Gabapentin, Ketorolac, Lipoglycopeptide antibiotics, and Tromethamine     Ht 1.76 m (5' 9.29\")   Wt 71.7 kg (158 lb 1.1 oz)   BMI 23.15 kg/m²  Body surface area is 1.87 meters squared.    Labs 10/31/23:  ANC~ 7230 Plt = 336 k   Hgb = 13.7     SCr = 0.86 mg/dL CrCl ~ 75.8 mL/min   AST/ALT/AP = 31/29/105 TBili = 0.2      Cyanocobalamin given every 9 weeks, last given 9/19/23, next due ~11/21/23    Pembrolizumab 400 mg fixed dose   No calculation required, OK to treat with final dose = 400 mg IV every other cycle    Pemetrexed 500 mg/m² x 1.87 m² = 935 mg   <10% difference, OK to treat with final dose = 900mg IV    Carmela Winters, PharmD  "

## 2023-10-31 ENCOUNTER — OUTPATIENT INFUSION SERVICES (OUTPATIENT)
Dept: ONCOLOGY | Facility: MEDICAL CENTER | Age: 73
End: 2023-10-31
Attending: STUDENT IN AN ORGANIZED HEALTH CARE EDUCATION/TRAINING PROGRAM
Payer: COMMERCIAL

## 2023-10-31 ENCOUNTER — HOSPITAL ENCOUNTER (OUTPATIENT)
Dept: HEMATOLOGY ONCOLOGY | Facility: MEDICAL CENTER | Age: 73
End: 2023-10-31
Attending: NURSE PRACTITIONER
Payer: COMMERCIAL

## 2023-10-31 VITALS
RESPIRATION RATE: 18 BRPM | DIASTOLIC BLOOD PRESSURE: 60 MMHG | OXYGEN SATURATION: 97 % | BODY MASS INDEX: 23.4 KG/M2 | SYSTOLIC BLOOD PRESSURE: 120 MMHG | TEMPERATURE: 97.9 F | WEIGHT: 158 LBS | HEIGHT: 69 IN | HEART RATE: 84 BPM

## 2023-10-31 VITALS
DIASTOLIC BLOOD PRESSURE: 70 MMHG | SYSTOLIC BLOOD PRESSURE: 139 MMHG | HEIGHT: 69 IN | TEMPERATURE: 97 F | HEART RATE: 95 BPM | OXYGEN SATURATION: 98 % | WEIGHT: 156.53 LBS | BODY MASS INDEX: 23.18 KG/M2 | RESPIRATION RATE: 18 BRPM

## 2023-10-31 VITALS — BODY MASS INDEX: 23.41 KG/M2 | WEIGHT: 158.07 LBS | HEIGHT: 69 IN

## 2023-10-31 DIAGNOSIS — Z23 NEEDS FLU SHOT: ICD-10-CM

## 2023-10-31 DIAGNOSIS — C34.91 ADENOCARCINOMA, LUNG, RIGHT (HCC): ICD-10-CM

## 2023-10-31 DIAGNOSIS — Z79.899 ENCOUNTER FOR LONG-TERM CURRENT USE OF HIGH RISK MEDICATION: ICD-10-CM

## 2023-10-31 LAB
ALBUMIN SERPL BCP-MCNC: 3.7 G/DL (ref 3.2–4.9)
ALBUMIN/GLOB SERPL: 0.9 G/DL
ALP SERPL-CCNC: 105 U/L (ref 30–99)
ALT SERPL-CCNC: 29 U/L (ref 2–50)
ANION GAP SERPL CALC-SCNC: 11 MMOL/L (ref 7–16)
AST SERPL-CCNC: 31 U/L (ref 12–45)
BASOPHILS # BLD AUTO: 0.4 % (ref 0–1.8)
BASOPHILS # BLD: 0.04 K/UL (ref 0–0.12)
BILIRUB SERPL-MCNC: 0.2 MG/DL (ref 0.1–1.5)
BUN SERPL-MCNC: 20 MG/DL (ref 8–22)
CALCIUM ALBUM COR SERPL-MCNC: 9.6 MG/DL (ref 8.5–10.5)
CALCIUM SERPL-MCNC: 9.4 MG/DL (ref 8.5–10.5)
CHLORIDE SERPL-SCNC: 104 MMOL/L (ref 96–112)
CO2 SERPL-SCNC: 25 MMOL/L (ref 20–33)
CREAT SERPL-MCNC: 0.86 MG/DL (ref 0.5–1.4)
EOSINOPHIL # BLD AUTO: 0.11 K/UL (ref 0–0.51)
EOSINOPHIL NFR BLD: 1 % (ref 0–6.9)
ERYTHROCYTE [DISTWIDTH] IN BLOOD BY AUTOMATED COUNT: 58.2 FL (ref 35.9–50)
GFR SERPLBLD CREATININE-BSD FMLA CKD-EPI: 91 ML/MIN/1.73 M 2
GLOBULIN SER CALC-MCNC: 4.1 G/DL (ref 1.9–3.5)
GLUCOSE SERPL-MCNC: 109 MG/DL (ref 65–99)
HCT VFR BLD AUTO: 42.1 % (ref 42–52)
HGB BLD-MCNC: 13.7 G/DL (ref 14–18)
IMM GRANULOCYTES # BLD AUTO: 0.04 K/UL (ref 0–0.11)
IMM GRANULOCYTES NFR BLD AUTO: 0.4 % (ref 0–0.9)
LYMPHOCYTES # BLD AUTO: 2.04 K/UL (ref 1–4.8)
LYMPHOCYTES NFR BLD: 19 % (ref 22–41)
MCH RBC QN AUTO: 34.5 PG (ref 27–33)
MCHC RBC AUTO-ENTMCNC: 32.5 G/DL (ref 32.3–36.5)
MCV RBC AUTO: 106 FL (ref 81.4–97.8)
MONOCYTES # BLD AUTO: 1.25 K/UL (ref 0–0.85)
MONOCYTES NFR BLD AUTO: 11.7 % (ref 0–13.4)
NEUTROPHILS # BLD AUTO: 7.23 K/UL (ref 1.82–7.42)
NEUTROPHILS NFR BLD: 67.5 % (ref 44–72)
NRBC # BLD AUTO: 0 K/UL
NRBC BLD-RTO: 0 /100 WBC (ref 0–0.2)
OUTPT INFUS CBC COMMENT OICOM: ABNORMAL
PLATELET # BLD AUTO: 336 K/UL (ref 164–446)
PMV BLD AUTO: 9.8 FL (ref 9–12.9)
POTASSIUM SERPL-SCNC: 3.7 MMOL/L (ref 3.6–5.5)
PROT SERPL-MCNC: 7.8 G/DL (ref 6–8.2)
RBC # BLD AUTO: 3.97 M/UL (ref 4.7–6.1)
SODIUM SERPL-SCNC: 140 MMOL/L (ref 135–145)
T4 FREE SERPL-MCNC: 1.38 NG/DL (ref 0.93–1.7)
TSH SERPL DL<=0.005 MIU/L-ACNC: 3.35 UIU/ML (ref 0.38–5.33)
WBC # BLD AUTO: 10.7 K/UL (ref 4.8–10.8)

## 2023-10-31 PROCEDURE — 700111 HCHG RX REV CODE 636 W/ 250 OVERRIDE (IP): Mod: JZ,JG | Performed by: NURSE PRACTITIONER

## 2023-10-31 PROCEDURE — 84439 ASSAY OF FREE THYROXINE: CPT

## 2023-10-31 PROCEDURE — 99214 OFFICE O/P EST MOD 30 MIN: CPT | Performed by: NURSE PRACTITIONER

## 2023-10-31 PROCEDURE — 85025 COMPLETE CBC W/AUTO DIFF WBC: CPT

## 2023-10-31 PROCEDURE — 99212 OFFICE O/P EST SF 10 MIN: CPT | Mod: 25 | Performed by: NURSE PRACTITIONER

## 2023-10-31 PROCEDURE — 80053 COMPREHEN METABOLIC PANEL: CPT

## 2023-10-31 PROCEDURE — 96411 CHEMO IV PUSH ADDL DRUG: CPT

## 2023-10-31 PROCEDURE — 96375 TX/PRO/DX INJ NEW DRUG ADDON: CPT

## 2023-10-31 PROCEDURE — 84443 ASSAY THYROID STIM HORMONE: CPT

## 2023-10-31 PROCEDURE — 90662 IIV NO PRSV INCREASED AG IM: CPT

## 2023-10-31 PROCEDURE — 700105 HCHG RX REV CODE 258: Performed by: NURSE PRACTITIONER

## 2023-10-31 PROCEDURE — 96413 CHEMO IV INFUSION 1 HR: CPT

## 2023-10-31 RX ORDER — ONDANSETRON 2 MG/ML
8 INJECTION INTRAMUSCULAR; INTRAVENOUS ONCE
Status: COMPLETED | OUTPATIENT
Start: 2023-10-31 | End: 2023-10-31

## 2023-10-31 RX ADMIN — SODIUM CHLORIDE 400 MG: 9 INJECTION, SOLUTION INTRAVENOUS at 10:39

## 2023-10-31 RX ADMIN — ONDANSETRON 8 MG: 2 INJECTION INTRAMUSCULAR; INTRAVENOUS at 10:18

## 2023-10-31 RX ADMIN — PEMETREXED DISODIUM 900 MG: 500 INJECTION, POWDER, LYOPHILIZED, FOR SOLUTION INTRAVENOUS at 11:38

## 2023-10-31 ASSESSMENT — ENCOUNTER SYMPTOMS
MYALGIAS: 0
HEADACHES: 0
FEVER: 0
WHEEZING: 0
COUGH: 1
DIARRHEA: 1
CONSTIPATION: 0
DIAPHORESIS: 0
VOMITING: 0
INSOMNIA: 0
CHILLS: 1
DIZZINESS: 1
NAUSEA: 1
SHORTNESS OF BREATH: 0
PALPITATIONS: 0
TINGLING: 0
FALLS: 1

## 2023-10-31 ASSESSMENT — FIBROSIS 4 INDEX
FIB4 SCORE: 1.27
FIB4 SCORE: 1.5
FIB4 SCORE: 1.5

## 2023-10-31 NOTE — PROGRESS NOTES
"Subjective     Ross Vladimir Zarate is a 73 y.o. male who presents with Lung Cancer ([place]/ Prechemo)            HPI  Mr. Zarate presents for evaluation prior to cycle 9 Pembro for continued treatment of stage Karoline, [id number], right lung adenocarcinoma. He is unaccompanied for today's visit.    Patient with incidental CT finding, completed 1/18/23, of new spiculated mass at anterior right upper lobe arising from a presumed previous scar, highly suggestive of disease. PET scan completed at [facility] 2/15/23 showed hypermetabolic right upper lobe mass and left lower lobe mass. Lung biopsy completed 2/17/23 confirmed malignancy. Patient had moved from [facility] to [place] to be closer to family. He established with radiation and medical oncology and completed SBRT to lung from 5/1-5/10/23. He completed 4 cycles carbo, pemetrexed, Pembro from 5/16-7/18/23 and transitioned to maintenance pemetrexed, pembrolizumab with cycle 5 on 8/8/23.    Patient notes he gets \"chills a lot, hard to stay warm\".  He is not doing much activity although weight remains stable.  Nausea is well relieved with as needed meds.  He reports 1 episode of right-sided chest pain that he feels was \"more lung related\".  He experienced dizziness when he was leaning over working on his truck, which he is trying to avoid.  Patient is otherwise at his baseline.    Review of Systems   Constitutional:  Positive for chills (\"chills alot, hard to stay warm\"). Negative for diaphoresis, fever, malaise/fatigue (\"not doing too much\") and weight loss (stable - attribute loss to scale variation).   Respiratory:  Positive for cough (intermittent and consistent with baseline). Negative for shortness of breath and wheezing.    Cardiovascular:  Positive for chest pain (right sided \"but seemed more lungs\"). Negative for palpitations and leg swelling.   Gastrointestinal:  Positive for diarrhea (one in a great while, no meds) and nausea (PRN meds only). Negative for " "constipation and vomiting.   Genitourinary:  Negative for dysuria.   Musculoskeletal:  Positive for falls (since last cycle, feell over while working on truck). Negative for joint pain and myalgias.   Neurological:  Positive for dizziness (when he was working on his truck, when head below heart he will \"go down\"). Negative for tingling and headaches.   Psychiatric/Behavioral:  The patient does not have insomnia.      Allergies   Allergen Reactions    Bacitracin-Neomycin-Polymyxin      Ointment around eye caused face and eye swelling    Gabapentin Swelling     Took several medications following cataract surgery. Not sure which medication was cause.     Ketorolac Swelling     Took following cataract surgery. Not sure which medication was cause.     Lipoglycopeptide Antibiotics Swelling     Pt stated he did not remember what kind of antibiotics he was allergic to. Pt stated \"two different kinds\" Face swelling and eyes glazing over.     Tromethamine          Current Outpatient Medications on File Prior to Encounter   Medication Sig Dispense Refill    prochlorperazine (COMPAZINE) 10 MG Tab Take 1 Tablet by mouth every 6 hours as needed (for nausea, vomiting). 30 Tablet 6    ondansetron (ZOFRAN) 4 MG Tab tablet Take 1 Tablet by mouth every four hours as needed for Nausea/Vomiting (for nausea, vomiting). 30 Tablet 6    acetaminophen (TYLENOL) 500 MG Tab Take 500-1,000 mg by mouth every 6 hours as needed.      tiotropium (SPIRIVA) 18 MCG Cap Place 18 mcg into inhaler and inhale every day.      ALBUTEROL INH Inhale.      ibuprofen (MOTRIN) 200 MG Tab Take 200 mg by mouth every 6 hours as needed.       No current facility-administered medications on file prior to encounter.              Objective     /60 (BP Location: Right arm, Patient Position: Sitting, BP Cuff Size: Adult)   Pulse 84   Temp 36.6 °C (97.9 °F) (Temporal)   Resp 18   Ht 1.76 m (5' 9.29\")   Wt 71.7 kg (158 lb)   SpO2 97%   BMI 23.14 kg/m²  "     Physical Exam  Vitals reviewed.   Constitutional:       General: He is not in acute distress.     Appearance: He is well-developed. He is not diaphoretic.   HENT:      Head: Normocephalic and atraumatic.   Eyes:      General: No scleral icterus.        Right eye: No discharge.         Left eye: No discharge.   Cardiovascular:      Rate and Rhythm: Normal rate and regular rhythm.      Heart sounds: Normal heart sounds. No murmur heard.     No friction rub. No gallop.   Pulmonary:      Effort: Pulmonary effort is normal. No respiratory distress.      Breath sounds: Normal breath sounds. No wheezing.   Abdominal:      General: There is no distension.      Palpations: Abdomen is soft.      Tenderness: There is no abdominal tenderness.   Musculoskeletal:         General: Normal range of motion.      Cervical back: Normal range of motion.   Skin:     General: Skin is warm and dry.      Coloration: Skin is not pale.      Findings: No erythema or rash.   Neurological:      Mental Status: He is alert and oriented to person, place, and time.   Psychiatric:         Behavior: Behavior normal.         [facility] on 10/31/2023   Component Date Value Ref Range Status    WBC 10/31/2023 10.7  4.8 - 10.8 K/uL Final    RBC 10/31/2023 3.97 (L)  4.70 - 6.10 M/uL Final    Hemoglobin 10/31/2023 13.7 (L)  14.0 - 18.0 g/dL Final    Hematocrit 10/31/2023 42.1  42.0 - 52.0 % Final    MCV 10/31/2023 106.0 (H)  81.4 - 97.8 fL Final    MCH 10/31/2023 34.5 (H)  27.0 - 33.0 pg Final    MCHC 10/31/2023 32.5  32.3 - 36.5 g/dL Final    Please note new reference range effective 05/22/2023.    RDW 10/31/2023 58.2 (H)  35.9 - 50.0 fL Final    Platelet Count 10/31/2023 336  164 - 446 K/uL Final    MPV 10/31/2023 9.8  9.0 - 12.9 fL Final    Neutrophils-Polys 10/31/2023 67.50  44.00 - 72.00 % Final    Lymphocytes 10/31/2023 19.00 (L)  22.00 - 41.00 % Final    Monocytes 10/31/2023 11.70  0.00 - 13.40 % Final    Eosinophils 10/31/2023  1.00  0.00 - 6.90 % Final    Basophils 10/31/2023 0.40  0.00 - 1.80 % Final    Immature Granulocytes 10/31/2023 0.40  0.00 - 0.90 % Final    Nucleated RBC 10/31/2023 0.00  0.00 - 0.20 /100 WBC Final    Please note new reference range effective 05/22/2023.    Neutrophils (Absolute) 10/31/2023 7.23  1.82 - 7.42 K/uL Final    Comment: Includes immature neutrophils, if present.  Please note new reference range effective 05/22/2023.      Lymphs (Absolute) 10/31/2023 2.04  1.00 - 4.80 K/uL Final    Monos (Absolute) 10/31/2023 1.25 (H)  0.00 - 0.85 K/uL Final    Eos (Absolute) 10/31/2023 0.11  0.00 - 0.51 K/uL Final    Baso (Absolute) 10/31/2023 0.04  0.00 - 0.12 K/uL Final    Immature Granulocytes (abs) 10/31/2023 0.04  0.00 - 0.11 K/uL Final    NRBC (Absolute) 10/31/2023 0.00  K/uL Final    Outpt Infus CBC Comment 10/31/2023 see below   Final    Comment: Per physician request, the automated differential results reported on this  patient have not been verified by a manual method.      Sodium 10/31/2023 140  135 - 145 mmol/L Final    Potassium 10/31/2023 3.7  3.6 - 5.5 mmol/L Final    Chloride 10/31/2023 104  96 - 112 mmol/L Final    Co2 10/31/2023 25  20 - 33 mmol/L Final    Anion Gap 10/31/2023 11.0  7.0 - 16.0 Final    Glucose 10/31/2023 109 (H)  65 - 99 mg/dL Final    Bun 10/31/2023 20  8 - 22 mg/dL Final    Creatinine 10/31/2023 0.86  0.50 - 1.40 mg/dL Final    Calcium 10/31/2023 9.4  8.5 - 10.5 mg/dL Final    Correct Calcium 10/31/2023 9.6  8.5 - 10.5 mg/dL Final    AST(SGOT) 10/31/2023 31  12 - 45 U/L Final    ALT(SGPT) 10/31/2023 29  2 - 50 U/L Final    Alkaline Phosphatase 10/31/2023 105 (H)  30 - 99 U/L Final    Total Bilirubin 10/31/2023 0.2  0.1 - 1.5 mg/dL Final    Albumin 10/31/2023 3.7  3.2 - 4.9 g/dL Final    Total Protein 10/31/2023 7.8  6.0 - 8.2 g/dL Final    Globulin 10/31/2023 4.1 (H)  1.9 - 3.5 g/dL Final    A-G Ratio 10/31/2023 0.9  g/dL Final    TSH 10/31/2023 3.350  0.380 - 5.330 uIU/mL Final     Comment: The 2011 American Thyroid Association (RADHA) guidelines  recommended that the interpretation of thyroid function in  pregnancy be based on trimester specific reference ranges.    1st Trimester  0.100-2.500 mIU/L  2nd Trimester  0.200-3.000 mIU/L  3rd Trimester  0.300-3.500 mIU/L    These established reference ranges have not been validated  at [facility].      Free T-4 10/31/2023 1.38  0.93 - 1.70 ng/dL Final    GFR (CKD-EPI) 10/31/2023 91  >60 mL/min/1.73 m 2 Final    Comment: Estimated Glomerular Filtration Rate is calculated using  race neutral CKD-EPI 2021 equation per NKF-ASN recommendations.              Assessment & Plan     1. Adenocarcinoma, lung, right (HCC)        2. Encounter for long-term current use of high risk medication        3. Needs flu shot  INFLUENZA VACCINE, HIGH DOSE (65+ ONLY)        1.  Lung cancer: Diagnosed 2/17/23; s/p SBRT to R lung 5/1-5/10/23; s/p 4 cycles carbo, pemetrexed, Pembro 5/16-7/18/23 and transitioned to maintenance pemetrexed, pembrolizumab with cycle 5 on 8/8/23.    Patient continues tolerating treatment without issue.  CBC, CMP, free T4, TSH have been evaluated and found to be within acceptable limits.  Patient will proceed with cycle 9 of treatment and return in 3 weeks for evaluation prior to cycle 10, sooner as needed.      The patient verbalized agreement and understanding of current plan. All questions and concerns were addressed at time of visit.    Please note that this dictation was created using voice recognition software. I have made every reasonable attempt to correct obvious errors, but I expect that there are errors of grammar and possibly content that I did not discover before finalizing the note.

## 2023-10-31 NOTE — PROGRESS NOTES
Chemotherapy Verification - SECONDARY RN       Height = 176 cm  Weight = 71.7 kg  BSA = 1.87 m^2       Medication: pembrolizumab  Dose: 400 mg-set dose  Calculated Dose: 400 mg-set dose                             (In mg/m2, AUC, mg/kg)     Medication: pemetrexed  Dose: 500 mg/m^2  Calculated Dose: 935 mg                             (In mg/m2, AUC, mg/kg)        I confirm that this process was performed independently.

## 2023-10-31 NOTE — PROGRESS NOTES
Ross to infusion for C9 of Keytruda and Alimta. Reports feeling well today with no major complaints. PIV in place from pre-chemo lab appt, unlocked and flushed with NS with positive blood return. Labs reviewed by RN, within parameters for treatment today. Pre-treatment Zofran given. Keytruda infused over 30 minutes with filter followed by Alimta over 10 minutes, patient tolerated all well with no adverse effects. PIV flushed post infusion with positive blood return, d/melissa with tip intact. Patient left in stable condition, knows when to return for next appt.

## 2023-10-31 NOTE — PROGRESS NOTES
Chemotherapy Verification - PRIMARY RN      Height = 1.76m  Weight = 71.7 kg  BSA = 1.87 m2       Medication: pembrolizumab  Dose: 400mg set dose   Calculated Dose: 400 mg                             (In mg/m2, AUC, mg/kg)     Medication: pemetrexed  Dose: 500mg/m2  Calculated Dose: 935 mg                             (In mg/m2, AUC, mg/kg)        I confirm this process was performed independently with the BSA and all final chemotherapy dosing calculations congruent.  Any discrepancies of 10% or greater have been addressed with the chemotherapy pharmacist. The resolution of the discrepancy has been documented in the EPIC progress notes.

## 2023-10-31 NOTE — PROGRESS NOTES
Pt arrives to [facility] for pre-chemotherapy labs and PIV placement.  Discussed plan of care with pt.  PIV established to L-FA.  Labs drawn from PIV.  PIV flushed with NS, locked and capped.  Site wrapped with gauze/coban dressing.  Confirmed next appt time with pt.  Pt left on foot to appt with IFRAH Zheng.

## 2023-11-09 PROBLEM — U07.1 COVID-19: Status: ACTIVE | Noted: 2023-11-09

## 2023-11-09 PROBLEM — I10 HYPERTENSION: Status: ACTIVE | Noted: 2023-11-09

## 2023-11-09 PROBLEM — M43.16 SPONDYLOLISTHESIS OF LUMBAR REGION: Status: ACTIVE | Noted: 2022-03-20

## 2023-11-09 PROBLEM — C44.311 BASAL CELL CARCINOMA (BCC) OF SKIN OF NOSE: Status: ACTIVE | Noted: 2023-11-09

## 2023-11-09 PROBLEM — G25.0 HEREDITARY ESSENTIAL TREMOR: Status: ACTIVE | Noted: 2023-11-09

## 2023-11-09 PROBLEM — G25.2 RESTING TREMOR: Status: ACTIVE | Noted: 2023-11-09

## 2023-11-09 PROBLEM — M51.9 DISORDER OF INTERVERTEBRAL DISC: Status: ACTIVE | Noted: 2022-04-26

## 2023-11-09 PROBLEM — Z87.09 H/O: PNEUMOTHORAX: Status: ACTIVE | Noted: 2023-11-09

## 2023-11-09 PROBLEM — M54.16 LUMBAR RADICULOPATHY: Status: ACTIVE | Noted: 2022-03-20

## 2023-11-09 ASSESSMENT — ENCOUNTER SYMPTOMS: WEIGHT LOSS: 0

## 2023-11-14 RX ORDER — ONDANSETRON 2 MG/ML
4 INJECTION INTRAMUSCULAR; INTRAVENOUS PRN
Status: CANCELLED | OUTPATIENT
Start: 2023-12-06

## 2023-11-14 RX ORDER — 0.9 % SODIUM CHLORIDE 0.9 %
VIAL (ML) INJECTION PRN
Status: CANCELLED | OUTPATIENT
Start: 2023-12-05

## 2023-11-14 RX ORDER — 0.9 % SODIUM CHLORIDE 0.9 %
10 VIAL (ML) INJECTION PRN
Status: CANCELLED | OUTPATIENT
Start: 2023-12-05

## 2023-11-14 RX ORDER — 0.9 % SODIUM CHLORIDE 0.9 %
3 VIAL (ML) INJECTION PRN
Status: CANCELLED | OUTPATIENT
Start: 2023-12-05

## 2023-11-14 RX ORDER — DIPHENHYDRAMINE HYDROCHLORIDE 50 MG/ML
50 INJECTION INTRAMUSCULAR; INTRAVENOUS PRN
Status: CANCELLED | OUTPATIENT
Start: 2023-12-06

## 2023-11-14 RX ORDER — METHYLPREDNISOLONE SODIUM SUCCINATE 125 MG/2ML
125 INJECTION, POWDER, LYOPHILIZED, FOR SOLUTION INTRAMUSCULAR; INTRAVENOUS PRN
Status: CANCELLED | OUTPATIENT
Start: 2023-12-06

## 2023-11-14 RX ORDER — ONDANSETRON 8 MG/1
8 TABLET, ORALLY DISINTEGRATING ORAL PRN
Status: CANCELLED | OUTPATIENT
Start: 2023-12-06

## 2023-11-14 RX ORDER — 0.9 % SODIUM CHLORIDE 0.9 %
VIAL (ML) INJECTION PRN
Status: CANCELLED | OUTPATIENT
Start: 2023-12-06

## 2023-11-14 RX ORDER — SODIUM CHLORIDE 9 MG/ML
INJECTION, SOLUTION INTRAVENOUS CONTINUOUS
Status: CANCELLED | OUTPATIENT
Start: 2023-12-06

## 2023-11-14 RX ORDER — 0.9 % SODIUM CHLORIDE 0.9 %
3 VIAL (ML) INJECTION PRN
Status: CANCELLED | OUTPATIENT
Start: 2023-12-06

## 2023-11-14 RX ORDER — ONDANSETRON 2 MG/ML
8 INJECTION INTRAMUSCULAR; INTRAVENOUS ONCE
Status: CANCELLED | OUTPATIENT
Start: 2023-12-06 | End: 2023-11-21

## 2023-11-14 RX ORDER — EPINEPHRINE 1 MG/ML(1)
0.5 AMPUL (ML) INJECTION PRN
Status: CANCELLED | OUTPATIENT
Start: 2023-12-06

## 2023-11-14 RX ORDER — 0.9 % SODIUM CHLORIDE 0.9 %
10 VIAL (ML) INJECTION PRN
Status: CANCELLED | OUTPATIENT
Start: 2023-12-06

## 2023-11-14 RX ORDER — PROCHLORPERAZINE MALEATE 10 MG
10 TABLET ORAL EVERY 6 HOURS PRN
Status: CANCELLED | OUTPATIENT
Start: 2023-12-06

## 2023-11-14 RX ORDER — CYANOCOBALAMIN 1000 UG/ML
1000 INJECTION, SOLUTION INTRAMUSCULAR; SUBCUTANEOUS
Status: CANCELLED | OUTPATIENT
Start: 2023-12-06

## 2023-11-16 ENCOUNTER — HOSPITAL ENCOUNTER (OUTPATIENT)
Dept: RADIOLOGY | Facility: MEDICAL CENTER | Age: 73
End: 2023-11-16
Attending: FAMILY MEDICINE
Payer: COMMERCIAL

## 2023-11-17 ENCOUNTER — APPOINTMENT (OUTPATIENT)
Dept: RADIOLOGY | Facility: MEDICAL CENTER | Age: 73
End: 2023-11-17
Attending: STUDENT IN AN ORGANIZED HEALTH CARE EDUCATION/TRAINING PROGRAM
Payer: COMMERCIAL

## 2023-11-21 ENCOUNTER — HOSPITAL ENCOUNTER (OUTPATIENT)
Dept: HEMATOLOGY ONCOLOGY | Facility: MEDICAL CENTER | Age: 73
End: 2023-11-21
Attending: STUDENT IN AN ORGANIZED HEALTH CARE EDUCATION/TRAINING PROGRAM
Payer: COMMERCIAL

## 2023-11-21 ENCOUNTER — APPOINTMENT (OUTPATIENT)
Dept: ONCOLOGY | Facility: MEDICAL CENTER | Age: 73
End: 2023-11-21
Attending: STUDENT IN AN ORGANIZED HEALTH CARE EDUCATION/TRAINING PROGRAM
Payer: COMMERCIAL

## 2023-11-21 VITALS
SYSTOLIC BLOOD PRESSURE: 104 MMHG | BODY MASS INDEX: 23.84 KG/M2 | OXYGEN SATURATION: 90 % | DIASTOLIC BLOOD PRESSURE: 50 MMHG | WEIGHT: 160.94 LBS | TEMPERATURE: 97.5 F | HEART RATE: 90 BPM | HEIGHT: 69 IN

## 2023-11-21 DIAGNOSIS — Z79.899 ENCOUNTER FOR LONG-TERM (CURRENT) USE OF HIGH-RISK MEDICATION: ICD-10-CM

## 2023-11-21 DIAGNOSIS — C34.91 ADENOCARCINOMA, LUNG, RIGHT (HCC): ICD-10-CM

## 2023-11-21 PROCEDURE — 99212 OFFICE O/P EST SF 10 MIN: CPT | Performed by: STUDENT IN AN ORGANIZED HEALTH CARE EDUCATION/TRAINING PROGRAM

## 2023-11-21 PROCEDURE — 99214 OFFICE O/P EST MOD 30 MIN: CPT | Performed by: STUDENT IN AN ORGANIZED HEALTH CARE EDUCATION/TRAINING PROGRAM

## 2023-11-21 ASSESSMENT — ENCOUNTER SYMPTOMS
NAUSEA: 0
HEADACHES: 0
COUGH: 1
FOCAL WEAKNESS: 0
SENSORY CHANGE: 0
MEMORY LOSS: 0
NECK PAIN: 0
DIZZINESS: 0
FEVER: 0
VOMITING: 0
ABDOMINAL PAIN: 0
SORE THROAT: 0
PALPITATIONS: 0
WHEEZING: 0
DEPRESSION: 0
SHORTNESS OF BREATH: 1
TINGLING: 0
ORTHOPNEA: 0
CHILLS: 0
TREMORS: 0
WEIGHT LOSS: 0
SPUTUM PRODUCTION: 0
BRUISES/BLEEDS EASILY: 0
BLURRED VISION: 0
HEARTBURN: 0
EYE DISCHARGE: 1

## 2023-11-21 ASSESSMENT — FIBROSIS 4 INDEX: FIB4 SCORE: 1.27

## 2023-11-21 NOTE — ADDENDUM NOTE
Encounter addended by: Peg Gibson M.D. on: 11/21/2023 9:33 AM   Actions taken: SmartForm saved, Clinical Note Signed

## 2023-11-21 NOTE — Clinical Note
-HOLD C10 of Pem/Pem 2/2 to PNA for 1 week -can proceed with chemo without recheck; but patient given clear instructions to call if he is not improving and I will see him ASAP -Will see patient prior to C11, can see Marcy

## 2023-11-21 NOTE — ADDENDUM NOTE
Encounter addended by: Sunday Galeas on: 11/21/2023 9:07 AM   Actions taken: Charge Capture section accepted

## 2023-11-21 NOTE — PROGRESS NOTES
Consult Note: Hematology/Oncology     Primary Care:  No primary care provider on file.    Chief Complaint   Patient presents with    Lung Cancer     Prechemo        Current Treatment: None    Prior Treatment: None    05/01 - 05/10/23: SBRT to lung - Dr. Jaimes  05/16/23: C1D1 Carboplatin, Pemetrexed and Pembrolizumab  06/06/23: C2D1 Carboplatin, Pemetrexed and Pembrolizumab  06/27/23: C3D1 Carboplatin, Pemetrexed and Pembrolizumab  07/18/23: C4D1 Carboplatin, Pemetrexed and Pembrolizumab  08/08/23: C5D1 Pemetrexed and Pembrolizumab  08/29/23: C6D1: Pemetrexed   09/19/23: C7D1 Pemetrexed and Pembrolizumab  10/1/23: C8D1: Pemetrexed   10/29/23: C9D1: Pemetrexed and Pembrolizumab  11/21/23: C8D1: Pemetrexed -HELD DUE TO PNA  Subjective:   History of Presenting Illness:  Tyler Zarate is a 73 y.o. male with a past medical history of COPD and tobacco use, 1 pack/day smoker, alcohol abuse, right leg DVT diagnosed in 2019 who presents today with a new diagnosis of metastatic adeno carcinoma of the lung.    Patient had a CT thorax on January 18, 2023 which showed a new spiculated mass in the anterior right upper lobe arising from the area of presumed previous scar.  It is highly suggestive of disease.    Patient had a biopsy and pathology on 2/17/23 shows adenocarcinoma of the right upper lobe.    A PET scan showed a hypermetabolic RUL mass (90N99ey) as well as a mass in the LLL (11X5mm).     Patient recently moved to [place] from [facility].  He moved to be closer to his son.    He reports unintentional weight loss greater than 10% in the last 6 months.    He also reports a personal history of cancer (basal cell of nose)    Interval History    Tolerated pembro/pem.  He is here prior to C10.     He reports that he was diagnosed with PNA 4 days ago and is on antibiotics for 10 day course    He reports feeling tired, SOB (but improving), cough.      He has been sleeping a lot    He denies any NVD (had NVD 2 days  "ago).     No new rashes.       Past Medical History:   Diagnosis Date    Cancer (HCC)     skin, Lung    Carcinoma in situ of respiratory system     Lung 2023    Cataract     COPD (chronic obstructive pulmonary disease) (HCC)     Cough     DVT (deep venous thrombosis) (HCC)     RLL    Lumbar radiculopathy     Pain     lower back and sciatic pain    Pneumonia     2018    Shortness of breath     Skin carcinoma     Sputum production     Synovial cyst     L spine        Past Surgical History:   Procedure Laterality Date    KS BRONCHOSCOPY,DIAGNOSTIC  4/12/2023    Procedure: FIBER OPTIC BRONCHOSCOPY WITH  WASH, BRUSH, BRONCHOALVEOLAR LAVAGE, BIOPSY AND FINE NEEDLE ASPIRATION & NAVIGATION, ROBOTICS;  Surgeon: Vladimir Ortiz M.D.;  Location: SURGERY [facility];  Service: Pulmonary Robotic    CATARACT PHACO WITH IOL Bilateral        Social History     Tobacco Use    Smoking status: Every Day     Current packs/day: 0.50     Average packs/day: 0.5 packs/day for 57.0 years (28.5 ttl pk-yrs)     Types: Cigarettes     Passive exposure: Never    Smokeless tobacco: Former     Types: Chew   Vaping Use    Vaping Use: Never used   Substance Use Topics    Alcohol use: Yes     Alcohol/week: 16.8 oz     Types: 28 Cans of beer per week     Comment: 4 beers a day    Drug use: Never        Family History   Problem Relation Age of Onset    Cancer Brother         lung       Allergies   Allergen Reactions    Bacitracin-Neomycin-Polymyxin      Ointment around eye caused face and eye swelling    Gabapentin Swelling     Took several medications following cataract surgery. Not sure which medication was cause.     Ketorolac Swelling     Took following cataract surgery. Not sure which medication was cause.     Lipoglycopeptide Antibiotics Swelling     Pt stated he did not remember what kind of antibiotics he was allergic to. Pt stated \"two different kinds\" Face swelling and eyes glazing over.     Tromethamine        Current Outpatient " "Medications   Medication Sig Dispense Refill    prochlorperazine (COMPAZINE) 10 MG Tab Take 1 Tablet by mouth every 6 hours as needed (for nausea, vomiting). 30 Tablet 6    ondansetron (ZOFRAN) 4 MG Tab tablet Take 1 Tablet by mouth every four hours as needed for Nausea/Vomiting (for nausea, vomiting). 30 Tablet 6    acetaminophen (TYLENOL) 500 MG Tab Take 500-1,000 mg by mouth every 6 hours as needed.      tiotropium (SPIRIVA) 18 MCG Cap Place 18 mcg into inhaler and inhale every day.      ibuprofen (MOTRIN) 200 MG Tab Take 200 mg by mouth every 6 hours as needed.      ALBUTEROL INH Inhale.       No current facility-administered medications for this encounter.       Review of Systems   Constitutional:  Positive for malaise/fatigue. Negative for chills, fever and weight loss.   HENT:  Negative for congestion, ear pain, nosebleeds and sore throat.    Eyes:  Positive for discharge. Negative for blurred vision.   Respiratory:  Positive for cough and shortness of breath. Negative for sputum production and wheezing.    Cardiovascular:  Negative for chest pain, palpitations, orthopnea and leg swelling.   Gastrointestinal:  Negative for abdominal pain, heartburn, nausea and vomiting.   Genitourinary:  Negative for dysuria, frequency and urgency.   Musculoskeletal:  Negative for neck pain.   Neurological:  Negative for dizziness, tingling, tremors, sensory change, focal weakness and headaches.   Endo/Heme/Allergies:  Does not bruise/bleed easily.   Psychiatric/Behavioral:  Negative for depression, memory loss and suicidal ideas.    All other systems reviewed and are negative.      Problem list, medications, and allergies reviewed by myself today in Epic.     Objective:     Vitals:    11/21/23 0813   BP: 104/50   BP Location: Right arm   Patient Position: Sitting   BP Cuff Size: Adult   Pulse: 90   Temp: 36.4 °C (97.5 °F)   TempSrc: Temporal   SpO2: 90%   Weight: 73 kg (160 lb 15 oz)   Height: 1.76 m (5' 9.29\")         DESC; " KARNOFSKY SCALE WITH ECOG EQUIVALENT: 80, Normal activity with effort; some signs or symptoms of disease (ECOG equivalent 1)    DISTRESS LEVEL: no apparent distress    Physical Exam  Constitutional:       General: He is not in acute distress.     Appearance: Normal appearance.   HENT:      Head: Normocephalic and atraumatic.      Nose: No congestion.      Comments: S/p excision of basal cell/RT     Mouth/Throat:      Mouth: Mucous membranes are moist.      Pharynx: Oropharynx is clear.   Eyes:      General: No scleral icterus.        Right eye: No discharge.         Left eye: No discharge.      Conjunctiva/sclera: Conjunctivae normal.      Pupils: Pupils are equal, round, and reactive to light.   Cardiovascular:      Rate and Rhythm: Normal rate and regular rhythm.      Pulses: Normal pulses.      Heart sounds: No murmur heard.     No friction rub.   Pulmonary:      Effort: Pulmonary effort is normal. No respiratory distress.      Breath sounds: No stridor. Wheezing (lower lung bases) and rales present.   Chest:      Chest wall: No tenderness.   Abdominal:      General: Abdomen is flat. Bowel sounds are normal. There is no distension.      Palpations: Abdomen is soft. There is no mass.      Tenderness: There is no abdominal tenderness. There is no guarding or rebound.   Musculoskeletal:         General: No swelling, tenderness or deformity. Normal range of motion.      Cervical back: Normal range of motion and neck supple. No rigidity or tenderness.      Right lower leg: No edema.      Left lower leg: No edema.   Skin:     General: Skin is warm.      Coloration: Skin is not jaundiced or pale.      Findings: No bruising, erythema or rash.   Neurological:      General: No focal deficit present.      Mental Status: He is alert and oriented to person, place, and time. Mental status is at baseline.      Motor: No weakness.   Psychiatric:         Mood and Affect: Mood normal.         Behavior: Behavior normal.          Thought Content: Thought content normal.         Judgment: Judgment normal.         Labs:   Most recent labs reviewed.    Slight anemia    Imaging:   Most recent images below have been independently reviewed by me.     8/1/2023     1.  Positive response to therapy. Right upper lobe mass has decreased in size, and there is associated scarring.  2.  No new metastatic disease in the chest, abdomen or pelvis.  3.  Emphysema, scattered linear areas of pulmonary scarring and associated mild bronchiectasis.  4.  Chronic trace left pleural effusion.  5.  Stable left adrenal adenoma. Stable nodularity of the right adrenal gland.  6.  Punctate nonobstructing right nephrolithiasis.  7.  Cholelithiasis.    Pathology:  Adenocarcinoma of the RUL    Assessment/Plan:      Cancer Staging   Adenocarcinoma, lung, right (HCC)  Staging form: Lung, AJCC 8th Edition  - Clinical stage from 4/24/2023: Stage SUDHAKAR (cT1c, cN0, cM1a) - Signed by Sam Jaimes M.D. on 4/24/2023       Mr. Zarate is a 74 yo M who presents today with a new diagnosis of adenocarcinoma (PDL1 1%; KEAP1 loss, STK11 loss, CRKL amp, GRANT G109, ZAL756, TP53) with Stage IV adenocarcinoma of the left lower lobe as well as the right upper lobe s/p C4 of carbo/pem/pem.    He is here prior to C10 to proceed with Pem/Pem.    Most recent scans done in the ER to al for PNA shows SD.     Questions and concerns were addressed.    Plan  -HOLD C10 of Pem/Pem 2/2 to PNA for 1 week  -can proceed with chemo without recheck; but patient given clear instructions to call if he is not improving and I will see him ASAP  -Will see patient prior to C11, can see Marcy  -Will need CT CAP in 3 months (next: 2/16/2023).    -smoking cessation provided.     Regimen  21-day cycle for 4-6 cycles or until disease progression or unacceptable toxicity    Maintenance therapy 21-day cycle until 24 months of therapy has been completed  Pembrolizumab 200 mg IV over 30 minutes on day 1  Followed by  pemetrexed 500 mg per metered squared IV over 10 minutes on day 1    References  NCCN Guidelines Version NSCLC 3.2002     Samuel VK, et al. Clin Lung Cancer 2019;20:30-36.e3    Renato JM, et al. Lung Cancer 2020;140:35-41.    Lai et al NEJM 2018: 378(22):6122-1490    Kelsey TEJADA Lancet Oncol 2016; 17(11): 9305-2850    Nikki M et al. Eur J Cancer, 2020; 131:68-75    #2. PNA  -complete course of antibiotics  -to call if not improving    #3. Research  -to be consented for  at next visit    No follow-ups on file.     Any questions and concerns raised by the patient were addressed and answered. Patient denies any further questions.  Patient encouraged to call the office with any concerns or issues.     Peg Gibson M.D.  Hematology/Oncology      32 minutes was spent on this case

## 2023-11-28 ENCOUNTER — PATIENT MESSAGE (OUTPATIENT)
Dept: HEMATOLOGY ONCOLOGY | Facility: MEDICAL CENTER | Age: 73
End: 2023-11-28
Payer: COMMERCIAL

## 2023-11-28 NOTE — PATIENT COMMUNICATION
Pt called to update that pt still has pneumonia and has plans to go to the ER in [place] to get checked out. Spoke with Dr. Gibson, delay treatment one week. Updated patient, and will reschedule treatment.

## 2023-11-29 ENCOUNTER — APPOINTMENT (OUTPATIENT)
Dept: ONCOLOGY | Facility: MEDICAL CENTER | Age: 73
End: 2023-11-29
Attending: STUDENT IN AN ORGANIZED HEALTH CARE EDUCATION/TRAINING PROGRAM
Payer: COMMERCIAL

## 2023-12-04 NOTE — PROGRESS NOTES
"Pharmacy Chemotherapy Verification    Dx: NSCLC  Cycle 10 (delayed 2 weeks d/t pneumonia)  Previous treatment = 10/31/23    Protocol: Pembrolizumab + Pemetrexed/CARBOplatin  Pembrolizumab 200 mg IV over 30 minutes on Day 1 followed by  Pemetrexed 500 mg/m2 IV over 10 minutes on Day 1 followed by  CARBOplatin AUC 5 IV over 30 minutes on Day 1  21-day cycle for 4-6 cycles  ~Followed by~  Pembrolizumab 200 mg IV over 30 minutes on Day 1 or Pembrolizumab 400 mg IV over 30 minutes every other cycle followed by  Pemetrexed 500 mg/m2 IV over 10 minutes on Day 1  Maintenance therapy: 21-day cycle until 24 months of therapy has been completed  ~Followed by~  Pemetrexed 500 mg/m2 IV over 10 minutes on Day 1   Maintenance therapy: 21-day cycle until DP/UT  NCCN Guidelines for Non-Small Cell Lung Cancer V.1.2023.  Nikki M, et al. Eur J Cancer. 2020;131:68-75.  Kelsey CJ, et al. Lancet Oncol. 2016;17(11):9769-3150.  Joelle SERNA et al. N Engl J Med. 2018;378(22):9280-8731.    Allergies:  Bacitracin-neomycin-polymyxin, Gabapentin, Ketorolac, Lipoglycopeptide antibiotics, and Tromethamine     /55   Pulse 100   Temp 36.3 °C (97.4 °F) (Temporal)   Resp 18   Ht 1.76 m (5' 9.29\")   Wt 73 kg (160 lb 15 oz)   SpO2 90%   BMI 23.57 kg/m²  Body surface area is 1.89 meters squared.    Labs 12/6/23:  ANC~ 72337 Plt = 282k   Hgb = 13.5     SCr = 0.76 mg/dL CrCl ~ 87 mL/min   AST/ALT/AP = 34/35/98 TBili = 0.4     Cyanocobalamin given every 9 weeks, last given 12/6/23, next due ~2/7/24    Pembrolizumab 400 mg fixed dose   No calculation required, OK to treat with final dose = -- mg IV every other cycle    Pemetrexed 500 mg/m² x 1.89 m² = 945 mg   <10% difference, OK to treat with final dose = 900 mg IV    Lawrence Garcia, PharmD  "

## 2023-12-05 NOTE — PROGRESS NOTES
"Pharmacy Chemotherapy Calculation:    Dx: NSCLC stage IV    Protocol: CARBOplatin/PEMEtrexed + Pembrolizumab  Pembrolizumab 200 mg IV over 30 minutes on Day 1 followed by   Pemetrexed 500 mg/m2 IV over 10 minutes on Day 1 followed by   CARBOplatin AUC 5 IV over 30 minutes on Day 1    21 day cycle for 4-6 cycles -Completed 7/18/23  ~Followed by~  Pembrolizumab 400 mg IV over 30 minutes on Day 1 every other cycle followed by   Pemetrexed 500 mg/m2 IV over 10 minutes on Day 1    21 day cycle until 24 months of therapy has been completed   ~Followed by~  Pemetrexed 500 mg/m2 IV over 10 minutes on Day 1    21 day cycle until DP or UT    NCCN Guidelines for Non-Small Cell Lung Cancer V.1.2023  Nikki M, et al. Eur J Cancer. 2020;131:68-75.  Kelsey CJ, et al. Lancet Oncol. 2016;17(11):9274-7479.  Joelle L, et al. N Engl J Med.  2018;378(22):0513-9586.    Allergies:  Bacitracin-neomycin-polymyxin, Gabapentin, Ketorolac, Lipoglycopeptide antibiotics, and Tromethamine     /55   Pulse 100   Temp 36.3 °C (97.4 °F) (Temporal)   Resp 18   Ht 1.76 m (5' 9.29\")   Wt 73 kg (160 lb 15 oz)   SpO2 90%   BMI 23.57 kg/m²  Body surface area is 1.89 meters squared.    Labs 12/06/23:  ANC~ 44052 Plt = 282k   Hgb = 13.5     SCr = 0.76 mg/dL CrCl ~ 87.2 mL/min      Received Cyanacobalmin injection 12/06/23    Drug Order   (Drug name, dose, route, IV Fluid & volume, frequency, number of doses) Cycle 10 (held due to pna)  Previous treatment: C9 on 10/31/23     Medication = Pembrolizumab   Base Dose= 400 mg   Fixed dose, no calculation required.  Final Dose = 400 mg  Route = IV  Fluid & Volume = NS 50 mL  Admin Duration = Over 30 minutes   Every OTHER cycle  (ODD cycles)      Fixed dose, no calculation required. Okay to treat with final dose.   Medication = PEMEtrexed   Base Dose= 500 mg/m²   Calc Dose: Base Dose x 1.89 m² = 945 mg  Final Dose = 900 mg  Route = IV  Fluid & Volume =  mL  Admin Duration = Over 10 minutes          " <10% difference, okay to treat with final dose     By my signature below, I confirm this process was performed independently with the BSA and all final chemotherapy dosing calculations congruent. I have reviewed the above chemotherapy order and that my calculation of the final dose and BSA (when applicable) corroborate those calculations of the  pharmacist. Discrepancies of 10% or greater in the written dose have been addressed and documented within the EPIC Progress notes.    Zena Bustamante, CodyD

## 2023-12-06 ENCOUNTER — OUTPATIENT INFUSION SERVICES (OUTPATIENT)
Dept: ONCOLOGY | Facility: MEDICAL CENTER | Age: 73
End: 2023-12-06
Attending: STUDENT IN AN ORGANIZED HEALTH CARE EDUCATION/TRAINING PROGRAM
Payer: COMMERCIAL

## 2023-12-06 VITALS
RESPIRATION RATE: 18 BRPM | OXYGEN SATURATION: 90 % | TEMPERATURE: 97.4 F | SYSTOLIC BLOOD PRESSURE: 129 MMHG | HEIGHT: 69 IN | BODY MASS INDEX: 23.84 KG/M2 | WEIGHT: 160.94 LBS | HEART RATE: 100 BPM | DIASTOLIC BLOOD PRESSURE: 55 MMHG

## 2023-12-06 VITALS
BODY MASS INDEX: 23.93 KG/M2 | DIASTOLIC BLOOD PRESSURE: 92 MMHG | RESPIRATION RATE: 18 BRPM | HEIGHT: 69 IN | HEART RATE: 90 BPM | TEMPERATURE: 97.1 F | SYSTOLIC BLOOD PRESSURE: 163 MMHG | WEIGHT: 161.6 LBS | OXYGEN SATURATION: 96 %

## 2023-12-06 DIAGNOSIS — C34.91 ADENOCARCINOMA, LUNG, RIGHT (HCC): ICD-10-CM

## 2023-12-06 LAB
ALBUMIN SERPL BCP-MCNC: 3.5 G/DL (ref 3.2–4.9)
ALBUMIN/GLOB SERPL: 0.8 G/DL
ALP SERPL-CCNC: 98 U/L (ref 30–99)
ALT SERPL-CCNC: 35 U/L (ref 2–50)
ANION GAP SERPL CALC-SCNC: 14 MMOL/L (ref 7–16)
AST SERPL-CCNC: 34 U/L (ref 12–45)
BASOPHILS # BLD AUTO: 0.2 % (ref 0–1.8)
BASOPHILS # BLD: 0.03 K/UL (ref 0–0.12)
BILIRUB SERPL-MCNC: 0.4 MG/DL (ref 0.1–1.5)
BUN SERPL-MCNC: 21 MG/DL (ref 8–22)
CALCIUM ALBUM COR SERPL-MCNC: 9.3 MG/DL (ref 8.5–10.5)
CALCIUM SERPL-MCNC: 8.9 MG/DL (ref 8.5–10.5)
CHLORIDE SERPL-SCNC: 98 MMOL/L (ref 96–112)
CO2 SERPL-SCNC: 25 MMOL/L (ref 20–33)
CREAT SERPL-MCNC: 0.76 MG/DL (ref 0.5–1.4)
EOSINOPHIL # BLD AUTO: 0.01 K/UL (ref 0–0.51)
EOSINOPHIL NFR BLD: 0.1 % (ref 0–6.9)
ERYTHROCYTE [DISTWIDTH] IN BLOOD BY AUTOMATED COUNT: 58.5 FL (ref 35.9–50)
GFR SERPLBLD CREATININE-BSD FMLA CKD-EPI: 94 ML/MIN/1.73 M 2
GLOBULIN SER CALC-MCNC: 4.4 G/DL (ref 1.9–3.5)
GLUCOSE SERPL-MCNC: 108 MG/DL (ref 65–99)
HCT VFR BLD AUTO: 41.2 % (ref 42–52)
HGB BLD-MCNC: 13.5 G/DL (ref 14–18)
IMM GRANULOCYTES # BLD AUTO: 0.07 K/UL (ref 0–0.11)
IMM GRANULOCYTES NFR BLD AUTO: 0.5 % (ref 0–0.9)
LYMPHOCYTES # BLD AUTO: 1.83 K/UL (ref 1–4.8)
LYMPHOCYTES NFR BLD: 11.8 % (ref 22–41)
MCH RBC QN AUTO: 33.8 PG (ref 27–33)
MCHC RBC AUTO-ENTMCNC: 32.8 G/DL (ref 32.3–36.5)
MCV RBC AUTO: 103 FL (ref 81.4–97.8)
MONOCYTES # BLD AUTO: 0.85 K/UL (ref 0–0.85)
MONOCYTES NFR BLD AUTO: 5.5 % (ref 0–13.4)
NEUTROPHILS # BLD AUTO: 12.66 K/UL (ref 1.82–7.42)
NEUTROPHILS NFR BLD: 81.9 % (ref 44–72)
NRBC # BLD AUTO: 0 K/UL
NRBC BLD-RTO: 0 /100 WBC (ref 0–0.2)
OUTPT INFUS CBC COMMENT OICOM: ABNORMAL
PLATELET # BLD AUTO: 282 K/UL (ref 164–446)
PMV BLD AUTO: 9.6 FL (ref 9–12.9)
POTASSIUM SERPL-SCNC: 4 MMOL/L (ref 3.6–5.5)
PROT SERPL-MCNC: 7.9 G/DL (ref 6–8.2)
RBC # BLD AUTO: 4 M/UL (ref 4.7–6.1)
SODIUM SERPL-SCNC: 137 MMOL/L (ref 135–145)
WBC # BLD AUTO: 15.5 K/UL (ref 4.8–10.8)

## 2023-12-06 PROCEDURE — 700105 HCHG RX REV CODE 258: Performed by: NURSE PRACTITIONER

## 2023-12-06 PROCEDURE — 96372 THER/PROPH/DIAG INJ SC/IM: CPT | Mod: XU

## 2023-12-06 PROCEDURE — 96409 CHEMO IV PUSH SNGL DRUG: CPT

## 2023-12-06 PROCEDURE — 96375 TX/PRO/DX INJ NEW DRUG ADDON: CPT

## 2023-12-06 PROCEDURE — 85025 COMPLETE CBC W/AUTO DIFF WBC: CPT

## 2023-12-06 PROCEDURE — 80053 COMPREHEN METABOLIC PANEL: CPT

## 2023-12-06 PROCEDURE — 700111 HCHG RX REV CODE 636 W/ 250 OVERRIDE (IP): Mod: JZ,JG | Performed by: NURSE PRACTITIONER

## 2023-12-06 RX ORDER — ONDANSETRON 2 MG/ML
8 INJECTION INTRAMUSCULAR; INTRAVENOUS ONCE
Status: COMPLETED | OUTPATIENT
Start: 2023-12-06 | End: 2023-12-06

## 2023-12-06 RX ORDER — CYANOCOBALAMIN 1000 UG/ML
1000 INJECTION, SOLUTION INTRAMUSCULAR; SUBCUTANEOUS
Status: DISCONTINUED | OUTPATIENT
Start: 2023-12-06 | End: 2023-12-06 | Stop reason: HOSPADM

## 2023-12-06 RX ORDER — FLUTICASONE PROPIONATE AND SALMETEROL 250; 50 UG/1; UG/1
POWDER RESPIRATORY (INHALATION)
COMMUNITY
Start: 2023-07-06

## 2023-12-06 RX ADMIN — CYANOCOBALAMIN 1000 MCG: 1000 INJECTION, SOLUTION INTRAMUSCULAR; SUBCUTANEOUS at 14:15

## 2023-12-06 RX ADMIN — ONDANSETRON 8 MG: 2 INJECTION INTRAMUSCULAR; INTRAVENOUS at 13:40

## 2023-12-06 RX ADMIN — PEMETREXED DISODIUM 900 MG: 500 INJECTION, POWDER, LYOPHILIZED, FOR SOLUTION INTRAVENOUS at 14:15

## 2023-12-06 ASSESSMENT — FIBROSIS 4 INDEX
FIB4 SCORE: 1.51
FIB4 SCORE: 1.27

## 2023-12-06 NOTE — PROGRESS NOTES
Tyler arrives to [facility] for pre-chemo labs. Patient is recovering from pneumonia, so he has been having dyspnea upon exertion. He is now utilizing oxygen at home. 24g PIV placed to [facility], which flushes easily and has brisk blood return. Labs drawn as ordered. PIV flushed with 20 ml NS, clamped, and wrapped for appointment this afternoon. Patient returns at 1330 for chemotherapy. Discharged home to self/family care in no apparent distress.

## 2023-12-06 NOTE — PROGRESS NOTES
Chemotherapy Verification - SECONDARY RN   C10 D1    Height = 176 cm  Weight = 73 kg  BSA = 1.89 m^2       Medication: pemetrexed (ALIMTA)  Dose: 500 mg/m2  Calculated Dose: 945 mg                             (In mg/m2, AUC, mg/kg)       I confirm that this process was performed independently.

## 2023-12-06 NOTE — PROGRESS NOTES
Chemotherapy Verification - PRIMARY RN      Height = 176 cm  Weight = 73 kg  BSA = 1.89 m2       Medication: Pemetrexed  Dose: 500 mg/m2  Calculated Dose: 945 mg                             (In mg/m2, AUC, mg/kg)     I confirm this process was performed independently with the BSA and all final chemotherapy dosing calculations congruent.  Any discrepancies of 10% or greater have been addressed with the chemotherapy pharmacist. The resolution of the discrepancy has been documented in the EPIC progress notes.

## 2023-12-07 NOTE — PROGRESS NOTES
Pt arrived ambulatory to [facility] for D1C10 Alimta. POC discussed and pt verbalized understanding. PIV in place from earlier appt; brisk blood return noted, line flushes with ease. Labs reviewed. Pre-medications, Vitamin B12 injection and Alimta administered per MAR and pt tolerated well. No s/s of adverse reactions or complications noted. Band-aid applied to injection site. PIV removed with tip intact, site covered with sterile gauze/coban and pt tolerated well. Educated pt on neutropenia, when to contact provider and/or defer judgment to emergent care for worrisome symptoms. Pt verbalized understanding. Pt confirms next appt and discharged to self care at this time. Pt in NAD at time of discharge.

## 2023-12-12 ENCOUNTER — APPOINTMENT (OUTPATIENT)
Dept: HEMATOLOGY ONCOLOGY | Facility: MEDICAL CENTER | Age: 73
End: 2023-12-12
Payer: COMMERCIAL

## 2023-12-18 RX ORDER — 0.9 % SODIUM CHLORIDE 0.9 %
10 VIAL (ML) INJECTION PRN
Status: CANCELLED | OUTPATIENT
Start: 2023-12-26

## 2023-12-18 RX ORDER — 0.9 % SODIUM CHLORIDE 0.9 %
10 VIAL (ML) INJECTION PRN
Status: CANCELLED | OUTPATIENT
Start: 2023-12-27

## 2023-12-18 RX ORDER — METHYLPREDNISOLONE SODIUM SUCCINATE 125 MG/2ML
125 INJECTION, POWDER, LYOPHILIZED, FOR SOLUTION INTRAMUSCULAR; INTRAVENOUS PRN
Status: CANCELLED | OUTPATIENT
Start: 2023-12-27

## 2023-12-18 RX ORDER — 0.9 % SODIUM CHLORIDE 0.9 %
VIAL (ML) INJECTION PRN
Status: CANCELLED | OUTPATIENT
Start: 2023-12-26

## 2023-12-18 RX ORDER — SODIUM CHLORIDE 9 MG/ML
INJECTION, SOLUTION INTRAVENOUS CONTINUOUS
Status: CANCELLED | OUTPATIENT
Start: 2023-12-27

## 2023-12-18 RX ORDER — DIPHENHYDRAMINE HYDROCHLORIDE 50 MG/ML
50 INJECTION INTRAMUSCULAR; INTRAVENOUS PRN
Status: CANCELLED | OUTPATIENT
Start: 2023-12-27

## 2023-12-18 RX ORDER — 0.9 % SODIUM CHLORIDE 0.9 %
VIAL (ML) INJECTION PRN
Status: CANCELLED | OUTPATIENT
Start: 2023-12-27

## 2023-12-18 RX ORDER — PROCHLORPERAZINE MALEATE 10 MG
10 TABLET ORAL EVERY 6 HOURS PRN
Status: CANCELLED | OUTPATIENT
Start: 2023-12-27

## 2023-12-18 RX ORDER — ONDANSETRON 2 MG/ML
8 INJECTION INTRAMUSCULAR; INTRAVENOUS ONCE
Status: CANCELLED | OUTPATIENT
Start: 2023-12-27 | End: 2023-12-27

## 2023-12-18 RX ORDER — EPINEPHRINE 1 MG/ML(1)
0.5 AMPUL (ML) INJECTION PRN
Status: CANCELLED | OUTPATIENT
Start: 2023-12-27

## 2023-12-18 RX ORDER — ONDANSETRON 2 MG/ML
4 INJECTION INTRAMUSCULAR; INTRAVENOUS PRN
Status: CANCELLED | OUTPATIENT
Start: 2023-12-27

## 2023-12-18 RX ORDER — 0.9 % SODIUM CHLORIDE 0.9 %
3 VIAL (ML) INJECTION PRN
Status: CANCELLED | OUTPATIENT
Start: 2023-12-26

## 2023-12-18 RX ORDER — 0.9 % SODIUM CHLORIDE 0.9 %
3 VIAL (ML) INJECTION PRN
Status: CANCELLED | OUTPATIENT
Start: 2023-12-27

## 2023-12-18 RX ORDER — CYANOCOBALAMIN 1000 UG/ML
1000 INJECTION, SOLUTION INTRAMUSCULAR; SUBCUTANEOUS
Status: CANCELLED | OUTPATIENT
Start: 2023-12-27

## 2023-12-18 RX ORDER — ONDANSETRON 8 MG/1
8 TABLET, ORALLY DISINTEGRATING ORAL PRN
Status: CANCELLED | OUTPATIENT
Start: 2023-12-27

## 2023-12-20 ENCOUNTER — HOSPITAL ENCOUNTER (OUTPATIENT)
Dept: HEMATOLOGY ONCOLOGY | Facility: MEDICAL CENTER | Age: 73
End: 2023-12-20
Attending: NURSE PRACTITIONER
Payer: COMMERCIAL

## 2023-12-20 ENCOUNTER — RESEARCH ENCOUNTER (OUTPATIENT)
Dept: HEMATOLOGY ONCOLOGY | Facility: MEDICAL CENTER | Age: 73
End: 2023-12-20
Payer: COMMERCIAL

## 2023-12-20 VITALS
DIASTOLIC BLOOD PRESSURE: 60 MMHG | TEMPERATURE: 98.7 F | SYSTOLIC BLOOD PRESSURE: 128 MMHG | WEIGHT: 157 LBS | RESPIRATION RATE: 18 BRPM | HEIGHT: 69 IN | HEART RATE: 93 BPM | OXYGEN SATURATION: 99 % | BODY MASS INDEX: 23.25 KG/M2

## 2023-12-20 DIAGNOSIS — Z79.899 ENCOUNTER FOR LONG-TERM CURRENT USE OF HIGH RISK MEDICATION: ICD-10-CM

## 2023-12-20 DIAGNOSIS — C34.91 ADENOCARCINOMA, LUNG, RIGHT (HCC): ICD-10-CM

## 2023-12-20 DIAGNOSIS — Z99.81 O2 DEPENDENT: ICD-10-CM

## 2023-12-20 DIAGNOSIS — C44.311 BASAL CELL CARCINOMA (BCC) OF SKIN OF NOSE: ICD-10-CM

## 2023-12-20 PROCEDURE — 99214 OFFICE O/P EST MOD 30 MIN: CPT | Performed by: NURSE PRACTITIONER

## 2023-12-20 PROCEDURE — 99212 OFFICE O/P EST SF 10 MIN: CPT | Performed by: NURSE PRACTITIONER

## 2023-12-20 ASSESSMENT — ENCOUNTER SYMPTOMS
DIARRHEA: 0
TINGLING: 0
INSOMNIA: 1
FEVER: 0
WHEEZING: 0
COUGH: 0
PALPITATIONS: 0
DIZZINESS: 1
CONSTIPATION: 0
WEIGHT LOSS: 0
HEADACHES: 0
MYALGIAS: 0
CHILLS: 0
SHORTNESS OF BREATH: 0
NAUSEA: 0
VOMITING: 1

## 2023-12-20 ASSESSMENT — FIBROSIS 4 INDEX: FIB4 SCORE: 1.51

## 2023-12-20 NOTE — Clinical Note
Can we get the Derm referral over there stat?  Also, not certain if we have to get [facility] or [facility]

## 2023-12-20 NOTE — RESEARCH NOTE
Screening/Consent note:    Participation in the  prescreening clinical trial was discussed with patient today. All aspects of the study purpose and procedures were explained.  Subject was given ample time to review the consent and all questions were answered to their satisfaction. Patient aware that the clinical trial is voluntary and they may withdraw consent at any time without affecting the level of care they receive.  Subject signed consent without coercion and undue influence and was given a copy of the signed consent. No study-related procedures took place prior to consenting and all assessments were conducted per protocol.     
No

## 2023-12-20 NOTE — PROGRESS NOTES
Subjective     Tyler Zarate is a 73 y.o. male who presents with Lung Cancer ([place]/ Pre[facility]mo)            HPI  Mr. Zarate presents for early evaluation prior to cycle 11 Pembro/Alimta for continued treatment of stage Karoline, [id number], right lung adenocarcinoma. He is accompanied by his son for today's visit.     Patient with incidental CT finding, completed 1/18/23, of new spiculated mass at anterior right upper lobe arising from a presumed previous scar, highly suggestive of disease. PET scan completed at [facility] 2/15/23 showed hypermetabolic right upper lobe mass and left lower lobe mass. Lung biopsy completed 2/17/23 confirmed malignancy. Patient had moved from [facility] to [place] to be closer to family. He established with radiation and medical oncology and completed SBRT to lung from 5/1-5/10/23. He completed 4 cycles carbo, pemetrexed, Pembro from 5/16-7/18/23 and transitioned to maintenance pemetrexed, pembrolizumab with cycle 5 on 8/8/23.     Cycle 10 was delayed as pt recovered from pneumonia. He is now on 4L O2, with slow progress in activity tolerance. New lesion noted at lateral aspect of R nare and septum. Dizziness with change in position. HE is otherwise at his baseline      Review of Systems   Constitutional:  Positive for malaise/fatigue (not tolerating activity very well). Negative for chills, fever (s/p pna - >2 weeks ago) and weight loss (3# down but likely more).   Respiratory:  Negative for cough, shortness of breath and wheezing.         O2 4L; home concentrator; [facility]   Cardiovascular:  Negative for chest pain, palpitations and leg swelling.   Gastrointestinal:  Positive for vomiting (1 episode last week, self lmiting, no recurrence). Negative for constipation (Last BM yesterday), diarrhea and nausea.   Genitourinary:  Negative for dysuria and hematuria.   Musculoskeletal:  Negative for joint pain and myalgias.   Skin:         Recurrent basal cell at R nare and septum  "(~2017/18)   Neurological:  Positive for dizziness (positional; when up too quick). Negative for tingling and headaches.   Psychiatric/Behavioral:  The patient has insomnia (consistent with baseline; lots off naps stealing from the night).        Allergies   Allergen Reactions    Bacitracin-Neomycin-Polymyxin      Ointment around eye caused face and eye swelling    Gabapentin Swelling     Took several medications following cataract surgery. Not sure which medication was cause.     Ketorolac Swelling     Took following cataract surgery. Not sure which medication was cause.     Lipoglycopeptide Antibiotics Swelling     Pt stated he did not remember what kind of antibiotics he was allergic to. Pt stated \"two different kinds\" Face swelling and eyes glazing over.     Tromethamine        Current Outpatient Medications on File Prior to Encounter   Medication Sig Dispense Refill    rivaroxaban (XARELTO) 20 MG Tab tablet Take 10 mg by mouth every day. Taking 10 mg daily from 20 mg      fluticasone-salmeterol (ADVAIR) 250-50 MCG/ACT AEROSOL POWDER, BREATH ACTIVATED INHALE 1 PUFF BY MOUTH TWICE A DAY FOR COPD RINSE MOUTH OUT AFTER USE      prochlorperazine (COMPAZINE) 10 MG Tab Take 1 Tablet by mouth every 6 hours as needed (for nausea, vomiting). 30 Tablet 6    ondansetron (ZOFRAN) 4 MG Tab tablet Take 1 Tablet by mouth every four hours as needed for Nausea/Vomiting (for nausea, vomiting). 30 Tablet 6    acetaminophen (TYLENOL) 500 MG Tab Take 500-1,000 mg by mouth every 6 hours as needed.      tiotropium (SPIRIVA) 18 MCG Cap Place 18 mcg into inhaler and inhale every day.      ALBUTEROL INH Inhale.      rivaroxaban (XARELTO) 15 MG Tab tablet Take 15 mg by mouth 2 (two) times a day. TAKE FOR 21 DAYS (Patient not taking: Reported on 12/20/2023)       No current facility-administered medications on file prior to encounter.            Objective     /60 (BP Location: Left arm, Patient Position: Sitting, BP Cuff Size: Adult) " "  Pulse 93   Temp 37.1 °C (98.7 °F) (Temporal)   Resp 18   Ht 1.76 m (5' 9.29\")   Wt 71.2 kg (157 lb)   SpO2 99% Comment: 4 liters O2  BMI 22.99 kg/m²      Physical Exam  Vitals reviewed.   Constitutional:       General: He is not in acute distress.     Appearance: He is well-developed. He is not diaphoretic.   HENT:      Head: Normocephalic and atraumatic.      Nose:      Comments: ~2cm lesion at lateral aspect of R nare, irritation/start of lesion at septum (photo in media)  Eyes:      General: No scleral icterus.        Right eye: No discharge.         Left eye: No discharge.   Cardiovascular:      Rate and Rhythm: Normal rate and regular rhythm.      Heart sounds: Normal heart sounds. No murmur heard.     No friction rub. No gallop.   Pulmonary:      Effort: Pulmonary effort is normal. No respiratory distress.      Breath sounds: Normal breath sounds. No wheezing.      Comments: O2 @ 4L since pneumonia last month  Abdominal:      General: There is no distension.      Palpations: Abdomen is soft.      Tenderness: There is no abdominal tenderness.   Musculoskeletal:         General: Normal range of motion.      Cervical back: Normal range of motion.   Skin:     General: Skin is warm and dry.      Coloration: Skin is not pale.      Findings: No erythema or rash.   Neurological:      Mental Status: He is alert and oriented to person, place, and time.   Psychiatric:         Behavior: Behavior normal.       Treatment labs to be completed prior to dosing                 Assessment & Plan     1. Adenocarcinoma, lung, right (HCC)        2. Encounter for long-term current use of high risk medication        3. Basal cell carcinoma (BCC) of skin of nose        4. O2 dependent            1.  O2 needs: Patient continues on 4L per [place], he travels great distance for visits and would benefit from portable concentrator, [facility].    2.  Basal cell ca: Patient with h/o basal cell carcinoma at R nare in approx " 2017/2018, h/o XRT only at [facility]. New lesion noted at lateral aspect of R nare and septum. Stat referral to derm placed.    3.  Lung cancer: Diagnosed 2/17/23; s/p SBRT to R lung 5/1-5/10/23; s/p 4 cycles carbo, pemetrexed, Pembro 5/16-7/18/23 and transitioned to maintenance pemetrexed, pembrolizumab with cycle 5 on 8/8/23.     Delay w/ C10 r/t pneumonia, from which pt continues to recover. CBC, CMP, free T4, TSH will be completed prior to dosing and if within acceptable patient will proceed with cycle 11 and return 3 weeks later for evaluation prior to cycle 12, sooner as needed.        The patient verbalized agreement and understanding of current plan. All questions and concerns were addressed at time of visit.    Please note that this dictation was created using voice recognition software. I have made every reasonable attempt to correct obvious errors, but I expect that there are errors of grammar and possibly content that I did not discover before finalizing the note.

## 2023-12-21 NOTE — ADDENDUM NOTE
Encounter addended by: MICHELE Ontiveros on: 12/20/2023 5:35 PM   Actions taken: Order list changed, Diagnosis association updated, Clinical Note Signed

## 2023-12-22 NOTE — PROGRESS NOTES
"Pharmacy Chemotherapy Verification    Dx: NSCLC stage IV    Protocol: CARBOplatin/PEMEtrexed + Pembrolizumab  Pembrolizumab 200 mg IV over 30 minutes on Day 1 followed by   Pemetrexed 500 mg/m2 IV over 10 minutes on Day 1 followed by   CARBOplatin AUC 5 IV over 30 minutes on Day 1    21 day cycle for 4-6 cycles -Completed 7/18/23  ~Followed by~  Pembrolizumab 400 mg IV over 30 minutes on Day 1 every other cycle followed by   Pemetrexed 500 mg/m2 IV over 10 minutes on Day 1    21 day cycle until 24 months of therapy has been completed   ~Followed by~  Pemetrexed 500 mg/m2 IV over 10 minutes on Day 1    21 day cycle until DP or UT    NCCN Guidelines for Non-Small Cell Lung Cancer V.1.2023  Nikki M, et al. Eur J Cancer. 2020;131:68-75.  Kelsey CJ, et al. Lancet Oncol. 2016;17(11):7739-2665.  Joelle L, et al. N Engl J Med.  2018;378(22):9187-1823.    Allergies:  Bacitracin-neomycin-polymyxin, Gabapentin, Ketorolac, Lipoglycopeptide antibiotics, and Tromethamine     /60   Pulse 89   Temp 36.4 °C (97.6 °F) (Temporal)   Resp 18   Ht 1.77 m (5' 9.69\") Comment: Shoes on  Wt 69.6 kg (153 lb 7 oz)   SpO2 96%   BMI 22.22 kg/m²  Body surface area is 1.85 meters squared.    Labs 12/27/23  ANC~3490 Hgb 12.9 Plt 370k  SCr 0.77 CrCl 82 mL/min   AST/ALT/AP = 35/31/112 Tbili = 0.2  TSH 2.02 Free T4 1.38    Received Cyanacobalmin injection 12/06/23    Drug Order   (Drug name, dose, route, IV Fluid & volume, frequency, number of doses) Cycle 11  Previous treatment: C10 12/6/23     Medication = Pembrolizumab   Base Dose= 400 mg   Fixed dose, no calculation required.  Final Dose = 400 mg  Route = IV  Fluid & Volume = NS 50 mL  Admin Duration = Over 30 minutes   Every OTHER cycle  (ODD cycles)      Fixed dose, no calculation required. Okay to treat with final dose.   Medication = PEMEtrexed   Base Dose= 500 mg/m²   Calc Dose: Base Dose x 1.85 m² = 925 mg  Final Dose = 900 mg  Route = IV  Fluid & Volume =  mL  Admin " Duration = Over 10 minutes          <10% difference, okay to treat with final dose     By my signature below, I confirm this process was performed independently with the BSA and all final chemotherapy dosing calculations congruent. I have reviewed the above chemotherapy order and that my calculation of the final dose and BSA (when applicable) corroborate those calculations of the  pharmacist. Discrepancies of 10% or greater in the written dose have been addressed and documented within the EPIC Progress notes.    Eva Hughes, CodyD

## 2023-12-25 NOTE — PROGRESS NOTES
"Pharmacy Chemotherapy Verification    Dx: NSCLC  Cycle 11  Previous treatment = 12/6/23    Protocol: Pembrolizumab + Pemetrexed/CARBOplatin  Pembrolizumab 200 mg IV over 30 minutes on Day 1 followed by  Pemetrexed 500 mg/m2 IV over 10 minutes on Day 1 followed by  CARBOplatin AUC 5 IV over 30 minutes on Day 1  21-day cycle for 4-6 cycles  ~Followed by~  Pembrolizumab 200 mg IV over 30 minutes on Day 1 or Pembrolizumab 400 mg IV over 30 minutes every other cycle followed by  Pemetrexed 500 mg/m2 IV over 10 minutes on Day 1  Maintenance therapy: 21-day cycle until 24 months of therapy has been completed  ~Followed by~  Pemetrexed 500 mg/m2 IV over 10 minutes on Day 1   Maintenance therapy: 21-day cycle until DP/UT  NCCN Guidelines for Non-Small Cell Lung Cancer V.1.2023.  Nikki M, et al. Eur J Cancer. 2020;131:68-75.  Kelsey CJ, et al. Lancet Oncol. 2016;17(11):6154-9728.  Joelle SERNA et al. N Engl J Med. 2018;378(22):7982-3987.    Allergies:  Bacitracin-neomycin-polymyxin, Gabapentin, Ketorolac, Lipoglycopeptide antibiotics, and Tromethamine     /60   Pulse 89   Temp 36.4 °C (97.6 °F) (Temporal)   Resp 18   Ht 1.77 m (5' 9.69\") Comment: Shoes on  Wt 69.6 kg (153 lb 7 oz)   SpO2 96%   BMI 22.22 kg/m²  Body surface area is 1.85 meters squared.    All lab results 12/27/23 within treatment parameters.   Cyanocobalamin given every 9 weeks, last given 12/6/23, next due ~2/7/24    Pembrolizumab 400 mg fixed dose   No calculation required, OK to treat with final dose = 400mg IV every other cycle    Pemetrexed 500 mg/m² x 1.77m² = 885 mg   <10% difference, OK to treat with final dose = 900 mg IV    Maria Guadalupe Esquivel, PharmD  "

## 2023-12-27 ENCOUNTER — OUTPATIENT INFUSION SERVICES (OUTPATIENT)
Dept: ONCOLOGY | Facility: MEDICAL CENTER | Age: 73
End: 2023-12-27
Attending: STUDENT IN AN ORGANIZED HEALTH CARE EDUCATION/TRAINING PROGRAM
Payer: COMMERCIAL

## 2023-12-27 VITALS
TEMPERATURE: 97.6 F | HEIGHT: 70 IN | OXYGEN SATURATION: 96 % | BODY MASS INDEX: 21.97 KG/M2 | RESPIRATION RATE: 18 BRPM | HEART RATE: 89 BPM | DIASTOLIC BLOOD PRESSURE: 60 MMHG | WEIGHT: 153.44 LBS | SYSTOLIC BLOOD PRESSURE: 123 MMHG

## 2023-12-27 VITALS
HEART RATE: 89 BPM | SYSTOLIC BLOOD PRESSURE: 127 MMHG | OXYGEN SATURATION: 93 % | TEMPERATURE: 97.4 F | DIASTOLIC BLOOD PRESSURE: 55 MMHG | RESPIRATION RATE: 17 BRPM

## 2023-12-27 DIAGNOSIS — C34.91 ADENOCARCINOMA, LUNG, RIGHT (HCC): ICD-10-CM

## 2023-12-27 LAB
ALBUMIN SERPL BCP-MCNC: 3.5 G/DL (ref 3.2–4.9)
ALBUMIN/GLOB SERPL: 0.8 G/DL
ALP SERPL-CCNC: 112 U/L (ref 30–99)
ALT SERPL-CCNC: 31 U/L (ref 2–50)
ANION GAP SERPL CALC-SCNC: 11 MMOL/L (ref 7–16)
AST SERPL-CCNC: 35 U/L (ref 12–45)
BASOPHILS # BLD AUTO: 0.6 % (ref 0–1.8)
BASOPHILS # BLD: 0.04 K/UL (ref 0–0.12)
BILIRUB SERPL-MCNC: 0.2 MG/DL (ref 0.1–1.5)
BUN SERPL-MCNC: 17 MG/DL (ref 8–22)
CALCIUM ALBUM COR SERPL-MCNC: 9.5 MG/DL (ref 8.5–10.5)
CALCIUM SERPL-MCNC: 9.1 MG/DL (ref 8.5–10.5)
CHLORIDE SERPL-SCNC: 102 MMOL/L (ref 96–112)
CO2 SERPL-SCNC: 26 MMOL/L (ref 20–33)
CREAT SERPL-MCNC: 0.77 MG/DL (ref 0.5–1.4)
EOSINOPHIL # BLD AUTO: 0.15 K/UL (ref 0–0.51)
EOSINOPHIL NFR BLD: 2.2 % (ref 0–6.9)
ERYTHROCYTE [DISTWIDTH] IN BLOOD BY AUTOMATED COUNT: 57.9 FL (ref 35.9–50)
GFR SERPLBLD CREATININE-BSD FMLA CKD-EPI: 94 ML/MIN/1.73 M 2
GLOBULIN SER CALC-MCNC: 4.2 G/DL (ref 1.9–3.5)
GLUCOSE SERPL-MCNC: 110 MG/DL (ref 65–99)
HCT VFR BLD AUTO: 40.8 % (ref 42–52)
HGB BLD-MCNC: 12.9 G/DL (ref 14–18)
IMM GRANULOCYTES # BLD AUTO: 0.02 K/UL (ref 0–0.11)
IMM GRANULOCYTES NFR BLD AUTO: 0.3 % (ref 0–0.9)
LYMPHOCYTES # BLD AUTO: 1.93 K/UL (ref 1–4.8)
LYMPHOCYTES NFR BLD: 28.4 % (ref 22–41)
MCH RBC QN AUTO: 33.1 PG (ref 27–33)
MCHC RBC AUTO-ENTMCNC: 31.6 G/DL (ref 32.3–36.5)
MCV RBC AUTO: 104.6 FL (ref 81.4–97.8)
MONOCYTES # BLD AUTO: 1.17 K/UL (ref 0–0.85)
MONOCYTES NFR BLD AUTO: 17.2 % (ref 0–13.4)
NEUTROPHILS # BLD AUTO: 3.49 K/UL (ref 1.82–7.42)
NEUTROPHILS NFR BLD: 51.3 % (ref 44–72)
NRBC # BLD AUTO: 0 K/UL
NRBC BLD-RTO: 0 /100 WBC (ref 0–0.2)
OUTPT INFUS CBC COMMENT OICOM: ABNORMAL
PLATELET # BLD AUTO: 370 K/UL (ref 164–446)
PMV BLD AUTO: 9.3 FL (ref 9–12.9)
POTASSIUM SERPL-SCNC: 4 MMOL/L (ref 3.6–5.5)
PROT SERPL-MCNC: 7.7 G/DL (ref 6–8.2)
RBC # BLD AUTO: 3.9 M/UL (ref 4.7–6.1)
SODIUM SERPL-SCNC: 139 MMOL/L (ref 135–145)
T4 FREE SERPL-MCNC: 1.38 NG/DL (ref 0.93–1.7)
TSH SERPL DL<=0.005 MIU/L-ACNC: 2.02 UIU/ML (ref 0.38–5.33)
WBC # BLD AUTO: 6.8 K/UL (ref 4.8–10.8)

## 2023-12-27 PROCEDURE — 96413 CHEMO IV INFUSION 1 HR: CPT

## 2023-12-27 PROCEDURE — 700111 HCHG RX REV CODE 636 W/ 250 OVERRIDE (IP): Mod: JZ,JG | Performed by: NURSE PRACTITIONER

## 2023-12-27 PROCEDURE — 96411 CHEMO IV PUSH ADDL DRUG: CPT

## 2023-12-27 PROCEDURE — 80053 COMPREHEN METABOLIC PANEL: CPT

## 2023-12-27 PROCEDURE — 84443 ASSAY THYROID STIM HORMONE: CPT

## 2023-12-27 PROCEDURE — 700105 HCHG RX REV CODE 258: Performed by: NURSE PRACTITIONER

## 2023-12-27 PROCEDURE — 84439 ASSAY OF FREE THYROXINE: CPT

## 2023-12-27 PROCEDURE — 85025 COMPLETE CBC W/AUTO DIFF WBC: CPT

## 2023-12-27 RX ORDER — ONDANSETRON 2 MG/ML
8 INJECTION INTRAMUSCULAR; INTRAVENOUS ONCE
Status: DISCONTINUED | OUTPATIENT
Start: 2023-12-27 | End: 2023-12-27 | Stop reason: HOSPADM

## 2023-12-27 RX ADMIN — PEMETREXED DISODIUM 900 MG: 500 INJECTION, POWDER, LYOPHILIZED, FOR SOLUTION INTRAVENOUS at 15:24

## 2023-12-27 RX ADMIN — SODIUM CHLORIDE 400 MG: 9 INJECTION, SOLUTION INTRAVENOUS at 14:29

## 2023-12-27 ASSESSMENT — FIBROSIS 4 INDEX: FIB4 SCORE: 1.26

## 2023-12-27 NOTE — PROGRESS NOTES
Tyler arrives ambulatory to [facility] for Cycle 11 Keytruda/ Alimta for NSC lung cancer.  Tyler denies any new or acute changes.  PIV present to left FA, flushes easily, brisk blood return observed.  Labs reviewed, parameters met for treatment today.    Keytruda and Alimta infused as ordered, Tyler tolerated well.  PIV flushed and removed, gauze and coban to site.  Discharged in NAD, next appointment confirmed.

## 2023-12-27 NOTE — PROGRESS NOTES
Mr Scott is here for pre chemo labs prior to his keytruda and alimta infusion today.    24 g PIV placed to his left arm and labs were obtained.     Mr Scott will return today at 1400, IV flushed and left in place.     Discharged in stable condition.

## 2023-12-27 NOTE — PROGRESS NOTES
Chemotherapy Verification - PRIMARY RN      Height = 177 cm  Weight = 69.6 kg  BSA = 1.85 m2       Medication: Keytruda  Dose: 400 mg (set dose)  Calculated Dose: 400 mg                             (In mg/m2, AUC, mg/kg)     Medication: Alimta  Dose: 500 mg/m2  Calculated Dose: 925 mg                             (In mg/m2, AUC, mg/kg)        I confirm this process was performed independently with the BSA and all final chemotherapy dosing calculations congruent.  Any discrepancies of 10% or greater have been addressed with the chemotherapy pharmacist. The resolution of the discrepancy has been documented in the EPIC progress notes.

## 2023-12-27 NOTE — PROGRESS NOTES
Chemotherapy Verification - SECONDARY RN       Height = 1.77 m  Weight = 69.6 kg  BSA = 1.85 m2       Medication: keytruda  Dose: 400 mg  Calculated Dose: 400 mg                             (In mg/m2, AUC, mg/kg)     Medication: Alimta  Dose: 500mg/m2  Calculated Dose: 925 mg                             (In mg/m2, AUC, mg/kg)        I confirm that this process was performed independently.

## 2024-01-02 ENCOUNTER — APPOINTMENT (OUTPATIENT)
Dept: HEMATOLOGY ONCOLOGY | Facility: MEDICAL CENTER | Age: 74
End: 2024-01-02
Payer: COMMERCIAL

## 2024-01-09 RX ORDER — 0.9 % SODIUM CHLORIDE 0.9 %
3 VIAL (ML) INJECTION PRN
Status: CANCELLED | OUTPATIENT
Start: 2024-01-16

## 2024-01-09 RX ORDER — DIPHENHYDRAMINE HYDROCHLORIDE 50 MG/ML
50 INJECTION INTRAMUSCULAR; INTRAVENOUS PRN
Status: CANCELLED | OUTPATIENT
Start: 2024-01-16

## 2024-01-09 RX ORDER — 0.9 % SODIUM CHLORIDE 0.9 %
VIAL (ML) INJECTION PRN
Status: CANCELLED | OUTPATIENT
Start: 2024-01-15

## 2024-01-09 RX ORDER — SODIUM CHLORIDE 9 MG/ML
INJECTION, SOLUTION INTRAVENOUS CONTINUOUS
Status: CANCELLED | OUTPATIENT
Start: 2024-01-16

## 2024-01-09 RX ORDER — EPINEPHRINE 1 MG/ML(1)
0.5 AMPUL (ML) INJECTION PRN
Status: CANCELLED | OUTPATIENT
Start: 2024-01-16

## 2024-01-09 RX ORDER — ONDANSETRON 8 MG/1
8 TABLET, ORALLY DISINTEGRATING ORAL PRN
Status: CANCELLED | OUTPATIENT
Start: 2024-01-16

## 2024-01-09 RX ORDER — 0.9 % SODIUM CHLORIDE 0.9 %
10 VIAL (ML) INJECTION PRN
Status: CANCELLED | OUTPATIENT
Start: 2024-01-16

## 2024-01-09 RX ORDER — 0.9 % SODIUM CHLORIDE 0.9 %
10 VIAL (ML) INJECTION PRN
Status: CANCELLED | OUTPATIENT
Start: 2024-01-15

## 2024-01-09 RX ORDER — 0.9 % SODIUM CHLORIDE 0.9 %
3 VIAL (ML) INJECTION PRN
Status: CANCELLED | OUTPATIENT
Start: 2024-01-15

## 2024-01-09 RX ORDER — ONDANSETRON 2 MG/ML
8 INJECTION INTRAMUSCULAR; INTRAVENOUS ONCE
Status: CANCELLED | OUTPATIENT
Start: 2024-01-16 | End: 2024-01-17

## 2024-01-09 RX ORDER — ONDANSETRON 2 MG/ML
4 INJECTION INTRAMUSCULAR; INTRAVENOUS PRN
Status: CANCELLED | OUTPATIENT
Start: 2024-01-16

## 2024-01-09 RX ORDER — CYANOCOBALAMIN 1000 UG/ML
1000 INJECTION, SOLUTION INTRAMUSCULAR; SUBCUTANEOUS
Status: CANCELLED | OUTPATIENT
Start: 2024-01-16

## 2024-01-09 RX ORDER — 0.9 % SODIUM CHLORIDE 0.9 %
VIAL (ML) INJECTION PRN
Status: CANCELLED | OUTPATIENT
Start: 2024-01-16

## 2024-01-09 RX ORDER — METHYLPREDNISOLONE SODIUM SUCCINATE 125 MG/2ML
125 INJECTION, POWDER, LYOPHILIZED, FOR SOLUTION INTRAMUSCULAR; INTRAVENOUS PRN
Status: CANCELLED | OUTPATIENT
Start: 2024-01-16

## 2024-01-09 RX ORDER — PROCHLORPERAZINE MALEATE 10 MG
10 TABLET ORAL EVERY 6 HOURS PRN
Status: CANCELLED | OUTPATIENT
Start: 2024-01-16

## 2024-01-16 ENCOUNTER — HOSPITAL ENCOUNTER (OUTPATIENT)
Dept: HEMATOLOGY ONCOLOGY | Facility: MEDICAL CENTER | Age: 74
End: 2024-01-16
Attending: STUDENT IN AN ORGANIZED HEALTH CARE EDUCATION/TRAINING PROGRAM
Payer: COMMERCIAL

## 2024-01-16 ENCOUNTER — OUTPATIENT INFUSION SERVICES (OUTPATIENT)
Dept: ONCOLOGY | Facility: MEDICAL CENTER | Age: 74
End: 2024-01-16
Attending: STUDENT IN AN ORGANIZED HEALTH CARE EDUCATION/TRAINING PROGRAM
Payer: COMMERCIAL

## 2024-01-16 VITALS
TEMPERATURE: 98.2 F | HEART RATE: 96 BPM | SYSTOLIC BLOOD PRESSURE: 118 MMHG | HEIGHT: 70 IN | WEIGHT: 158.51 LBS | OXYGEN SATURATION: 94 % | DIASTOLIC BLOOD PRESSURE: 48 MMHG | BODY MASS INDEX: 22.69 KG/M2

## 2024-01-16 VITALS
HEIGHT: 70 IN | OXYGEN SATURATION: 98 % | HEART RATE: 98 BPM | DIASTOLIC BLOOD PRESSURE: 65 MMHG | TEMPERATURE: 97 F | RESPIRATION RATE: 18 BRPM | WEIGHT: 154.32 LBS | SYSTOLIC BLOOD PRESSURE: 138 MMHG | BODY MASS INDEX: 22.09 KG/M2

## 2024-01-16 VITALS — HEIGHT: 70 IN | BODY MASS INDEX: 21.94 KG/M2 | WEIGHT: 153.22 LBS

## 2024-01-16 DIAGNOSIS — C34.91 ADENOCARCINOMA, LUNG, RIGHT (HCC): ICD-10-CM

## 2024-01-16 LAB
ALBUMIN SERPL BCP-MCNC: 3.1 G/DL (ref 3.2–4.9)
ALBUMIN/GLOB SERPL: 0.7 G/DL
ALP SERPL-CCNC: 104 U/L (ref 30–99)
ALT SERPL-CCNC: 69 U/L (ref 2–50)
ANION GAP SERPL CALC-SCNC: 13 MMOL/L (ref 7–16)
AST SERPL-CCNC: 47 U/L (ref 12–45)
BASOPHILS # BLD AUTO: 0.3 % (ref 0–1.8)
BASOPHILS # BLD: 0.04 K/UL (ref 0–0.12)
BILIRUB SERPL-MCNC: 0.2 MG/DL (ref 0.1–1.5)
BUN SERPL-MCNC: 23 MG/DL (ref 8–22)
CALCIUM ALBUM COR SERPL-MCNC: 9.2 MG/DL (ref 8.5–10.5)
CALCIUM SERPL-MCNC: 8.5 MG/DL (ref 8.5–10.5)
CHLORIDE SERPL-SCNC: 102 MMOL/L (ref 96–112)
CO2 SERPL-SCNC: 23 MMOL/L (ref 20–33)
CREAT SERPL-MCNC: 0.77 MG/DL (ref 0.5–1.4)
EOSINOPHIL # BLD AUTO: 0.19 K/UL (ref 0–0.51)
EOSINOPHIL NFR BLD: 1.3 % (ref 0–6.9)
ERYTHROCYTE [DISTWIDTH] IN BLOOD BY AUTOMATED COUNT: 60 FL (ref 35.9–50)
GFR SERPLBLD CREATININE-BSD FMLA CKD-EPI: 94 ML/MIN/1.73 M 2
GLOBULIN SER CALC-MCNC: 4.2 G/DL (ref 1.9–3.5)
GLUCOSE SERPL-MCNC: 111 MG/DL (ref 65–99)
HCT VFR BLD AUTO: 38.3 % (ref 42–52)
HGB BLD-MCNC: 12.7 G/DL (ref 14–18)
IMM GRANULOCYTES # BLD AUTO: 0.06 K/UL (ref 0–0.11)
IMM GRANULOCYTES NFR BLD AUTO: 0.4 % (ref 0–0.9)
LYMPHOCYTES # BLD AUTO: 2.46 K/UL (ref 1–4.8)
LYMPHOCYTES NFR BLD: 17.3 % (ref 22–41)
MCH RBC QN AUTO: 34 PG (ref 27–33)
MCHC RBC AUTO-ENTMCNC: 33.2 G/DL (ref 32.3–36.5)
MCV RBC AUTO: 102.7 FL (ref 81.4–97.8)
MONOCYTES # BLD AUTO: 1.3 K/UL (ref 0–0.85)
MONOCYTES NFR BLD AUTO: 9.2 % (ref 0–13.4)
NEUTROPHILS # BLD AUTO: 10.13 K/UL (ref 1.82–7.42)
NEUTROPHILS NFR BLD: 71.5 % (ref 44–72)
NRBC # BLD AUTO: 0 K/UL
NRBC BLD-RTO: 0 /100 WBC (ref 0–0.2)
OUTPT INFUS CBC COMMENT OICOM: ABNORMAL
PLATELET # BLD AUTO: 360 K/UL (ref 164–446)
PMV BLD AUTO: 9.8 FL (ref 9–12.9)
POTASSIUM SERPL-SCNC: 4 MMOL/L (ref 3.6–5.5)
PROT SERPL-MCNC: 7.3 G/DL (ref 6–8.2)
RBC # BLD AUTO: 3.73 M/UL (ref 4.7–6.1)
SODIUM SERPL-SCNC: 138 MMOL/L (ref 135–145)
WBC # BLD AUTO: 14.2 K/UL (ref 4.8–10.8)

## 2024-01-16 PROCEDURE — 700111 HCHG RX REV CODE 636 W/ 250 OVERRIDE (IP): Mod: JZ,JG | Performed by: NURSE PRACTITIONER

## 2024-01-16 PROCEDURE — 36415 COLL VENOUS BLD VENIPUNCTURE: CPT

## 2024-01-16 PROCEDURE — 85025 COMPLETE CBC W/AUTO DIFF WBC: CPT

## 2024-01-16 PROCEDURE — 96375 TX/PRO/DX INJ NEW DRUG ADDON: CPT

## 2024-01-16 PROCEDURE — 96409 CHEMO IV PUSH SNGL DRUG: CPT

## 2024-01-16 PROCEDURE — 99212 OFFICE O/P EST SF 10 MIN: CPT

## 2024-01-16 PROCEDURE — 99214 OFFICE O/P EST MOD 30 MIN: CPT | Performed by: STUDENT IN AN ORGANIZED HEALTH CARE EDUCATION/TRAINING PROGRAM

## 2024-01-16 PROCEDURE — 700105 HCHG RX REV CODE 258: Performed by: NURSE PRACTITIONER

## 2024-01-16 PROCEDURE — 99212 OFFICE O/P EST SF 10 MIN: CPT | Performed by: STUDENT IN AN ORGANIZED HEALTH CARE EDUCATION/TRAINING PROGRAM

## 2024-01-16 PROCEDURE — 80053 COMPREHEN METABOLIC PANEL: CPT

## 2024-01-16 RX ORDER — ONDANSETRON 2 MG/ML
8 INJECTION INTRAMUSCULAR; INTRAVENOUS ONCE
Status: COMPLETED | OUTPATIENT
Start: 2024-01-16 | End: 2024-01-16

## 2024-01-16 RX ADMIN — PEMETREXED DISODIUM 900 MG: 500 INJECTION, POWDER, LYOPHILIZED, FOR SOLUTION INTRAVENOUS at 15:32

## 2024-01-16 RX ADMIN — ONDANSETRON 8 MG: 2 INJECTION INTRAMUSCULAR; INTRAVENOUS at 15:08

## 2024-01-16 ASSESSMENT — ENCOUNTER SYMPTOMS
SORE THROAT: 0
NERVOUS/ANXIOUS: 1
SPUTUM PRODUCTION: 0
DEPRESSION: 0
FOCAL WEAKNESS: 0
WEIGHT LOSS: 0
DIZZINESS: 0
SENSORY CHANGE: 0
WHEEZING: 0
ORTHOPNEA: 0
ABDOMINAL PAIN: 0
FEVER: 0
COUGH: 0
NAUSEA: 0
MEMORY LOSS: 0
TREMORS: 0
CHILLS: 0
EYE DISCHARGE: 0
TINGLING: 0
HEARTBURN: 0
VOMITING: 0
BRUISES/BLEEDS EASILY: 0
SHORTNESS OF BREATH: 1
PALPITATIONS: 0
BLURRED VISION: 0
NECK PAIN: 0
HEADACHES: 0

## 2024-01-16 ASSESSMENT — FIBROSIS 4 INDEX
FIB4 SCORE: 1.26
FIB4 SCORE: 1.16
FIB4 SCORE: 1.16

## 2024-01-16 NOTE — PROGRESS NOTES
Chemotherapy Verification - SECONDARY RN       Height = 177 cm      Weight =  69.5 kg  BSA =  1.85 m^2      Medication: Alimta   Dose: 500 mg/m^2  Calculated Dose:  925 mg                            (In mg/m2, AUC, mg/kg)         I confirm that this process was performed independently.

## 2024-01-16 NOTE — PROGRESS NOTES
Mr Zarate is here today for pre chemo labs.    Pt has no new concerns to report.     IV placed to his left arm, flushed easily with some blood return, quantity wasn't enough to send to lab.  Venipuncture to his left hand, labs were drawn.    IV was left in place for chemotherapy later today.    Mr Zarate was discharged in stable condition.

## 2024-01-16 NOTE — PROGRESS NOTES
Consult Note: Hematology/Oncology     Primary Care:  No primary care provider on file.    Chief Complaint   Patient presents with    Lung Cancer     Prechemo        Current Treatment: None    Prior Treatment: None    05/01 - 05/10/23: SBRT to lung - Dr. Jaimes  05/16/23: C1D1 Carboplatin, Pemetrexed and Pembrolizumab  06/06/23: C2D1 Carboplatin, Pemetrexed and Pembrolizumab  06/27/23: C3D1 Carboplatin, Pemetrexed and Pembrolizumab  07/18/23: C4D1 Carboplatin, Pemetrexed and Pembrolizumab  08/08/23: C5D1 Pemetrexed and Pembrolizumab  08/29/23: C6D1: Pemetrexed   09/19/23: C7D1 Pemetrexed and Pembrolizumab  10/1/23: C8D1: Pemetrexed   10/29/23: C9D1: Pemetrexed and Pembrolizumab  11/21/23: C10D1: Pemetrexed -HELD DUE TO PNA  12/06/23: C10D1: Pemetrexed   12/27/23: C11D1: Pemetrexed and Pembrolizumab  1/16/24: C12D1 Pemetrexed     Subjective:   History of Presenting Illness:  Tyler Zarate is a 73 y.o. male with a past medical history of COPD and tobacco use, 1 pack/day smoker, alcohol abuse, right leg DVT diagnosed in 2019 who presents today with a new diagnosis of metastatic adeno carcinoma of the lung.    Patient had a CT thorax on January 18, 2023 which showed a new spiculated mass in the anterior right upper lobe arising from the area of presumed previous scar.  It is highly suggestive of disease.    Patient had a biopsy and pathology on 2/17/23 shows adenocarcinoma of the right upper lobe.    A PET scan showed a hypermetabolic RUL mass (99K82mo) as well as a mass in the LLL (11X5mm).     Patient recently moved to [place] from [facility].  He moved to be closer to his son.    He reports unintentional weight loss greater than 10% in the last 6 months.    He also reports a personal history of cancer (basal cell of nose)    Interval History    Tolerated pembro/pem.  He is here prior to C12.   Reports that today he feels anxious.  He is unsure why.  He says that he was arrival driving him to the appointment  "and has not recovered since.  We reviewed his labs together today.          Past Medical History:   Diagnosis Date    Cancer (HCC)     skin, Lung    Carcinoma in situ of respiratory system     Lung 2023    Cataract     COPD (chronic obstructive pulmonary disease) (HCC)     Cough     DVT (deep venous thrombosis) (HCC)     RLL    Lumbar radiculopathy     Pain     lower back and sciatic pain    Pneumonia     2018    Shortness of breath     Skin carcinoma     Sputum production     Synovial cyst     L spine        Past Surgical History:   Procedure Laterality Date    WA BRONCHOSCOPY,DIAGNOSTIC  4/12/2023    Procedure: FIBER OPTIC BRONCHOSCOPY WITH  WASH, BRUSH, BRONCHOALVEOLAR LAVAGE, BIOPSY AND FINE NEEDLE ASPIRATION & NAVIGATION, ROBOTICS;  Surgeon: Vladimir Ortiz M.D.;  Location: SURGERY [facility];  Service: Pulmonary Robotic    CATARACT PHACO WITH IOL Bilateral        Social History     Tobacco Use    Smoking status: Every Day     Current packs/day: 0.50     Average packs/day: 0.5 packs/day for 57.0 years (28.5 ttl pk-yrs)     Types: Cigarettes     Passive exposure: Never    Smokeless tobacco: Former     Types: Chew   Vaping Use    Vaping Use: Never used   Substance Use Topics    Alcohol use: Yes     Alcohol/week: 16.8 oz     Types: 28 Cans of beer per week     Comment: 4 beers a day    Drug use: Never        Family History   Problem Relation Age of Onset    Cancer Brother         lung       Allergies   Allergen Reactions    Bacitracin-Neomycin-Polymyxin      Ointment around eye caused face and eye swelling    Gabapentin Swelling     Took several medications following cataract surgery. Not sure which medication was cause.     Ketorolac Swelling     Took following cataract surgery. Not sure which medication was cause.     Lipoglycopeptide Antibiotics Swelling     Pt stated he did not remember what kind of antibiotics he was allergic to. Pt stated \"two different kinds\" Face swelling and eyes glazing over.     " Tromethamine     Celecoxib Rash       Current Outpatient Medications   Medication Sig Dispense Refill    rivaroxaban (XARELTO) 20 MG Tab tablet Take 10 mg by mouth every day. Taking 10 mg daily from 20 mg      fluticasone-salmeterol (ADVAIR) 250-50 MCG/ACT AEROSOL POWDER, BREATH ACTIVATED INHALE 1 PUFF BY MOUTH TWICE A DAY FOR COPD RINSE MOUTH OUT AFTER USE      prochlorperazine (COMPAZINE) 10 MG Tab Take 1 Tablet by mouth every 6 hours as needed (for nausea, vomiting). 30 Tablet 6    ondansetron (ZOFRAN) 4 MG Tab tablet Take 1 Tablet by mouth every four hours as needed for Nausea/Vomiting (for nausea, vomiting). 30 Tablet 6    acetaminophen (TYLENOL) 500 MG Tab Take 500-1,000 mg by mouth every 6 hours as needed.      tiotropium (SPIRIVA) 18 MCG Cap Place 18 mcg into inhaler and inhale every day.      ALBUTEROL INH Inhale.      rivaroxaban (XARELTO) 15 MG Tab tablet Take 15 mg by mouth 2 (two) times a day. TAKE FOR 21 DAYS (Patient not taking: Reported on 12/20/2023)       No current facility-administered medications for this encounter.       Review of Systems   Constitutional:  Positive for malaise/fatigue. Negative for chills, fever and weight loss.   HENT:  Negative for congestion, ear pain, nosebleeds and sore throat.    Eyes:  Negative for blurred vision and discharge.   Respiratory:  Positive for shortness of breath. Negative for cough, sputum production and wheezing.    Cardiovascular:  Negative for chest pain, palpitations, orthopnea and leg swelling.   Gastrointestinal:  Negative for abdominal pain, heartburn, nausea and vomiting.   Genitourinary:  Negative for dysuria, frequency and urgency.   Musculoskeletal:  Negative for neck pain.   Neurological:  Negative for dizziness, tingling, tremors, sensory change, focal weakness and headaches.   Endo/Heme/Allergies:  Does not bruise/bleed easily.   Psychiatric/Behavioral:  Negative for depression, memory loss and suicidal ideas. The patient is nervous/anxious.   "  All other systems reviewed and are negative.      Problem list, medications, and allergies reviewed by myself today in Epic.     Objective:     Vitals:    01/16/24 1303   BP: 118/48   BP Location: Right arm   Patient Position: Sitting   BP Cuff Size: Adult   Pulse: 96   Temp: 36.8 °C (98.2 °F)   TempSrc: Temporal   SpO2: 94%   Weight: 71.9 kg (158 lb 8.2 oz)   Height: 1.77 m (5' 9.69\")         DESC; KARNOFSKY SCALE WITH ECOG EQUIVALENT: 80, Normal activity with effort; some signs or symptoms of disease (ECOG equivalent 1)    DISTRESS LEVEL: no apparent distress    Physical Exam  Constitutional:       General: He is not in acute distress.     Appearance: Normal appearance.   HENT:      Head: Normocephalic and atraumatic.      Nose: No congestion.      Comments: S/p excision of basal cell/RT     Mouth/Throat:      Mouth: Mucous membranes are moist.      Pharynx: Oropharynx is clear.   Eyes:      General: No scleral icterus.        Right eye: No discharge.         Left eye: No discharge.      Conjunctiva/sclera: Conjunctivae normal.      Pupils: Pupils are equal, round, and reactive to light.   Cardiovascular:      Rate and Rhythm: Normal rate and regular rhythm.      Pulses: Normal pulses.      Heart sounds: No murmur heard.     No friction rub.   Pulmonary:      Effort: Pulmonary effort is normal. No respiratory distress.      Breath sounds: No stridor. Wheezing (lower lung bases) and rales present.   Chest:      Chest wall: No tenderness.   Abdominal:      General: Abdomen is flat. Bowel sounds are normal. There is no distension.      Palpations: Abdomen is soft. There is no mass.      Tenderness: There is no abdominal tenderness. There is no guarding or rebound.   Musculoskeletal:         General: No swelling, tenderness or deformity. Normal range of motion.      Cervical back: Normal range of motion and neck supple. No rigidity or tenderness.      Right lower leg: No edema.      Left lower leg: No edema. "   Skin:     General: Skin is warm.      Coloration: Skin is not jaundiced or pale.      Findings: No bruising, erythema or rash.   Neurological:      General: No focal deficit present.      Mental Status: He is alert and oriented to person, place, and time. Mental status is at baseline.      Motor: No weakness.   Psychiatric:         Mood and Affect: Mood normal.         Behavior: Behavior normal.         Thought Content: Thought content normal.         Judgment: Judgment normal.         Labs:   Most recent labs reviewed.    Slight anemia    Imaging:   Most recent images below have been independently reviewed by me.     8/1/2023     1.  Positive response to therapy. Right upper lobe mass has decreased in size, and there is associated scarring.  2.  No new metastatic disease in the chest, abdomen or pelvis.  3.  Emphysema, scattered linear areas of pulmonary scarring and associated mild bronchiectasis.  4.  Chronic trace left pleural effusion.  5.  Stable left adrenal adenoma. Stable nodularity of the right adrenal gland.  6.  Punctate nonobstructing right nephrolithiasis.  7.  Cholelithiasis.    Pathology:  Adenocarcinoma of the RUL    Assessment/Plan:      Cancer Staging   Adenocarcinoma, lung, right (HCC)  Staging form: Lung, AJCC 8th Edition  - Clinical stage from 4/24/2023: Stage SUDHAKAR (cT1c, cN0, cM1a) - Signed by Sam Jaimes M.D. on 4/24/2023       Mr. Zarate is a 72 yo M who presents today with a new diagnosis of adenocarcinoma (PDL1 1%; KEAP1 loss, STK11 loss, CRKL amp, GRANT G109, VOA264, TP53) with Stage IV adenocarcinoma of the left lower lobe as well as the right upper lobe s/p C4 of carbo/pem/pem now on maintenance pem/pem.  He is here prior to C12 to proceed with Pem/Pem.    Labs reviewed    Plan  -Proceed with C12 Pem/Pem   -Will need CT CAP in 3 months (next 2/16/2023).    -smoking cessation provided.     Regimen  21-day cycle for 4-6 cycles or until disease progression or unacceptable  toxicity    Maintenance therapy 21-day cycle until 24 months of therapy has been completed  Pembrolizumab 200 mg IV over 30 minutes on day 1  Followed by pemetrexed 500 mg per metered squared IV over 10 minutes on day 1    References  NCCN Guidelines Version NSCLC 3.2002     Jimmie VK, et al. Clin Lung Cancer 2019;20:30-36.e3    Renato JM, et al. Lung Cancer 2020;140:35-41.    Lai et al NEJM 2018: 378(22):1317-2782    Kelsey  Lancet Oncol 2016; 17(11): 3128-0056    Nikki M et al. Eur J Cancer, 2020; 131:68-75    #2  Research  -to be consented for  at next visit    No follow-ups on file.     Any questions and concerns raised by the patient were addressed and answered. Patient denies any further questions.  Patient encouraged to call the office with any concerns or issues.     Peg Gibson M.D.  Hematology/Oncology      32 minutes was spent on this case

## 2024-01-16 NOTE — PROGRESS NOTES
"Pharmacy Chemotherapy Verification    Dx: NSCLC  Cycle 12  Previous treatment = 12/27/23    Protocol: Pembrolizumab + Pemetrexed/CARBOplatin  Pembrolizumab 200 mg IV over 30 minutes on Day 1 followed by  Pemetrexed 500 mg/m2 IV over 10 minutes on Day 1 followed by  CARBOplatin AUC 5 IV over 30 minutes on Day 1  21-day cycle for 4-6 cycles  ~Followed by~  Pembrolizumab 200 mg IV over 30 minutes on Day 1 or Pembrolizumab 400 mg IV over 30 minutes every other cycle followed by  Pemetrexed 500 mg/m2 IV over 10 minutes on Day 1  Maintenance therapy: 21-day cycle until 24 months of therapy has been completed  ~Followed by~  Pemetrexed 500 mg/m2 IV over 10 minutes on Day 1   Maintenance therapy: 21-day cycle until DP/UT  NCCN Guidelines for Non-Small Cell Lung Cancer V.1.2023.  Nikki M, et al. Eur J Cancer. 2020;131:68-75.  Kelsey CJ, et al. Lancet Oncol. 2016;17(11):2244-1048.  Joelle SERNA et al. N Engl J Med. 2018;378(22):1953-1804.    Allergies:  Bacitracin-neomycin-polymyxin, Gabapentin, Ketorolac, Lipoglycopeptide antibiotics, and Tromethamine     Ht 1.77 m (5' 9.69\")   Wt 69.5 kg (153 lb 3.5 oz)   BMI 22.18 kg/m²  Body surface area is 1.85 meters squared.    All lab results 1/16/24 within treatment parameters.   Cyanocobalamin given every 9 weeks, last given 12/6/23, next due ~2/7/24    Pembrolizumab 400 mg fixed dose   No calculation required, OK to treat with final dose = ---mg IV every other cycle    Pemetrexed 500 mg/m² x 1.85m² = 925mg   <10% difference, OK to treat with final dose = 900mg IV    Maria Guadalupe Esquivel, PharmD  "

## 2024-01-16 NOTE — PROGRESS NOTES
"Pharmacy Chemotherapy Verification    Dx: NSCLC stage IV    Protocol: CARBOplatin/PEMEtrexed + Pembrolizumab  Pembrolizumab 200 mg IV over 30 minutes on Day 1 followed by   Pemetrexed 500 mg/m2 IV over 10 minutes on Day 1 followed by   CARBOplatin AUC 5 IV over 30 minutes on Day 1    21 day cycle for 4-6 cycles -Completed 7/18/23  ~Followed by~  Pembrolizumab 400 mg IV over 30 minutes on Day 1 every other cycle followed by   Pemetrexed 500 mg/m2 IV over 10 minutes on Day 1    21 day cycle until 24 months of therapy has been completed   ~Followed by~  Pemetrexed 500 mg/m2 IV over 10 minutes on Day 1    21 day cycle until DP or UT    NCCN Guidelines for Non-Small Cell Lung Cancer V.1.2023  Nikki M, et al. Eur J Cancer. 2020;131:68-75.  Kelsey CJ, et al. Lancet Oncol. 2016;17(11):1182-6543.  Joelle L, et al. N Engl J Med.  2018;378(22):2655-4064.    Allergies:  Bacitracin-neomycin-polymyxin, Gabapentin, Ketorolac, Lipoglycopeptide antibiotics, and Tromethamine     Ht 1.77 m (5' 9.69\")   Wt 69.5 kg (153 lb 3.5 oz)   BMI 22.18 kg/m²  Body surface area is 1.85 meters squared.    Labs 1/16/24  ANC~02243 Hgb 12.7 Plt 360k  SCr 0.77 CrCl 81.8 mL/min   AST/ALT/AP = 47/69/104 Tbili = 0.2  TSH pending Free T4 pending    Received Cyanacobalmin injection 12/06/23    Drug Order   (Drug name, dose, route, IV Fluid & volume, frequency, number of doses) Cycle 12  Previous treatment: C11 12/27/23     Medication = Pembrolizumab   Base Dose= 400 mg   Fixed dose, no calculation required.  Final Dose = --- mg  Route = IV  Fluid & Volume = NS 50 mL  Admin Duration = Over 30 minutes   Every OTHER cycle  (ODD cycles)      Fixed dose, no calculation required. Okay to treat with final dose.   Medication = PEMEtrexed   Base Dose= 500 mg/m²   Calc Dose: Base Dose x 1.85 m² = 925 mg  Final Dose = 900 mg  Route = IV  Fluid & Volume =  mL  Admin Duration = Over 10 minutes          <10% difference, okay to treat with final dose     By my " signature below, I confirm this process was performed independently with the BSA and all final chemotherapy dosing calculations congruent. I have reviewed the above chemotherapy order and that my calculation of the final dose and BSA (when applicable) corroborate those calculations of the  pharmacist. Discrepancies of 10% or greater in the written dose have been addressed and documented within the EPIC Progress notes.    Carmela Winters, PharmD

## 2024-01-16 NOTE — PROGRESS NOTES
Chemotherapy Verification - PRIMARY RN      Height = 177 cm  Weight = 71.9 kg  BSA = 1.88 m^2       Medication: PEMEtrexed (Alimta)  Dose: 500 mg/m^2  Calculated Dose: 940 mg                             (In mg/m2, AUC, mg/kg)     I confirm this process was performed independently with the BSA and all final chemotherapy dosing calculations congruent.  Any discrepancies of 10% or greater have been addressed with the chemotherapy pharmacist. The resolution of the discrepancy has been documented in the EPIC progress notes.

## 2024-01-16 NOTE — ADDENDUM NOTE
Encounter addended by: Sunday Galeas on: 1/16/2024 2:04 PM   Actions taken: Charge Capture section accepted

## 2024-01-17 NOTE — PROGRESS NOTES
Tyler is here for PEMEtrexed (Alimta). PIV in place; brisk blood return noted. Pre-medication given per MAR. Alimta given per MAR. PIV flushed with normal saline; gauze/coban applied to site. Next appointment scheduled. Discharged to self care; no apparent distress noted.

## 2024-01-30 ENCOUNTER — PATIENT OUTREACH (OUTPATIENT)
Dept: ONCOLOGY | Facility: MEDICAL CENTER | Age: 74
End: 2024-01-30
Payer: COMMERCIAL

## 2024-01-31 ENCOUNTER — APPOINTMENT (RX ONLY)
Dept: URBAN - METROPOLITAN AREA CLINIC 4 | Facility: CLINIC | Age: 74
Setting detail: DERMATOLOGY
End: 2024-01-31

## 2024-01-31 PROBLEM — D48.5 NEOPLASM OF UNCERTAIN BEHAVIOR OF SKIN: Status: ACTIVE | Noted: 2024-01-31

## 2024-01-31 PROCEDURE — 99202 OFFICE O/P NEW SF 15 MIN: CPT

## 2024-01-31 PROCEDURE — ? ADDITIONAL NOTES

## 2024-01-31 PROCEDURE — ? COUNSELING

## 2024-01-31 NOTE — PROCEDURE: ADDITIONAL NOTES
Additional Notes: Patient signed ROR form for prior biopsy. Patient states he is not interested in MOHS. Radiation discussed. Task sent to Dr. De León’s team for second opinion on viability of vismodegib. Patient to contact me in 1 week if he has not heard from us.
Render Risk Assessment In Note?: no
Detail Level: Simple

## 2024-01-31 NOTE — PROGRESS NOTES
Call placed to patient for cancer nurse navigation and DST of 5/10.  Pt reports treatment is going fine.  He denies any major concerns right now.  Pt is coming from [place].  Asked patient if he has been getting gas reimbursement from [facility], he has not requested it.  Pt said in past he had been denied.  Encouraged him to reach out to [facility] for forms he would need to submit.  Pt would like information from [facility] on past appointments so he can [facility] to the [facility].  Will follow up with medical oncology to see if they have something that can be provided.  If so, patient can  when comes for next appointment on 2/7.    Message sent to [facility] to see what their process is.

## 2024-02-01 RX ORDER — DIPHENHYDRAMINE HYDROCHLORIDE 50 MG/ML
50 INJECTION INTRAMUSCULAR; INTRAVENOUS PRN
Status: CANCELLED | OUTPATIENT
Start: 2024-02-07

## 2024-02-01 RX ORDER — EPINEPHRINE 1 MG/ML(1)
0.5 AMPUL (ML) INJECTION PRN
Status: CANCELLED | OUTPATIENT
Start: 2024-02-07

## 2024-02-01 RX ORDER — 0.9 % SODIUM CHLORIDE 0.9 %
VIAL (ML) INJECTION PRN
Status: CANCELLED | OUTPATIENT
Start: 2024-02-07

## 2024-02-01 RX ORDER — 0.9 % SODIUM CHLORIDE 0.9 %
10 VIAL (ML) INJECTION PRN
Status: CANCELLED | OUTPATIENT
Start: 2024-02-07

## 2024-02-01 RX ORDER — 0.9 % SODIUM CHLORIDE 0.9 %
10 VIAL (ML) INJECTION PRN
Status: CANCELLED | OUTPATIENT
Start: 2024-02-06

## 2024-02-01 RX ORDER — ONDANSETRON 2 MG/ML
4 INJECTION INTRAMUSCULAR; INTRAVENOUS PRN
Status: CANCELLED | OUTPATIENT
Start: 2024-02-07

## 2024-02-01 RX ORDER — PROCHLORPERAZINE MALEATE 10 MG
10 TABLET ORAL EVERY 6 HOURS PRN
Status: CANCELLED | OUTPATIENT
Start: 2024-02-07

## 2024-02-01 RX ORDER — CYANOCOBALAMIN 1000 UG/ML
1000 INJECTION, SOLUTION INTRAMUSCULAR; SUBCUTANEOUS
Status: CANCELLED | OUTPATIENT
Start: 2024-02-07

## 2024-02-01 RX ORDER — 0.9 % SODIUM CHLORIDE 0.9 %
VIAL (ML) INJECTION PRN
Status: CANCELLED | OUTPATIENT
Start: 2024-02-06

## 2024-02-01 RX ORDER — ONDANSETRON 8 MG/1
8 TABLET, ORALLY DISINTEGRATING ORAL PRN
Status: CANCELLED | OUTPATIENT
Start: 2024-02-07

## 2024-02-01 RX ORDER — ONDANSETRON 2 MG/ML
8 INJECTION INTRAMUSCULAR; INTRAVENOUS ONCE
Status: CANCELLED | OUTPATIENT
Start: 2024-02-07 | End: 2024-02-06

## 2024-02-01 RX ORDER — METHYLPREDNISOLONE SODIUM SUCCINATE 125 MG/2ML
125 INJECTION, POWDER, LYOPHILIZED, FOR SOLUTION INTRAMUSCULAR; INTRAVENOUS PRN
Status: CANCELLED | OUTPATIENT
Start: 2024-02-07

## 2024-02-01 RX ORDER — 0.9 % SODIUM CHLORIDE 0.9 %
3 VIAL (ML) INJECTION PRN
Status: CANCELLED | OUTPATIENT
Start: 2024-02-06

## 2024-02-01 RX ORDER — SODIUM CHLORIDE 9 MG/ML
INJECTION, SOLUTION INTRAVENOUS CONTINUOUS
Status: CANCELLED | OUTPATIENT
Start: 2024-02-07

## 2024-02-01 RX ORDER — 0.9 % SODIUM CHLORIDE 0.9 %
3 VIAL (ML) INJECTION PRN
Status: CANCELLED | OUTPATIENT
Start: 2024-02-07

## 2024-02-05 NOTE — PROGRESS NOTES
"Pharmacy Chemotherapy Verification    Dx: NSCLC  Cycle 13  Previous treatment = 1/16/24    Protocol: Pembrolizumab + Pemetrexed/CARBOplatin  Pembrolizumab 200 mg IV over 30 minutes on Day 1 followed by  Pemetrexed 500 mg/m2 IV over 10 minutes on Day 1 followed by  CARBOplatin AUC 5 IV over 30 minutes on Day 1  21-day cycle for 4-6 cycles  ~Followed by~  Pembrolizumab 200 mg IV over 30 minutes on Day 1 or Pembrolizumab 400 mg IV over 30 minutes every other cycle followed by  Pemetrexed 500 mg/m2 IV over 10 minutes on Day 1  Maintenance therapy: 21-day cycle until 24 months of therapy has been completed  ~Followed by~  Pemetrexed 500 mg/m2 IV over 10 minutes on Day 1   Maintenance therapy: 21-day cycle until DP/UT  NCCN Guidelines for Non-Small Cell Lung Cancer V.1.2023.  Nikki M, et al. Eur J Cancer. 2020;131:68-75.  Kelsey CJ, et al. Lancet Oncol. 2016;17(11):7621-4392.  Joelle SERNA et al. N Engl J Med. 2018;378(22):1022-9271.    Allergies:  Bacitracin-neomycin-polymyxin, Gabapentin, Ketorolac, Lipoglycopeptide antibiotics, and Tromethamine     Ht 1.77 m (5' 9.69\")   Wt 74 kg (163 lb 2.3 oz)   BMI 23.62 kg/m²  Body surface area is 1.91 meters squared.    All lab results 2/7/24 within treatment parameters.   Cyanocobalamin given every 9 weeks, last given 2/7/24, next due ~4/10/24     Pembrolizumab 400 mg fixed dose   No calculation required, OK to treat with final dose = 400mg IV every other cycle    Pemetrexed 500 mg/m² x 1.91m² = 955mg   <10% difference, OK to treat with final dose = 1000mg IV    Maria Guadalupe Esquivel, PharmD  "

## 2024-02-06 NOTE — PROGRESS NOTES
"Pharmacy Chemotherapy Verification    Dx: NSCLC stage IV    Protocol: CARBOplatin/PEMEtrexed + Pembrolizumab  Pembrolizumab 200 mg IV over 30 minutes on Day 1 followed by   Pemetrexed 500 mg/m2 IV over 10 minutes on Day 1 followed by   CARBOplatin AUC 5 IV over 30 minutes on Day 1    21 day cycle for 4-6 cycles -Completed 7/18/23  ~Followed by~  Pembrolizumab 400 mg IV over 30 minutes on Day 1 every other cycle followed by   Pemetrexed 500 mg/m2 IV over 10 minutes on Day 1    21 day cycle until 24 months of therapy has been completed   ~Followed by~  Pemetrexed 500 mg/m2 IV over 10 minutes on Day 1    21 day cycle until DP or UT    NCCN Guidelines for Non-Small Cell Lung Cancer V.1.2023  Nikki M, et al. Eur J Cancer. 2020;131:68-75.  Kelsey CJ, et al. Lancet Oncol. 2016;17(11):1500-1021.  Joelle L, et al. N Engl J Med.  2018;378(22):5314-6116.    Allergies:  Bacitracin-neomycin-polymyxin, Gabapentin, Ketorolac, Lipoglycopeptide antibiotics, and Tromethamine     Ht 1.77 m (5' 9.69\")   Wt 74 kg (163 lb 2.3 oz)   BMI 23.62 kg/m²  Body surface area is 1.91 meters squared.    Labs 2/7/24:  ANC~ 6190 Plt = 372k   Hgb = 11.3     SCr = 0.77 mg/dL CrCl ~ 87 mL/min   AST/ALT/AP = 47/69/104 TBili = 0.2     Received Cyanacobalmin injection 2/7/24    Drug Order   (Drug name, dose, route, IV Fluid & volume, frequency, number of doses) Cycle 13  Previous treatment: C12 1/16/24     Medication = Pembrolizumab   Base Dose= 400 mg   Fixed dose, no calculation required.  Final Dose = 400 mg  Route = IV  Fluid & Volume = NS 50 mL  Admin Duration = Over 30 minutes   Every OTHER cycle (ODD cycles)      Fixed dose, no calculation required. Okay to treat with final dose.   Medication = PEMEtrexed   Base Dose= 500 mg/m²   Calc Dose: Base Dose x 1.91 m² = 955 mg  Final Dose = 1000 mg  Route = IV  Fluid & Volume =  mL  Admin Duration = Over 10 minutes          <10% difference, okay to treat with final dose     By my signature below, I " confirm this process was performed independently with the BSA and all final chemotherapy dosing calculations congruent. I have reviewed the above chemotherapy order and that my calculation of the final dose and BSA (when applicable) corroborate those calculations of the  pharmacist. Discrepancies of 10% or greater in the written dose have been addressed and documented within the EPIC Progress notes.    Lawrence Garcia, PharmD

## 2024-02-07 ENCOUNTER — TELEPHONE (OUTPATIENT)
Dept: HEMATOLOGY ONCOLOGY | Facility: MEDICAL CENTER | Age: 74
End: 2024-02-07

## 2024-02-07 ENCOUNTER — OUTPATIENT INFUSION SERVICES (OUTPATIENT)
Dept: ONCOLOGY | Facility: MEDICAL CENTER | Age: 74
End: 2024-02-07
Attending: STUDENT IN AN ORGANIZED HEALTH CARE EDUCATION/TRAINING PROGRAM
Payer: COMMERCIAL

## 2024-02-07 ENCOUNTER — HOSPITAL ENCOUNTER (OUTPATIENT)
Dept: HEMATOLOGY ONCOLOGY | Facility: MEDICAL CENTER | Age: 74
End: 2024-02-07
Attending: NURSE PRACTITIONER
Payer: COMMERCIAL

## 2024-02-07 VITALS
TEMPERATURE: 97.6 F | RESPIRATION RATE: 18 BRPM | BODY MASS INDEX: 23.36 KG/M2 | DIASTOLIC BLOOD PRESSURE: 57 MMHG | WEIGHT: 163.14 LBS | HEART RATE: 102 BPM | HEIGHT: 70 IN | SYSTOLIC BLOOD PRESSURE: 136 MMHG | OXYGEN SATURATION: 98 %

## 2024-02-07 VITALS
DIASTOLIC BLOOD PRESSURE: 54 MMHG | WEIGHT: 164.62 LBS | SYSTOLIC BLOOD PRESSURE: 134 MMHG | HEIGHT: 70 IN | TEMPERATURE: 97.3 F | BODY MASS INDEX: 23.57 KG/M2 | HEART RATE: 89 BPM | OXYGEN SATURATION: 98 % | RESPIRATION RATE: 19 BRPM

## 2024-02-07 VITALS — WEIGHT: 163.14 LBS | HEIGHT: 70 IN | BODY MASS INDEX: 23.36 KG/M2

## 2024-02-07 DIAGNOSIS — C34.91 ADENOCARCINOMA, LUNG, RIGHT (HCC): ICD-10-CM

## 2024-02-07 DIAGNOSIS — Z79.899 ENCOUNTER FOR LONG-TERM CURRENT USE OF HIGH RISK MEDICATION: ICD-10-CM

## 2024-02-07 LAB
ALBUMIN SERPL BCP-MCNC: 3.2 G/DL (ref 3.2–4.9)
ALBUMIN/GLOB SERPL: 0.8 G/DL
ALP SERPL-CCNC: 103 U/L (ref 30–99)
ALT SERPL-CCNC: 65 U/L (ref 2–50)
ANION GAP SERPL CALC-SCNC: 13 MMOL/L (ref 7–16)
AST SERPL-CCNC: 51 U/L (ref 12–45)
BASOPHILS # BLD AUTO: 0.3 % (ref 0–1.8)
BASOPHILS # BLD: 0.03 K/UL (ref 0–0.12)
BILIRUB SERPL-MCNC: 0.2 MG/DL (ref 0.1–1.5)
BUN SERPL-MCNC: 21 MG/DL (ref 8–22)
CALCIUM ALBUM COR SERPL-MCNC: 9.2 MG/DL (ref 8.5–10.5)
CALCIUM SERPL-MCNC: 8.6 MG/DL (ref 8.5–10.5)
CHLORIDE SERPL-SCNC: 101 MMOL/L (ref 96–112)
CO2 SERPL-SCNC: 25 MMOL/L (ref 20–33)
CREAT SERPL-MCNC: 0.84 MG/DL (ref 0.5–1.4)
EOSINOPHIL # BLD AUTO: 0.23 K/UL (ref 0–0.51)
EOSINOPHIL NFR BLD: 2.1 % (ref 0–6.9)
ERYTHROCYTE [DISTWIDTH] IN BLOOD BY AUTOMATED COUNT: 63.7 FL (ref 35.9–50)
GFR SERPLBLD CREATININE-BSD FMLA CKD-EPI: 91 ML/MIN/1.73 M 2
GLOBULIN SER CALC-MCNC: 4.1 G/DL (ref 1.9–3.5)
GLUCOSE SERPL-MCNC: 95 MG/DL (ref 65–99)
HCT VFR BLD AUTO: 34.6 % (ref 42–52)
HGB BLD-MCNC: 11.3 G/DL (ref 14–18)
IMM GRANULOCYTES # BLD AUTO: 0.03 K/UL (ref 0–0.11)
IMM GRANULOCYTES NFR BLD AUTO: 0.3 % (ref 0–0.9)
LYMPHOCYTES # BLD AUTO: 2.73 K/UL (ref 1–4.8)
LYMPHOCYTES NFR BLD: 25.3 % (ref 22–41)
MCH RBC QN AUTO: 33.6 PG (ref 27–33)
MCHC RBC AUTO-ENTMCNC: 32.7 G/DL (ref 32.3–36.5)
MCV RBC AUTO: 103 FL (ref 81.4–97.8)
MONOCYTES # BLD AUTO: 1.58 K/UL (ref 0–0.85)
MONOCYTES NFR BLD AUTO: 14.6 % (ref 0–13.4)
NEUTROPHILS # BLD AUTO: 6.19 K/UL (ref 1.82–7.42)
NEUTROPHILS NFR BLD: 57.4 % (ref 44–72)
NRBC # BLD AUTO: 0 K/UL
NRBC BLD-RTO: 0 /100 WBC (ref 0–0.2)
OUTPT INFUS CBC COMMENT OICOM: ABNORMAL
PLATELET # BLD AUTO: 372 K/UL (ref 164–446)
PMV BLD AUTO: 9.9 FL (ref 9–12.9)
POTASSIUM SERPL-SCNC: 3.8 MMOL/L (ref 3.6–5.5)
PROT SERPL-MCNC: 7.3 G/DL (ref 6–8.2)
RBC # BLD AUTO: 3.36 M/UL (ref 4.7–6.1)
SODIUM SERPL-SCNC: 139 MMOL/L (ref 135–145)
WBC # BLD AUTO: 10.8 K/UL (ref 4.8–10.8)

## 2024-02-07 PROCEDURE — 85025 COMPLETE CBC W/AUTO DIFF WBC: CPT

## 2024-02-07 PROCEDURE — 80053 COMPREHEN METABOLIC PANEL: CPT

## 2024-02-07 PROCEDURE — 96411 CHEMO IV PUSH ADDL DRUG: CPT

## 2024-02-07 PROCEDURE — 700105 HCHG RX REV CODE 258: Performed by: NURSE PRACTITIONER

## 2024-02-07 PROCEDURE — 96372 THER/PROPH/DIAG INJ SC/IM: CPT | Mod: XU

## 2024-02-07 PROCEDURE — 700111 HCHG RX REV CODE 636 W/ 250 OVERRIDE (IP): Mod: JZ,JG | Performed by: NURSE PRACTITIONER

## 2024-02-07 PROCEDURE — 96413 CHEMO IV INFUSION 1 HR: CPT

## 2024-02-07 PROCEDURE — 99214 OFFICE O/P EST MOD 30 MIN: CPT | Performed by: NURSE PRACTITIONER

## 2024-02-07 PROCEDURE — 96375 TX/PRO/DX INJ NEW DRUG ADDON: CPT

## 2024-02-07 PROCEDURE — 99212 OFFICE O/P EST SF 10 MIN: CPT | Performed by: NURSE PRACTITIONER

## 2024-02-07 RX ORDER — ONDANSETRON 2 MG/ML
8 INJECTION INTRAMUSCULAR; INTRAVENOUS ONCE
Status: COMPLETED | OUTPATIENT
Start: 2024-02-07 | End: 2024-02-07

## 2024-02-07 RX ORDER — CYANOCOBALAMIN 1000 UG/ML
1000 INJECTION, SOLUTION INTRAMUSCULAR; SUBCUTANEOUS
Status: DISCONTINUED | OUTPATIENT
Start: 2024-02-07 | End: 2024-02-07 | Stop reason: HOSPADM

## 2024-02-07 RX ADMIN — SODIUM CHLORIDE 400 MG: 9 INJECTION, SOLUTION INTRAVENOUS at 16:03

## 2024-02-07 RX ADMIN — CYANOCOBALAMIN 1000 MCG: 1000 INJECTION, SOLUTION INTRAMUSCULAR; SUBCUTANEOUS at 15:34

## 2024-02-07 RX ADMIN — ONDANSETRON 8 MG: 2 INJECTION INTRAMUSCULAR; INTRAVENOUS at 15:34

## 2024-02-07 RX ADMIN — PEMETREXED DISODIUM 1000 MG: 500 INJECTION, POWDER, LYOPHILIZED, FOR SOLUTION INTRAVENOUS at 16:38

## 2024-02-07 ASSESSMENT — PAIN SCALES - GENERAL: PAINLEVEL: NO PAIN

## 2024-02-07 ASSESSMENT — FIBROSIS 4 INDEX
FIB4 SCORE: 1.26
FIB4 SCORE: 1.16
FIB4 SCORE: 1.16

## 2024-02-07 ASSESSMENT — ENCOUNTER SYMPTOMS
CHILLS: 0
DIZZINESS: 0
MYALGIAS: 0
NAUSEA: 0
DIARRHEA: 0
COUGH: 0
PALPITATIONS: 0
FEVER: 0
WEIGHT LOSS: 0
CONSTIPATION: 1
HEADACHES: 0
TINGLING: 0
VOMITING: 0
SHORTNESS OF BREATH: 1

## 2024-02-07 NOTE — PROGRESS NOTES
Patient came into infusion independently. Orders and vital signs reviewed, assessment done. PIV established with blood return noted. Labs collected and reviewed. Patient meets parameters to proceed with chemo treatment today. PIV left in place. Patient left in stable condition with next appointment confirmed.

## 2024-02-07 NOTE — ADDENDUM NOTE
Encounter addended by: Cyn Reyes, Med Ass't on: 2/7/2024 2:53 PM   Actions taken: Charge Capture section accepted

## 2024-02-07 NOTE — PROGRESS NOTES
Chemotherapy Verification - SECONDARY RN       Height = 1.77m  Weight = 74kg  BSA = 1.91m2       Medication: pembrolizumab  Dose: flat dose  Calculated Dose: 400mg                             (In mg/m2, AUC, mg/kg)     Medication: pemetrexed  Dose: 500mg/m2  Calculated Dose: 955mg                             (In mg/m2, AUC, mg/kg)    I confirm that this process was performed independently.

## 2024-02-07 NOTE — PROGRESS NOTES
Subjective     Tyler Zarate is a 73 y.o. male who presents with Cancer (Arthur/ Lung , skin and nose / pre chemo )          HPI    Patient seen today for evaluation prior to cycle 13 of chemotherapy employing maitenance Pemetrexed and Pembrolizumab for metastatic lung cancer, for continued monitoring of symptoms and side effects of cancer treatments.  He presents unaccompanied for today's visit.     Oncology history of presenting illness:  Patient had a CT scan of the chest on 1/18/2023 which showed a new spiculated mass in the anterior right upper lobe arising from the area of the presumed previous scar.  This was highly suggestive of disease.  He ended up having a biopsy on 2/17/2023 which was consistent with adenocarcinoma of the right upper lobe.  He did have a PET scan following his diagnosis, date unknown, which showed a hypermetabolic right upper lobe mass measuring 20 x 16 mm as well as a mass in the left lower lobe measuring 11 x 5 mm.  Patient did undergo biopsy of the left lower lobe which was consistent with adenocarcinoma of the lung.     Patient recently moved from [facility] to [place] to be closer to his son.  At time of diagnosis patient had complained of unintentional weight loss of greater than 10% within the past 6 months.  Patient also with a history of basal cell carcinoma of the nose.     Treatment history:  05/01 - 05/10/23: SBRT to lung - Dr. Jaimes  05/16/23: C1D1 Carboplatin, Pemetrexed and Pembrolizumab  06/06/23: C2D1 Carboplatin, Pemetrexed and Pembrolizumab  06/27/23: C3D1 Carboplatin, Pemetrexed and Pembrolizumab  07/18/23: C4D1 Carboplatin, Pemetrexed and Pembrolizumab  08/08/23: C5D1 maintenance Pemetrexed and Pembrolizumab  08/29/23: C6D1 maintenance Pemetrexed and Pembrolizumab  09/19/23: C7D1 maintenance Pemetrexed and Pembrolizumab  10/01/23: C8D1 Pemetrexed   10/29/23: C9D1 Pemetrexed and Pembrolizumab  11/21/23: C10D1 Pemetrexed -HELD DUE TO PNA  12/06/23: C10D1  "Pemetrexed   12/27/23: C11D1 Pemetrexed and Pembrolizumab  01/16/24: C12D1 Pemetrexed   02/07/24: C13D1 Pemetrexed and Pembrolizumab     Interval history:  Patient is doing very well.  He did notice a small intermittent pain on the right side of his chest after he had pneumonia.  It is slightly improving and only very intermittent at times.  He did confirm he has his repeat CT chest on 2/16/2024.  He does have fatigue but states it is tolerable.  He naps usually in the morning that gets him through the rest of the day.  He has mild constipation that he controls with diet and increasing hydration.  He does note shortness of breath when he overexerts himself.  Otherwise no other clinical complaints at this time.      Allergies   Allergen Reactions    Bacitracin-Neomycin-Polymyxin      Ointment around eye caused face and eye swelling    Gabapentin Swelling     Took several medications following cataract surgery. Not sure which medication was cause.     Ketorolac Swelling     Took following cataract surgery. Not sure which medication was cause.     Lipoglycopeptide Antibiotics Swelling     Pt stated he did not remember what kind of antibiotics he was allergic to. Pt stated \"two different kinds\" Face swelling and eyes glazing over.     Tromethamine     Celecoxib Rash     Current Outpatient Medications on File Prior to Visit   Medication Sig Dispense Refill    rivaroxaban (XARELTO) 15 MG Tab tablet Take 15 mg by mouth 2 (two) times a day. TAKE FOR 21 DAYS (Patient not taking: Reported on 12/20/2023)      rivaroxaban (XARELTO) 20 MG Tab tablet Take 10 mg by mouth every day. Taking 10 mg daily from 20 mg      fluticasone-salmeterol (ADVAIR) 250-50 MCG/ACT AEROSOL POWDER, BREATH ACTIVATED INHALE 1 PUFF BY MOUTH TWICE A DAY FOR COPD RINSE MOUTH OUT AFTER USE      prochlorperazine (COMPAZINE) 10 MG Tab Take 1 Tablet by mouth every 6 hours as needed (for nausea, vomiting). 30 Tablet 6    ondansetron (ZOFRAN) 4 MG Tab tablet " "Take 1 Tablet by mouth every four hours as needed for Nausea/Vomiting (for nausea, vomiting). 30 Tablet 6    acetaminophen (TYLENOL) 500 MG Tab Take 500-1,000 mg by mouth every 6 hours as needed.      tiotropium (SPIRIVA) 18 MCG Cap Place 18 mcg into inhaler and inhale every day.      ALBUTEROL INH Inhale.       No current facility-administered medications on file prior to visit.         Review of Systems   Constitutional:  Positive for malaise/fatigue (still present but stable - he does nap during the day which helps). Negative for chills (noted from the cold weather), fever and weight loss (working on eating at least 2 meals per day - weight increasing slightly).   Respiratory:  Positive for shortness of breath (only noted when he does too much activity). Negative for cough.    Cardiovascular:  Negative for chest pain and palpitations.   Gastrointestinal:  Positive for constipation (mild but dealing with it via his diet). Negative for diarrhea, nausea and vomiting.   Genitourinary:  Negative for dysuria.   Musculoskeletal:  Negative for myalgias.   Skin:  Negative for rash.   Neurological:  Negative for dizziness, tingling and headaches (only noted last week - not too bothersome).              Objective     /54 (BP Location: Right arm, Patient Position: Sitting, BP Cuff Size: Small adult)   Pulse 89   Temp 36.3 °C (97.3 °F) (Temporal)   Resp 19   Ht 1.77 m (5' 9.69\")   Wt 74.7 kg (164 lb 9.9 oz)   SpO2 98% Comment: 5L  BMI 23.83 kg/m²      Physical Exam  Vitals reviewed.   Constitutional:       General: He is not in acute distress.     Appearance: Normal appearance. He is not diaphoretic.   HENT:      Head: Normocephalic and atraumatic.   Cardiovascular:      Rate and Rhythm: Normal rate and regular rhythm.      Heart sounds: Normal heart sounds. No murmur heard.     No friction rub. No gallop.   Pulmonary:      Effort: Pulmonary effort is normal. No respiratory distress.      Breath sounds: Normal " breath sounds. No wheezing.   Abdominal:      General: Bowel sounds are normal. There is no distension.      Palpations: Abdomen is soft.      Tenderness: There is no abdominal tenderness.   Musculoskeletal:         General: No swelling or tenderness. Normal range of motion.   Skin:     General: Skin is warm and dry.   Neurological:      Mental Status: He is alert and oriented to person, place, and time.   Psychiatric:         Mood and Affect: Mood normal.         Behavior: Behavior normal.             Latest Reference Range & Units 02/07/24 14:05   WBC 4.8 - 10.8 K/uL 10.8   RBC 4.70 - 6.10 M/uL 3.36 (L)   Hemoglobin 14.0 - 18.0 g/dL 11.3 (L)   Hematocrit 42.0 - 52.0 % 34.6 (L)   MCV 81.4 - 97.8 fL 103.0 (H)   MCH 27.0 - 33.0 pg 33.6 (H)   MCHC 32.3 - 36.5 g/dL 32.7   RDW 35.9 - 50.0 fL 63.7 (H)   Platelet Count 164 - 446 K/uL 372   MPV 9.0 - 12.9 fL 9.9   Neutrophils-Polys 44.00 - 72.00 % 57.40   Neutrophils (Absolute) 1.82 - 7.42 K/uL 6.19   Lymphocytes 22.00 - 41.00 % 25.30   Lymphs (Absolute) 1.00 - 4.80 K/uL 2.73   Monocytes 0.00 - 13.40 % 14.60 (H)   Monos (Absolute) 0.00 - 0.85 K/uL 1.58 (H)   Eosinophils 0.00 - 6.90 % 2.10   Eos (Absolute) 0.00 - 0.51 K/uL 0.23   Basophils 0.00 - 1.80 % 0.30   Baso (Absolute) 0.00 - 0.12 K/uL 0.03   Immature Granulocytes 0.00 - 0.90 % 0.30   Immature Granulocytes (abs) 0.00 - 0.11 K/uL 0.03   Nucleated RBC 0.00 - 0.20 /100 WBC 0.00   NRBC (Absolute) K/uL 0.00   Outpt Infus CBC Comment  see below                Assessment & Plan       1. Adenocarcinoma, lung, right (HCC)        2. Encounter for long-term current use of high risk medication                1. Patient with Stage SUDHAKAR (cT1c, cN0, cM1a) currently on treatment with maintenance pemetrexed and Q6 week pembrolizumab. Patient scheduled to proceed with cycle 13 today.  I was able to review his CBC, however the CMP, TSH and free T4 are still pending.  If labs meet parameters he is okay to proceed with treatment as  planned.    2.  Patient to follow-up in clinic in 3 weeks, or sooner if needed.      Please note that this dictation was created using voice recognition software. I have made every reasonable attempt to correct obvious errors, but I expect that there are errors of grammar and possibly content that I did not discover before finalizing the note.

## 2024-02-07 NOTE — PROGRESS NOTES
Chemotherapy Verification - PRIMARY RN      Height = 1.77 m  Weight = 74 kg  BSA = 1.91 2       Medication: Keytruda  Dose: 400 mg  Calculated Dose: 400 mg                             (In mg/m2, AUC, mg/kg)     Medication: Alimta  Dose: 500mg/m2  Calculated Dose: 955 mg                             (In mg/m2, AUC, mg/kg)          I confirm this process was performed independently with the BSA and all final chemotherapy dosing calculations congruent.  Any discrepancies of 10% or greater have been addressed with the chemotherapy pharmacist. The resolution of the discrepancy has been documented in the EPIC progress notes.

## 2024-02-08 ENCOUNTER — TELEPHONE (OUTPATIENT)
Dept: HEMATOLOGY ONCOLOGY | Facility: MEDICAL CENTER | Age: 74
End: 2024-02-08

## 2024-02-08 NOTE — TELEPHONE ENCOUNTER
Eva from [facility] would like a call back. [phone]    She wants to confirm that you have reviewed the information that was faxed on 2/5/24.  (Uploaded into )

## 2024-02-08 NOTE — TELEPHONE ENCOUNTER
Called and spoke to patient to ask if he would be willing to come on Thursdays for treatment instead of Wednesday due to Dr. Gibson being out of office on Wednesdays. Patient stated he would be okay with this. Talked with Marcy Haddad as well and she is okay with this change so that the patient would not have to travel in 2 times from [place]. Sent email to Infusion department to ask for change.

## 2024-02-08 NOTE — PROGRESS NOTES
Mr Zarate is here today for chemotherapy.    Pt has no new concerns to report.     Pt presented with 24 g IV to his left arm. Labs were previously done and are within parameters for treatment.     Pre medications given.    Chemotherapy given and was tolerated well.    B 12 given IM to right deltoid.     IV was removed intact. Gauze and tape applied to the site.     Pt was discharged in stable condition.

## 2024-02-16 ENCOUNTER — HOSPITAL ENCOUNTER (OUTPATIENT)
Dept: RADIOLOGY | Facility: MEDICAL CENTER | Age: 74
End: 2024-02-16
Attending: STUDENT IN AN ORGANIZED HEALTH CARE EDUCATION/TRAINING PROGRAM
Payer: COMMERCIAL

## 2024-02-16 DIAGNOSIS — C34.91 ADENOCARCINOMA, LUNG, RIGHT (HCC): ICD-10-CM

## 2024-02-16 PROCEDURE — 700117 HCHG RX CONTRAST REV CODE 255: Performed by: STUDENT IN AN ORGANIZED HEALTH CARE EDUCATION/TRAINING PROGRAM

## 2024-02-16 PROCEDURE — 71260 CT THORAX DX C+: CPT

## 2024-02-16 RX ADMIN — IOHEXOL 100 ML: 350 INJECTION, SOLUTION INTRAVENOUS at 09:12

## 2024-02-21 DIAGNOSIS — C44.311 BASAL CELL CARCINOMA (BCC) OF SKIN OF NOSE: ICD-10-CM

## 2024-02-28 RX ORDER — 0.9 % SODIUM CHLORIDE 0.9 %
10 VIAL (ML) INJECTION PRN
Status: CANCELLED | OUTPATIENT
Start: 2024-03-05

## 2024-02-28 RX ORDER — ONDANSETRON 2 MG/ML
4 INJECTION INTRAMUSCULAR; INTRAVENOUS PRN
Status: CANCELLED | OUTPATIENT
Start: 2024-03-05

## 2024-02-28 RX ORDER — ONDANSETRON 8 MG/1
8 TABLET, ORALLY DISINTEGRATING ORAL PRN
Status: CANCELLED | OUTPATIENT
Start: 2024-03-05

## 2024-02-28 RX ORDER — 0.9 % SODIUM CHLORIDE 0.9 %
10 VIAL (ML) INJECTION PRN
Status: CANCELLED | OUTPATIENT
Start: 2024-03-04

## 2024-02-28 RX ORDER — 0.9 % SODIUM CHLORIDE 0.9 %
3 VIAL (ML) INJECTION PRN
Status: CANCELLED | OUTPATIENT
Start: 2024-03-04

## 2024-02-28 RX ORDER — 0.9 % SODIUM CHLORIDE 0.9 %
3 VIAL (ML) INJECTION PRN
Status: CANCELLED | OUTPATIENT
Start: 2024-03-05

## 2024-02-28 RX ORDER — DIPHENHYDRAMINE HYDROCHLORIDE 50 MG/ML
50 INJECTION INTRAMUSCULAR; INTRAVENOUS PRN
Status: CANCELLED | OUTPATIENT
Start: 2024-03-05

## 2024-02-28 RX ORDER — EPINEPHRINE 1 MG/ML(1)
0.5 AMPUL (ML) INJECTION PRN
Status: CANCELLED | OUTPATIENT
Start: 2024-03-05

## 2024-02-28 RX ORDER — 0.9 % SODIUM CHLORIDE 0.9 %
VIAL (ML) INJECTION PRN
Status: CANCELLED | OUTPATIENT
Start: 2024-03-04

## 2024-02-28 RX ORDER — PROCHLORPERAZINE MALEATE 10 MG
10 TABLET ORAL EVERY 6 HOURS PRN
Status: CANCELLED | OUTPATIENT
Start: 2024-03-05

## 2024-02-28 RX ORDER — SODIUM CHLORIDE 9 MG/ML
INJECTION, SOLUTION INTRAVENOUS CONTINUOUS
Status: CANCELLED | OUTPATIENT
Start: 2024-03-05

## 2024-02-28 RX ORDER — 0.9 % SODIUM CHLORIDE 0.9 %
VIAL (ML) INJECTION PRN
Status: CANCELLED | OUTPATIENT
Start: 2024-03-05

## 2024-02-28 RX ORDER — CYANOCOBALAMIN 1000 UG/ML
1000 INJECTION, SOLUTION INTRAMUSCULAR; SUBCUTANEOUS
Status: CANCELLED | OUTPATIENT
Start: 2024-03-05

## 2024-02-28 RX ORDER — ONDANSETRON 2 MG/ML
8 INJECTION INTRAMUSCULAR; INTRAVENOUS ONCE
Status: CANCELLED | OUTPATIENT
Start: 2024-03-05 | End: 2024-03-05

## 2024-02-28 RX ORDER — METHYLPREDNISOLONE SODIUM SUCCINATE 125 MG/2ML
125 INJECTION, POWDER, LYOPHILIZED, FOR SOLUTION INTRAMUSCULAR; INTRAVENOUS PRN
Status: CANCELLED | OUTPATIENT
Start: 2024-03-05

## 2024-02-29 ENCOUNTER — APPOINTMENT (OUTPATIENT)
Dept: HEMATOLOGY ONCOLOGY | Facility: MEDICAL CENTER | Age: 74
End: 2024-02-29
Payer: COMMERCIAL

## 2024-02-29 ENCOUNTER — APPOINTMENT (OUTPATIENT)
Dept: ONCOLOGY | Facility: MEDICAL CENTER | Age: 74
End: 2024-02-29
Attending: STUDENT IN AN ORGANIZED HEALTH CARE EDUCATION/TRAINING PROGRAM
Payer: COMMERCIAL

## 2024-03-01 ENCOUNTER — PATIENT OUTREACH (OUTPATIENT)
Dept: ONCOLOGY | Facility: MEDICAL CENTER | Age: 74
End: 2024-03-01
Payer: COMMERCIAL

## 2024-03-01 DIAGNOSIS — Z65.8 PSYCHOSOCIAL DISTRESS: ICD-10-CM
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
## 2024-03-05 ENCOUNTER — APPOINTMENT (OUTPATIENT)
Dept: ONCOLOGY | Facility: MEDICAL CENTER | Age: 74
End: 2024-03-05
Attending: STUDENT IN AN ORGANIZED HEALTH CARE EDUCATION/TRAINING PROGRAM
Payer: COMMERCIAL

## 2024-03-05 ENCOUNTER — HOSPITAL ENCOUNTER (OUTPATIENT)
Dept: HEMATOLOGY ONCOLOGY | Facility: MEDICAL CENTER | Age: 74
End: 2024-03-05
Attending: NURSE PRACTITIONER
Payer: COMMERCIAL

## 2024-03-05 ENCOUNTER — HOSPITAL ENCOUNTER (OUTPATIENT)
Dept: RADIOLOGY | Facility: MEDICAL CENTER | Age: 74
End: 2024-03-05
Attending: NURSE PRACTITIONER
Payer: COMMERCIAL

## 2024-03-05 ENCOUNTER — TELEPHONE (OUTPATIENT)
Dept: HEMATOLOGY ONCOLOGY | Facility: MEDICAL CENTER | Age: 74
End: 2024-03-05

## 2024-03-05 VITALS
SYSTOLIC BLOOD PRESSURE: 140 MMHG | RESPIRATION RATE: 24 BRPM | HEART RATE: 95 BPM | DIASTOLIC BLOOD PRESSURE: 58 MMHG | OXYGEN SATURATION: 96 % | BODY MASS INDEX: 22.19 KG/M2 | TEMPERATURE: 97.5 F | HEIGHT: 70 IN | WEIGHT: 155 LBS

## 2024-03-05 DIAGNOSIS — C34.91 ADENOCARCINOMA, LUNG, RIGHT (HCC): ICD-10-CM

## 2024-03-05 DIAGNOSIS — R05.9 COUGH, UNSPECIFIED TYPE: ICD-10-CM

## 2024-03-05 DIAGNOSIS — Z79.899 ENCOUNTER FOR LONG-TERM CURRENT USE OF HIGH RISK MEDICATION: ICD-10-CM

## 2024-03-05 DIAGNOSIS — R06.02 SOB (SHORTNESS OF BREATH): ICD-10-CM

## 2024-03-05 DIAGNOSIS — C44.311 BASAL CELL CARCINOMA (BCC) OF SKIN OF NOSE: ICD-10-CM

## 2024-03-05 PROCEDURE — 71046 X-RAY EXAM CHEST 2 VIEWS: CPT

## 2024-03-05 PROCEDURE — 99214 OFFICE O/P EST MOD 30 MIN: CPT | Performed by: NURSE PRACTITIONER

## 2024-03-05 PROCEDURE — 99212 OFFICE O/P EST SF 10 MIN: CPT | Performed by: NURSE PRACTITIONER

## 2024-03-05 ASSESSMENT — ENCOUNTER SYMPTOMS
FEVER: 0
NAUSEA: 0
CHILLS: 0
SPUTUM PRODUCTION: 1
DIARRHEA: 0
CONSTIPATION: 0
COUGH: 1
WEIGHT LOSS: 1
ABDOMINAL PAIN: 1
HEMOPTYSIS: 0
VOMITING: 0
PALPITATIONS: 0
SHORTNESS OF BREATH: 1

## 2024-03-05 ASSESSMENT — PAIN SCALES - GENERAL: PAINLEVEL: NO PAIN

## 2024-03-05 ASSESSMENT — FIBROSIS 4 INDEX: FIB4 SCORE: 1.26

## 2024-03-05 NOTE — ADDENDUM NOTE
Encounter addended by: Cyn Reyes, Med Ass't on: 3/5/2024 11:27 AM   Actions taken: Charge Capture section accepted
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
## 2024-03-05 NOTE — TELEPHONE ENCOUNTER
Per Marcy Haddad, patient will be going downstairs for a STAT Chest CT and treatment will be held today. Called Infusion charge Michelle to inform that patient will not be coming.

## 2024-03-11 ENCOUNTER — HOSPITAL ENCOUNTER (OUTPATIENT)
Dept: RADIATION ONCOLOGY | Facility: MEDICAL CENTER | Age: 74
End: 2024-03-11
Attending: RADIOLOGY
Payer: COMMERCIAL

## 2024-03-11 ENCOUNTER — APPOINTMENT (OUTPATIENT)
Dept: HEMATOLOGY ONCOLOGY | Facility: MEDICAL CENTER | Age: 74
End: 2024-03-11
Payer: COMMERCIAL

## 2024-03-11 VITALS
HEIGHT: 70 IN | BODY MASS INDEX: 22.28 KG/M2 | SYSTOLIC BLOOD PRESSURE: 126 MMHG | OXYGEN SATURATION: 91 % | DIASTOLIC BLOOD PRESSURE: 61 MMHG | HEART RATE: 89 BPM | WEIGHT: 155.65 LBS

## 2024-03-11 DIAGNOSIS — C44.311 BASAL CELL CARCINOMA (BCC) OF SKIN OF NOSE: ICD-10-CM

## 2024-03-11 PROCEDURE — 99214 OFFICE O/P EST MOD 30 MIN: CPT | Performed by: RADIOLOGY

## 2024-03-11 ASSESSMENT — PAIN SCALES - GENERAL: PAINLEVEL: NO PAIN

## 2024-03-11 ASSESSMENT — FIBROSIS 4 INDEX: FIB4 SCORE: 1.26

## 2024-03-11 NOTE — ADDENDUM NOTE
Encounter addended by: Sam Jaimes M.D. on: 3/11/2024 1:50 PM   Actions taken: SmartForm saved, Clinical Note Signed, Results reviewed in IB

## 2024-03-11 NOTE — CONSULTS
RADIATION ONCOLOGY CONSULT    Patient name:  Tyler Zarate    Primary Physician:  Marcy Jose M.D. MRN: [id number]  [facility]: [id number]   Referring physician:  Peg Gibson M.*  : 1950, 73 y.o.     DATE OF SERVICE: 3/11/2024    IDENTIFICATION: A 73 y.o. male with   Adenocarcinoma, lung, right (HCC)  Staging form: Lung, AJCC 8th Edition  - Clinical stage from 2023: Stage SUDHAKAR (cT1c, cN0, cM1a) - Signed by Sam Jaimes M.D. on 2023  Histopathologic type: Adenocarcinoma, NOS  Stage prefix: Initial diagnosis        He is here at the kind request of Peg Garcia M.*        HISTORY OF PRESENT ILLNESS:  Lynda Zarate is a 73-year-old gentleman well-known to me from his prior history and treatment that I provided for his metastatic lung adenocarcinoma, status post stereotactic radiation to the right upper lobe as well as to the left hilum completed in May 2023.  Thereafter, he has been on systemic therapy with medical oncology with carboplatin, pemetrexed and pembrolizumab.  He seems to be tolerating this reasonably well.    More recently, and the reason for which she comes today is for concern about basal cell carcinoma on the right ala of his nose.  He reports that he was previously diagnosed with a basal cell carcinoma here at the right ala of his nose and [facility].  After that he had good resolution, but every now and then this area will crust over, occasionally bleed, and because of these reasons he had a recent follow-up with dermatology at which point there was concern for either recurrent or new basal cell carcinoma with potential involvement of the columella.  Because of this, note was sent to medical oncology and he was now referred to me for discussion about radiation therapy.    Of note, there is no recent biopsy of this area.  He actually has a biopsy planned  with dermatology later this week.  Currently, he reports no pain, numbness or tingling or any overt lesions in this area aside from the right ala region where it does tend to crust over and occasionally open up again.  See recent photo scanned into the chart.        PROBLEM LIST:  Patient Active Problem List   Diagnosis    Adenocarcinoma, lung, right (HCC)    Left lower lobe pulmonary nodule    Spondylolisthesis of lumbar region    Resting tremor    Lumbar radiculopathy    Hypertension    Hereditary essential tremor    H/O: pneumothorax    Disorder of intervertebral disc    Deep vein thrombosis (DVT) of right lower extremity (HCC)    COVID-19    Basal cell carcinoma (BCC) of skin of nose        PAST SURGICAL HISTORY:  Past Surgical History:   Procedure Laterality Date    ND BRONCHOSCOPY,DIAGNOSTIC  4/12/2023    Procedure: FIBER OPTIC BRONCHOSCOPY WITH  WASH, BRUSH, BRONCHOALVEOLAR LAVAGE, BIOPSY AND FINE NEEDLE ASPIRATION & NAVIGATION, ROBOTICS;  Surgeon: Vladimir Ortiz M.D.;  Location: SURGERY [facility];  Service: Pulmonary Robotic    CATARACT PHACO WITH IOL Bilateral        CURRENT MEDICATIONS:  Current Outpatient Medications   Medication Sig Dispense Refill    rivaroxaban (XARELTO) 20 MG Tab tablet Take 10 mg by mouth every day. Taking 10 mg daily from 20 mg      fluticasone-salmeterol (ADVAIR) 250-50 MCG/ACT AEROSOL POWDER, BREATH ACTIVATED       prochlorperazine (COMPAZINE) 10 MG Tab Take 1 Tablet by mouth every 6 hours as needed (for nausea, vomiting). 30 Tablet 6    ondansetron (ZOFRAN) 4 MG Tab tablet Take 1 Tablet by mouth every four hours as needed for Nausea/Vomiting (for nausea, vomiting). 30 Tablet 6    acetaminophen (TYLENOL) 500 MG Tab Take 500-1,000 mg by mouth every 6 hours as needed.      tiotropium (SPIRIVA) 18 MCG Cap Place 18 mcg into inhaler and inhale every day.      ALBUTEROL INH Inhale.      rivaroxaban (XARELTO) 15 MG Tab tablet Take 15 mg by mouth 2 (two) times a day. TAKE FOR 21  "DAYS (Patient not taking: Reported on 12/20/2023)       No current facility-administered medications for this encounter.       ALLERGIES:    Bacitracin-neomycin-polymyxin, Gabapentin, Ketorolac, Lipoglycopeptide antibiotics, Tromethamine, and Celecoxib    FAMILY HISTORY:    family history includes Cancer in his brother.    SOCIAL HISTORY:     reports that he has been smoking cigarettes. He started smoking about 59 years ago. He has a 29.6 pack-year smoking history. He has never been exposed to tobacco smoke. He has quit using smokeless tobacco.  His smokeless tobacco use included chew. He reports that he does not currently use alcohol after a past usage of about 8.4 oz of alcohol per week. He reports that he does not use drugs. Patient is a 73 year old male who resides in [facility] with his Son. He is retired from construction/ musician.     REVIEW OF SYSTEMS:    A complete review of systems taken. Pertinent items in HPI. All others negative.    PHYSICAL EXAM:    PERFORMANCE STATUS:      3/11/2024     1:15 PM   ECOG Performance Review   ECOG Performance Status Restricted in physically strenuous activity but ambulatory and able to carry out work of a light or sedentary nature, e.g., light house work, office work         3/11/2024     1:15 PM   Karnofsky Score   Karnofsky Score 80     /61 (BP Location: Right arm, Patient Position: Sitting, BP Cuff Size: Adult)   Pulse 89   Ht 1.778 m (5' 10\")   Wt 70.6 kg (155 lb 10.3 oz)   SpO2 91%   BMI 22.33 kg/m²   Physical Exam  Constitutional:       Appearance: Normal appearance.   HENT:      Nose:        Comments: Small pearly white lesion immediately to the right of midline at the tip of the nose along with associated crusting.  Telangiectasias throughout the distal nasal skin.  Small scabbed over area more laterally of the right nasal ala as above.  Cardiovascular:      Rate and Rhythm: Normal rate and regular rhythm.   Pulmonary:      Effort: Pulmonary effort is " normal. No respiratory distress.   Neurological:      General: No focal deficit present.      Mental Status: He is alert and oriented to person, place, and time.          LABORATORY DATA:   Lab Results   Component Value Date/Time    WBC 10.8 02/07/2024 02:05 PM    RBC 3.36 (L) 02/07/2024 02:05 PM    HEMOGLOBIN 11.3 (L) 02/07/2024 02:05 PM    HEMATOCRIT 34.6 (L) 02/07/2024 02:05 PM    .0 (H) 02/07/2024 02:05 PM    MCH 33.6 (H) 02/07/2024 02:05 PM    MCHC 32.7 02/07/2024 02:05 PM    RDW 63.7 (H) 02/07/2024 02:05 PM    PLATELETCT 372 02/07/2024 02:05 PM    MPV 9.9 02/07/2024 02:05 PM    NEUTSPOLYS 57.40 02/07/2024 02:05 PM    LYMPHOCYTES 25.30 02/07/2024 02:05 PM    MONOCYTES 14.60 (H) 02/07/2024 02:05 PM    EOSINOPHILS 2.10 02/07/2024 02:05 PM    BASOPHILS 0.30 02/07/2024 02:05 PM      Lab Results   Component Value Date/Time    SODIUM 139 02/07/2024 02:05 PM    POTASSIUM 3.8 02/07/2024 02:05 PM    CHLORIDE 101 02/07/2024 02:05 PM    CO2 25 02/07/2024 02:05 PM    GLUCOSE 95 02/07/2024 02:05 PM    BUN 21 02/07/2024 02:05 PM    CREATININE 0.84 02/07/2024 02:05 PM           RADIOLOGY DATA:  CT-CHEST,ABDOMEN,PELVIS WITH    Result Date: 2/16/2024  1.  There is overall stable disease. 2.  The anterior right upper lobe lung nodule and associated parenchymal airspace opacity with bronchiectasis is similar to the most recent CT of 11/16/2023. Unable to measure a discrete mass. 3.  Other bilateral areas of parenchymal scarring and minimal pleural thickening are unchanged. 4.  There is emphysema/COPD. 5.  No evidence of metastatic adenopathy in the chest. 6.  No evidence of metastatic disease in the abdomen or pelvis.    DX-CHEST-2 VIEWS    Result Date: 3/5/2024  1.  Small nodular density in right upper lobe with curvilinear airspace opacities radiating from this lesion. These findings are consistent with the patient's known lung carcinoma in this region. 2.  Atelectasis or parenchymal scarring in left lung base. 3.   Pleural thickening about the left costophrenic angle and posteriorly in the base of the left hemithorax.      IMPRESSION:    A 73 y.o. with  Adenocarcinoma, lung, right (HCC)  Staging form: Lung, AJCC 8th Edition  - Clinical stage from 4/24/2023: Stage SUDHAKAR (cT1c, cN0, cM1a) - Signed by Sam Jaimes M.D. on 4/24/2023  Histopathologic type: Adenocarcinoma, NOS  Stage prefix: Initial diagnosis        RECOMMENDATIONS:   I reviewed with Tyler his history and treatment about his basal cell carcinoma.  As it turns out, he had this lesion biopsied about 5 years ago and [facility]  He reports that it did improve after that, but he saw dermatology recently and they are worried about a recurrence.  He has not had a biopsy.  Therefore I agree with proceeding with a biopsy to see if she truly does have recurrent basal cell carcinoma or persistent disease.  Ultimately, we would need to know that before making a decision about treatment.    Assuming he does have persistent or recurrent basal cell carcinoma there, I am not certain that this needs to be aggressively treated currently.  He has metastatic lung cancer and is on systemic chemotherapy along with immunotherapy.  His lung cancer is certainly his overall  of morbidity and mortality, and while the basal cell may be cosmetically important, it does not appear to be posing any functional threat.  Furthermore, repeat radiation is only going to place him at risk of significant cutaneous toxicity with potentially a nonhealing ulcerated wound here.  He definitive resection with Mohs or a more wide local excision type treatment would be also quite morbid.  Alternatively, he has significant medical therapy options including has pathway inhibitors or Cemiplimab, the latter which could in theory assist in combination treatment with his lung cancer chemotherapy as well.  Therefore, I discussed case with medical oncology, and our current plan  will be to proceed with a biopsy, but thereafter proceed with conservative treatment with systemic therapy both for his lung cancer as well as his basal carcinoma.  No further follow-ups with me.    Thank you for the opportunity to participate in his care.  If any questions or comments, please do not hesitate in calling.    Orders Placed This Encounter    REFERRAL TO ONCOLOGY NURSE NAVIGATOR

## 2024-03-11 NOTE — PROGRESS NOTES
"Patient was seen today in clinic with Dr. Jaimes for consultation.  Vitals signs and weight were obtained and pain assessment was completed.  Allergies and medications were reviewed with the patient.    Vitals/Pain:  Vitals:    03/11/24 1307   BP: 126/61   BP Location: Right arm   Patient Position: Sitting   BP Cuff Size: Adult   Pulse: 89   SpO2: 91%   Weight: 70.6 kg (155 lb 10.3 oz)   Height: 1.778 m (5' 10\")   Pain Score: No pain (\"Occasional nose pain if it splits open.\")        Allergies:   Bacitracin-neomycin-polymyxin, Gabapentin, Ketorolac, Lipoglycopeptide antibiotics, Tromethamine, and Celecoxib    Current Medications:  Current Outpatient Medications   Medication Sig Dispense Refill    rivaroxaban (XARELTO) 20 MG Tab tablet Take 10 mg by mouth every day. Taking 10 mg daily from 20 mg      fluticasone-salmeterol (ADVAIR) 250-50 MCG/ACT AEROSOL POWDER, BREATH ACTIVATED       prochlorperazine (COMPAZINE) 10 MG Tab Take 1 Tablet by mouth every 6 hours as needed (for nausea, vomiting). 30 Tablet 6    ondansetron (ZOFRAN) 4 MG Tab tablet Take 1 Tablet by mouth every four hours as needed for Nausea/Vomiting (for nausea, vomiting). 30 Tablet 6    acetaminophen (TYLENOL) 500 MG Tab Take 500-1,000 mg by mouth every 6 hours as needed.      tiotropium (SPIRIVA) 18 MCG Cap Place 18 mcg into inhaler and inhale every day.      ALBUTEROL INH Inhale.      rivaroxaban (XARELTO) 15 MG Tab tablet Take 15 mg by mouth 2 (two) times a day. TAKE FOR 21 DAYS (Patient not taking: Reported on 12/20/2023)       No current facility-administered medications for this encounter.         PCP:  Damon Knott R.N.  "

## 2024-03-12 ENCOUNTER — TELEPHONE (OUTPATIENT)
Dept: HEMATOLOGY ONCOLOGY | Facility: MEDICAL CENTER | Age: 74
End: 2024-03-12
Payer: COMMERCIAL

## 2024-03-12 NOTE — TELEPHONE ENCOUNTER
Called and spoke to patient to confirm he had transportation to his appointment on Thursday 3/14 with Dr Gibson. Pt confirmed he does has a ride and will be here. Told patient to reach out if he cannot make it. Pt verbalized understanding.

## 2024-03-14 ENCOUNTER — HOSPITAL ENCOUNTER (OUTPATIENT)
Dept: HEMATOLOGY ONCOLOGY | Facility: MEDICAL CENTER | Age: 74
End: 2024-03-14
Attending: STUDENT IN AN ORGANIZED HEALTH CARE EDUCATION/TRAINING PROGRAM
Payer: COMMERCIAL

## 2024-03-14 ENCOUNTER — OUTPATIENT INFUSION SERVICES (OUTPATIENT)
Dept: ONCOLOGY | Facility: MEDICAL CENTER | Age: 74
End: 2024-03-14
Attending: STUDENT IN AN ORGANIZED HEALTH CARE EDUCATION/TRAINING PROGRAM
Payer: COMMERCIAL

## 2024-03-14 VITALS
DIASTOLIC BLOOD PRESSURE: 70 MMHG | TEMPERATURE: 99 F | HEIGHT: 70 IN | SYSTOLIC BLOOD PRESSURE: 138 MMHG | BODY MASS INDEX: 21.81 KG/M2 | OXYGEN SATURATION: 97 % | HEART RATE: 89 BPM | WEIGHT: 152.34 LBS

## 2024-03-14 VITALS
BODY MASS INDEX: 21.81 KG/M2 | WEIGHT: 152.34 LBS | TEMPERATURE: 97.5 F | SYSTOLIC BLOOD PRESSURE: 124 MMHG | OXYGEN SATURATION: 93 % | HEIGHT: 70 IN | HEART RATE: 95 BPM | RESPIRATION RATE: 16 BRPM | DIASTOLIC BLOOD PRESSURE: 53 MMHG

## 2024-03-14 DIAGNOSIS — C34.91 ADENOCARCINOMA, LUNG, RIGHT (HCC): ICD-10-CM

## 2024-03-14 DIAGNOSIS — Z79.899 ENCOUNTER FOR LONG-TERM (CURRENT) USE OF HIGH-RISK MEDICATION: ICD-10-CM

## 2024-03-14 LAB
ALBUMIN SERPL BCP-MCNC: 3.5 G/DL (ref 3.2–4.9)
ALBUMIN/GLOB SERPL: 0.8 G/DL
ALP SERPL-CCNC: 121 U/L (ref 30–99)
ALT SERPL-CCNC: 34 U/L (ref 2–50)
ANION GAP SERPL CALC-SCNC: 11 MMOL/L (ref 7–16)
AST SERPL-CCNC: 28 U/L (ref 12–45)
BASOPHILS # BLD AUTO: 0.4 % (ref 0–1.8)
BASOPHILS # BLD: 0.04 K/UL (ref 0–0.12)
BILIRUB SERPL-MCNC: 0.4 MG/DL (ref 0.1–1.5)
BUN SERPL-MCNC: 20 MG/DL (ref 8–22)
CALCIUM ALBUM COR SERPL-MCNC: 9.6 MG/DL (ref 8.5–10.5)
CALCIUM SERPL-MCNC: 9.2 MG/DL (ref 8.5–10.5)
CHLORIDE SERPL-SCNC: 100 MMOL/L (ref 96–112)
CO2 SERPL-SCNC: 27 MMOL/L (ref 20–33)
CREAT SERPL-MCNC: 0.74 MG/DL (ref 0.5–1.4)
EOSINOPHIL # BLD AUTO: 0.1 K/UL (ref 0–0.51)
EOSINOPHIL NFR BLD: 0.9 % (ref 0–6.9)
ERYTHROCYTE [DISTWIDTH] IN BLOOD BY AUTOMATED COUNT: 69.9 FL (ref 35.9–50)
GFR SERPLBLD CREATININE-BSD FMLA CKD-EPI: 95 ML/MIN/1.73 M 2
GLOBULIN SER CALC-MCNC: 4.5 G/DL (ref 1.9–3.5)
GLUCOSE SERPL-MCNC: 98 MG/DL (ref 65–99)
HCT VFR BLD AUTO: 36.8 % (ref 42–52)
HGB BLD-MCNC: 11.9 G/DL (ref 14–18)
IMM GRANULOCYTES # BLD AUTO: 0.05 K/UL (ref 0–0.11)
IMM GRANULOCYTES NFR BLD AUTO: 0.4 % (ref 0–0.9)
LYMPHOCYTES # BLD AUTO: 1.86 K/UL (ref 1–4.8)
LYMPHOCYTES NFR BLD: 16.7 % (ref 22–41)
MCH RBC QN AUTO: 34.3 PG (ref 27–33)
MCHC RBC AUTO-ENTMCNC: 32.3 G/DL (ref 32.3–36.5)
MCV RBC AUTO: 106.1 FL (ref 81.4–97.8)
MONOCYTES # BLD AUTO: 1.26 K/UL (ref 0–0.85)
MONOCYTES NFR BLD AUTO: 11.3 % (ref 0–13.4)
NEUTROPHILS # BLD AUTO: 7.84 K/UL (ref 1.82–7.42)
NEUTROPHILS NFR BLD: 70.3 % (ref 44–72)
NRBC # BLD AUTO: 0 K/UL
NRBC BLD-RTO: 0 /100 WBC (ref 0–0.2)
OUTPT INFUS CBC COMMENT OICOM: ABNORMAL
PLATELET # BLD AUTO: 242 K/UL (ref 164–446)
PMV BLD AUTO: 9.7 FL (ref 9–12.9)
POTASSIUM SERPL-SCNC: 4.1 MMOL/L (ref 3.6–5.5)
PROT SERPL-MCNC: 8 G/DL (ref 6–8.2)
RBC # BLD AUTO: 3.47 M/UL (ref 4.7–6.1)
SODIUM SERPL-SCNC: 138 MMOL/L (ref 135–145)
T4 FREE SERPL-MCNC: 1.57 NG/DL (ref 0.93–1.7)
TSH SERPL DL<=0.005 MIU/L-ACNC: 2.28 UIU/ML (ref 0.38–5.33)
WBC # BLD AUTO: 11.2 K/UL (ref 4.8–10.8)

## 2024-03-14 PROCEDURE — 85025 COMPLETE CBC W/AUTO DIFF WBC: CPT

## 2024-03-14 PROCEDURE — 80053 COMPREHEN METABOLIC PANEL: CPT

## 2024-03-14 PROCEDURE — 99214 OFFICE O/P EST MOD 30 MIN: CPT | Performed by: STUDENT IN AN ORGANIZED HEALTH CARE EDUCATION/TRAINING PROGRAM

## 2024-03-14 PROCEDURE — 96375 TX/PRO/DX INJ NEW DRUG ADDON: CPT

## 2024-03-14 PROCEDURE — 700111 HCHG RX REV CODE 636 W/ 250 OVERRIDE (IP): Mod: JZ | Performed by: NURSE PRACTITIONER

## 2024-03-14 PROCEDURE — 84443 ASSAY THYROID STIM HORMONE: CPT

## 2024-03-14 PROCEDURE — 700105 HCHG RX REV CODE 258: Performed by: NURSE PRACTITIONER

## 2024-03-14 PROCEDURE — 84439 ASSAY OF FREE THYROXINE: CPT

## 2024-03-14 PROCEDURE — 99212 OFFICE O/P EST SF 10 MIN: CPT | Performed by: STUDENT IN AN ORGANIZED HEALTH CARE EDUCATION/TRAINING PROGRAM

## 2024-03-14 PROCEDURE — 96409 CHEMO IV PUSH SNGL DRUG: CPT

## 2024-03-14 RX ORDER — ONDANSETRON 2 MG/ML
8 INJECTION INTRAMUSCULAR; INTRAVENOUS ONCE
Status: COMPLETED | OUTPATIENT
Start: 2024-03-14 | End: 2024-03-14

## 2024-03-14 RX ADMIN — ONDANSETRON 8 MG: 2 INJECTION INTRAMUSCULAR; INTRAVENOUS at 09:55

## 2024-03-14 RX ADMIN — PEMETREXED DISODIUM 900 MG: 500 INJECTION, POWDER, LYOPHILIZED, FOR SOLUTION INTRAVENOUS at 10:28

## 2024-03-14 ASSESSMENT — ENCOUNTER SYMPTOMS
PALPITATIONS: 0
ABDOMINAL PAIN: 0
NAUSEA: 0
HEADACHES: 0
EYE DISCHARGE: 0
HEARTBURN: 0
VOMITING: 0
NERVOUS/ANXIOUS: 1
MEMORY LOSS: 0
WHEEZING: 0
TREMORS: 0
ORTHOPNEA: 0
TINGLING: 0
DIZZINESS: 0
NECK PAIN: 0
FEVER: 0
SORE THROAT: 0
SHORTNESS OF BREATH: 1
CHILLS: 0
FOCAL WEAKNESS: 0
DEPRESSION: 0
WEIGHT LOSS: 0
COUGH: 0
BRUISES/BLEEDS EASILY: 0
SENSORY CHANGE: 0
BLURRED VISION: 0
SPUTUM PRODUCTION: 0

## 2024-03-14 ASSESSMENT — FIBROSIS 4 INDEX
FIB4 SCORE: 1.26
FIB4 SCORE: 1.26
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
## 2024-03-14 NOTE — ADDENDUM NOTE
Encounter addended by: Sunday Galeas on: 3/14/2024 8:43 AM   Actions taken: Charge Capture section accepted

## 2024-03-14 NOTE — PROGRESS NOTES
"Pharmacy Chemotherapy Verification    Dx: NSCLC  Cycle 14  Previous treatment = 2/7/24    Protocol: Pembrolizumab + Pemetrexed/CARBOplatin  Pembrolizumab 200 mg IV over 30 minutes on Day 1 followed by  Pemetrexed 500 mg/m2 IV over 10 minutes on Day 1 followed by  CARBOplatin AUC 5 IV over 30 minutes on Day 1  21-day cycle for 4-6 cycles  ~Followed by~  Pembrolizumab 200 mg IV over 30 minutes on Day 1 or Pembrolizumab 400 mg IV over 30 minutes every other cycle followed by  Pemetrexed 500 mg/m2 IV over 10 minutes on Day 1  Maintenance therapy: 21-day cycle until 24 months of therapy has been completed  ~Followed by~  Pemetrexed 500 mg/m2 IV over 10 minutes on Day 1   Maintenance therapy: 21-day cycle until DP/UT  NCCN Guidelines for Non-Small Cell Lung Cancer V.1.2023.  Nikki M, et al. Eur J Cancer. 2020;131:68-75.  Kelsey CJ, et al. Lancet Oncol. 2016;17(11):9798-2292.  Joelle L, et al. N Engl J Med. 2018;378(22):9437-4826.    Allergies:  Bacitracin-neomycin-polymyxin, Gabapentin, Ketorolac, Lipoglycopeptide antibiotics, and Tromethamine     /53   Pulse 95   Temp 36.4 °C (97.5 °F) (Temporal)   Resp 16   Ht 1.77 m (5' 9.69\")   Wt 69.1 kg (152 lb 5.4 oz)   SpO2 93%   BMI 22.06 kg/m²  Body surface area is 1.84 meters squared.    Labs 3/14/24  ANC~ 7840 Plt = 242k   Hgb = 11.9     SCr = 0.74 mg/dL CrCl ~ 84.4 mL/min   LFT's = 28/34/121 TBili = 0.4     Cyanocobalamin given every 9 weeks, last given 2/7/24, next due ~4/10/24     Pemetrexed 500 mg/m² x 1.84 m² = 920 mg   <10% difference, OK to treat with final dose = 900 mg IV    Kuldip Coello, PharmD  "

## 2024-03-14 NOTE — PROGRESS NOTES
Tyler presented into [facility] for Day 1/ Cycle 14 of Alimta for NSC lung cancer. Pt denied having any new or acute complaints today, reports tolerating past treatments well. PIV started to LAC, had positive blood return and flushed briskly. Labs drawn as ordered and reviewed. Pt meets parameters for treatment today. Premed given as ordered. Pt given Alimta as prescribed, tolerated well, denied having any complaints during or after infusion. PIV discontinued, bleeding controlled with gauze and Coban. Pt has future appointments. Pt discharged to home in good condition.

## 2024-03-14 NOTE — PROGRESS NOTES
Chemotherapy Verification - SECONDARY RN       Height = 177 cm  Weight = 69.1 kg  BSA = 1.84 m^2       Medication: Pemetrexed (Alimta)  Dose: 500 mg/m^2  Calculated Dose: 920 mg                             (In mg/m2, AUC, mg/kg)     I confirm that this process was performed independently.

## 2024-03-14 NOTE — PROGRESS NOTES
Chemotherapy Verification - PRIMARY RN      Height = 177 cm  Weight = 69.1 kg  BSA = 1.84 m^2       Medication: pemetrexed (Alimata)  Dose: 500 mg/m^2  Calculated Dose: 920 mg                             (In mg/m2, AUC, mg/kg)     I confirm this process was performed independently with the BSA and all final chemotherapy dosing calculations congruent.  Any discrepancies of 10% or greater have been addressed with the chemotherapy pharmacist. The resolution of the discrepancy has been documented in the EPIC progress notes.

## 2024-03-20 ENCOUNTER — PATIENT OUTREACH (OUTPATIENT)
Dept: ONCOLOGY | Facility: MEDICAL CENTER | Age: 74
End: 2024-03-20
Payer: COMMERCIAL

## 2024-03-20 NOTE — PROGRESS NOTES
"Follow up call for navigation and DST of 4/10.  Asked how he was doing after treatment last week.  Pt reports getting \"normal chemo results\", day 3 he is \"miserable as hell\" and takes awhile to start feeling better.  Asked  patient about his breathing and he reports \"not good today\"  yesterday checked it and was 94%.  Pt coughing while checking current oxygenation.  Started at 89% up to 91% with deep breathing and got up to 98%, per patient.  Eating is not going well, pt saying he is just not hungry.  He reported did not eat at all the other day.  Reviewed with him importance of nutrition and doing his best to eat.  Pt does have protein drinks, encouraged him to do his best to take those if not eating.  Pt asked about chocolate ice cream.  Reviewed if not eating, getting any calories in would help.  Discussed making milkshake with protein drinks to increase nutrition.  Pt will try this.  Pt started coughing with wet cough and stated he had no other questions with request to end call.  Continue to follow for navigation.  "

## 2024-03-21 ENCOUNTER — APPOINTMENT (OUTPATIENT)
Dept: HEMATOLOGY ONCOLOGY | Facility: MEDICAL CENTER | Age: 74
End: 2024-03-21
Payer: COMMERCIAL

## 2024-03-26 ENCOUNTER — APPOINTMENT (OUTPATIENT)
Dept: ONCOLOGY | Facility: MEDICAL CENTER | Age: 74
End: 2024-03-26
Payer: COMMERCIAL

## 2024-03-26 ENCOUNTER — APPOINTMENT (OUTPATIENT)
Dept: HEMATOLOGY ONCOLOGY | Facility: MEDICAL CENTER | Age: 74
End: 2024-03-26
Payer: COMMERCIAL

## 2024-04-02 RX ORDER — 0.9 % SODIUM CHLORIDE 0.9 %
VIAL (ML) INJECTION PRN
Status: CANCELLED | OUTPATIENT
Start: 2024-04-09

## 2024-04-02 RX ORDER — 0.9 % SODIUM CHLORIDE 0.9 %
VIAL (ML) INJECTION PRN
Status: CANCELLED | OUTPATIENT
Start: 2024-04-08

## 2024-04-02 RX ORDER — EPINEPHRINE 1 MG/ML(1)
0.5 AMPUL (ML) INJECTION PRN
Status: CANCELLED | OUTPATIENT
Start: 2024-04-09

## 2024-04-02 RX ORDER — 0.9 % SODIUM CHLORIDE 0.9 %
10 VIAL (ML) INJECTION PRN
Status: CANCELLED | OUTPATIENT
Start: 2024-04-09

## 2024-04-02 RX ORDER — SODIUM CHLORIDE 9 MG/ML
INJECTION, SOLUTION INTRAVENOUS CONTINUOUS
Status: CANCELLED | OUTPATIENT
Start: 2024-04-09

## 2024-04-02 RX ORDER — ONDANSETRON 8 MG/1
8 TABLET, ORALLY DISINTEGRATING ORAL PRN
Status: CANCELLED | OUTPATIENT
Start: 2024-04-09

## 2024-04-02 RX ORDER — 0.9 % SODIUM CHLORIDE 0.9 %
3 VIAL (ML) INJECTION PRN
Status: CANCELLED | OUTPATIENT
Start: 2024-04-08

## 2024-04-02 RX ORDER — PROCHLORPERAZINE MALEATE 10 MG
10 TABLET ORAL EVERY 6 HOURS PRN
Status: CANCELLED | OUTPATIENT
Start: 2024-04-09

## 2024-04-02 RX ORDER — ONDANSETRON 2 MG/ML
8 INJECTION INTRAMUSCULAR; INTRAVENOUS ONCE
Status: CANCELLED | OUTPATIENT
Start: 2024-04-09 | End: 2024-04-09

## 2024-04-02 RX ORDER — ONDANSETRON 2 MG/ML
4 INJECTION INTRAMUSCULAR; INTRAVENOUS PRN
Status: CANCELLED | OUTPATIENT
Start: 2024-04-09

## 2024-04-02 RX ORDER — METHYLPREDNISOLONE SODIUM SUCCINATE 125 MG/2ML
125 INJECTION, POWDER, LYOPHILIZED, FOR SOLUTION INTRAMUSCULAR; INTRAVENOUS PRN
Status: CANCELLED | OUTPATIENT
Start: 2024-04-09

## 2024-04-02 RX ORDER — DIPHENHYDRAMINE HYDROCHLORIDE 50 MG/ML
50 INJECTION INTRAMUSCULAR; INTRAVENOUS PRN
Status: CANCELLED | OUTPATIENT
Start: 2024-04-09

## 2024-04-02 RX ORDER — CYANOCOBALAMIN 1000 UG/ML
1000 INJECTION, SOLUTION INTRAMUSCULAR; SUBCUTANEOUS
Status: CANCELLED | OUTPATIENT
Start: 2024-04-09

## 2024-04-02 RX ORDER — 0.9 % SODIUM CHLORIDE 0.9 %
3 VIAL (ML) INJECTION PRN
Status: CANCELLED | OUTPATIENT
Start: 2024-04-09

## 2024-04-02 RX ORDER — 0.9 % SODIUM CHLORIDE 0.9 %
10 VIAL (ML) INJECTION PRN
Status: CANCELLED | OUTPATIENT
Start: 2024-04-08

## 2024-04-08 NOTE — PROGRESS NOTES
"Pharmacy Chemotherapy Verification    Dx: NSCLC  Cycle 15  Previous treatment = 3/14/24    Protocol: Pembrolizumab + Pemetrexed/CARBOplatin  Pembrolizumab 200 mg IV over 30 minutes on Day 1 followed by  Pemetrexed 500 mg/m2 IV over 10 minutes on Day 1 followed by  CARBOplatin AUC 5 IV over 30 minutes on Day 1  21-day cycle for 4-6 cycles  ~Followed by~  Pembrolizumab 200 mg IV over 30 minutes on Day 1 or Pembrolizumab 400 mg IV over 30 minutes every other cycle followed by  Pemetrexed 500 mg/m2 IV over 10 minutes on Day 1  Maintenance therapy: 21-day cycle until 24 months of therapy has been completed  ~Followed by~  Pemetrexed 500 mg/m2 IV over 10 minutes on Day 1   Maintenance therapy: 21-day cycle until DP/UT  NCCN Guidelines for Non-Small Cell Lung Cancer V.1.2023.  Nikki M, et al. Eur J Cancer. 2020;131:68-75.  Kelsey CJ, et al. Lancet Oncol. 2016;17(11):6899-2307.  Joelle SERNA et al. N Engl J Med. 2018;378(22):7476-4056.    Allergies:  Bacitracin-neomycin-polymyxin, Gabapentin, Ketorolac, Lipoglycopeptide antibiotics, and Tromethamine     /58   Pulse 90   Temp 36.2 °C (97.2 °F) (Temporal)   Resp 20   Ht 1.77 m (5' 9.69\")   Wt 71 kg (156 lb 8.4 oz)   SpO2 95%   BMI 22.66 kg/m²  Body surface area is 1.87 meters squared.    Labs 4/9/24:  ANC~ 7350 Plt = 351k   Hgb = 10.4     SCr = 0.91 mg/dL CrCl ~ 70 mL/min   AST/ALT/AP = 44/48/110 TBili = 0.2     Cyanocobalamin given every 9 weeks, last given 4/9/24, next due ~ 6/11/24    Pembrolizumab 400 mg fixed dose   No calculation required, OK to treat with final dose = 400 mg    Pemetrexed 500 mg/m² x 1.87 m² = 935 mg   <10% difference, OK to treat with final dose = 900 mg IV    Lawrence Garcia, PharmD  "

## 2024-04-09 ENCOUNTER — HOSPITAL ENCOUNTER (OUTPATIENT)
Dept: HEMATOLOGY ONCOLOGY | Facility: MEDICAL CENTER | Age: 74
End: 2024-04-09
Attending: STUDENT IN AN ORGANIZED HEALTH CARE EDUCATION/TRAINING PROGRAM
Payer: COMMERCIAL

## 2024-04-09 ENCOUNTER — OUTPATIENT INFUSION SERVICES (OUTPATIENT)
Dept: ONCOLOGY | Facility: MEDICAL CENTER | Age: 74
End: 2024-04-09
Attending: STUDENT IN AN ORGANIZED HEALTH CARE EDUCATION/TRAINING PROGRAM
Payer: COMMERCIAL

## 2024-04-09 VITALS
OXYGEN SATURATION: 96 % | BODY MASS INDEX: 22.68 KG/M2 | DIASTOLIC BLOOD PRESSURE: 50 MMHG | HEIGHT: 70 IN | HEART RATE: 93 BPM | SYSTOLIC BLOOD PRESSURE: 108 MMHG | WEIGHT: 158.4 LBS | TEMPERATURE: 99.5 F

## 2024-04-09 VITALS
WEIGHT: 156.53 LBS | TEMPERATURE: 97.2 F | SYSTOLIC BLOOD PRESSURE: 122 MMHG | RESPIRATION RATE: 20 BRPM | OXYGEN SATURATION: 95 % | HEART RATE: 90 BPM | DIASTOLIC BLOOD PRESSURE: 58 MMHG | HEIGHT: 70 IN | BODY MASS INDEX: 22.41 KG/M2

## 2024-04-09 DIAGNOSIS — C44.311 BASAL CELL CARCINOMA (BCC) OF SKIN OF NOSE: ICD-10-CM

## 2024-04-09 DIAGNOSIS — C34.91 ADENOCARCINOMA, LUNG, RIGHT (HCC): ICD-10-CM

## 2024-04-09 LAB
ALBUMIN SERPL BCP-MCNC: 3.2 G/DL (ref 3.2–4.9)
ALBUMIN/GLOB SERPL: 0.8 G/DL
ALP SERPL-CCNC: 110 U/L (ref 30–99)
ALT SERPL-CCNC: 48 U/L (ref 2–50)
ANION GAP SERPL CALC-SCNC: 12 MMOL/L (ref 7–16)
AST SERPL-CCNC: 44 U/L (ref 12–45)
BASOPHILS # BLD AUTO: 0.5 % (ref 0–1.8)
BASOPHILS # BLD: 0.06 K/UL (ref 0–0.12)
BILIRUB SERPL-MCNC: 0.2 MG/DL (ref 0.1–1.5)
BUN SERPL-MCNC: 18 MG/DL (ref 8–22)
CALCIUM ALBUM COR SERPL-MCNC: 8.9 MG/DL (ref 8.5–10.5)
CALCIUM SERPL-MCNC: 8.3 MG/DL (ref 8.5–10.5)
CHLORIDE SERPL-SCNC: 100 MMOL/L (ref 96–112)
CO2 SERPL-SCNC: 27 MMOL/L (ref 20–33)
CREAT SERPL-MCNC: 0.91 MG/DL (ref 0.5–1.4)
EOSINOPHIL # BLD AUTO: 0.29 K/UL (ref 0–0.51)
EOSINOPHIL NFR BLD: 2.5 % (ref 0–6.9)
ERYTHROCYTE [DISTWIDTH] IN BLOOD BY AUTOMATED COUNT: 70.4 FL (ref 35.9–50)
GFR SERPLBLD CREATININE-BSD FMLA CKD-EPI: 88 ML/MIN/1.73 M 2
GLOBULIN SER CALC-MCNC: 4 G/DL (ref 1.9–3.5)
GLUCOSE SERPL-MCNC: 72 MG/DL (ref 65–99)
HCT VFR BLD AUTO: 32.8 % (ref 42–52)
HGB BLD-MCNC: 10.4 G/DL (ref 14–18)
IMM GRANULOCYTES # BLD AUTO: 0.05 K/UL (ref 0–0.11)
IMM GRANULOCYTES NFR BLD AUTO: 0.4 % (ref 0–0.9)
LYMPHOCYTES # BLD AUTO: 2.6 K/UL (ref 1–4.8)
LYMPHOCYTES NFR BLD: 22.1 % (ref 22–41)
MCH RBC QN AUTO: 34.8 PG (ref 27–33)
MCHC RBC AUTO-ENTMCNC: 31.7 G/DL (ref 32.3–36.5)
MCV RBC AUTO: 109.7 FL (ref 81.4–97.8)
MONOCYTES # BLD AUTO: 1.44 K/UL (ref 0–0.85)
MONOCYTES NFR BLD AUTO: 12.2 % (ref 0–13.4)
NEUTROPHILS # BLD AUTO: 7.35 K/UL (ref 1.82–7.42)
NEUTROPHILS NFR BLD: 62.3 % (ref 44–72)
NRBC # BLD AUTO: 0 K/UL
NRBC BLD-RTO: 0 /100 WBC (ref 0–0.2)
OUTPT INFUS CBC COMMENT OICOM: ABNORMAL
PLATELET # BLD AUTO: 351 K/UL (ref 164–446)
PMV BLD AUTO: 9.6 FL (ref 9–12.9)
POTASSIUM SERPL-SCNC: 3.7 MMOL/L (ref 3.6–5.5)
PROT SERPL-MCNC: 7.2 G/DL (ref 6–8.2)
RBC # BLD AUTO: 2.99 M/UL (ref 4.7–6.1)
SODIUM SERPL-SCNC: 139 MMOL/L (ref 135–145)
WBC # BLD AUTO: 11.8 K/UL (ref 4.8–10.8)

## 2024-04-09 PROCEDURE — 96375 TX/PRO/DX INJ NEW DRUG ADDON: CPT

## 2024-04-09 PROCEDURE — 80053 COMPREHEN METABOLIC PANEL: CPT

## 2024-04-09 PROCEDURE — 700111 HCHG RX REV CODE 636 W/ 250 OVERRIDE (IP): Mod: JZ,JG | Performed by: NURSE PRACTITIONER

## 2024-04-09 PROCEDURE — 96413 CHEMO IV INFUSION 1 HR: CPT

## 2024-04-09 PROCEDURE — 96411 CHEMO IV PUSH ADDL DRUG: CPT

## 2024-04-09 PROCEDURE — 96372 THER/PROPH/DIAG INJ SC/IM: CPT

## 2024-04-09 PROCEDURE — 85025 COMPLETE CBC W/AUTO DIFF WBC: CPT

## 2024-04-09 PROCEDURE — 700105 HCHG RX REV CODE 258: Performed by: NURSE PRACTITIONER

## 2024-04-09 PROCEDURE — 99212 OFFICE O/P EST SF 10 MIN: CPT | Performed by: STUDENT IN AN ORGANIZED HEALTH CARE EDUCATION/TRAINING PROGRAM

## 2024-04-09 PROCEDURE — 99214 OFFICE O/P EST MOD 30 MIN: CPT | Performed by: STUDENT IN AN ORGANIZED HEALTH CARE EDUCATION/TRAINING PROGRAM

## 2024-04-09 RX ORDER — ONDANSETRON 2 MG/ML
8 INJECTION INTRAMUSCULAR; INTRAVENOUS ONCE
Status: COMPLETED | OUTPATIENT
Start: 2024-04-09 | End: 2024-04-09

## 2024-04-09 RX ORDER — CYANOCOBALAMIN 1000 UG/ML
1000 INJECTION, SOLUTION INTRAMUSCULAR; SUBCUTANEOUS
Status: DISCONTINUED | OUTPATIENT
Start: 2024-04-09 | End: 2024-04-09 | Stop reason: HOSPADM

## 2024-04-09 RX ADMIN — PEMETREXED DISODIUM 900 MG: 500 INJECTION, POWDER, LYOPHILIZED, FOR SOLUTION INTRAVENOUS at 17:24

## 2024-04-09 RX ADMIN — SODIUM CHLORIDE 400 MG: 9 INJECTION, SOLUTION INTRAVENOUS at 16:25

## 2024-04-09 RX ADMIN — ONDANSETRON 8 MG: 2 INJECTION INTRAMUSCULAR; INTRAVENOUS at 16:02

## 2024-04-09 RX ADMIN — CYANOCOBALAMIN 1000 MCG: 1000 INJECTION, SOLUTION INTRAMUSCULAR; SUBCUTANEOUS at 16:02

## 2024-04-09 ASSESSMENT — ENCOUNTER SYMPTOMS
EYE DISCHARGE: 0
TREMORS: 0
NERVOUS/ANXIOUS: 1
SHORTNESS OF BREATH: 1
NAUSEA: 0
COUGH: 0
WEIGHT LOSS: 0
CHILLS: 0
DIZZINESS: 0
MEMORY LOSS: 0
BRUISES/BLEEDS EASILY: 0
HEADACHES: 0
ORTHOPNEA: 0
VOMITING: 0
NECK PAIN: 0
FEVER: 0
ABDOMINAL PAIN: 0
DEPRESSION: 0
SORE THROAT: 0
SPUTUM PRODUCTION: 0
PALPITATIONS: 0
SENSORY CHANGE: 0
BLURRED VISION: 0
WHEEZING: 0
FOCAL WEAKNESS: 0
TINGLING: 0
HEARTBURN: 0

## 2024-04-09 ASSESSMENT — FIBROSIS 4 INDEX
FIB4 SCORE: 1.47
FIB4 SCORE: 1.47

## 2024-04-09 NOTE — ADDENDUM NOTE
Encounter addended by: Sunday Galeas on: 4/9/2024 3:03 PM   Actions taken: Charge Capture section accepted

## 2024-04-09 NOTE — PROGRESS NOTES
Chemotherapy Verification - SECONDARY RN       Height = 177 cm  Weight = 71 kg  BSA = 1.87 m2       Medication: Keytruda  Dose: 400 mg set dose  Calculated Dose: 400 mg                             (In mg/m2, AUC, mg/kg)     Medication: Alimta  Dose: 500 mg/m2  Calculated Dose: 935 mg                             (In mg/m2, AUC, mg/kg)    I confirm that this process was performed independently.
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
## 2024-04-09 NOTE — PROGRESS NOTES
"Pharmacy Chemotherapy Calculation:    Dx: NSCLC stage IV (cT1c, cN0, cM1a)     Protocol: CARBOplatin/PEMEtrexed + Pembrolizumab  Pembrolizumab 200 mg IV over 30 minutes on Day 1 followed by   Pemetrexed 500 mg/m2 IV over 10 minutes on Day 1 followed by   CARBOplatin AUC 5 IV over 30 minutes on Day 1   21 day cycle for 4-6 cycles *completed 7/18/23  ~Followed by~  Pembrolizumab 400 mg IV over 30 minutes on Day 1 every other cycle followed by   Pemetrexed 500 mg/m2 IV over 10 minutes on Day 1   21 day cycle until 24 months of therapy has been completed   ~Followed by~  Pemetrexed 500 mg/m2 IV over 10 minutes on Day 1   21 day cycle until DP or UT    NCCN Guidelines for Non-Small Cell Lung Cancer V.1.2023  Nikki M, et al. Eur J Cancer. 2020;131:68-75.  Kelsey CJ, et al. Lancet Oncol. 2016;17(11):7675-4729.  Joelle SERNA, et al. N Engl J Med.  2018;378(22):3523-1504.    Allergies:  Bacitracin-neomycin-polymyxin, Gabapentin, Ketorolac, Lipoglycopeptide antibiotics, and Tromethamine     /58   Pulse 90   Temp 36.2 °C (97.2 °F) (Temporal)   Resp 20   Ht 1.77 m (5' 9.69\")   Wt 71 kg (156 lb 8.4 oz)   SpO2 95%   BMI 22.66 kg/m²  Body surface area is 1.87 meters squared.    Labs 4/9/24:  ANC~ 7350 Plt = 351 k   Hgb = 10.4     SCr = 0.91 mg/dL CrCl ~ 70 mL/min   AST/ALT/AP = 44/48/110 TBili = 0.2     Labs 3/14/24:  TSH = 2.280   Free T4 = 1.57        Received Cyanacobalmin injection 4/9/24     Drug Order   (Drug name, dose, route, IV Fluid & volume, frequency, number of doses) Cycle 15   Previous treatment: C14 on 3/14/24     Medication = Pembrolizumab   Base Dose= 400 mg   Fixed dose, no calculation required.  Final Dose = 400 mg  Route = IV  Fluid & Volume = NS 50 mL  Admin Duration = Over 30 minutes   Every OTHER cycle  (ODD cycles)      Fixed dose, no calculation required. Okay to treat with final dose.   Medication = PEMEtrexed   Base Dose= 500 mg/m²   Calc Dose: Base Dose x 1.87 m² = 935 mg  Final Dose = 900 " mg  Route = IV  Fluid & Volume =  mL  Admin Duration = Over 10 minutes          <10% difference, okay to treat with final dose     By my signature below, I confirm this process was performed independently with the BSA and all final chemotherapy dosing calculations congruent. I have reviewed the above chemotherapy order and that my calculation of the final dose and BSA (when applicable) corroborate those calculations of the  pharmacist. Discrepancies of 10% or greater in the written dose have been addressed and documented within the EPIC Progress notes.    Laura Najera, PharmD, BCOP

## 2024-04-09 NOTE — PROGRESS NOTES
Chemotherapy Verification - PRIMARY RN  C15 D1   Vitamin B-12 given today     Height = 177 cm  Weight = 71 kg  BSA = 1.87 m^2       Medication: pembrolizumab (KEYTRUDA)  Dose: 400 mg set dose  Calculated Dose: 400 mg set dose                             (In mg/m2, AUC, mg/kg)     Medication: pemetrexed (ALIMTA)  Dose: 500 mg/m2  Calculated Dose: 935 mg                              (In mg/m2, AUC, mg/kg)      I confirm this process was performed independently with the BSA and all final chemotherapy dosing calculations congruent.  Any discrepancies of 10% or greater have been addressed with the chemotherapy pharmacist. The resolution of the discrepancy has been documented in the EPIC progress notes.

## 2024-04-10 ENCOUNTER — TELEPHONE (OUTPATIENT)
Dept: HEMATOLOGY ONCOLOGY | Facility: MEDICAL CENTER | Age: 74
End: 2024-04-10
Payer: COMMERCIAL

## 2024-04-10 NOTE — PROGRESS NOTES
Tyler arrives to [facility] for C15D1 Keytruda/Alimta for right lung adenocarcinoma. Patient denies acute health concerns. Denies s/s active infections, open wounds, and sores in mouth (though presently has dental issues/broken teeth). 24g PIV placed to LFA, which flushes easily and has brisk blood return. Labs drawn, reviewed, and meets parameters to proceed with treatment. Premedication administered with Vitamin B-12 injection (given IM to L deltoid). Keytruda infused without adverse s/s. At end of keytruda infusion, PIV alarming occluded without s/s infiltration nor phlebitis. Upon removal of PIV, line was kinked. PIV removed with tip intact. New 22g PIV placed to LAC which flushes easily and has brisk blood return. Alimta then infused over 10 mins without adverse s/s. Patient tolerated infusions well. PIV with brisk blood return, flushed, and removed with tip intact. Patient has his next appt in 3 weeks. Discharged home to self care in no apparent distress.

## 2024-04-10 NOTE — TELEPHONE ENCOUNTER
Called Oxygen places here asking about International Travel and found that there is no coverage overseas.  Called the Pt at the request of Dr Gibson r/terence travel and oxygen concentrator while in [place].  Discussed with the Pt that for International travel there may be some type of health coverage that can be purchased through the travel agency or airline.  Also, warned the Pt to be sure that the plugs are compatible.  Pt thanked me for the follow up.

## 2024-04-16 ENCOUNTER — APPOINTMENT (OUTPATIENT)
Dept: HEMATOLOGY ONCOLOGY | Facility: MEDICAL CENTER | Age: 74
End: 2024-04-16
Payer: COMMERCIAL

## 2024-04-24 RX ORDER — ONDANSETRON 8 MG/1
8 TABLET, ORALLY DISINTEGRATING ORAL PRN
OUTPATIENT
Start: 2024-04-30

## 2024-04-24 RX ORDER — SODIUM CHLORIDE 9 MG/ML
INJECTION, SOLUTION INTRAVENOUS CONTINUOUS
OUTPATIENT
Start: 2024-04-30

## 2024-04-24 RX ORDER — 0.9 % SODIUM CHLORIDE 0.9 %
10 VIAL (ML) INJECTION PRN
Status: CANCELLED | OUTPATIENT
Start: 2024-04-29

## 2024-04-24 RX ORDER — 0.9 % SODIUM CHLORIDE 0.9 %
3 VIAL (ML) INJECTION PRN
OUTPATIENT
Start: 2024-04-30

## 2024-04-24 RX ORDER — PROCHLORPERAZINE MALEATE 10 MG
10 TABLET ORAL EVERY 6 HOURS PRN
OUTPATIENT
Start: 2024-04-30

## 2024-04-24 RX ORDER — 0.9 % SODIUM CHLORIDE 0.9 %
VIAL (ML) INJECTION PRN
Status: CANCELLED | OUTPATIENT
Start: 2024-04-29

## 2024-04-24 RX ORDER — ONDANSETRON 2 MG/ML
4 INJECTION INTRAMUSCULAR; INTRAVENOUS PRN
OUTPATIENT
Start: 2024-04-30

## 2024-04-24 RX ORDER — DIPHENHYDRAMINE HYDROCHLORIDE 50 MG/ML
50 INJECTION INTRAMUSCULAR; INTRAVENOUS PRN
OUTPATIENT
Start: 2024-04-30

## 2024-04-24 RX ORDER — 0.9 % SODIUM CHLORIDE 0.9 %
3 VIAL (ML) INJECTION PRN
Status: CANCELLED | OUTPATIENT
Start: 2024-04-29

## 2024-04-24 RX ORDER — 0.9 % SODIUM CHLORIDE 0.9 %
10 VIAL (ML) INJECTION PRN
OUTPATIENT
Start: 2024-04-30

## 2024-04-24 RX ORDER — METHYLPREDNISOLONE SODIUM SUCCINATE 125 MG/2ML
125 INJECTION, POWDER, LYOPHILIZED, FOR SOLUTION INTRAMUSCULAR; INTRAVENOUS PRN
OUTPATIENT
Start: 2024-04-30

## 2024-04-24 RX ORDER — 0.9 % SODIUM CHLORIDE 0.9 %
VIAL (ML) INJECTION PRN
OUTPATIENT
Start: 2024-04-30

## 2024-04-24 RX ORDER — ONDANSETRON 2 MG/ML
8 INJECTION INTRAMUSCULAR; INTRAVENOUS ONCE
OUTPATIENT
Start: 2024-04-30 | End: 2024-04-30

## 2024-04-24 RX ORDER — CYANOCOBALAMIN 1000 UG/ML
1000 INJECTION, SOLUTION INTRAMUSCULAR; SUBCUTANEOUS
OUTPATIENT
Start: 2024-04-30

## 2024-04-24 RX ORDER — EPINEPHRINE 1 MG/ML(1)
0.5 AMPUL (ML) INJECTION PRN
OUTPATIENT
Start: 2024-04-30

## 2024-04-27 ENCOUNTER — HOSPITAL ENCOUNTER (OUTPATIENT)
Dept: RADIOLOGY | Facility: MEDICAL CENTER | Age: 74
End: 2024-04-27

## 2024-04-27 ENCOUNTER — TELEPHONE (OUTPATIENT)
Dept: HEMATOLOGY ONCOLOGY | Facility: MEDICAL CENTER | Age: 74
End: 2024-04-27
Payer: COMMERCIAL

## 2024-04-28 NOTE — TELEPHONE ENCOUNTER
After Hours Call    Medical Hematologist/Oncologist: Dr. Gibson  Diagnosis: Lung cancer  Treatment: Pembrolizumab and Pemetrexed, cycle 16 scheduled for 4/30/2024.    Reason for call:  Received phone call from emergency room department MD in [place], [place].      Pertinent Information:  Patient presented with shortness of breath.  His hemoglobin was low at 8.6.  His oxygen was less than 90% requiring 5 L.  Patient received 1 L of fluid with improvement in his overall clinical status.  Chest x-ray was completed which showed COPD.    Plan:  Recommend CT chest to rule out pneumonitis.  With pneumonitis is noted, patient on immunotherapy and would require 1 mg/kg of prednisone.  If infection questionable, okay to start antibiotics.  Will defer to ER MD as to whether patient should be admitted to the hospital.  Will check in on patient next week.  Patient is due to follow-up in the clinic on 4/30/2024.      IFRAH Onofre  [facility]  Office of Lizzeth Martinez Schwartzberg, and [place]

## 2024-04-29 ENCOUNTER — TELEPHONE (OUTPATIENT)
Dept: PALLIATIVE MEDICINE | Facility: HOSPICE | Age: 74
End: 2024-04-29
Payer: COMMERCIAL

## 2024-04-29 ENCOUNTER — HOSPITAL ENCOUNTER (OUTPATIENT)
Dept: RADIOLOGY | Facility: MEDICAL CENTER | Age: 74
End: 2024-04-29

## 2024-04-29 NOTE — TELEPHONE ENCOUNTER
Fax referral received from [facility] for [place]. Patient currently lives in [place] as verified by him via telephone call. I told Tyler that I would let the MD who sent the referral know that we would not be able to see him in his home and recommend a referral to a palliative care provider that he could visit in his office.     [facility] and let [facility] know to redirect the [facility].

## 2024-04-30 ENCOUNTER — HOSPITAL ENCOUNTER (EMERGENCY)
Facility: MEDICAL CENTER | Age: 74
End: 2024-04-30
Attending: EMERGENCY MEDICINE
Payer: COMMERCIAL

## 2024-04-30 ENCOUNTER — OUTPATIENT INFUSION SERVICES (OUTPATIENT)
Dept: ONCOLOGY | Facility: MEDICAL CENTER | Age: 74
End: 2024-04-30
Attending: STUDENT IN AN ORGANIZED HEALTH CARE EDUCATION/TRAINING PROGRAM
Payer: COMMERCIAL

## 2024-04-30 ENCOUNTER — APPOINTMENT (OUTPATIENT)
Dept: RADIOLOGY | Facility: MEDICAL CENTER | Age: 74
End: 2024-04-30
Attending: EMERGENCY MEDICINE
Payer: COMMERCIAL

## 2024-04-30 ENCOUNTER — HOSPITAL ENCOUNTER (OUTPATIENT)
Dept: HEMATOLOGY ONCOLOGY | Facility: MEDICAL CENTER | Age: 74
End: 2024-04-30
Attending: NURSE PRACTITIONER
Payer: COMMERCIAL

## 2024-04-30 VITALS
HEART RATE: 79 BPM | WEIGHT: 156 LBS | SYSTOLIC BLOOD PRESSURE: 118 MMHG | RESPIRATION RATE: 18 BRPM | OXYGEN SATURATION: 98 % | TEMPERATURE: 97.8 F | BODY MASS INDEX: 22.59 KG/M2 | DIASTOLIC BLOOD PRESSURE: 62 MMHG

## 2024-04-30 VITALS
TEMPERATURE: 96.9 F | HEIGHT: 70 IN | OXYGEN SATURATION: 97 % | SYSTOLIC BLOOD PRESSURE: 112 MMHG | BODY MASS INDEX: 22.33 KG/M2 | WEIGHT: 156 LBS | DIASTOLIC BLOOD PRESSURE: 54 MMHG | RESPIRATION RATE: 18 BRPM | HEART RATE: 89 BPM

## 2024-04-30 VITALS
OXYGEN SATURATION: 97 % | DIASTOLIC BLOOD PRESSURE: 46 MMHG | RESPIRATION RATE: 20 BRPM | HEIGHT: 70 IN | TEMPERATURE: 97.4 F | SYSTOLIC BLOOD PRESSURE: 100 MMHG | HEART RATE: 83 BPM | WEIGHT: 156.97 LBS | BODY MASS INDEX: 22.47 KG/M2

## 2024-04-30 DIAGNOSIS — K92.0 HEMATEMESIS WITH NAUSEA: ICD-10-CM

## 2024-04-30 DIAGNOSIS — K22.6 MALLORY-WEISS SYNDROME: ICD-10-CM

## 2024-04-30 DIAGNOSIS — Z79.899 ENCOUNTER FOR LONG-TERM (CURRENT) USE OF HIGH-RISK MEDICATION: ICD-10-CM

## 2024-04-30 DIAGNOSIS — C34.91 ADENOCARCINOMA, LUNG, RIGHT (HCC): ICD-10-CM

## 2024-04-30 DIAGNOSIS — R74.01 TRANSAMINITIS: ICD-10-CM

## 2024-04-30 DIAGNOSIS — D64.9 ANEMIA, UNSPECIFIED TYPE: ICD-10-CM

## 2024-04-30 LAB
ALBUMIN SERPL BCP-MCNC: 3.1 G/DL (ref 3.2–4.9)
ALBUMIN/GLOB SERPL: 0.8 G/DL
ALP SERPL-CCNC: 103 U/L (ref 30–99)
ALT SERPL-CCNC: 150 U/L (ref 2–50)
ANION GAP SERPL CALC-SCNC: 11 MMOL/L (ref 7–16)
AST SERPL-CCNC: 90 U/L (ref 12–45)
BASOPHILS # BLD AUTO: 0 % (ref 0–1.8)
BASOPHILS # BLD: 0 K/UL (ref 0–0.12)
BILIRUB SERPL-MCNC: 0.2 MG/DL (ref 0.1–1.5)
BUN SERPL-MCNC: 31 MG/DL (ref 8–22)
CALCIUM ALBUM COR SERPL-MCNC: 9.1 MG/DL (ref 8.5–10.5)
CALCIUM SERPL-MCNC: 8.4 MG/DL (ref 8.5–10.5)
CHLORIDE SERPL-SCNC: 101 MMOL/L (ref 96–112)
CO2 SERPL-SCNC: 28 MMOL/L (ref 20–33)
CREAT SERPL-MCNC: 0.96 MG/DL (ref 0.5–1.4)
EOSINOPHIL # BLD AUTO: 0.09 K/UL (ref 0–0.51)
EOSINOPHIL NFR BLD: 1 % (ref 0–6.9)
ERYTHROCYTE [DISTWIDTH] IN BLOOD BY AUTOMATED COUNT: 62.4 FL (ref 35.9–50)
GFR SERPLBLD CREATININE-BSD FMLA CKD-EPI: 82 ML/MIN/1.73 M 2
GLOBULIN SER CALC-MCNC: 3.7 G/DL (ref 1.9–3.5)
GLUCOSE SERPL-MCNC: 98 MG/DL (ref 65–99)
HCT VFR BLD AUTO: 28.2 % (ref 42–52)
HGB BLD-MCNC: 9.2 G/DL (ref 14–18)
IMM GRANULOCYTES # BLD AUTO: 0.04 K/UL (ref 0–0.11)
IMM GRANULOCYTES NFR BLD AUTO: 0.5 % (ref 0–0.9)
LYMPHOCYTES # BLD AUTO: 1.78 K/UL (ref 1–4.8)
LYMPHOCYTES NFR BLD: 20 % (ref 22–41)
MCH RBC QN AUTO: 35.5 PG (ref 27–33)
MCHC RBC AUTO-ENTMCNC: 32.6 G/DL (ref 32.3–36.5)
MCV RBC AUTO: 108.9 FL (ref 81.4–97.8)
MONOCYTES # BLD AUTO: 1.07 K/UL (ref 0–0.85)
MONOCYTES NFR BLD AUTO: 12 % (ref 0–13.4)
NEUTROPHILS # BLD AUTO: 5.9 K/UL (ref 1.82–7.42)
NEUTROPHILS NFR BLD: 66.5 % (ref 44–72)
NRBC # BLD AUTO: 0 K/UL
NRBC BLD-RTO: 0 /100 WBC (ref 0–0.2)
OUTPT INFUS CBC COMMENT OICOM: ABNORMAL
PLATELET # BLD AUTO: 302 K/UL (ref 164–446)
PMV BLD AUTO: 10.4 FL (ref 9–12.9)
POTASSIUM SERPL-SCNC: 3.8 MMOL/L (ref 3.6–5.5)
PROT SERPL-MCNC: 6.8 G/DL (ref 6–8.2)
RBC # BLD AUTO: 2.59 M/UL (ref 4.7–6.1)
SODIUM SERPL-SCNC: 140 MMOL/L (ref 135–145)
T4 FREE SERPL-MCNC: 1.43 NG/DL (ref 0.93–1.7)
TSH SERPL DL<=0.005 MIU/L-ACNC: 2.56 UIU/ML (ref 0.38–5.33)
WBC # BLD AUTO: 8.9 K/UL (ref 4.8–10.8)

## 2024-04-30 PROCEDURE — 36415 COLL VENOUS BLD VENIPUNCTURE: CPT

## 2024-04-30 PROCEDURE — 99212 OFFICE O/P EST SF 10 MIN: CPT | Performed by: NURSE PRACTITIONER

## 2024-04-30 RX ORDER — PREDNISONE 20 MG/1
60 TABLET ORAL DAILY
Status: ON HOLD | COMMUNITY
End: 2024-05-18

## 2024-04-30 RX ORDER — METHYLPREDNISOLONE SODIUM SUCCINATE 2 G/30.6ML
70 INJECTION, POWDER, FOR SOLUTION INTRAMUSCULAR; INTRAVENOUS DAILY
COMMUNITY
Start: 2024-04-27 | End: 2024-04-30

## 2024-04-30 RX ORDER — ONDANSETRON 4 MG/1
4 TABLET, ORALLY DISINTEGRATING ORAL ONCE
Status: COMPLETED | OUTPATIENT
Start: 2024-04-30 | End: 2024-04-30

## 2024-04-30 RX ORDER — FOLIC ACID 1 MG/1
1 TABLET ORAL SEE ADMIN INSTRUCTIONS
COMMUNITY

## 2024-04-30 RX ORDER — ALBUTEROL SULFATE 90 UG/1
2 AEROSOL, METERED RESPIRATORY (INHALATION) EVERY 4 HOURS PRN
Status: ON HOLD | COMMUNITY
End: 2024-05-26

## 2024-04-30 RX ORDER — OMEPRAZOLE 20 MG/1
20 CAPSULE, DELAYED RELEASE ORAL ONCE
Status: COMPLETED | OUTPATIENT
Start: 2024-04-30 | End: 2024-04-30

## 2024-04-30 RX ORDER — OMEPRAZOLE 20 MG/1
20 CAPSULE, DELAYED RELEASE ORAL 2 TIMES DAILY
Qty: 60 CAPSULE | Refills: 0 | Status: SHIPPED | OUTPATIENT
Start: 2024-04-30

## 2024-04-30 RX ORDER — TIOTROPIUM BROMIDE INHALATION SPRAY 3.12 UG/1
5 SPRAY, METERED RESPIRATORY (INHALATION) DAILY
Status: ON HOLD | COMMUNITY
End: 2024-05-26

## 2024-04-30 RX ADMIN — ONDANSETRON 4 MG: 4 TABLET, ORALLY DISINTEGRATING ORAL at 12:41

## 2024-04-30 RX ADMIN — OMEPRAZOLE 20 MG: 20 CAPSULE, DELAYED RELEASE ORAL at 14:19

## 2024-04-30 RX ADMIN — IOHEXOL 15 ML: 240 INJECTION, SOLUTION INTRATHECAL; INTRAVASCULAR; INTRAVENOUS; ORAL at 15:45

## 2024-04-30 RX ADMIN — IOHEXOL 100 ML: 350 INJECTION, SOLUTION INTRAVENOUS at 15:24

## 2024-04-30 ASSESSMENT — ENCOUNTER SYMPTOMS
WEIGHT LOSS: 0
DIARRHEA: 0
COUGH: 1
SHORTNESS OF BREATH: 1
CONSTIPATION: 0
FEVER: 0
PALPITATIONS: 0
DIZZINESS: 1
HEADACHES: 0
VOMITING: 1
CHILLS: 0
TINGLING: 0
NAUSEA: 0

## 2024-04-30 ASSESSMENT — FIBROSIS 4 INDEX
FIB4 SCORE: 1.36
FIB4 SCORE: 1.36
FIB4 SCORE: 1.82

## 2024-04-30 ASSESSMENT — PAIN SCALES - GENERAL: PAINLEVEL: NO PAIN

## 2024-04-30 NOTE — ED NOTES
Assist RN: Medicated patient per MAR. Patient denies further needs. Family at bedside. Call light within reach.

## 2024-04-30 NOTE — PROGRESS NOTES
Pt presented to [facility] for D1C16 OP NSC Lung Alimta. POC discussed and pt verbalized understanding.    Was notified by IFRAH Onofre that the patient will need his labs drawn, and will need to report to the emergency department based on his results as he had reported to the emergency department in [place] with concerning symptoms and lab results.   Patient was updated on POC and was agreeable. 23g butterfly used to obtain lab results in the left AC; site covered with gauze and coban.   Labs were reviewed and notified Marcy. Per Marcy, patient is to check into the emergency department. She reports that she notified ER charge RN. Patient and son were escorted to ER check in in [facility].

## 2024-04-30 NOTE — ED TRIAGE NOTES
Pt to triage via w/c c/o possible gi bleed. Per pt he had several episodes of vomiting this am and is patient states there may have been blood in there. Pt takes xerelto. Denies pain. Nad. vss

## 2024-04-30 NOTE — ADDENDUM NOTE
Encounter addended by: Cyn Reyes, Med Ass't on: 4/30/2024 10:16 AM   Actions taken: Charge Capture section accepted

## 2024-04-30 NOTE — ED PROVIDER NOTES
"ER Provider Note    Scribed for Everett Mccormick M.d. by Rik Britton. 4/30/2024  12:21 PM    Primary Care Provider: Marcy Jose M.D.    CHIEF COMPLAINT   Chief Complaint   Patient presents with    Abnormal Labs     EXTERNAL RECORDS REVIEWED  Outpatient Notes The patient was seen in office today. They recommended he come here for concerns of immunotherapy related hepatitis.    HPI/ROS  LIMITATION TO HISTORY   Select: : None  OUTSIDE HISTORIAN(S):  Family members were present and provided history regarding the patient's lab results.    Tyler Zarate is a 74 y.o. male who presents to the ED complaining of abnormal labs. The patient's family reports the patient had labs done over the weekend because he was \"not doing good\". These labs revealed he had low hemoglobin and a hematocrit of 8.6. His liver enzymes were also severely elevated from baseline. He had his labs redone today and there was concerns for a possible bleed. The patient reports associated vomiting on Sunday night that was initially white but became dark like coffee grounds after some time. This lasted for 4 hours and did not occur again afterwards. He also notes pain in his left sciatic nerve which is chronic. He denies any hematochezia, melena, hematuria, or bleeding gums. The patient notes he does chemotherapy and his oncologist is Dr. Olivera. He takes Xarelto 20 mg, but denies taking any antacid medications. He denies any history of GI bleed or cirrhosis. The patient reports he has not had any alcohol for 4 months and has no history of heavy drinking.     PAST MEDICAL HISTORY  Past Medical History:   Diagnosis Date    Basal cell carcinoma (BCC) of skin of nose     Cancer (HCC)     skin, Lung    Carcinoma in situ of respiratory system     Lung 2023    Cataract     COPD (chronic obstructive pulmonary disease) (HCC)     Cough     DVT (deep venous thrombosis) (HCC)     RLL    Lumbar radiculopathy     Pain     lower back and sciatic " pain    Pneumonia     2018    Shortness of breath     Skin carcinoma     Sputum production     Synovial cyst     L spine       SURGICAL HISTORY  Past Surgical History:   Procedure Laterality Date    CT BRONCHOSCOPY,DIAGNOSTIC  4/12/2023    Procedure: FIBER OPTIC BRONCHOSCOPY WITH  WASH, BRUSH, BRONCHOALVEOLAR LAVAGE, BIOPSY AND FINE NEEDLE ASPIRATION & NAVIGATION, ROBOTICS;  Surgeon: Vladimir Ortiz M.D.;  Location: SURGERY [facility];  Service: Pulmonary Robotic    CATARACT PHACO WITH IOL Bilateral        FAMILY HISTORY  Family History   Problem Relation Age of Onset    Cancer Brother         lung       SOCIAL HISTORY   reports that he has been smoking cigarettes. He started smoking about 59 years ago. He has a 29.7 pack-year smoking history. He has never been exposed to tobacco smoke. He has quit using smokeless tobacco.  His smokeless tobacco use included chew. He reports that he does not currently use alcohol after a past usage of about 8.4 oz of alcohol per week. He reports that he does not use drugs.    CURRENT MEDICATIONS  Previous Medications    ACETAMINOPHEN (TYLENOL) 500 MG TAB    Take 500-1,000 mg by mouth every 6 hours as needed.    ALBUTEROL INH    Inhale.    FLUTICASONE-SALMETEROL (ADVAIR) 250-50 MCG/ACT AEROSOL POWDER, BREATH ACTIVATED        METHYLPREDNISOLONE SODIUM SUCCINATE (SOLU-MEDROL) 2000 MG INJECTION    Take 70 mg by mouth every day at 6 PM. Unknown on duration to take    ONDANSETRON (ZOFRAN) 4 MG TAB TABLET    Take 1 Tablet by mouth every four hours as needed for Nausea/Vomiting (for nausea, vomiting).    PROCHLORPERAZINE (COMPAZINE) 10 MG TAB    Take 1 Tablet by mouth every 6 hours as needed (for nausea, vomiting).    RIVAROXABAN (XARELTO) 15 MG TAB TABLET    Take 15 mg by mouth 2 (two) times a day. TAKE FOR 21 DAYS    RIVAROXABAN (XARELTO) 20 MG TAB TABLET    Take 10 mg by mouth every day. Taking 10 mg daily from 20 mg    TIOTROPIUM (SPIRIVA) 18 MCG CAP    Place 18 mcg into inhaler  and inhale every day.       ALLERGIES  Bacitracin-neomycin-polymyxin, Gabapentin, Ketorolac, Lipoglycopeptide antibiotics, Tromethamine, and Celecoxib    PHYSICAL EXAM  /49   Pulse 78   Temp 37.2 °C (98.9 °F) (Temporal)   Resp 16   Wt 70.8 kg (156 lb)   SpO2 97%   BMI 22.59 kg/m²   Constitutional: Ill appearing, Cachectic, Mild distress.   HENT: Normocephalic, Atraumatic, Basal cell carcinoma over right nares.  Eyes: Conjunctiva normal, No discharge.   Cardiovascular: Normal heart rate, Normal rhythm, No murmurs, equal pulses.   Pulmonary: Normal breath sounds, No respiratory distress, No wheezing, No rales, No rhonchi.  Chest: No chest wall tenderness or deformity.   Abdomen:Soft, No tenderness, No masses, no rebound, no guarding.   Rectal: Brown stool guaiac negative.  Musculoskeletal: No major deformities noted, No tenderness.   Skin: Warm, Dry, No erythema, No rash.   Neurologic: Alert & oriented x 3, Normal motor function,  No focal deficits noted.   Psychiatric: Affect normal, Judgment normal, Mood normal.      DIAGNOSTIC STUDIES    RADIOLOGY/PROCEDURES   Radiologist interpretation:  CT-CHEST,ABDOMEN,PELVIS WITH   Final Result      1.  There is overall stable disease.   2.  The anterior right upper lobe lung nodule and associated parenchymal airspace opacity with bronchiectasis is similar to the most recent studies. Unable to measure a discrete mass.   3.  Other bilateral areas of parenchymal scarring and minimal pleural thickening are unchanged. Stable chronic trace left pleural effusion.   4.  There is emphysema/COPD.   5.  No evidence of metastatic adenopathy in the chest.   6.  No evidence of metastatic disease in the abdomen or pelvis.          COURSE & MEDICAL DECISION MAKING     ASSESSMENT, COURSE AND PLAN  Care Narrative:     12:32 PM - Patient seen and examined at bedside. The patient is a 74 year old male who presents to the ED for evaluation of abnormal labs. He had labs done on Saturday  that showed low hemoglobin and a hematocrit of 8.6. The patient reports associated vomiting that was white initially but became dark on Sunday night, but denies any hematochezia, melena, hematuria, or bleeding gums. Discussed plan of care, including getting labs and imaging and discussing the patient's case with Dr. Gibson (oncology). Patient agrees to the plan of care. I informed the patient his hemoglobin is up to 9.2, but his liver enzymes have doubled since they were last done. I paged Dr. Gibson. The patient will be treated with Zofran 4 mg PO. Differential diagnoses include but not limited to: GI bleed, Brittaney-Bryson tear, anemia    1:53 PM - The patient will be treated with omeprazole 20 mg PO.     2:09 PM - I discussed the patient's case and the above findings with Dr. Gibson (oncology) who recommends a CT chest abdomen pelvis with contrast. Ordered for  CT-chest, abdomen, pelvis to evaluate.     5:26 PM - I reevaluated the patient at bedside. The patient informs me they feel improved following Zofran and omeprazole administration. I discussed the patient's diagnostic study results. I discussed plan for discharge and follow up as outlined below. The patient is stable for discharge at this time and will return for any new or worsening symptoms. Patient verbalizes understanding and support with my plan for discharge.      ED OBS: No; Patient does not meet criteria for ED Observation.            PROBLEM LIST  Problem #1 GI bleed at this point time given the fact the patient been vomiting and then had coffee-ground emesis I suspect the patient most likely had a Brittaney-Bryson tear or gastritis causing this bleeding.  Patient's anemia is actually improved.  At this point time patient does not want to stay in the hospital for EGD given the fact that he is guaiac negative I do not think he has any continued bleeding.  Will start him on a PPI.  Discussed with him he may need to follow-up with GI if he continues to  have continued bleeding or elevation in his transaminases.    Problem #2 elevated transaminases at this point in time do not see any liver metastasis or exact cause.  This may be secondary to the immune modulator medications he is taking.  I have made his oncologist aware of his numbers.  At this point time I think he can follow-up as an outpatient.    DISPOSITION AND DISCUSSIONS  I have discussed management of the patient with the following physicians and SPENCER's:  Dr. Gibson (oncology),    Discussion of management with other \[facility] [place] [facility]\"" or appropriate source(s): None     Escalation of care considered, and ultimately not performed: acute inpatient care management, however at this time, the patient is most appropriate for outpatient management.    Barriers to care at this time, including but not limited to:  None .     Decision tools and prescription drugs considered including, but not limited to:  Patient will be started on PPI .    The patient will return for new or worsening symptoms and is stable at the time of discharge.    DISPOSITION:  Patient will be discharged home in stable condition.    FOLLOW UP:  Peg Gibson M.D.  [address] [place] [address] [id number] [phone]    Schedule an appointment as soon as possible for a visit       [address] [address]  [phone]    If you need a  GI doctor    [facility] - [address] [id number] [phone]    If you need a  Gi doctor      OUTPATIENT MEDICATIONS:  New Prescriptions    OMEPRAZOLE (PRILOSEC) 20 MG DELAYED-RELEASE CAPSULE    Take 1 Capsule by mouth 2 times a day.        FINAL DIAGNOSIS  1. Brittaney-Bryson syndrome    2. Anemia, unspecified type    3. Transaminitis    4. Hematemesis with nausea       IRik (April), am scribing for, and in the presence of, JD Flores*.    Electronically signed by: Rik Whiteside), 4/30/2024    STEFANI  KANWAL Flores.* personally performed the services described in this documentation, as scribed by Rik Britton in my presence, and it is both accurate and complete.      The note accurately reflects work and decisions made by me.  Everett Mccormick M.D.  5/1/2024  9:32 PM
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
## 2024-05-01 NOTE — DISCHARGE INSTRUCTIONS
Return the emergency department if you have significant vomiting blood, lightheadedness or passout.  Or heavy rectal bleeding.  Take the Prilosec daily.  Follow-up with a GI doctor you may need endoscopy

## 2024-05-01 NOTE — ED NOTES
Med rec is complete per [facility].   Pt does not know his medications. I tried to call Son, but number in chart is disconnected.   [facility] Chem[facility] Carboplatin, Pemetrexed and Pembrolizumab.    Allergies reviewed.    Has patient had any outside antibiotics in the last 30 days? Unk    Any Anticoagulants (rivaroxaban, apixaban, edoxaban, dabigatran, warfarin, enoxaparin) taken in the last 14 days? Y  Anticoagulant name: Xarelto, Last dose: unk.        Pharmacy/Pharmacies Pt utilizes : [facility]

## 2024-05-09 NOTE — DOCUMENTATION QUERY
[facility]                                                                       Query Response Note      PATIENT:               DENISE ABREU  ACCT #:                  [id number]  MRN:                     [id number]  :                      1950  ADMIT DATE:       2024 12:06 PM  DISCH DATE:        2024 6:01 PM  RESPONDING  PROVIDER #:        [id number]           QUERY TEXT:    Please clarify in documentation the relationship, if any, between the anemia and the chemotherapy for lung cancer, and or the Brittaney-Bryson Syndrome    *Note: If the appropriate response is not listed below, please respond with a new note.    Nate Schober, CCA [email or web address]    The patient's Clinical Indicators include:  Clinical Indicators: Patient presents with hematemesis, outside lab results for H/H of 8.6 as well as elevated liver enzymes.  It is also noted that patient is receiving Chemo for lung cancer.  The final diagnosis includes Brittaney-Bryson Syndrome.    Treatment: No labs were drawn, chest x-ray taken    Risk Factors: N/A  Options provided:   -- Anemia is due to or associated with Chemotherapy only   -- Anemia is due to or associated with Brittaney-Bryson Syndrome only   -- Anemia is due to or associated with both Chemotherapy and Brittaney-Bryson Syndrome   -- Anemia is not due to or associated with Chemotherapy or Brittaney-Bryson Syndrome   -- Other explanation, please specify with a new note   -- Unable to determine      Query created by: Schober, Nate on 5/3/2024 12:08 PM    RESPONSE TEXT:    Anemia is due to or associated with both Chemotherapy and Brittaney-Bryson Syndrome          Electronically signed by:  GÉNESIS LEE MD 2024 7:19 PM

## 2024-05-15 ENCOUNTER — APPOINTMENT (OUTPATIENT)
Dept: RADIOLOGY | Facility: MEDICAL CENTER | Age: 74
End: 2024-05-15
Attending: EMERGENCY MEDICINE
Payer: COMMERCIAL

## 2024-05-15 ENCOUNTER — HOSPITAL ENCOUNTER (OUTPATIENT)
Facility: MEDICAL CENTER | Age: 74
End: 2024-05-18
Attending: EMERGENCY MEDICINE | Admitting: STUDENT IN AN ORGANIZED HEALTH CARE EDUCATION/TRAINING PROGRAM
Payer: COMMERCIAL

## 2024-05-15 ENCOUNTER — APPOINTMENT (OUTPATIENT)
Dept: RADIOLOGY | Facility: MEDICAL CENTER | Age: 74
End: 2024-05-15
Payer: COMMERCIAL

## 2024-05-15 DIAGNOSIS — J69.0 ASPIRATION PNEUMONIA OF BOTH LUNGS, UNSPECIFIED ASPIRATION PNEUMONIA TYPE, UNSPECIFIED PART OF LUNG (HCC): ICD-10-CM

## 2024-05-15 DIAGNOSIS — C34.91 ADENOCARCINOMA, LUNG, RIGHT (HCC): ICD-10-CM

## 2024-05-15 DIAGNOSIS — C34.90 PRIMARY ADENOCARCINOMA OF LUNG, UNSPECIFIED LATERALITY (HCC): ICD-10-CM

## 2024-05-15 DIAGNOSIS — R53.81 DEBILITY: ICD-10-CM

## 2024-05-15 DIAGNOSIS — E87.20 LACTIC ACIDOSIS: ICD-10-CM

## 2024-05-15 DIAGNOSIS — J96.01 ACUTE RESPIRATORY FAILURE WITH HYPOXIA (HCC): ICD-10-CM

## 2024-05-15 PROBLEM — N39.0 UTI (URINARY TRACT INFECTION): Status: ACTIVE | Noted: 2024-05-15

## 2024-05-15 PROBLEM — J96.21 ACUTE ON CHRONIC HYPOXIC RESPIRATORY FAILURE (HCC): Status: ACTIVE | Noted: 2024-05-15

## 2024-05-15 PROBLEM — D35.00 ADRENAL ADENOMA: Status: ACTIVE | Noted: 2024-05-15

## 2024-05-15 PROBLEM — D72.829 LEUKOCYTOSIS: Status: ACTIVE | Noted: 2024-05-15

## 2024-05-15 PROBLEM — J44.9 COPD (CHRONIC OBSTRUCTIVE PULMONARY DISEASE) (HCC): Status: ACTIVE | Noted: 2024-05-15

## 2024-05-15 PROBLEM — J96.11 CHRONIC HYPOXIC RESPIRATORY FAILURE (HCC): Status: ACTIVE | Noted: 2024-05-15

## 2024-05-15 PROBLEM — Z71.89 ADVANCED CARE PLANNING/COUNSELING DISCUSSION: Status: ACTIVE | Noted: 2024-05-15

## 2024-05-15 PROBLEM — R53.1 WEAKNESS: Status: ACTIVE | Noted: 2024-05-15

## 2024-05-15 LAB
ALBUMIN SERPL BCP-MCNC: 3.3 G/DL (ref 3.2–4.9)
ALBUMIN/GLOB SERPL: 1.1 G/DL
ALP SERPL-CCNC: 97 U/L (ref 30–99)
ALT SERPL-CCNC: 26 U/L (ref 2–50)
ANION GAP SERPL CALC-SCNC: 13 MMOL/L (ref 7–16)
ANISOCYTOSIS BLD QL SMEAR: ABNORMAL
APPEARANCE UR: CLEAR
AST SERPL-CCNC: 20 U/L (ref 12–45)
BASOPHILS # BLD AUTO: 0.9 % (ref 0–1.8)
BASOPHILS # BLD: 0.13 K/UL (ref 0–0.12)
BILIRUB SERPL-MCNC: 0.4 MG/DL (ref 0.1–1.5)
BILIRUB UR QL STRIP.AUTO: NEGATIVE
BUN SERPL-MCNC: 21 MG/DL (ref 8–22)
CALCIUM ALBUM COR SERPL-MCNC: 9 MG/DL (ref 8.5–10.5)
CALCIUM SERPL-MCNC: 8.4 MG/DL (ref 8.5–10.5)
CHLORIDE SERPL-SCNC: 102 MMOL/L (ref 96–112)
CO2 SERPL-SCNC: 25 MMOL/L (ref 20–33)
COLOR UR: YELLOW
CREAT SERPL-MCNC: 1.13 MG/DL (ref 0.5–1.4)
EKG IMPRESSION: NORMAL
EOSINOPHIL # BLD AUTO: 0 K/UL (ref 0–0.51)
EOSINOPHIL NFR BLD: 0 % (ref 0–6.9)
ERYTHROCYTE [DISTWIDTH] IN BLOOD BY AUTOMATED COUNT: 70.2 FL (ref 35.9–50)
FLUAV RNA SPEC QL NAA+PROBE: NEGATIVE
FLUBV RNA SPEC QL NAA+PROBE: NEGATIVE
GFR SERPLBLD CREATININE-BSD FMLA CKD-EPI: 68 ML/MIN/1.73 M 2
GLOBULIN SER CALC-MCNC: 3 G/DL (ref 1.9–3.5)
GLUCOSE SERPL-MCNC: 132 MG/DL (ref 65–99)
GLUCOSE UR STRIP.AUTO-MCNC: NEGATIVE MG/DL
HCT VFR BLD AUTO: 34.6 % (ref 42–52)
HGB BLD-MCNC: 11.6 G/DL (ref 14–18)
KETONES UR STRIP.AUTO-MCNC: NEGATIVE MG/DL
LACTATE SERPL-SCNC: 2.4 MMOL/L (ref 0.5–2)
LACTATE SERPL-SCNC: 3 MMOL/L (ref 0.5–2)
LEUKOCYTE ESTERASE UR QL STRIP.AUTO: NEGATIVE
LYMPHOCYTES # BLD AUTO: 1.11 K/UL (ref 1–4.8)
LYMPHOCYTES NFR BLD: 7.9 % (ref 22–41)
MACROCYTES BLD QL SMEAR: ABNORMAL
MAGNESIUM SERPL-MCNC: 1.9 MG/DL (ref 1.5–2.5)
MANUAL DIFF BLD: NORMAL
MCH RBC QN AUTO: 36.5 PG (ref 27–33)
MCHC RBC AUTO-ENTMCNC: 33.5 G/DL (ref 32.3–36.5)
MCV RBC AUTO: 108.8 FL (ref 81.4–97.8)
MICRO URNS: NORMAL
MONOCYTES # BLD AUTO: 0.37 K/UL (ref 0–0.85)
MONOCYTES NFR BLD AUTO: 2.6 % (ref 0–13.4)
MORPHOLOGY BLD-IMP: NORMAL
NEUTROPHILS # BLD AUTO: 12.49 K/UL (ref 1.82–7.42)
NEUTROPHILS NFR BLD: 88.6 % (ref 44–72)
NITRITE UR QL STRIP.AUTO: NEGATIVE
NRBC # BLD AUTO: 0 K/UL
NRBC BLD-RTO: 0 /100 WBC (ref 0–0.2)
NT-PROBNP SERPL IA-MCNC: 961 PG/ML (ref 0–125)
OVALOCYTES BLD QL SMEAR: NORMAL
PH UR STRIP.AUTO: 6.5 [PH] (ref 5–8)
PHOSPHATE SERPL-MCNC: 1.8 MG/DL (ref 2.5–4.5)
PLATELET # BLD AUTO: 150 K/UL (ref 164–446)
PLATELET BLD QL SMEAR: NORMAL
PMV BLD AUTO: 10.4 FL (ref 9–12.9)
POIKILOCYTOSIS BLD QL SMEAR: NORMAL
POTASSIUM SERPL-SCNC: 3.7 MMOL/L (ref 3.6–5.5)
PROCALCITONIN SERPL-MCNC: 0.18 NG/ML
PROT SERPL-MCNC: 6.3 G/DL (ref 6–8.2)
PROT UR QL STRIP: NEGATIVE MG/DL
RBC # BLD AUTO: 3.18 M/UL (ref 4.7–6.1)
RBC BLD AUTO: PRESENT
RBC UR QL AUTO: NEGATIVE
RSV RNA SPEC QL NAA+PROBE: NEGATIVE
SARS-COV-2 RNA RESP QL NAA+PROBE: NOTDETECTED
SODIUM SERPL-SCNC: 140 MMOL/L (ref 135–145)
SP GR UR STRIP.AUTO: 1.02
TROPONIN T SERPL-MCNC: 50 NG/L (ref 6–19)
UROBILINOGEN UR STRIP.AUTO-MCNC: 0.2 MG/DL
WBC # BLD AUTO: 14.1 K/UL (ref 4.8–10.8)

## 2024-05-15 PROCEDURE — 99497 ADVNCD CARE PLAN 30 MIN: CPT | Performed by: STUDENT IN AN ORGANIZED HEALTH CARE EDUCATION/TRAINING PROGRAM

## 2024-05-15 PROCEDURE — 83880 ASSAY OF NATRIURETIC PEPTIDE: CPT

## 2024-05-15 PROCEDURE — 94640 AIRWAY INHALATION TREATMENT: CPT

## 2024-05-15 PROCEDURE — 96365 THER/PROPH/DIAG IV INF INIT: CPT | Mod: XU

## 2024-05-15 PROCEDURE — 71275 CT ANGIOGRAPHY CHEST: CPT

## 2024-05-15 PROCEDURE — 84484 ASSAY OF TROPONIN QUANT: CPT

## 2024-05-15 PROCEDURE — 99285 EMERGENCY DEPT VISIT HI MDM: CPT

## 2024-05-15 PROCEDURE — 0241U HCHG SARS-COV-2 COVID-19 NFCT DS RESP RNA 4 TRGT ED POC: CPT

## 2024-05-15 PROCEDURE — 81003 URINALYSIS AUTO W/O SCOPE: CPT

## 2024-05-15 PROCEDURE — 36415 COLL VENOUS BLD VENIPUNCTURE: CPT

## 2024-05-15 PROCEDURE — 99223 1ST HOSP IP/OBS HIGH 75: CPT | Mod: 25 | Performed by: STUDENT IN AN ORGANIZED HEALTH CARE EDUCATION/TRAINING PROGRAM

## 2024-05-15 PROCEDURE — 80053 COMPREHEN METABOLIC PANEL: CPT

## 2024-05-15 PROCEDURE — 87086 URINE CULTURE/COLONY COUNT: CPT

## 2024-05-15 PROCEDURE — 84100 ASSAY OF PHOSPHORUS: CPT

## 2024-05-15 PROCEDURE — 85027 COMPLETE CBC AUTOMATED: CPT

## 2024-05-15 PROCEDURE — 770020 HCHG ROOM/CARE - TELE (206)

## 2024-05-15 PROCEDURE — 87040 BLOOD CULTURE FOR BACTERIA: CPT

## 2024-05-15 PROCEDURE — 84145 PROCALCITONIN (PCT): CPT

## 2024-05-15 PROCEDURE — 93005 ELECTROCARDIOGRAM TRACING: CPT | Performed by: EMERGENCY MEDICINE

## 2024-05-15 PROCEDURE — 700117 HCHG RX CONTRAST REV CODE 255: Performed by: EMERGENCY MEDICINE

## 2024-05-15 PROCEDURE — 700105 HCHG RX REV CODE 258: Performed by: EMERGENCY MEDICINE

## 2024-05-15 PROCEDURE — 700111 HCHG RX REV CODE 636 W/ 250 OVERRIDE (IP): Mod: JZ | Performed by: EMERGENCY MEDICINE

## 2024-05-15 PROCEDURE — 700105 HCHG RX REV CODE 258: Performed by: STUDENT IN AN ORGANIZED HEALTH CARE EDUCATION/TRAINING PROGRAM

## 2024-05-15 PROCEDURE — 85007 BL SMEAR W/DIFF WBC COUNT: CPT

## 2024-05-15 PROCEDURE — 83605 ASSAY OF LACTIC ACID: CPT | Mod: 91

## 2024-05-15 PROCEDURE — 83735 ASSAY OF MAGNESIUM: CPT

## 2024-05-15 PROCEDURE — 700101 HCHG RX REV CODE 250: Performed by: EMERGENCY MEDICINE

## 2024-05-15 PROCEDURE — 71045 X-RAY EXAM CHEST 1 VIEW: CPT

## 2024-05-15 RX ORDER — SODIUM CHLORIDE, SODIUM LACTATE, POTASSIUM CHLORIDE, CALCIUM CHLORIDE 600; 310; 30; 20 MG/100ML; MG/100ML; MG/100ML; MG/100ML
INJECTION, SOLUTION INTRAVENOUS CONTINUOUS
Status: ACTIVE | OUTPATIENT
Start: 2024-05-15 | End: 2024-05-16

## 2024-05-15 RX ORDER — LEVOFLOXACIN 500 MG/1
500 TABLET, FILM COATED ORAL DAILY
Status: ON HOLD | COMMUNITY
Start: 2024-05-11 | End: 2024-05-18

## 2024-05-15 RX ORDER — IPRATROPIUM BROMIDE AND ALBUTEROL SULFATE 2.5; .5 MG/3ML; MG/3ML
3 SOLUTION RESPIRATORY (INHALATION) ONCE
Status: COMPLETED | OUTPATIENT
Start: 2024-05-15 | End: 2024-05-15

## 2024-05-15 RX ORDER — LEVOFLOXACIN 500 MG/1
500 TABLET, FILM COATED ORAL DAILY
Status: COMPLETED | OUTPATIENT
Start: 2024-05-16 | End: 2024-05-18

## 2024-05-15 RX ORDER — ALBUTEROL SULFATE 90 UG/1
2 AEROSOL, METERED RESPIRATORY (INHALATION)
Status: DISCONTINUED | OUTPATIENT
Start: 2024-05-15 | End: 2024-05-18 | Stop reason: HOSPADM

## 2024-05-15 RX ORDER — FLUTICASONE PROPIONATE AND SALMETEROL 250; 50 UG/1; UG/1
1 POWDER RESPIRATORY (INHALATION) 2 TIMES DAILY
Status: DISCONTINUED | OUTPATIENT
Start: 2024-05-15 | End: 2024-05-15

## 2024-05-15 RX ORDER — PREDNISONE 20 MG/1
60 TABLET ORAL DAILY
Status: DISCONTINUED | OUTPATIENT
Start: 2024-05-16 | End: 2024-05-18

## 2024-05-15 RX ORDER — SODIUM CHLORIDE, SODIUM LACTATE, POTASSIUM CHLORIDE, CALCIUM CHLORIDE 600; 310; 30; 20 MG/100ML; MG/100ML; MG/100ML; MG/100ML
1000 INJECTION, SOLUTION INTRAVENOUS ONCE
Status: COMPLETED | OUTPATIENT
Start: 2024-05-15 | End: 2024-05-15

## 2024-05-15 RX ADMIN — SODIUM CHLORIDE, POTASSIUM CHLORIDE, SODIUM LACTATE AND CALCIUM CHLORIDE: 600; 310; 30; 20 INJECTION, SOLUTION INTRAVENOUS at 22:33

## 2024-05-15 RX ADMIN — IPRATROPIUM BROMIDE AND ALBUTEROL SULFATE 3 ML: 2.5; .5 SOLUTION RESPIRATORY (INHALATION) at 16:25

## 2024-05-15 RX ADMIN — SODIUM CHLORIDE, POTASSIUM CHLORIDE, SODIUM LACTATE AND CALCIUM CHLORIDE 1000 ML: 600; 310; 30; 20 INJECTION, SOLUTION INTRAVENOUS at 18:21

## 2024-05-15 RX ADMIN — AMPICILLIN AND SULBACTAM 3 G: 1; 2 INJECTION, POWDER, FOR SOLUTION INTRAMUSCULAR; INTRAVENOUS at 19:46

## 2024-05-15 RX ADMIN — IOHEXOL 45 ML: 350 INJECTION, SOLUTION INTRAVENOUS at 17:13

## 2024-05-15 SDOH — ECONOMIC STABILITY: TRANSPORTATION INSECURITY
IN THE PAST 12 MONTHS, HAS LACK OF RELIABLE TRANSPORTATION KEPT YOU FROM MEDICAL APPOINTMENTS, MEETINGS, WORK OR FROM GETTING THINGS NEEDED FOR DAILY LIVING?: NO

## 2024-05-15 SDOH — ECONOMIC STABILITY: TRANSPORTATION INSECURITY
IN THE PAST 12 MONTHS, HAS THE LACK OF TRANSPORTATION KEPT YOU FROM MEDICAL APPOINTMENTS OR FROM GETTING MEDICATIONS?: NO

## 2024-05-15 ASSESSMENT — ENCOUNTER SYMPTOMS
PALPITATIONS: 0
NERVOUS/ANXIOUS: 0
HEADACHES: 0
SHORTNESS OF BREATH: 1
CHILLS: 0
SENSORY CHANGE: 0
ORTHOPNEA: 0
FEVER: 0
SPUTUM PRODUCTION: 1
NECK PAIN: 0
STRIDOR: 0
FLANK PAIN: 0
DIZZINESS: 1
VOMITING: 0
COUGH: 0
HALLUCINATIONS: 0
DIARRHEA: 0
FOCAL WEAKNESS: 0
EYE DISCHARGE: 0
ABDOMINAL PAIN: 0
EYE PAIN: 0
TREMORS: 0
BACK PAIN: 0
INSOMNIA: 0
PHOTOPHOBIA: 0
WEAKNESS: 1
SPEECH CHANGE: 0
TINGLING: 0
SINUS PAIN: 0
HEMOPTYSIS: 0
DOUBLE VISION: 0
NAUSEA: 0

## 2024-05-15 ASSESSMENT — PATIENT HEALTH QUESTIONNAIRE - PHQ9
2. FEELING DOWN, DEPRESSED, IRRITABLE, OR HOPELESS: NOT AT ALL
SUM OF ALL RESPONSES TO PHQ9 QUESTIONS 1 AND 2: 0
1. LITTLE INTEREST OR PLEASURE IN DOING THINGS: NOT AT ALL

## 2024-05-15 ASSESSMENT — SOCIAL DETERMINANTS OF HEALTH (SDOH)

## 2024-05-15 ASSESSMENT — FIBROSIS 4 INDEX
FIB4 SCORE: 1.82
FIB4 SCORE: 1.961161351381840319

## 2024-05-15 ASSESSMENT — LIFESTYLE VARIABLES: SUBSTANCE_ABUSE: 0

## 2024-05-15 NOTE — ED NOTES
Pt wheeled back to red 3. His son is present in the room. Pt set up on monitor and is on 5 liters a minute of Oxygen. ERP to see.

## 2024-05-15 NOTE — ED PROVIDER NOTES
ED Provider Note    CHIEF COMPLAINT  Chief Complaint   Patient presents with    Shortness of Breath    Weakness    Dizziness     Pt c/o increased oxygen needs and sob since dx with UTI     EXTERNAL RECORDS REVIEWED  Records review show that the patient was last seen in the ED 4/30/24. He sent here due to concerns for possible GI bleed. Had imaging done that was unremarkable and showed chronic anemia and was discharged. The patient follows Dr. Gibson with oncology for adenocarcinoma in his lung. The patient's discharge note from [facility] May 11 th had a white count of 16 at that time. He had a negative COVID test and had hemoglobin of 11.     HPI/ROS  LIMITATION TO HISTORY   Select: : None  OUTSIDE HISTORIAN(S):  Family Son is present at bedside and provides some of the patient's history.    Tyler Zarate is a 74 y.o. male who presents to the Emergency Department with shortness of breath, generalized weakness, and dizziness. The patient notes that his weakness and dizziness began about 2 weeks ago. He also endorses that he has been coughing. The patient 's son notes that he originally took the patient to the hospital in [place], treated for UTI with levofloxacin. Son notes that they came here because the patient was not getting better.  The patient's son also reports that he noticed the patient's urine and stool output has decreased and the patient's appetite and fluid intake has not been as much as he would like. Patient was on 2 L of oxygen at home, but over the last month has increase to 5-8 L. Patient denies any pain. Denies any leg swelling. Patient reports that he see Dr. Gibson for lung cancer and states that he is still on chemo, however missed his last session. Son notes that it has been about 30-40 days since his last chemo treatment. The patient is on blood thinners for a history of DVTs in the past. He has not missed any Xarelto doses. Denies any cardiac issues.       PAST MEDICAL  HISTORY  Past Medical History:   Diagnosis Date    Basal cell carcinoma (BCC) of skin of nose     Cancer (HCC)     skin, Lung    Carcinoma in situ of respiratory system     Lung 2023    Cataract     COPD (chronic obstructive pulmonary disease) (HCC)     Cough     DVT (deep venous thrombosis) (HCC)     RLL    Lumbar radiculopathy     Pain     lower back and sciatic pain    Pneumonia     2018    Shortness of breath     Skin carcinoma     Sputum production     Synovial cyst     L spine        SURGICAL HISTORY  Past Surgical History:   Procedure Laterality Date    AR BRONCHOSCOPY,DIAGNOSTIC  4/12/2023    Procedure: FIBER OPTIC BRONCHOSCOPY WITH  WASH, BRUSH, BRONCHOALVEOLAR LAVAGE, BIOPSY AND FINE NEEDLE ASPIRATION & NAVIGATION, ROBOTICS;  Surgeon: Vladimir Ortiz M.D.;  Location: SURGERY [facility];  Service: Pulmonary Robotic    CATARACT PHACO WITH IOL Bilateral         FAMILY HISTORY  Family History   Problem Relation Age of Onset    Cancer Brother         lung       SOCIAL HISTORY   reports that he has been smoking cigarettes. He started smoking about 59 years ago. He has a 29.7 pack-year smoking history. He has never been exposed to tobacco smoke. He has quit using smokeless tobacco.  His smokeless tobacco use included chew. He reports that he does not currently use alcohol after a past usage of about 8.4 oz of alcohol per week. He reports that he does not use drugs.    CURRENT MEDICATIONS  Previous Medications    ALBUTEROL 108 (90 BASE) MCG/ACT AERO SOLN INHALATION AEROSOL    Inhale 2 Puffs every four hours as needed for Shortness of Breath.    FLUTICASONE-SALMETEROL (ADVAIR) 250-50 MCG/ACT AEROSOL POWDER, BREATH ACTIVATED    Inhale 1 Puff 2 times a day.    FOLIC ACID (FOLVITE) 1 MG TAB    Take 1 mg by mouth every day.    OMEPRAZOLE (PRILOSEC) 20 MG DELAYED-RELEASE CAPSULE    Take 1 Capsule by mouth 2 times a day.    ONDANSETRON (ZOFRAN) 4 MG TAB TABLET    Take 1 Tablet by mouth every four hours as needed for  Nausea/Vomiting (for nausea, vomiting).    PREDNISONE (DELTASONE) 20 MG TAB    Take 60 mg by mouth every day.    PROCHLORPERAZINE (COMPAZINE) 10 MG TAB    Take 1 Tablet by mouth every 6 hours as needed (for nausea, vomiting).    RIVAROXABAN (XARELTO) 20 MG TAB TABLET    Take 20 mg by mouth every day.      SERTRALINE (ZOLOFT) 50 MG TAB    Take 50 mg by mouth every day.    TIOTROPIUM (SPIRIVA RESPIMAT) 2.5 MCG/ACT AERO SOLN    Inhale 5 mcg every day.       ALLERGIES  Bacitracin-neomycin-polymyxin, Gabapentin, Ketorolac, Lipoglycopeptide antibiotics, Tromethamine, and Celecoxib    PHYSICAL EXAM  BP 90/55   Pulse 80   Temp 36.9 °C (98.4 °F) (Temporal)   Wt 70.8 kg (156 lb)   SpO2 99%       Constitutional: Chronically ill-appearing. Alert in no apparent distress.  HENT: Normocephalic, Atraumatic. Bilateral external ears normal. Nose normal.  Moist mucous membranes.  Oropharynx clear.  Eyes: Pupils are equal and reactive. Conjunctiva normal.   Neck: Supple, full range of motion  Heart: Regular rate and rhythm.  No murmurs.    Lungs: Tachypnic with mild increase work of breathing. Mild expiratory wheezing. No respiratory distress.   Abdomen Soft, no distention.  No tenderness to palpation.  Musculoskeletal: Atraumatic. No obvious deformities noted.  No lower extremity edema.  Skin: Warm, Dry.  No erythema, No rash.   Neurologic: Alert and oriented x3. Moving all extremities spontaneously without focal deficits.  Psychiatric: Affect normal, Mood normal, Appears appropriate and not intoxicated.     DIAGNOSTIC STUDIES    EKG  I have independently interpreted this EKG  Results for orders placed or performed during the hospital encounter of 05/15/24   EKG   Result Value Ref Range    Report       [facility] Emergency Dept.    Test Date:  2024-05-15  Pt Name:    DENISE ABREU                   Department: ER  MRN:        [id number]                      Room:  Gender:     Male                         Technician:  [id number]  :        1950                   Requested By:ER TRIAGE PROTOCOL  Order #:    [id number]                    Reading MD: Georgie Uriarte MD    Measurements  Intervals                                Axis  Rate:       85                           P:          96  FL:         157                          QRS:        77  QRSD:       91                           T:          60  QT:         366  QTc:        436    Interpretive Statements  Sinus rhythm  Normal intervals, no ectopy  No ST or T wave change  No previous ECG available for comparison  Electronically Signed On 05- 16:46:10 PDT by Georgie Uriarte MD         LABS  Labs Reviewed   CBC WITH DIFFERENTIAL - Abnormal; Notable for the following components:       Result Value    WBC 14.1 (*)     RBC 3.18 (*)     Hemoglobin 11.6 (*)     Hematocrit 34.6 (*)     .8 (*)     MCH 36.5 (*)     RDW 70.2 (*)     Platelet Count 150 (*)     Neutrophils-Polys 88.60 (*)     Lymphocytes 7.90 (*)     Neutrophils (Absolute) 12.49 (*)     Baso (Absolute) 0.13 (*)     Macrocytosis 2+ (*)     All other components within normal limits   COMP METABOLIC PANEL - Abnormal; Notable for the following components:    Glucose 132 (*)     Calcium 8.4 (*)     All other components within normal limits   LACTIC ACID - Abnormal; Notable for the following components:    Lactic Acid 3.0 (*)     All other components within normal limits   LACTIC ACID - Abnormal; Notable for the following components:    Lactic Acid 2.4 (*)     All other components within normal limits   PROBRAIN NATRIURETIC PEPTIDE, NT - Abnormal; Notable for the following components:    NT-proBNP 961 (*)     All other components within normal limits   TROPONIN - Abnormal; Notable for the following components:    Troponin T 50 (*)     All other components within normal limits   ESTIMATED GFR   DIFFERENTIAL MANUAL   PERIPHERAL SMEAR REVIEW   PLATELET ESTIMATE   MORPHOLOGY   URINALYSIS   LACTIC ACID   URINE CULTURE(NEW)    BLOOD CULTURE   BLOOD CULTURE   MAGNESIUM   PHOSPHORUS   PROCALCITONIN   POCT COV-2, FLU A/B, RSV BY PCR   POC COV-2, FLU A/B, RSV BY PCR     RADIOLOGY  I have independently interpreted the diagnostic imaging associated with this visit and am waiting the final reading from the radiologist.   My preliminary interpretation is as follows: no large PE    Radiologist interpretation:  CT-CTA CHEST PULMONARY ARTERY W/ RECONS   Final Result      1.  No central or segmental pulmonary embolus   2.  New BILATERAL dependent bronchial changes suspicious for bronchitis. Aspiration related lung disease is a consideration.   3.  Unchanged BILATERAL parenchymal changes compatible with patient's known history of lung cancer without appreciable interval change since the recent metastatic survey   4.  Adrenal adenoma   5.  Atherosclerosis   6.  Trace LEFT pleural effusion            DX-CHEST-PORTABLE (1 VIEW)   Final Result      No significant interval change. No new consolidation or pleural effusion. Posttreatment related changes in this patient with history of lung cancer.            COURSE & MEDICAL DECISION MAKING    3:47 PM - Patient seen and examined at bedside. Discussed plan of care, including ordering labs and imaging to further evaluate. Patient agrees to the plan of care. The patient will be medicated with Duoneb. Ordered for CT-CTA chest pulmonary artery w/ recons, DX-chest, SARS-CoV-2, Flu A/B, and RSV, Lactic acid, UA, Urine culture, Blood culture, eGFR, Differential manual, peripheral smear review, platelet estimate, morphology, proBrain natruretic peptide, Troponin, CBC with diff, CMP, EKG to evaluate his symptoms.     Sepsis: Infection was suspected 3:47PM (Time). Sepsis pathway was initiated. Less than 30cc/kg because of concern for volume overload 1,000 mL of crystalloid was ordered. Antibiotics were given per protocol.    ASSESSMENT, COURSE AND PLAN  Care Narrative: Patient with history of adenocarcinoma of the  lung on current chemotherapy also with recent urinary tract infections on antibiotics who presents with increasing weakness and shortness of breath.  He initially had a low blood pressure on arrival however this improved quickly without intervention.  Vital signs are otherwise unremarkable other than increased oxygen requirement from his baseline.  He does have some mild increased work of breathing, minimal wheezing on exam.  His EKG does not show obvious ischemia or arrhythmia.  Troponin is minimally elevated as well as BNP.  Chest x-ray shows some ongoing chronic changes however no new pneumonia or pulmonary edema.  Labs show a leukocytosis of 14 as well as an elevated lactate at 3.  He was given gentle IV fluids due to concern for possible underlying heart failure.  CT of the chest does not show pulmonary embolism however does show possible bronchitis or aspiration therefore he was covered with antibiotics for aspiration pneumonia.  His urinalysis is clear without signs of infection.  Viral testing was negative.  Patient will need admission due to significant weakness and increased oxygen requirement.    4:45 PM  - Upon reassessment, patient is resting comfortably with normal vital signs.  No new complaints at this time.  Discussed results with patient and/or family as well as plan of care for admission. Patient verbalizes understanding and agreement to this plan of care.     I discussed the patient's case and the above findings with Dr. Yadav (hospitalist) who agrees to evaluate the patient for admission.      ADDITIONAL PROBLEM LIST  Problem #1: Aspiration pneumonia -treated with Unasyn    Problem #2: Acute respiratory failure -due to above versus worsening lung malignancy    Problem #3: Lactic acidosis -improving with IV fluids      DISPOSITION AND DISCUSSIONS  I have discussed management of the patient with the following physicians and SPENCER's:  Dr. Yadav (hospitalist)    Discussion of management with  other [facility] [place] or appropriate source(s): RT        CRITICAL CARE  The very real possibilty of a deterioration of this patient's condition required the highest level of my preparedness for sudden, emergent intervention.  I provided critical care services, which included medication orders, frequent reevaluations of the patient's condition and response to treatment, ordering and reviewing test results, and discussing the case with various consultants.  The critical care time associated with the care of the patient was 33 minutes. Review chart for interventions. This time is exclusive of any other billable procedures.       DISPOSITION:  Patient will be hospitalized by Dr. Yadav in guarded condition.     FINAL DIAGNOSIS  1. Acute respiratory failure with hypoxia (HCC)    2. Aspiration pneumonia of both lungs, unspecified aspiration pneumonia type, unspecified part of lung (HCC)      The note accurately reflects work and decisions made by me.  Georgie Uriarte M.D.  5/15/2024  8:42 PM     Mary KO (April), am scribing for, and in the presence of, Georgie Uriarte M.D..    Electronically signed by: Mary Christianson (April), 5/15/2024    Georgie KO M.D. personally performed the services described in this documentation, as scribed by Mary Christianson in my presence, and it is both accurate and complete.

## 2024-05-15 NOTE — ED TRIAGE NOTES
Chief Complaint   Patient presents with    Shortness of Breath    Weakness    Dizziness     Pt c/o increased oxygen needs and sob since dx with UTI

## 2024-05-16 ENCOUNTER — APPOINTMENT (OUTPATIENT)
Dept: RADIOLOGY | Facility: MEDICAL CENTER | Age: 74
End: 2024-05-16
Attending: INTERNAL MEDICINE
Payer: COMMERCIAL

## 2024-05-16 LAB
ALBUMIN SERPL BCP-MCNC: 2.8 G/DL (ref 3.2–4.9)
ALBUMIN/GLOB SERPL: 1.1 G/DL
ALP SERPL-CCNC: 79 U/L (ref 30–99)
ALT SERPL-CCNC: 25 U/L (ref 2–50)
ANION GAP SERPL CALC-SCNC: 9 MMOL/L (ref 7–16)
AST SERPL-CCNC: 19 U/L (ref 12–45)
BILIRUB SERPL-MCNC: 0.3 MG/DL (ref 0.1–1.5)
BUN SERPL-MCNC: 20 MG/DL (ref 8–22)
CALCIUM ALBUM COR SERPL-MCNC: 8.8 MG/DL (ref 8.5–10.5)
CALCIUM SERPL-MCNC: 7.8 MG/DL (ref 8.5–10.5)
CHLORIDE SERPL-SCNC: 102 MMOL/L (ref 96–112)
CHOLEST SERPL-MCNC: 120 MG/DL (ref 100–199)
CO2 SERPL-SCNC: 28 MMOL/L (ref 20–33)
CREAT SERPL-MCNC: 1.05 MG/DL (ref 0.5–1.4)
ERYTHROCYTE [DISTWIDTH] IN BLOOD BY AUTOMATED COUNT: 69.9 FL (ref 35.9–50)
GFR SERPLBLD CREATININE-BSD FMLA CKD-EPI: 74 ML/MIN/1.73 M 2
GLOBULIN SER CALC-MCNC: 2.6 G/DL (ref 1.9–3.5)
GLUCOSE SERPL-MCNC: 114 MG/DL (ref 65–99)
HCT VFR BLD AUTO: 27.7 % (ref 42–52)
HDLC SERPL-MCNC: 62 MG/DL
HGB BLD-MCNC: 9.1 G/DL (ref 14–18)
LACTATE SERPL-SCNC: 1.1 MMOL/L (ref 0.5–2)
LACTATE SERPL-SCNC: 1.3 MMOL/L (ref 0.5–2)
LACTATE SERPL-SCNC: 2.2 MMOL/L (ref 0.5–2)
LDLC SERPL CALC-MCNC: 47 MG/DL
MCH RBC QN AUTO: 35.8 PG (ref 27–33)
MCHC RBC AUTO-ENTMCNC: 32.9 G/DL (ref 32.3–36.5)
MCV RBC AUTO: 109.1 FL (ref 81.4–97.8)
PLATELET # BLD AUTO: 123 K/UL (ref 164–446)
PMV BLD AUTO: 10.4 FL (ref 9–12.9)
POTASSIUM SERPL-SCNC: 3.4 MMOL/L (ref 3.6–5.5)
PROT SERPL-MCNC: 5.4 G/DL (ref 6–8.2)
RBC # BLD AUTO: 2.54 M/UL (ref 4.7–6.1)
SODIUM SERPL-SCNC: 139 MMOL/L (ref 135–145)
TRIGL SERPL-MCNC: 54 MG/DL (ref 0–149)
WBC # BLD AUTO: 16.6 K/UL (ref 4.8–10.8)

## 2024-05-16 PROCEDURE — 85027 COMPLETE CBC AUTOMATED: CPT

## 2024-05-16 PROCEDURE — 83605 ASSAY OF LACTIC ACID: CPT | Mod: 91

## 2024-05-16 PROCEDURE — 80053 COMPREHEN METABOLIC PANEL: CPT

## 2024-05-16 PROCEDURE — 700102 HCHG RX REV CODE 250 W/ 637 OVERRIDE(OP): Mod: JZ | Performed by: INTERNAL MEDICINE

## 2024-05-16 PROCEDURE — 700111 HCHG RX REV CODE 636 W/ 250 OVERRIDE (IP): Performed by: STUDENT IN AN ORGANIZED HEALTH CARE EDUCATION/TRAINING PROGRAM

## 2024-05-16 PROCEDURE — 99232 SBSQ HOSP IP/OBS MODERATE 35: CPT | Performed by: INTERNAL MEDICINE

## 2024-05-16 PROCEDURE — A9270 NON-COVERED ITEM OR SERVICE: HCPCS | Performed by: STUDENT IN AN ORGANIZED HEALTH CARE EDUCATION/TRAINING PROGRAM

## 2024-05-16 PROCEDURE — 80061 LIPID PANEL: CPT

## 2024-05-16 PROCEDURE — 97535 SELF CARE MNGMENT TRAINING: CPT

## 2024-05-16 PROCEDURE — G0378 HOSPITAL OBSERVATION PER HR: HCPCS

## 2024-05-16 PROCEDURE — 97163 PT EVAL HIGH COMPLEX 45 MIN: CPT

## 2024-05-16 PROCEDURE — 36415 COLL VENOUS BLD VENIPUNCTURE: CPT

## 2024-05-16 PROCEDURE — 97166 OT EVAL MOD COMPLEX 45 MIN: CPT

## 2024-05-16 PROCEDURE — 700102 HCHG RX REV CODE 250 W/ 637 OVERRIDE(OP): Performed by: STUDENT IN AN ORGANIZED HEALTH CARE EDUCATION/TRAINING PROGRAM

## 2024-05-16 PROCEDURE — A9270 NON-COVERED ITEM OR SERVICE: HCPCS | Mod: JZ | Performed by: INTERNAL MEDICINE

## 2024-05-16 PROCEDURE — 70450 CT HEAD/BRAIN W/O DYE: CPT

## 2024-05-16 RX ORDER — POTASSIUM CHLORIDE 20 MEQ/1
40 TABLET, EXTENDED RELEASE ORAL ONCE
Status: COMPLETED | OUTPATIENT
Start: 2024-05-16 | End: 2024-05-16

## 2024-05-16 RX ADMIN — MOMETASONE FUROATE AND FORMOTEROL FUMARATE DIHYDRATE 2 PUFF: 200; 5 AEROSOL RESPIRATORY (INHALATION) at 07:44

## 2024-05-16 RX ADMIN — RIVAROXABAN 20 MG: 20 TABLET, FILM COATED ORAL at 07:45

## 2024-05-16 RX ADMIN — SERTRALINE HYDROCHLORIDE 50 MG: 50 TABLET ORAL at 05:34

## 2024-05-16 RX ADMIN — POTASSIUM CHLORIDE 40 MEQ: 1500 TABLET, EXTENDED RELEASE ORAL at 08:28

## 2024-05-16 RX ADMIN — PREDNISONE 60 MG: 20 TABLET ORAL at 05:34

## 2024-05-16 RX ADMIN — TIOTROPIUM BROMIDE INHALATION SPRAY 5 MCG: 3.12 SPRAY, METERED RESPIRATORY (INHALATION) at 05:41

## 2024-05-16 RX ADMIN — LEVOFLOXACIN 500 MG: 500 TABLET, FILM COATED ORAL at 05:34

## 2024-05-16 RX ADMIN — MOMETASONE FUROATE AND FORMOTEROL FUMARATE DIHYDRATE 2 PUFF: 200; 5 AEROSOL RESPIRATORY (INHALATION) at 21:52

## 2024-05-16 ASSESSMENT — ENCOUNTER SYMPTOMS
SPUTUM PRODUCTION: 1
STRIDOR: 0
TINGLING: 0
SHORTNESS OF BREATH: 1
ORTHOPNEA: 0
FEVER: 0
EYE PAIN: 0
HEADACHES: 0
VOMITING: 0
PALPITATIONS: 0
FLANK PAIN: 0
FOCAL WEAKNESS: 0
DOUBLE VISION: 0
INSOMNIA: 0
COUGH: 0
SINUS PAIN: 0
ABDOMINAL PAIN: 0
DIZZINESS: 1
EYE DISCHARGE: 0
HALLUCINATIONS: 0
DIARRHEA: 0
PHOTOPHOBIA: 0
NAUSEA: 0
HEMOPTYSIS: 0
CHILLS: 0
NECK PAIN: 0
SPEECH CHANGE: 0
SENSORY CHANGE: 0
WEAKNESS: 1
BACK PAIN: 0
TREMORS: 0
NERVOUS/ANXIOUS: 0

## 2024-05-16 ASSESSMENT — COGNITIVE AND FUNCTIONAL STATUS - GENERAL
MOBILITY SCORE: 18
EATING MEALS: A LITTLE
STANDING UP FROM CHAIR USING ARMS: A LITTLE
CLIMB 3 TO 5 STEPS WITH RAILING: A LOT
MOBILITY SCORE: 18
SUGGESTED CMS G CODE MODIFIER DAILY ACTIVITY: CK
CLIMB 3 TO 5 STEPS WITH RAILING: A LOT
PERSONAL GROOMING: A LITTLE
DRESSING REGULAR LOWER BODY CLOTHING: A LITTLE
WALKING IN HOSPITAL ROOM: A LITTLE
SUGGESTED CMS G CODE MODIFIER MOBILITY: CK
DAILY ACTIVITIY SCORE: 22
DRESSING REGULAR LOWER BODY CLOTHING: A LITTLE
DAILY ACTIVITIY SCORE: 18
SUGGESTED CMS G CODE MODIFIER DAILY ACTIVITY: CJ
MOVING FROM LYING ON BACK TO SITTING ON SIDE OF FLAT BED: A LITTLE
MOVING FROM LYING ON BACK TO SITTING ON SIDE OF FLAT BED: A LITTLE
HELP NEEDED FOR BATHING: A LITTLE
SUGGESTED CMS G CODE MODIFIER MOBILITY: CK
TOILETING: A LITTLE
WALKING IN HOSPITAL ROOM: A LITTLE
STANDING UP FROM CHAIR USING ARMS: A LITTLE
MOVING TO AND FROM BED TO CHAIR: A LITTLE
DRESSING REGULAR UPPER BODY CLOTHING: A LITTLE
HELP NEEDED FOR BATHING: A LITTLE
MOVING TO AND FROM BED TO CHAIR: A LITTLE

## 2024-05-16 ASSESSMENT — LIFESTYLE VARIABLES: SUBSTANCE_ABUSE: 0

## 2024-05-16 ASSESSMENT — ACTIVITIES OF DAILY LIVING (ADL): TOILETING: INDEPENDENT

## 2024-05-16 ASSESSMENT — PAIN DESCRIPTION - PAIN TYPE: TYPE: ACUTE PAIN

## 2024-05-16 ASSESSMENT — GAIT ASSESSMENTS: GAIT LEVEL OF ASSIST: UNABLE TO PARTICIPATE

## 2024-05-16 NOTE — DIETARY
"Nutrition services: Day 0 of admit.  yTler Zarate is a 74 y.o. male with admitting DX of Acute on chronic hypoxic respiratory failure.          Pt seen for poor PO/wt loss per admit screen, malnutrition screening tool score of 2. RD met with pt and pt's family at bedside, pt reports poor intake/wt loss since chemotherapy started over ~ 1 year with recent increase in wt loss over past couple of weeks. Pt and family report pt's UBW was ~ 145 lbs - 1 year ago with recent 10 lbs wt loss over past two weeks (went from 150 lbs to 140 lbs). Pt normally eats two-three meals/day but recently eating ~ 75% less than his usual intake.  Briefly discussed importance of adequate intake. Pt enjoys whole milk and reports he does not really like supplement drinks but willing to try them. Interview was limited as MD was into talk with them.        Assessment:  Height: 175.3 cm (5' 9\")  Weight: 64.2 kg (141 lb 8.6 oz)  Body mass index is 20.9 kg/m²., BMI classification: Normal.   Diet/Intake: Regular.     Evaluation:   Pertinent Labs: K+ 3.4.   Pertinent Meds: Prednisone.  Pt with past medical history of adenocarcinoma of the lung, currently on chemotherapy, history of COPD.   Nutrition Focused Physical Exam: Hollowing at temple, very little fat to upper arm, significantly squared shoulders, prominent clavicle bone, very little roundness/firmness to calf, depression along inner thigh.    Wt Readings from Last 8 Encounters:  05/15/24 64.2 kg (141 lb 8.6 oz)  04/30/24 71.2 kg (156 lb 15.5 oz)  04/30/24 70.8 kg (156 lb)  04/30/24 70.8 kg (156 lb)  04/09/24 71.9 kg (158 lb 6.4 oz)  04/09/24 71 kg (156 lb 8.4 oz)  03/11/24 70.6 kg (155 lb 10.3 oz)  03/14/24 69.1 kg (152 lb 5.4 oz)  Pt with 11% wt loss over past ~ 1.5 months (severe).     Malnutrition Risk: Pt meets ASPEN criteria for severe chronic disease related malnutrition related to adenocarcinoma of the right lung as evidenced by severe fat loss and severe muscle loss per " nutrition focused physical exam, significant wt loss and poor intake per pt report, and severe wt loss per chart review.     Recommendations/Plan:  Will add Ensure Plus and whole milk with all meals.   Encourage intake of ~ 50% or more of meals.   Document intake of all meals, snacks, and supplements  as % taken in ADL's to provide interdisciplinary communication across all shifts.   Monitor weight.  Nutrition rep will continue to see patient for ongoing meal and snack preferences.     RD to monitor for adequate intake.

## 2024-05-16 NOTE — ASSESSMENT & PLAN NOTE
Likely secondary to recent high dose Prednisone use. Patient states that his last dose of Prednisone was two days ago. Unclear why he was placed on it.

## 2024-05-16 NOTE — DISCHARGE PLANNING
Patient rolled back to observation/outpatient status per attending MD determination (Johnnie Bravo MD) and UR committee MD secondary review (Mehnaz Alvarez MD). Condition code 44 completed.

## 2024-05-16 NOTE — DISCHARGE PLANNING
Case Management Discharge Planning    Admission Date: 5/15/2024  GMLOS: 3.6  ALOS: 0    6-Clicks ADL Score: 22  6-Clicks Mobility Score: 18      Anticipated Discharge Dispo: Discharge Disposition: Discharged to home/self care (01)    DME Needed: No    Action(s) Taken: Updated Provider/Nurse on Discharge Plan and DC Assessment Complete (See below)    Escalations Completed: None    Medically Clear: No    Next Steps: Pt has been rolled to OBV status. [facility]. She states pt is NOT service connected and has Medicare A only. Can consider short term CLC vs home with HH and PCA (which [facility] can [facility].) Pt has no long term care benefits    Barriers to Discharge: Medical clearance and Pending PT Evaluation    Is the patient up for discharge tomorrow: No

## 2024-05-16 NOTE — ASSESSMENT & PLAN NOTE
Patient has adenocarcinoma of the right upper lobe status post biopsy.  There is a lesion in the left lower lobe measuring 11 x 5 mm.  There is concern that this is secondary primary.  He is followed by pulmonology and outpatient.

## 2024-05-16 NOTE — DISCHARGE PLANNING
Per Dr. Alvarez, anticipate discharge to home on Sat. Will need HH (needs HH order.) Called Amy at  [phone]. She will work on getting PCA (personal care attendant) to be provided by [facility]. And  [facility] SW [facility]  to case.

## 2024-05-16 NOTE — THERAPY
"Physical Therapy   Initial Evaluation     Patient Name: Tyler Zarate  Age:  74 y.o., Sex:  male  Medical Record #: [id number]  Today's Date: 5/16/2024     Precautions  Precautions: Fall Risk  Comments: orthostatic    Assessment  Patient is 74 y.o. male admitted with SOB, weakness, dizziness. PMHx of lung CA on chemo, COPD, chronic respiratory failure on 3-5L O2 at baseline, prior UTI, resting tremor. Pt with positive symptomatic orthostatics during evaluation, see vitals below. Pt did not require assist to/from the edge of bed, however, did require Emile for STS and static standing. Further details regarding evaluation detailed below. Recommend placement, however, pt reports he would decline; then recommend  PT which pt is agreeable. Pt would benefit from continued acute IP PT services to address weakness, impaired balance, activity tolerance, endurance, and overall decreased mobility from his baseline.    Plan    Physical Therapy Initial Treatment Plan   Treatment Plan : Equipment, Family / Caregiver Training, Gait Training, Manual Therapy, Neuro Re-Education / Balance, Self Care / Home Evaluation, Stair Training, Therapeutic Activities, Therapeutic Exercise  Treatment Frequency: 4 Times per Week  Duration: Until Therapy Goals Met    DC Equipment Recommendations: Unable to determine at this time  Discharge Recommendations: Recommend post-acute placement for additional physical therapy services prior to discharge home (However, pt reporting would refuse. Then recommend  PT once BP improves and able to ambulate/negotiate steps to enter home)       Subjective    \"No, no and no.\" When breached topic of placement     Objective     05/16/24 1504   Charge Group   PT Evaluation PT Evaluation High   PT Self Care / Home Evaluation (Units) 1   Total Time Spent   PT Evaluation Time Spent (Mins) 12   PT Self Care/Home Evaluation Time Spent (Mins) 8   PT Total Time Spent (Calculated) 20   Precautions   Precautions Fall " Risk   Comments orthostatic   Vitals   Blood Pressure    (supine 123/60, HR 81; sitting 112/56, HR 90; standing 94/52, ; BP returned to 110's/50's in supine. Symptomatic, worst in standing)   O2 (LPM) 3   O2 Delivery Device Oxymask   Pain 0 - 10 Group   Therapist Pain Assessment Post Activity Pain Same as Prior to Activity;Nurse Notified;0   Prior Living Situation   Prior Services Intermittent Physical Support for ADL Per Family   Housing / Facility 2 Story House   Steps Into Home 2   Steps In Home   (FOS, stays on first floor)   Bathroom Set up Walk In Shower;Shower Chair   Equipment Owned Single Point Cane   Lives with - Patient's Self Care Capacity Adult Children;Child Less than 18 Years of Age   Comments Lives with son and DIL, son works and  DIL homeschooling 7 children so both are unable to assist much during the day   Prior Level of Functional Mobility   Bed Mobility Independent   Transfer Status Independent   Ambulation Independent   Ambulation Distance   (community)   Assistive Devices Used Single Point Cane  (intermittently)   Stairs Independent   Comments At baseline, however, within past few weeks pt with mobility decline, unable to walk more than 20ft or so with assist.   History of Falls   History of Falls Yes   Date of Last Fall   (getting out of car after last admission)   Cognition    Cognition / Consciousness WDL   Level of Consciousness Alert   Comments Pleasant and cooperative   Active ROM Upper Body   Active ROM Upper Body  WDL   Strength Upper Body   Comments functional for mobility, not formally assessed   Active ROM Lower Body    Active ROM Lower Body  WDL   Strength Lower Body   Comments BLE grossly 4/5, no buckling noted   Coordination Upper Body   Comments resting tremors noted, reports baseline   Balance Assessment   Sitting Balance (Static) Fair   Sitting Balance (Dynamic) Fair -   Standing Balance (Static) Fair -   Standing Balance (Dynamic) Poor +   Weight Shift Sitting Fair    Weight Shift Standing Poor   Comments via HHA   Bed Mobility    Supine to Sit Supervised   Sit to Supine Supervised   Scooting Supervised   Rolling Supervised   Gait Analysis   Gait Level Of Assist Unable to Participate   Comments limited 2/2 OH   Functional Mobility   Sit to Stand Minimal Assist   Mobility STS only, static standing duration cut short 2/2 c/o worsening lightheadedness   Comments Per rn, pt recently transferred <> [facility], pt reporting tolerated up in BSC for 3-4 min total with tolerable lightheadedness   6 Clicks Assessment - How much HELP from from another person do you currently need... (If the patient hasn't done an activity recently, how much help from another person do you think he/she would need if he/she tried?)   Turning from your back to your side while in a flat bed without using bedrails? 4   Moving from lying on your back to sitting on the side of a flat bed without using bedrails? 3   Moving to and from a bed to a chair (including a wheelchair)? 3   Standing up from a chair using your arms (e.g., wheelchair, or bedside chair)? 3   Walking in hospital room? 3   Climbing 3-5 steps with a railing? 2   6 clicks Mobility Score 18   Activity Tolerance   Sitting Edge of Bed < 5 min   Standing < 1 min   Comments limited by OH, weakness   Patient / Family Goals    Patient / Family Goal #1 to go home   Short Term Goals    Short Term Goal # 1 Pt will perform stand step transfers with FWW and SPV in 6 visits to get in/out of chair   Short Term Goal # 2 Pt will ambulate 100ft with FWW and SPV in 6 visits to access home environment   Short Term Goal # 3 Pt will ascend/descend 2 steps with unilateral UE support and CGA in 6 visits to safely enter/exit home   Education Group   Education Provided Role of Physical Therapist   Role of Physical Therapist Patient Response Patient;Acceptance;Explanation;Verbal Demonstration   Additional Comments Pt receptive to self management and compensatory strategies with  mobility, s/s of OH, prevention strategies, slow to change position, seated exercises, avoiding prolonged static standing   Physical Therapy Initial Treatment Plan    Treatment Plan  Equipment;Family / Caregiver Training;Gait Training;Manual Therapy;Neuro Re-Education / Balance;Self Care / Home Evaluation;Stair Training;Therapeutic Activities;Therapeutic Exercise   Treatment Frequency 4 Times per Week   Duration Until Therapy Goals Met   Problem List    Problems Impaired Bed Mobility;Impaired Transfers;Impaired Ambulation;Functional Strength Deficit;Impaired Balance;Decreased Activity Tolerance   Anticipated Discharge Equipment and Recommendations   DC Equipment Recommendations Unable to determine at this time   Discharge Recommendations Recommend post-acute placement for additional physical therapy services prior to discharge home  (However, pt reporting would refuse. Then recommend  PT once BP improves and able to ambulate/negotiate steps to enter home)   Interdisciplinary Plan of Care Collaboration   IDT Collaboration with  Nursing;Occupational Therapist;Family / Caregiver   Patient Position at End of Therapy In Bed;Call Light within Reach;Tray Table within Reach;Phone within Reach;Family / Friend in Room  (as found)   Collaboration Comments RN updated   Session Information   Date / Session Number  5/16- 1 (1/4, 5/22)

## 2024-05-16 NOTE — THERAPY
Occupational Therapy   Initial Evaluation     Patient Name: Tyler Zarate  Age:  74 y.o., Sex:  male  Medical Record #: [id number]  Today's Date: 5/16/2024     Precautions  Precautions: Fall Risk  Comments: orthostatic    Assessment    Patient is 74 y.o. male admitted with SOB and weakness. Other pertinent medical history includes adenocarcinoma of lung on chemotherapy, COPD, chronic respiratory failure, DVT on Xarelto, resting tremor, and UTI. Pt seen for OT evaluation. Pt required SBA for bed mobility, SBA to don/doff socks, and SBA to wipe face while seated. BP was 116/72 EOB, 87/48 standing, and 132/63 after return to supine. Pt symptomatic. RN aware. Pt lives with his son, daughter in law, and 7 children (ages 2-15 years old). Pt reported that his son walks with him to prevent falls at baseline when he is home. Pt's son and daughter in law both work. Pt current functional performance limited by impaired activity tolerance, impaired balance, generalized weakness, hypotension/dizziness, and impaired coordination. Pt will benefit from skilled OT while admitted to acute care.     Plan    Occupational Therapy Initial Treatment Plan   Treatment Interventions: Self Care / Activities of Daily Living, Therapeutic Exercises, Adaptive Equipment, Therapeutic Activity, Neuro Re-Education / Balance  Treatment Frequency: 3 Times per Week  Duration: Until Therapy Goals Met    DC Equipment Recommendations: Unable to determine at this time  Discharge Recommendations: Recommend post-acute placement for additional occupational therapy services prior to discharge home      Objective     05/16/24 1210   Prior Living Situation   Prior Services Intermittent Physical Support for ADL Per Family   Housing / Facility 2 Story House   Steps In Home   (FOS, stays on first floor)   Bathroom Set up Walk In Shower;Shower Chair   Equipment Owned Single Point Cane;Tub / Shower Seat   Lives with - Patient's Self Care Capacity Adult  "Children;Child Less than 18 Years of Age   Comments Pt lives with his son, daughter in law, and 7 children (ages 2-15 years old). Pt reported that his son walks with him to \"steady\" him and prevent falls. Pt's son studying to be a paramedic. Pt's daughter in law works in lactation.   Prior Level of ADL Function   Self Feeding Independent   Grooming / Hygiene Independent   Bathing Independent   Dressing Independent   Toileting Independent   Prior Level of IADL Function   Laundry Requires Assist   Kitchen Mobility Requires Assist   Home Management Requires Assist   Shopping Dependent   Prior Level Of Mobility   (reported he occasionally uses the cane)   Driving / Transportation Driving Independent   Occupation (Pre-Hospital Vocational) Retired Due To Age   History of Falls   History of Falls Yes   Date of Last Fall   (Pt reported one fall while getting out of the car after last hospital admission. Pt reported his son walks with him when he is home to prevent falls. Pt also mentioned fear of falling.)   Precautions   Precautions Fall Risk   Comments orthostatic   Vitals   Pulse 78   Pulse Oximetry 94 %   O2 (LPM) 3   O2 Delivery Device Oxymask   Vitals Comments BP was 116/72 EOB, 87/48 standing, and 132/63 after return to supine. Pt symptomatic. RN aware.   Pain   Pain Scales 0 to 10 Scale    Pain 0 - 10 Group   Therapist Pain Assessment Post Activity Pain Same as Prior to Activity;Nurse Notified  (not rated, agreeable to eval)   Non Verbal Descriptors   Non Verbal Scale  Calm   Cognition    Cognition / Consciousness WDL   Level of Consciousness Alert   Comments Pleasant and cooperative, receptive to education   Passive ROM Upper Body   Passive ROM Upper Body WDL   Active ROM Upper Body   Active ROM Upper Body  WDL   Strength Upper Body   Comments grossly 3+/5 bilaterally   Upper Body Muscle Tone   Upper Body Muscle Tone  WDL   Coordination Upper Body   Comments tremors noted in B UE, pt reported this is normal for him "   Balance Assessment   Sitting Balance (Static) Fair   Sitting Balance (Dynamic) Fair -   Standing Balance (Static) Fair -   Standing Balance (Dynamic) Poor +   Weight Shift Sitting Fair   Weight Shift Standing Poor   Comments w/ FWW   Bed Mobility    Supine to Sit Standby Assist   Sit to Supine Standby Assist   Scooting Standby Assist   Rolling Standby Assist   Comments HOB flat, use of rails   ADL Assessment   Grooming Standby Assist;Seated  (wiped face)   Lower Body Dressing Standby Assist  (don/doff socks)   Functional Mobility   Sit to Stand Minimal Assist   Bed, Chair, Wheelchair Transfer Minimal Assist   Mobility EOB>stand with a few steps>supine   Comments w/ FWW   Visual Perception   Visual Perception  Not Tested   Activity Tolerance   Sitting in Chair NT, declined reported he just got back to bed   Sitting Edge of Bed <5 min   Standing <1-2 min   Comments limited by weakness, dizziness, hypotension   Patient / Family Goals   Patient / Family Goal #1 to go home   Short Term Goals   Short Term Goal # 1 Pt will perform ADL transfer w/ supv   Short Term Goal # 2 Pt will perform standing g/h w/ supv   Short Term Goal # 3 Pt will perform toilet hygiene w/ supv   Education Group   Education Provided Role of Occupational Therapist;Activities of Daily Living;Pathology of bedrest   Role of Occupational Therapist Patient Response Patient;Acceptance;Explanation;Verbal Demonstration   ADL Patient Response Patient;Acceptance;Explanation;Demonstration;Verbal Demonstration;Action Demonstration   Pathology of Bedrest Patient Response Patient;Acceptance;Explanation;Verbal Demonstration   Occupational Therapy Initial Treatment Plan    Treatment Interventions Self Care / Activities of Daily Living;Therapeutic Exercises;Adaptive Equipment;Therapeutic Activity;Neuro Re-Education / Balance   Treatment Frequency 3 Times per Week   Duration Until Therapy Goals Met   Problem List   Problem List Decreased Active Daily Living  Skills;Decreased Homemaking Skills;Decreased Upper Extremity Strength Left;Decreased Upper Extremity Strength Right;Decreased Functional Mobility;Decreased Activity Tolerance;Impaired Coordination Right Upper Extremity;Impaired Coordination Left Upper Extremity;Impaired Postural Control / Balance

## 2024-05-16 NOTE — H&P
Hospital Medicine History & Physical Note    Date of Service  5/15/2024    Primary Care Physician  Marcy Jose M.D.      Code Status  Full Code    Chief Complaint  Chief Complaint   Patient presents with    Shortness of Breath    Weakness    Dizziness     Pt c/o increased oxygen needs and sob since dx with UTI       History of Presenting Illness  Tyler Zarate is a 74 y.o. male with past medical history of adenocarcinoma of the lung, currently on chemotherapy, history of COPD and chronic hypoxemic respiratory failure on 5 L supplemental oxygen at baseline who presented 5/15/2024 with worsening generalized weakness.  Patient states that over the last few days, he is felt progressively weak.  He states that he was recently diagnosed with a UTI, and has been on outpatient oral Levaquin.  He states that the UTI symptoms have improved.  He states that over the last 1 to 2 days she has had poor p.o. intake, and trouble getting out of bed.  As result patient came to the emergency department.  In regards to his dyspnea and shortness of breath.  He states that when he is on oxygen, he feels fine.  He is currently on his 5 L baseline oxygen requirement.  Denies any fevers or chills.  No new productive cough.   In the emergency department, labs remarkable for leukocytosis greater than 14,000.  He does state that he was on high-dose steroids, which completed approximately 2 days ago.  CT imaging was obtained, was negative for pulmonary emboli.  There is some dependent bronchial changes that are suspicious for bronchitis.    I discussed the plan of care with patient.    Review of Systems  Review of Systems   Constitutional:  Negative for chills and fever.   HENT:  Negative for congestion, ear discharge, ear pain, nosebleeds, sinus pain and tinnitus.    Eyes:  Negative for double vision, photophobia, pain and discharge.   Respiratory:  Positive for sputum production and shortness of breath. Negative for cough,  hemoptysis and stridor.    Cardiovascular:  Negative for chest pain, palpitations and orthopnea.   Gastrointestinal:  Negative for abdominal pain, diarrhea, nausea and vomiting.   Genitourinary:  Negative for dysuria, flank pain, frequency, hematuria and urgency.   Musculoskeletal:  Negative for back pain, joint pain and neck pain.   Neurological:  Positive for dizziness and weakness. Negative for tingling, tremors, sensory change, speech change, focal weakness and headaches.   Psychiatric/Behavioral:  Negative for hallucinations, substance abuse and suicidal ideas. The patient is not nervous/anxious and does not have insomnia.    All other systems reviewed and are negative.      Past Medical History   has a past medical history of Basal cell carcinoma (BCC) of skin of nose, Cancer (HCC), Carcinoma in situ of respiratory system, Cataract, COPD (chronic obstructive pulmonary disease) (HCC), Cough, DVT (deep venous thrombosis) (HCC), Lumbar radiculopathy, Pain, Pneumonia, Shortness of breath, Skin carcinoma, Sputum production, and Synovial cyst.    Surgical History   has a past surgical history that includes cataract phaco with iol (Bilateral) and pr bronchoscopy,diagnostic (4/12/2023).     Family History  family history includes Cancer in his brother.   Family history reviewed with patient. There is family history that is pertinent to the chief complaint.     Social History   reports that he has been smoking cigarettes. He started smoking about 59 years ago. He has a 29.7 pack-year smoking history. He has never been exposed to tobacco smoke. He has quit using smokeless tobacco.  His smokeless tobacco use included chew. He reports that he does not currently use alcohol after a past usage of about 8.4 oz of alcohol per week. He reports that he does not use drugs.    Allergies  Allergies   Allergen Reactions    Bacitracin-Neomycin-Polymyxin      Ointment around eye caused face and eye swelling    Gabapentin Swelling      "Took several medications following cataract surgery. Not sure which medication was cause.     Ketorolac Swelling     Took following cataract surgery. Not sure which medication was cause.     Lipoglycopeptide Antibiotics Swelling     Pt stated he did not remember what kind of antibiotics he was allergic to. Pt stated \"two different kinds\" Face swelling and eyes glazing over.     Tromethamine     Celecoxib Rash       Medications  Prior to Admission Medications   Prescriptions Last Dose Informant Patient Reported? Taking?   albuterol 108 (90 Base) MCG/ACT Aero Soln inhalation aerosol PRN at PRN Patient, Family Member Yes No   Sig: Inhale 2 Puffs every four hours as needed for Shortness of Breath.   fluticasone-salmeterol (ADVAIR) 250-50 MCG/ACT AEROSOL POWDER, BREATH ACTIVATED 5/15/2024 at AM Patient, Family Member Yes Yes   Sig: Inhale 1 Puff 2 times a day.   folic acid (FOLVITE) 1 MG Tab > MONTH AGO at [facility] Patient, Family Member Yes No   Sig: Take 1 mg by mouth see administration instructions. Take 1 to 2 days before chemotherapy only   levoFLOXacin (LEVAQUIN) 500 MG tablet 5/15/2024 at AM Patient, Family Member Yes Yes   Sig: Take 500 mg by mouth every day. X 7 days   omeprazole (PRILOSEC) 20 MG delayed-release capsule 5/15/2024 at AM Patient, Family Member No Yes   Sig: Take 1 Capsule by mouth 2 times a day.   Patient taking differently: Take 20 mg by mouth every day.   ondansetron (ZOFRAN) 4 MG Tab tablet PRN at PRN Patient, Family Member No No   Sig: Take 1 Tablet by mouth every four hours as needed for Nausea/Vomiting (for nausea, vomiting).   predniSONE (DELTASONE) 20 MG Tab 5/13/2024 at [facility] Patient, Family Member Yes No   Sig: Take 60 mg by mouth every day. 60 mg = 3 tabs   prochlorperazine (COMPAZINE) 10 MG Tab PRN at PRN Patient, Family Member No No   Sig: Take 1 Tablet by mouth every 6 hours as needed (for nausea, vomiting).   rivaroxaban (XARELTO) 20 MG Tab tablet 5/15/2024 at AM Patient, Family Member Yes " Yes   Sig: Take 20 mg by mouth every day.     sertraline (ZOLOFT) 50 MG Tab 5/15/2024 at AM Patient, Family Member Yes Yes   Sig: Take 50 mg by mouth every day.   tiotropium (SPIRIVA RESPIMAT) 2.5 mcg/Act Aero Soln 5/15/2024 at AM Patient, Family Member Yes Yes   Sig: Inhale 5 mcg every day.      Facility-Administered Medications: None       Physical Exam  Temp:  [36.9 °C (98.4 °F)] 36.9 °C (98.4 °F)  Pulse:  [79-83] 83  Resp:  [16-25] 17  BP: ()/(54-60) 107/60  SpO2:  [96 %-99 %] 96 %  Blood Pressure : 107/60   Temperature: 36.9 °C (98.4 °F)   Pulse: 83   Respiration: 17   Pulse Oximetry: 96 %       Physical Exam  Constitutional:       General: He is not in acute distress.     Appearance: Normal appearance. He is normal weight. He is not ill-appearing, toxic-appearing or diaphoretic.   HENT:      Head: Normocephalic and atraumatic.      Mouth/Throat:      Mouth: Mucous membranes are moist.   Eyes:      Pupils: Pupils are equal, round, and reactive to light.   Cardiovascular:      Rate and Rhythm: Normal rate and regular rhythm.      Pulses: Normal pulses.      Heart sounds: Normal heart sounds. No murmur heard.     No friction rub. No gallop.   Pulmonary:      Effort: Pulmonary effort is normal. No respiratory distress.      Breath sounds: No stridor. Rales present. No wheezing or rhonchi.   Chest:      Chest wall: No tenderness.   Abdominal:      General: There is no distension.      Palpations: There is no mass.      Tenderness: There is no abdominal tenderness. There is no right CVA tenderness, left CVA tenderness, guarding or rebound.      Hernia: No hernia is present.   Musculoskeletal:         General: No swelling, tenderness, deformity or signs of injury.      Right lower leg: No edema.      Left lower leg: No edema.   Skin:     General: Skin is warm and dry.      Capillary Refill: Capillary refill takes less than 2 seconds.      Coloration: Skin is not jaundiced or pale.      Findings: No bruising,  erythema, lesion or rash.   Neurological:      General: No focal deficit present.      Mental Status: He is alert and oriented to person, place, and time. Mental status is at baseline.      Cranial Nerves: No cranial nerve deficit.      Sensory: No sensory deficit.      Motor: No weakness.      Coordination: Coordination normal.      Gait: Gait normal.      Deep Tendon Reflexes: Reflexes normal.   Psychiatric:         Mood and Affect: Mood normal.         Laboratory:  Recent Labs     05/15/24  1454   WBC 14.1*   RBC 3.18*   HEMOGLOBIN 11.6*   HEMATOCRIT 34.6*   .8*   MCH 36.5*   MCHC 33.5   RDW 70.2*   PLATELETCT 150*   MPV 10.4     Recent Labs     05/15/24  1454   SODIUM 140   POTASSIUM 3.7   CHLORIDE 102   CO2 25   GLUCOSE 132*   BUN 21   CREATININE 1.13   CALCIUM 8.4*     Recent Labs     05/15/24  1454   ALTSGPT 26   ASTSGOT 20   ALKPHOSPHAT 97   TBILIRUBIN 0.4   GLUCOSE 132*         Recent Labs     05/15/24  1454   NTPROBNP 961*         Recent Labs     05/15/24  1454   TROPONINT 50*       Imaging:  CT-CTA CHEST PULMONARY ARTERY W/ RECONS   Final Result      1.  No central or segmental pulmonary embolus   2.  New BILATERAL dependent bronchial changes suspicious for bronchitis. Aspiration related lung disease is a consideration.   3.  Unchanged BILATERAL parenchymal changes compatible with patient's known history of lung cancer without appreciable interval change since the recent metastatic survey   4.  Adrenal adenoma   5.  Atherosclerosis   6.  Trace LEFT pleural effusion            DX-CHEST-PORTABLE (1 VIEW)   Final Result      No significant interval change. No new consolidation or pleural effusion. Posttreatment related changes in this patient with history of lung cancer.          X-Ray:  I have personally reviewed the images and compared with prior images.  EKG:  I have personally reviewed the images and compared with prior images.    Assessment/Plan:  Justification for Admission Status  I anticipate  this patient will require at least two midnights for appropriate medical management, necessitating inpatient admission because chronic hypoxic respiratory failure, weakness    Patient will need a Telemetry bed on MEDICAL service .  The need is secondary to chronic hypoxic respiratory failure, weakness.    * Chronic hypoxic respiratory failure (HCC)- (present on admission)  Assessment & Plan  Patient with known history of COPD and adenocarcinoma of the lung.  He is followed by pulmonology in outpatient setting.  At baseline he requires 5 L supplemental oxygen.  Currently on 5 L while emergency department.  No signs of COPD exacerbation  CT imaging was obtained in the department.  There is no evidence of a pulmonary embolism.  There are some dependent bronchial changes suspicious for bronchitis.      COPD (chronic obstructive pulmonary disease) (HCC)  Assessment & Plan  Patient with known history of COPD.  Patient reports 5 L supplemental oxygen.  He is followed by pulmonology in outpatient setting  Continue with home dose Spiriva, albuterol, Dulera  Currently not in COPD exacerbation    UTI (urinary tract infection)  Assessment & Plan  Patient was found to have a UTI several days ago.  Was started on oral Levaquin.  States he is currently asymptomatic  Complete course of Levaquin.    Advanced care planning/counseling discussion  Assessment & Plan  I spent 17 minutes at bedside in ED with patient discussing work-up, results, diagnosis, prognosis including his current adenocarcinoma of the lung.   CODE STATUS discussed with patient and wants to be full code.      Leukocytosis  Assessment & Plan  Likely secondary to recent high dose Prednisone use. Patient states that his last dose of Prednisone was two days ago. Unclear why he was placed on it.     Weakness  Assessment & Plan  PT/OT evaluation     Lactic acid acidosis  Assessment & Plan  Patient presented with lactic acid of 3.0.  Likely secondary to dehydration from  poor PO intake   Received 1 L of IV fluids in Emergency Department  Follow-up repeat lactic acid level     Deep vein thrombosis (DVT) of right lower extremity (HCC)- (present on admission)  Assessment & Plan  Continue Xarelto     Left lower lobe pulmonary nodule  Assessment & Plan  Patient has adenocarcinoma of the right upper lobe status post biopsy.  There is a lesion in the left lower lobe measuring 11 x 5 mm.  There is concern that this is secondary primary.  He is followed by pulmonology and outpatient.    Adenocarcinoma, lung, right (HCC)- (present on admission)  Assessment & Plan  History of adenocarcinoma of the right lung.  Currently undergoing chemotherapy.  Day team to consult oncology    Adrenal adenoma  Assessment & Plan  Incidental finding on CT scan          VTE prophylaxis: therapeutic anticoagulation with xarelto

## 2024-05-16 NOTE — DISCHARGE PLANNING
In the case of an emergency, pt's legal NOK is sonRan     RNCM met with pt at bedside and obtained the information used in this assessment. Pt verified accuracy of facesheet. Pt lives in a  2 story home with son, his wife and 7 kids ages 15-2.5..  [facility]. Prior to current hospitalization, pt was  independent in ADLS/IADLS, but states his level of function has been declining and often needs assist with amb.  Pt has a good support system. Pt denies any hx of substance use and denies any dx of mh. 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  Received call from daughter in law, Will Zarate. She expressed concerns about pt's return to home. She states her spouse is an EMT and works 48hrs shifts. She is care-giver and home schooling 7 kids. She states pt is only able to amb about 4 ft, is incont of bowel and bladder and has been challenged with hygiene. He has ad frequent falls.        Care Transition Team Assessment    Information Source: Pt and daughter in law.  Orientation Level: Oriented X4  Information Given By: Patient  Informant's Name: Tyler  Who is responsible for making decisions for patient? : Patient         Elopement Risk  Legal Hold: No  Ambulatory or Self Mobile in Wheelchair: No-Not an Elopement Risk  Elopement Risk: Not at Risk for Elopement    Interdisciplinary Discharge Planning  Does Admitting Nurse Feel This Could be a Complex Discharge?: No  [facility]  Lives with - Patient's Self Care Capacity: Adult Children  Patient or legal guardian wants to designate a caregiver: No  Support Systems: Family Member(s)  Housing / Facility: 2 Story House  Do You Take your Prescribed Medications Regularly: Yes  Able to Return to Previous ADL's: Yes  Mobility Issues: Yes  Prior Services: None  Durable Medical Equipment: Home Oxygen  DME Provider / Phone: [employer]    Discharge Preparedness  What is your plan  after discharge?: Uncertain - pending medical team collaboration  What are your discharge supports?: Child  Prior Functional Level: Ambulatory, Independent with Activities of Daily Living, Independent with Medication Management, Uses Cane  Difficulity with ADLs: None  Difficulity with IADLs: None    Functional Assesment  Prior Functional Level: Ambulatory, Independent with Activities of Daily Living, Independent with Medication Management, Uses Cane    Finances  Prescription Coverage: Yes    Vision / Hearing Impairment  Vision Impairment : No  Hearing Impairment : Yes  Hearing Impairment: Both Ears              Domestic Abuse  Have you ever been the victim of abuse or violence?: No  Possible Abuse/Neglect Reported to:: Not Applicable    Psychological Assessment  History of Substance Abuse: None  History of Psychiatric Problems: No         Anticipated Discharge Information  Discharge Disposition: Discharged to home/self care (01)

## 2024-05-16 NOTE — PROGRESS NOTES
[facility] Medicine Daily Progress Note    Date of Service  5/16/2024    Chief Complaint  Tyler Zarate is a 74 y.o. male admitted 5/15/2024 with shortness of breath and weakness     Hospital Course  This is a 4 y.o. male with past medical history of adenocarcinoma of the lung, currently on chemotherapy, history of COPD and chronic hypoxemic respiratory failure on 5 L supplemental oxygen at baseline who presented 5/15/2024 with worsening generalized weakness.  Patient states that over the last few days, he is felt progressively weak.  He states that he was recently diagnosed with a UTI, and has been on outpatient oral Levaquin.  He states that the UTI symptoms have improved.  He states that over the last 1 to 2 days she has had poor p.o. intake, and trouble getting out of bed.  As result patient came to the emergency department.  In regards to his dyspnea and shortness of breath.  He states that when he is on oxygen, he feels fine.  He is currently on his 5 L baseline oxygen requirement.  Denies any fevers or chills.  No new productive cough.   In the emergency department, labs remarkable for leukocytosis greater than 14,000.  He does state that he was on high-dose steroids, which completed approximately 2 days ago.  CT imaging was obtained, was negative for pulmonary emboli.  There is some dependent bronchial changes that are suspicious for bronchitis.  Patient admitted for further treatment     Interval Problem Update  Patient seen and examined, afebrile, no nausea or vomiting, still feeling weak and SOB.   Cont steroids, PT/OT eval       I have discussed this patient's plan of care and discharge plan at IDT rounds today with Case Management, Nursing, Nursing leadership, and other members of the IDT team.    Consultants/Specialty  None     Code Status  Full Code    Disposition  The patient is not medically cleared for discharge to home or a post-acute facility.      I have placed the appropriate orders for  post-discharge needs.    Review of Systems  Review of Systems   Constitutional:  Negative for chills and fever.   HENT:  Negative for congestion, ear discharge, ear pain, nosebleeds, sinus pain and tinnitus.    Eyes:  Negative for double vision, photophobia, pain and discharge.   Respiratory:  Positive for sputum production and shortness of breath. Negative for cough, hemoptysis and stridor.    Cardiovascular:  Negative for chest pain, palpitations and orthopnea.   Gastrointestinal:  Negative for abdominal pain, diarrhea, nausea and vomiting.   Genitourinary:  Negative for dysuria, flank pain, frequency, hematuria and urgency.   Musculoskeletal:  Negative for back pain, joint pain and neck pain.   Neurological:  Positive for dizziness and weakness. Negative for tingling, tremors, sensory change, speech change, focal weakness and headaches.   Psychiatric/Behavioral:  Negative for hallucinations, substance abuse and suicidal ideas. The patient is not nervous/anxious and does not have insomnia.    All other systems reviewed and are negative.       Physical Exam  Temp:  [36.7 °C (98.1 °F)-37.4 °C (99.3 °F)] 37.1 °C (98.8 °F)  Pulse:  [72-95] 76  Resp:  [16-25] 20  BP: ()/(52-98) 122/61  SpO2:  [90 %-99 %] 98 %    Physical Exam  Constitutional:       General: He is not in acute distress.     Appearance: Normal appearance. He is normal weight. He is not ill-appearing, toxic-appearing or diaphoretic.   HENT:      Head: Normocephalic and atraumatic.      Mouth/Throat:      Mouth: Mucous membranes are moist.   Eyes:      Pupils: Pupils are equal, round, and reactive to light.   Cardiovascular:      Rate and Rhythm: Normal rate and regular rhythm.      Pulses: Normal pulses.      Heart sounds: Normal heart sounds. No murmur heard.     No friction rub. No gallop.   Pulmonary:      Effort: Pulmonary effort is normal. No respiratory distress.      Breath sounds: No stridor. Rales present. No wheezing or rhonchi.   Chest:       Chest wall: No tenderness.   Abdominal:      General: There is no distension.      Palpations: There is no mass.      Tenderness: There is no abdominal tenderness. There is no right CVA tenderness, left CVA tenderness, guarding or rebound.      Hernia: No hernia is present.   Musculoskeletal:         General: No swelling, tenderness, deformity or signs of injury.      Right lower leg: No edema.      Left lower leg: No edema.   Skin:     General: Skin is warm and dry.      Capillary Refill: Capillary refill takes less than 2 seconds.      Coloration: Skin is not jaundiced or pale.      Findings: No bruising, erythema, lesion or rash.   Neurological:      General: No focal deficit present.      Mental Status: He is alert and oriented to person, place, and time. Mental status is at baseline.      Cranial Nerves: No cranial nerve deficit.      Sensory: No sensory deficit.      Motor: No weakness.      Coordination: Coordination normal.      Gait: Gait normal.      Deep Tendon Reflexes: Reflexes normal.   Psychiatric:         Mood and Affect: Mood normal.         Fluids    Intake/Output Summary (Last 24 hours) at 5/16/2024 1224  Last data filed at 5/16/2024 0900  Gross per 24 hour   Intake 1899 ml   Output 425 ml   Net 1474 ml       Laboratory  Recent Labs     05/15/24  1454 05/16/24  0257   WBC 14.1* 16.6*   RBC 3.18* 2.54*   HEMOGLOBIN 11.6* 9.1*   HEMATOCRIT 34.6* 27.7*   .8* 109.1*   MCH 36.5* 35.8*   MCHC 33.5 32.9   RDW 70.2* 69.9*   PLATELETCT 150* 123*   MPV 10.4 10.4     Recent Labs     05/15/24  1454 05/16/24  0156   SODIUM 140 139   POTASSIUM 3.7 3.4*   CHLORIDE 102 102   CO2 25 28   GLUCOSE 132* 114*   BUN 21 20   CREATININE 1.13 1.05   CALCIUM 8.4* 7.8*             Recent Labs     05/16/24  0156   TRIGLYCERIDE 54   HDL 62   LDL 47       Imaging  CT-CTA CHEST PULMONARY ARTERY W/ RECONS   Final Result      1.  No central or segmental pulmonary embolus   2.  New BILATERAL dependent bronchial changes  suspicious for bronchitis. Aspiration related lung disease is a consideration.   3.  Unchanged BILATERAL parenchymal changes compatible with patient's known history of lung cancer without appreciable interval change since the recent metastatic survey   4.  Adrenal adenoma   5.  Atherosclerosis   6.  Trace LEFT pleural effusion            DX-CHEST-PORTABLE (1 VIEW)   Final Result      No significant interval change. No new consolidation or pleural effusion. Posttreatment related changes in this patient with history of lung cancer.           Assessment/Plan  * Chronic hypoxic respiratory failure (HCC)- (present on admission)  Assessment & Plan  Patient with known history of COPD and adenocarcinoma of the lung.  He is followed by pulmonology in outpatient setting.  At baseline he requires between 3-5  L supplemental oxygen.  CT imaging was obtained in the department.  There is no evidence of a pulmonary embolism.  There are some dependent bronchial changes suspicious for bronchitis.    On steroids   Wean as tolerated     Adrenal adenoma  Assessment & Plan  Incidental finding on CT scan      COPD (chronic obstructive pulmonary disease) (HCC)  Assessment & Plan  Patient with known history of COPD.  Patient reports 5 L supplemental oxygen.  He is followed by pulmonology in outpatient setting  Continue with home dose Spiriva, albuterol, Dulera  Currently not in COPD exacerbation    UTI (urinary tract infection)  Assessment & Plan  Patient was found to have a UTI several days ago.  Was started on oral Levaquin.  States he is currently asymptomatic  Complete course of Levaquin.    Advanced care planning/counseling discussion  Assessment & Plan  I spent 17 minutes at bedside in ED with patient discussing work-up, results, diagnosis, prognosis including his current adenocarcinoma of the lung.   CODE STATUS discussed with patient and wants to be full code.      Leukocytosis  Assessment & Plan  Likely secondary to recent high  dose Prednisone use. Patient states that his last dose of Prednisone was two days ago. Unclear why he was placed on it.     Weakness  Assessment & Plan  PT/OT evaluation     Lactic acid acidosis  Assessment & Plan  Patient presented with lactic acid of 3.0.  Likely secondary to dehydration from poor PO intake   Received 1 L of IV fluids in Emergency Department  Follow-up repeat lactic acid level     Deep vein thrombosis (DVT) of right lower extremity (HCC)- (present on admission)  Assessment & Plan  Continue Xarelto     Left lower lobe pulmonary nodule  Assessment & Plan  Patient has adenocarcinoma of the right upper lobe status post biopsy.  There is a lesion in the left lower lobe measuring 11 x 5 mm.  There is concern that this is secondary primary.  He is followed by pulmonology and outpatient.    Adenocarcinoma, lung, right (HCC)- (present on admission)  Assessment & Plan  History of adenocarcinoma of the right lung.  Currently undergoing chemotherapy.  Day team to consult oncology         VTE prophylaxis: xarelto     I have performed a physical exam and reviewed and updated ROS and Plan today (5/16/2024). In review of yesterday's note (5/15/2024), there are no changes except as documented above.

## 2024-05-16 NOTE — ASSESSMENT & PLAN NOTE
Patient with known history of COPD and adenocarcinoma of the lung.  He is followed by pulmonology in outpatient setting.  At baseline he requires between 3-5  L supplemental oxygen.  CT imaging was obtained in the department.  There is no evidence of a pulmonary embolism.  There are some dependent bronchial changes suspicious for bronchitis.    On steroids   Wean as tolerated

## 2024-05-16 NOTE — PROGRESS NOTES
Report received from Rn. Assumed pt care. Pt is sleeping in bed on 3 liters Sp02 97%. Fall precautions in place, call light and belongings within reach, bed in lowest position. No signs of distress.

## 2024-05-16 NOTE — CARE PLAN
The patient is Stable - Low risk of patient condition declining or worsening    Shift Goals  Clinical Goals: safety  Patient Goals: sleep  Family Goals: safety    Progress made toward(s) clinical / shift goals:    Problem: Skin Integrity  Goal: Skin integrity is maintained or improved  Outcome: Progressing     Problem: Fall Risk  Goal: Patient will remain free from falls  Outcome: Progressing     Problem: Respiratory  Goal: Patient will achieve/maintain optimum respiratory ventilation and gas exchange  Outcome: Progressing     Problem: Mobility  Goal: Patient's capacity to carry out activities will improve  Outcome: Progressing     Problem: Self Care  Goal: Patient will have the ability to perform ADLs independently or with assistance (bathe, groom, dress, toilet and feed)  Outcome: Progressing     Problem: Infection - Standard  Goal: Patient will remain free from infection  Outcome: Progressing       Patient is not progressing towards the following goals:

## 2024-05-16 NOTE — ASSESSMENT & PLAN NOTE
Patient with known history of COPD.  Patient reports 5 L supplemental oxygen.  He is followed by pulmonology in outpatient setting  Continue with home dose Spiriva, albuterol, Dulera  Currently not in COPD exacerbation

## 2024-05-16 NOTE — PROGRESS NOTES
4 Eyes Skin Assessment Completed by louie, KOFI and KOFI mathew.    Head WDL  Ears WDL  Nose Scab  Mouth WDL  Neck WDL  Breast/Chest WDL  Shoulder Blades WDL  Spine WDL  (R) Arm/Elbow/Hand WDL  (L) Arm/Elbow/Hand WDL  Abdomen WDL  Groin WDL  Scrotum/Coccyx/Buttocks Redness, Blanching, and Excoriation  (R) Leg Swelling  (L) Leg Swelling  (R) Heel/Foot/Toe WDL  (L) Heel/Foot/Toe WDL          Devices In Places Tele Box, Pulse Ox, and Oxy Mask      Interventions In Place Pillows    Possible Skin Injury No    Pictures Uploaded Into Epic N/A  Wound Consult Placed N/A  RN Wound Prevention Protocol Ordered Yes

## 2024-05-16 NOTE — ASSESSMENT & PLAN NOTE
I had discussion again today with patient regarding goals  of care and his prognosis Patient wants to remain full code for now  More then 18 min were spent for  goals of care

## 2024-05-16 NOTE — ASSESSMENT & PLAN NOTE
Patient presented with lactic acid of 3.0.  Likely secondary to dehydration from poor PO intake   Received 1 L of IV fluids in Emergency Department  Follow-up repeat lactic acid level

## 2024-05-16 NOTE — ED NOTES
Med Rec complete per patient and family at bedside  Allergies reviewed  Antibiotics in the past 30 days:yes  Anticoagulant in past 14 days:yes  Anticoagulant:Xarelto 20 mg Last dose:05/15/24  [facility] utilizes:[facility]    Pt does chemotherapy here at [facility] every 21 days however per son pt missed last appointment. Last chemo was 30-40 days ago    Pt has 3 days remaining on Levofloxacin 7 day course

## 2024-05-16 NOTE — CARE PLAN
The patient is Stable - Low risk of patient condition declining or worsening    Shift Goals  Clinical Goals: safety, up to chair for meals, monitor respiratory status  Patient Goals: sleep  Family Goals: safety    Progress made toward(s) clinical / shift goals:      Problem: Knowledge Deficit - Standard  Goal: Patient and family/care givers will demonstrate understanding of plan of care, disease process/condition, diagnostic tests and medications  Outcome: Progressing     Problem: Skin Integrity  Goal: Skin integrity is maintained or improved  Outcome: Progressing     Problem: Fall Risk  Goal: Patient will remain free from falls  Outcome: Progressing     Problem: Mobility  Goal: Patient's capacity to carry out activities will improve  Outcome: Progressing     Problem: Infection - Standard  Goal: Patient will remain free from infection  Outcome: Progressing       Patient is not progressing towards the following goals:

## 2024-05-16 NOTE — ASSESSMENT & PLAN NOTE
Patient was found to have a UTI several days ago.  Was started on oral Levaquin.  States he is currently asymptomatic  Complete course of Levaquin.

## 2024-05-16 NOTE — ASSESSMENT & PLAN NOTE
History of adenocarcinoma of the right lung.  Currently undergoing chemotherapy.  Day team to consult oncology

## 2024-05-17 ENCOUNTER — APPOINTMENT (OUTPATIENT)
Dept: CARDIOLOGY | Facility: MEDICAL CENTER | Age: 74
End: 2024-05-17
Attending: INTERNAL MEDICINE
Payer: COMMERCIAL

## 2024-05-17 LAB
ANION GAP SERPL CALC-SCNC: 10 MMOL/L (ref 7–16)
BACTERIA UR CULT: NORMAL
BUN SERPL-MCNC: 21 MG/DL (ref 8–22)
CALCIUM SERPL-MCNC: 8.1 MG/DL (ref 8.5–10.5)
CHLORIDE SERPL-SCNC: 102 MMOL/L (ref 96–112)
CO2 SERPL-SCNC: 27 MMOL/L (ref 20–33)
CREAT SERPL-MCNC: 0.86 MG/DL (ref 0.5–1.4)
ERYTHROCYTE [DISTWIDTH] IN BLOOD BY AUTOMATED COUNT: 69.8 FL (ref 35.9–50)
GFR SERPLBLD CREATININE-BSD FMLA CKD-EPI: 90 ML/MIN/1.73 M 2
GLUCOSE SERPL-MCNC: 125 MG/DL (ref 65–99)
HCT VFR BLD AUTO: 30.5 % (ref 42–52)
HGB BLD-MCNC: 10 G/DL (ref 14–18)
LACTATE SERPL-SCNC: 1.1 MMOL/L (ref 0.5–2)
LV EJECT FRACT  99904: 60
LV EJECT FRACT MOD 2C 99903: 66.02
LV EJECT FRACT MOD 4C 99902: 60.53
LV EJECT FRACT MOD BP 99901: 63.97
MCH RBC QN AUTO: 35.8 PG (ref 27–33)
MCHC RBC AUTO-ENTMCNC: 32.8 G/DL (ref 32.3–36.5)
MCV RBC AUTO: 109.3 FL (ref 81.4–97.8)
PLATELET # BLD AUTO: 124 K/UL (ref 164–446)
PMV BLD AUTO: 10.1 FL (ref 9–12.9)
POTASSIUM SERPL-SCNC: 3.8 MMOL/L (ref 3.6–5.5)
RBC # BLD AUTO: 2.79 M/UL (ref 4.7–6.1)
SIGNIFICANT IND 70042: NORMAL
SITE SITE: NORMAL
SODIUM SERPL-SCNC: 139 MMOL/L (ref 135–145)
SOURCE SOURCE: NORMAL
WBC # BLD AUTO: 18.7 K/UL (ref 4.8–10.8)

## 2024-05-17 PROCEDURE — 93306 TTE W/DOPPLER COMPLETE: CPT | Mod: 26 | Performed by: INTERNAL MEDICINE

## 2024-05-17 PROCEDURE — 83605 ASSAY OF LACTIC ACID: CPT

## 2024-05-17 PROCEDURE — 80048 BASIC METABOLIC PNL TOTAL CA: CPT

## 2024-05-17 PROCEDURE — 700102 HCHG RX REV CODE 250 W/ 637 OVERRIDE(OP): Performed by: STUDENT IN AN ORGANIZED HEALTH CARE EDUCATION/TRAINING PROGRAM

## 2024-05-17 PROCEDURE — 700101 HCHG RX REV CODE 250: Performed by: INTERNAL MEDICINE

## 2024-05-17 PROCEDURE — 700111 HCHG RX REV CODE 636 W/ 250 OVERRIDE (IP): Performed by: STUDENT IN AN ORGANIZED HEALTH CARE EDUCATION/TRAINING PROGRAM

## 2024-05-17 PROCEDURE — 700117 HCHG RX CONTRAST REV CODE 255: Performed by: NURSE PRACTITIONER

## 2024-05-17 PROCEDURE — 85027 COMPLETE CBC AUTOMATED: CPT

## 2024-05-17 PROCEDURE — 36415 COLL VENOUS BLD VENIPUNCTURE: CPT

## 2024-05-17 PROCEDURE — A9270 NON-COVERED ITEM OR SERVICE: HCPCS | Performed by: STUDENT IN AN ORGANIZED HEALTH CARE EDUCATION/TRAINING PROGRAM

## 2024-05-17 PROCEDURE — G0378 HOSPITAL OBSERVATION PER HR: HCPCS

## 2024-05-17 PROCEDURE — 99233 SBSQ HOSP IP/OBS HIGH 50: CPT | Performed by: INTERNAL MEDICINE

## 2024-05-17 PROCEDURE — 93306 TTE W/DOPPLER COMPLETE: CPT

## 2024-05-17 RX ORDER — CIPROFLOXACIN AND DEXAMETHASONE 3; 1 MG/ML; MG/ML
4 SUSPENSION/ DROPS AURICULAR (OTIC) 2 TIMES DAILY
Status: DISCONTINUED | OUTPATIENT
Start: 2024-05-17 | End: 2024-05-18 | Stop reason: HOSPADM

## 2024-05-17 RX ADMIN — MOMETASONE FUROATE AND FORMOTEROL FUMARATE DIHYDRATE 2 PUFF: 200; 5 AEROSOL RESPIRATORY (INHALATION) at 09:08

## 2024-05-17 RX ADMIN — SERTRALINE HYDROCHLORIDE 50 MG: 50 TABLET ORAL at 04:28

## 2024-05-17 RX ADMIN — TIOTROPIUM BROMIDE INHALATION SPRAY 5 MCG: 3.12 SPRAY, METERED RESPIRATORY (INHALATION) at 07:15

## 2024-05-17 RX ADMIN — CIPROFLOXACIN AND DEXAMETHASONE 4 DROP: 3; 1 SUSPENSION/ DROPS AURICULAR (OTIC) at 16:39

## 2024-05-17 RX ADMIN — HUMAN ALBUMIN MICROSPHERES AND PERFLUTREN 3 ML: 10; .22 INJECTION, SOLUTION INTRAVENOUS at 12:30

## 2024-05-17 RX ADMIN — RIVAROXABAN 20 MG: 20 TABLET, FILM COATED ORAL at 09:06

## 2024-05-17 RX ADMIN — PREDNISONE 60 MG: 20 TABLET ORAL at 04:28

## 2024-05-17 RX ADMIN — MOMETASONE FUROATE AND FORMOTEROL FUMARATE DIHYDRATE 2 PUFF: 200; 5 AEROSOL RESPIRATORY (INHALATION) at 20:23

## 2024-05-17 RX ADMIN — LEVOFLOXACIN 500 MG: 500 TABLET, FILM COATED ORAL at 04:28

## 2024-05-17 ASSESSMENT — ENCOUNTER SYMPTOMS
ORTHOPNEA: 0
NECK PAIN: 0
NERVOUS/ANXIOUS: 0
SENSORY CHANGE: 0
DOUBLE VISION: 0
ABDOMINAL PAIN: 0
SPEECH CHANGE: 0
COUGH: 0
HALLUCINATIONS: 0
WEAKNESS: 1
FLANK PAIN: 0
HEMOPTYSIS: 0
DIARRHEA: 0
SINUS PAIN: 0
SPUTUM PRODUCTION: 1
NAUSEA: 0
EYE DISCHARGE: 0
TREMORS: 0
PHOTOPHOBIA: 0
CHILLS: 0
FOCAL WEAKNESS: 0
PALPITATIONS: 0
INSOMNIA: 0
DIZZINESS: 1
STRIDOR: 0
BACK PAIN: 0
VOMITING: 0
TINGLING: 0
FEVER: 0
SHORTNESS OF BREATH: 1
HEADACHES: 0
EYE PAIN: 0

## 2024-05-17 ASSESSMENT — LIFESTYLE VARIABLES: SUBSTANCE_ABUSE: 0

## 2024-05-17 ASSESSMENT — FIBROSIS 4 INDEX: FIB4 SCORE: 2.3

## 2024-05-17 ASSESSMENT — PAIN DESCRIPTION - PAIN TYPE: TYPE: ACUTE PAIN

## 2024-05-17 NOTE — DISCHARGE PLANNING
Referral Management team    Received hospice referral. Patient lives in [place], [place]. Will defer referral to CM team.     Please let the RMT know if patient stays within the state for hospice services.

## 2024-05-17 NOTE — PROGRESS NOTES
Monitor Summary:     Rhythm: SR/ST  Rate:   Ectopy: (O)PVC, (R)PAC, Big, Trig  Measurements: .16/.06/.45                 12 Hour Chart Check

## 2024-05-17 NOTE — FACE TO FACE
Face to Face Supporting Documentation - Home Health    The encounter with this patient was in whole or in part the primary reason for home health admission.    Date of encounter:   Patient:                    MRN:                       YOB: 2025  Tyler Zarate  [id number]  1950     Home health to see patient for:  Skilled Nursing care for assessment, interventions & education, Physical Therapy evaluation and treatment, and Occupational therapy evaluation and treatment    Skilled need for:  Comment: Medication management     Skilled nursing interventions to include:  Comment: medication management     Homebound status evidenced by:  Need the aid of supportive devices such as crutches, canes, wheelchairs or walkers. Leaving home requires a considerable and taxing effort. There is a normal inability to leave the home.    Community Physician to provide follow up care: Marcy Jose M.D.     Optional Interventions? No      I certify the face to face encounter for this home health care referral meets the CMS requirements and the encounter/clinical assessment with the patient was, in whole, or in part, for the medical condition(s) listed above, which is the primary reason for home health care. Based on my clinical findings: the service(s) are medically necessary, support the need for home health care, and the homebound criteria are met.  I certify that this patient has had a face to face encounter by myself.  Mehnaz Alvarez M.D. - NPI: [id number]

## 2024-05-17 NOTE — DISCHARGE PLANNING
@ 1045: Discussed pt during IDT rounds. Per MD, anticipate pt will dc with HH. Pending HH order.    @ 1210: DANNY called [facility] , requesting updates on obtain Personal Care Attendant for pt. Left a VM.    @ 1540: DANNY met with pt and pt's daughter-in-law at bedside to provide [id number] form for personal care attendant. DANNY provided DIL with contact information for [facility]. Per DIL, requesting wheelchair for DME and bedside commode. DANNY explained bedside commode is usually not covered under insurance; MD would need to place DME orders. Provided information for [facility]. Pt's DIL asked about pending hospice referral. SW asked pt's DIL if pt wanted hospice or HH as both cannot be provided. Pt's DIL interested in palliative consult to discuss GOC instead. DANNY informed DIL that palliative consult has been placed. DANNY sent HH referral to [facility] and faxed choice to DPA.

## 2024-05-17 NOTE — PROGRESS NOTES
[facility] Medicine Daily Progress Note    Date of Service  5/17/2024    Chief Complaint  Tyler Zarate is a 74 y.o. male admitted 5/15/2024 with shortness of breath and weakness     Hospital Course  This is a 4 y.o. male with past medical history of adenocarcinoma of the lung, currently on chemotherapy, history of COPD and chronic hypoxemic respiratory failure on 5 L supplemental oxygen at baseline who presented 5/15/2024 with worsening generalized weakness.  Patient states that over the last few days, he is felt progressively weak.  He states that he was recently diagnosed with a UTI, and has been on outpatient oral Levaquin.  He states that the UTI symptoms have improved.  He states that over the last 1 to 2 days she has had poor p.o. intake, and trouble getting out of bed.  As result patient came to the emergency department.  In regards to his dyspnea and shortness of breath.  He states that when he is on oxygen, he feels fine.  He is currently on his 5 L baseline oxygen requirement.  Denies any fevers or chills.  No new productive cough.   In the emergency department, labs remarkable for leukocytosis greater than 14,000.  He does state that he was on high-dose steroids, which completed approximately 2 days ago.  CT imaging was obtained, was negative for pulmonary emboli.  There is some dependent bronchial changes that are suspicious for bronchitis.  Patient admitted for further treatment     Interval Problem Update  Patient seen and examined, afebrile, no nausea or vomiting, still feeling weak and SOB.   Cont steroids, PT/OT eval   5/17: Patient seen and examined, afebrile resting in bed CT head negative for acute issues, echo is pending  Cont on steroids for his COPD       I have discussed this patient's plan of care and discharge plan at IDT rounds today with Case Management, Nursing, Nursing leadership, and other members of the IDT team.    Consultants/Specialty  None     Code Status  Full  Code    Disposition  The patient is not medically cleared for discharge to home or a post-acute facility.      I have placed the appropriate orders for post-discharge needs.    Review of Systems  Review of Systems   Constitutional:  Negative for chills and fever.   HENT:  Negative for congestion, ear discharge, ear pain, nosebleeds, sinus pain and tinnitus.    Eyes:  Negative for double vision, photophobia, pain and discharge.   Respiratory:  Positive for sputum production and shortness of breath. Negative for cough, hemoptysis and stridor.    Cardiovascular:  Negative for chest pain, palpitations and orthopnea.   Gastrointestinal:  Negative for abdominal pain, diarrhea, nausea and vomiting.   Genitourinary:  Negative for dysuria, flank pain, frequency, hematuria and urgency.   Musculoskeletal:  Negative for back pain, joint pain and neck pain.   Neurological:  Positive for dizziness and weakness. Negative for tingling, tremors, sensory change, speech change, focal weakness and headaches.   Psychiatric/Behavioral:  Negative for hallucinations, substance abuse and suicidal ideas. The patient is not nervous/anxious and does not have insomnia.    All other systems reviewed and are negative.       Physical Exam  Temp:  [36.5 °C (97.7 °F)-37 °C (98.6 °F)] 36.6 °C (97.9 °F)  Pulse:  [66-81] 72  Resp:  [18-20] 18  BP: (108-113)/(53-55) 108/53  SpO2:  [92 %-97 %] 92 %    Physical Exam  Constitutional:       General: He is not in acute distress.     Appearance: Normal appearance. He is normal weight. He is not ill-appearing, toxic-appearing or diaphoretic.   HENT:      Head: Normocephalic and atraumatic.      Mouth/Throat:      Mouth: Mucous membranes are moist.   Eyes:      Pupils: Pupils are equal, round, and reactive to light.   Cardiovascular:      Rate and Rhythm: Normal rate and regular rhythm.      Pulses: Normal pulses.      Heart sounds: Normal heart sounds. No murmur heard.     No friction rub. No gallop.    Pulmonary:      Effort: Pulmonary effort is normal. No respiratory distress.      Breath sounds: No stridor. Rales present. No wheezing or rhonchi.   Chest:      Chest wall: No tenderness.   Abdominal:      General: There is no distension.      Palpations: There is no mass.      Tenderness: There is no abdominal tenderness. There is no right CVA tenderness, left CVA tenderness, guarding or rebound.      Hernia: No hernia is present.   Musculoskeletal:         General: No swelling, tenderness, deformity or signs of injury.      Right lower leg: No edema.      Left lower leg: No edema.   Skin:     General: Skin is warm and dry.      Capillary Refill: Capillary refill takes less than 2 seconds.      Coloration: Skin is not jaundiced or pale.      Findings: No bruising, erythema, lesion or rash.   Neurological:      General: No focal deficit present.      Mental Status: He is alert and oriented to person, place, and time. Mental status is at baseline.      Cranial Nerves: No cranial nerve deficit.      Sensory: No sensory deficit.      Motor: No weakness.      Coordination: Coordination normal.      Gait: Gait normal.      Deep Tendon Reflexes: Reflexes normal.   Psychiatric:         Mood and Affect: Mood normal.         Fluids    Intake/Output Summary (Last 24 hours) at 5/17/2024 1341  Last data filed at 5/17/2024 1100  Gross per 24 hour   Intake --   Output 750 ml   Net -750 ml       Laboratory  Recent Labs     05/15/24  1454 05/16/24  0257 05/17/24  0045   WBC 14.1* 16.6* 18.7*   RBC 3.18* 2.54* 2.79*   HEMOGLOBIN 11.6* 9.1* 10.0*   HEMATOCRIT 34.6* 27.7* 30.5*   .8* 109.1* 109.3*   MCH 36.5* 35.8* 35.8*   MCHC 33.5 32.9 32.8   RDW 70.2* 69.9* 69.8*   PLATELETCT 150* 123* 124*   MPV 10.4 10.4 10.1     Recent Labs     05/15/24  1454 05/16/24  0156 05/17/24  0045   SODIUM 140 139 139   POTASSIUM 3.7 3.4* 3.8   CHLORIDE 102 102 102   CO2 25 28 27   GLUCOSE 132* 114* 125*   BUN 21 20 21   CREATININE 1.13 1.05  0.86   CALCIUM 8.4* 7.8* 8.1*             Recent Labs     05/16/24  0156   TRIGLYCERIDE 54   HDL 62   LDL 47       Imaging  EC-ECHOCARDIOGRAM COMPLETE W/ CONT         CT-HEAD W/O   Final Result         1.  NO ACUTE ABNORMALITIES ARE NOTED ON CT SCAN OF THE HEAD.      2.  Probable sinusitis.      3.  Possible mastoiditis. Mild opacification noted in right middle ear.      Findings are consistent with atrophy.  Decreased attenuation in the periventricular white matter likely indicates microvascular ischemic disease.         CT-CTA CHEST PULMONARY ARTERY W/ RECONS   Final Result      1.  No central or segmental pulmonary embolus   2.  New BILATERAL dependent bronchial changes suspicious for bronchitis. Aspiration related lung disease is a consideration.   3.  Unchanged BILATERAL parenchymal changes compatible with patient's known history of lung cancer without appreciable interval change since the recent metastatic survey   4.  Adrenal adenoma   5.  Atherosclerosis   6.  Trace LEFT pleural effusion            DX-CHEST-PORTABLE (1 VIEW)   Final Result      No significant interval change. No new consolidation or pleural effusion. Posttreatment related changes in this patient with history of lung cancer.           Assessment/Plan  * Chronic hypoxic respiratory failure (HCC)- (present on admission)  Assessment & Plan  Patient with known history of COPD and adenocarcinoma of the lung.  He is followed by pulmonology in outpatient setting.  At baseline he requires between 3-5  L supplemental oxygen.  CT imaging was obtained in the department.  There is no evidence of a pulmonary embolism.  There are some dependent bronchial changes suspicious for bronchitis.    On steroids   Wean as tolerated     Adrenal adenoma  Assessment & Plan  Incidental finding on CT scan      COPD (chronic obstructive pulmonary disease) (HCC)  Assessment & Plan  Patient with known history of COPD.  Patient reports 5 L supplemental oxygen.  He is  followed by pulmonology in outpatient setting  Continue with home dose Spiriva, albuterol, Dulera  Currently not in COPD exacerbation    UTI (urinary tract infection)  Assessment & Plan  Patient was found to have a UTI several days ago.  Was started on oral Levaquin.  States he is currently asymptomatic  Complete course of Levaquin.    Advanced care planning/counseling discussion  Assessment & Plan  I spent 17 minutes at bedside in ED with patient discussing work-up, results, diagnosis, prognosis including his current adenocarcinoma of the lung.   CODE STATUS discussed with patient and wants to be full code.      Leukocytosis  Assessment & Plan  Likely secondary to recent high dose Prednisone use. Patient states that his last dose of Prednisone was two days ago. Unclear why he was placed on it.     Weakness  Assessment & Plan  PT/OT evaluation     Lactic acid acidosis  Assessment & Plan  Patient presented with lactic acid of 3.0.  Likely secondary to dehydration from poor PO intake   Received 1 L of IV fluids in Emergency Department  Follow-up repeat lactic acid level     Deep vein thrombosis (DVT) of right lower extremity (HCC)- (present on admission)  Assessment & Plan  Continue Xarelto     Left lower lobe pulmonary nodule  Assessment & Plan  Patient has adenocarcinoma of the right upper lobe status post biopsy.  There is a lesion in the left lower lobe measuring 11 x 5 mm.  There is concern that this is secondary primary.  He is followed by pulmonology and outpatient.    Adenocarcinoma, lung, right (HCC)- (present on admission)  Assessment & Plan  History of adenocarcinoma of the right lung.  Currently undergoing chemotherapy.  Day team to consult oncology         VTE prophylaxis: xarelto   Greater than 51 minutes spent prepping to see patient (e.g. review of tests) obtaining and/or reviewing separately obtained history. Performing a medically appropriate examination and/ evaluation.  Counseling and educating the  patient/family/caregiver.  Ordering medications, tests, or procedures.  Referring and communicating with other health care professionals.  Documenting clinical information in EPIC.  Independently interpreting results and communicating results to patient/family/caregiver.  Care coordination.    I have performed a physical exam and reviewed and updated ROS and Plan today (5/17/2024). In review of yesterday's note (5/16/2024), there are no changes except as documented above.

## 2024-05-17 NOTE — PROGRESS NOTES
Bedside report received from NOC RN. Assumed care of pt. Pt awake, laying in bed. A/Ox4, VSS. No concerns, complaints or distress. Pt educated to call before getting out of bed. POC reviewed and white board updated. Tele box on. SR 72 on the monitor. Call light in reach. Bed locked in lowest position with 2 upper bed rails up. Bed alarm on.

## 2024-05-18 VITALS
HEIGHT: 69 IN | OXYGEN SATURATION: 96 % | SYSTOLIC BLOOD PRESSURE: 116 MMHG | HEART RATE: 67 BPM | DIASTOLIC BLOOD PRESSURE: 61 MMHG | RESPIRATION RATE: 17 BRPM | WEIGHT: 134.48 LBS | TEMPERATURE: 97.7 F | BODY MASS INDEX: 19.92 KG/M2

## 2024-05-18 LAB
ANION GAP SERPL CALC-SCNC: 8 MMOL/L (ref 7–16)
BUN SERPL-MCNC: 26 MG/DL (ref 8–22)
CALCIUM SERPL-MCNC: 8.2 MG/DL (ref 8.5–10.5)
CHLORIDE SERPL-SCNC: 102 MMOL/L (ref 96–112)
CO2 SERPL-SCNC: 28 MMOL/L (ref 20–33)
CREAT SERPL-MCNC: 0.85 MG/DL (ref 0.5–1.4)
ERYTHROCYTE [DISTWIDTH] IN BLOOD BY AUTOMATED COUNT: 69.6 FL (ref 35.9–50)
GFR SERPLBLD CREATININE-BSD FMLA CKD-EPI: 91 ML/MIN/1.73 M 2
GLUCOSE SERPL-MCNC: 121 MG/DL (ref 65–99)
HCT VFR BLD AUTO: 29.3 % (ref 42–52)
HGB BLD-MCNC: 9.7 G/DL (ref 14–18)
MCH RBC QN AUTO: 36.1 PG (ref 27–33)
MCHC RBC AUTO-ENTMCNC: 33.1 G/DL (ref 32.3–36.5)
MCV RBC AUTO: 108.9 FL (ref 81.4–97.8)
PLATELET # BLD AUTO: 127 K/UL (ref 164–446)
PMV BLD AUTO: 10.3 FL (ref 9–12.9)
POTASSIUM SERPL-SCNC: 3.9 MMOL/L (ref 3.6–5.5)
RBC # BLD AUTO: 2.69 M/UL (ref 4.7–6.1)
SODIUM SERPL-SCNC: 138 MMOL/L (ref 135–145)
WBC # BLD AUTO: 17.6 K/UL (ref 4.8–10.8)

## 2024-05-18 PROCEDURE — 80048 BASIC METABOLIC PNL TOTAL CA: CPT

## 2024-05-18 PROCEDURE — 700111 HCHG RX REV CODE 636 W/ 250 OVERRIDE (IP): Performed by: STUDENT IN AN ORGANIZED HEALTH CARE EDUCATION/TRAINING PROGRAM

## 2024-05-18 PROCEDURE — 85027 COMPLETE CBC AUTOMATED: CPT

## 2024-05-18 PROCEDURE — 99498 ADVNCD CARE PLAN ADDL 30 MIN: CPT | Performed by: INTERNAL MEDICINE

## 2024-05-18 PROCEDURE — A9270 NON-COVERED ITEM OR SERVICE: HCPCS | Performed by: STUDENT IN AN ORGANIZED HEALTH CARE EDUCATION/TRAINING PROGRAM

## 2024-05-18 PROCEDURE — 94664 DEMO&/EVAL PT USE INHALER: CPT

## 2024-05-18 PROCEDURE — G0378 HOSPITAL OBSERVATION PER HR: HCPCS

## 2024-05-18 PROCEDURE — 700102 HCHG RX REV CODE 250 W/ 637 OVERRIDE(OP): Performed by: STUDENT IN AN ORGANIZED HEALTH CARE EDUCATION/TRAINING PROGRAM

## 2024-05-18 PROCEDURE — 99239 HOSP IP/OBS DSCHRG MGMT >30: CPT | Mod: 25 | Performed by: INTERNAL MEDICINE

## 2024-05-18 PROCEDURE — 36415 COLL VENOUS BLD VENIPUNCTURE: CPT

## 2024-05-18 RX ADMIN — CIPROFLOXACIN AND DEXAMETHASONE 4 DROP: 3; 1 SUSPENSION/ DROPS AURICULAR (OTIC) at 04:51

## 2024-05-18 RX ADMIN — TIOTROPIUM BROMIDE INHALATION SPRAY 5 MCG: 3.12 SPRAY, METERED RESPIRATORY (INHALATION) at 04:52

## 2024-05-18 RX ADMIN — SERTRALINE HYDROCHLORIDE 50 MG: 50 TABLET ORAL at 04:51

## 2024-05-18 RX ADMIN — CIPROFLOXACIN AND DEXAMETHASONE 4 DROP: 3; 1 SUSPENSION/ DROPS AURICULAR (OTIC) at 16:12

## 2024-05-18 RX ADMIN — MOMETASONE FUROATE AND FORMOTEROL FUMARATE DIHYDRATE 2 PUFF: 200; 5 AEROSOL RESPIRATORY (INHALATION) at 08:38

## 2024-05-18 RX ADMIN — RIVAROXABAN 20 MG: 20 TABLET, FILM COATED ORAL at 08:37

## 2024-05-18 RX ADMIN — LEVOFLOXACIN 500 MG: 500 TABLET, FILM COATED ORAL at 04:51

## 2024-05-18 RX ADMIN — PREDNISONE 60 MG: 20 TABLET ORAL at 04:51

## 2024-05-18 ASSESSMENT — ENCOUNTER SYMPTOMS
FOCAL WEAKNESS: 0
WEAKNESS: 1
SENSORY CHANGE: 0
NAUSEA: 0
INSOMNIA: 0
FLANK PAIN: 0
DIZZINESS: 1
TREMORS: 0
COUGH: 0
CHILLS: 0
SPEECH CHANGE: 0
DOUBLE VISION: 0
STRIDOR: 0
SHORTNESS OF BREATH: 1
NERVOUS/ANXIOUS: 0
EYE PAIN: 0
ABDOMINAL PAIN: 0
HEADACHES: 0
TINGLING: 0
PHOTOPHOBIA: 0
ORTHOPNEA: 0
VOMITING: 0
DIARRHEA: 0
NECK PAIN: 0
HEMOPTYSIS: 0
FEVER: 0
SPUTUM PRODUCTION: 1
BACK PAIN: 0
HALLUCINATIONS: 0
EYE DISCHARGE: 0
SINUS PAIN: 0
PALPITATIONS: 0

## 2024-05-18 ASSESSMENT — LIFESTYLE VARIABLES: SUBSTANCE_ABUSE: 0

## 2024-05-18 ASSESSMENT — FIBROSIS 4 INDEX: FIB4 SCORE: 2.24

## 2024-05-18 NOTE — DISCHARGE PLANNING
Case Management Discharge Planning    Admission Date: 5/15/2024  GMLOS: 3.6  ALOS: 0    6-Clicks ADL Score: 18  6-Clicks Mobility Score: 18      Anticipated Discharge Dispo: Discharge Disposition: Discharged to home/self care (01)    DME Needed: Yes    DME Ordered: Yes  Wheelchair. Pt's family provided.    Action(s) Taken: Updated Provider/Nurse on Discharge Plan    1615, RN CM called and spoke with daughter in law Will and she said they have a wheelchair and a bedside commode ready for Pt at home. Pt's family will provide transport home at 2000 tonight. Will was also informed Pt got accepted by [facility]. IDT team informed through [employer].    Escalations Completed: None    Medically Clear: Yes    Next Steps: CM will continue to assist Pt with discharge needs.      Barriers to Discharge: None

## 2024-05-18 NOTE — CARE PLAN
The patient is Stable - Low risk of patient condition declining or worsening    Shift Goals  Clinical Goals: VSS, rest, comfort  Patient Goals: rest, comfort  Family Goals: ROMAIN    Progress made toward(s) clinical / shift goals:      Problem: Knowledge Deficit - Standard  Goal: Patient and family/care givers will demonstrate understanding of plan of care, disease process/condition, diagnostic tests and medications  Outcome: Progressing     Problem: Skin Integrity  Goal: Skin integrity is maintained or improved  Outcome: Progressing     Problem: Fall Risk  Goal: Patient will remain free from falls  Outcome: Progressing     Problem: Respiratory  Goal: Patient will achieve/maintain optimum respiratory ventilation and gas exchange  Outcome: Progressing       Patient is not progressing towards the following goals:

## 2024-05-18 NOTE — FACE TO FACE
Face to Face Note  -  Durable Medical Equipment    Mehnaz Alvarez M.D. - NPI: [id number]  I certify that this patient is under my care and that they had a durable medical equipment(DME)face to face encounter by myself that meets the physician DME face-to-face encounter requirements with this patient on:    Date of encounter:   Patient:                    MRN:                       YOB: 2025  Tyler Zarate  [id number]  1950     The encounter with the patient was in whole, or in part, for the following medical condition, which is the primary reason for durable medical equipment:  Other - lung adenocarcinoma     I certify that, based on my findings, the following durable medical equipment is medically necessary:    Wheelchair   Patient needs manual wheelchair for use inside the home based on the above diagnosis. Per guidelines patient meets criteria in the following ways:   A.  Patient has significant impairment in the following Toileting and Dressing and is is unable to complete these tasks in a reasonable timeframe and places the patient at a heightened risk of morbidity.   B.  The patient's mobility limitations cannot be sufficiently resolved by use of  fitted cane or walker.   C.  The patient reports his home provides adequate access between rooms,  maneuvering space, and surfaces for use of the manual wheelchair that is  provided.   D.  The use of the manual wheelchair will significantly improve the patient's  ability to participate in MRADLs and the patient will use it on a regular basis in  the home.   E.  The patient has not expressed an unwillingness to use the manual  wheelchair.   F. The patient has limitations of strength, endurance, range of motion, or coordination per OT notes:.    My Clinical findings support the need for the above equipment due to:  Abnormal Gait, Frequent Falls

## 2024-05-18 NOTE — PROGRESS NOTES
Bedside report received from NOC RN. Assumed care of pt. Pt awake, laying in bed. A/Ox4, VSS. No concerns, complaints or distress. Pt educated to call before getting out of bed. POC reviewed and white board updated. Tele box on. SB 58 on the monitor. Call light in reach. Bed locked in lowest position with 2 upper bed rails up. Bed alarm on.

## 2024-05-18 NOTE — PROGRESS NOTES
Bedside report received at 1900 and assumed care of patient. Resting in bed, denied pain. A&O x4 on 2L NC. Tele monitoring in place. Educated on fall risk, all fall precautions in place. Call light within reach, bed locked and in lowest position, denied other needs at this time. Hourly rounding in place.

## 2024-05-18 NOTE — CARE PLAN
The patient is Stable - Low risk of patient condition declining or worsening    Shift Goals  Clinical Goals: monitor VS, labs  Patient Goals: rest, comfort  Family Goals: ROMAIN    Progress made toward(s) clinical / shift goals:    Problem: Respiratory  Goal: Patient will achieve/maintain optimum respiratory ventilation and gas exchange  Outcome: Progressing     Problem: Mobility  Goal: Patient's capacity to carry out activities will improve  Outcome: Progressing       Patient is not progressing towards the following goals:

## 2024-05-18 NOTE — PROGRESS NOTES
[facility] Medicine Daily Progress Note    Date of Service  5/18/2024    Chief Complaint  Tyler Zarate is a 74 y.o. male admitted 5/15/2024 with shortness of breath and weakness     Hospital Course  This is a 4 y.o. male with past medical history of adenocarcinoma of the lung, currently on chemotherapy, history of COPD and chronic hypoxemic respiratory failure on 5 L supplemental oxygen at baseline who presented 5/15/2024 with worsening generalized weakness.  Patient states that over the last few days, he is felt progressively weak.  He states that he was recently diagnosed with a UTI, and has been on outpatient oral Levaquin.  He states that the UTI symptoms have improved.  He states that over the last 1 to 2 days she has had poor p.o. intake, and trouble getting out of bed.  As result patient came to the emergency department.  In regards to his dyspnea and shortness of breath.  He states that when he is on oxygen, he feels fine.  He is currently on his 5 L baseline oxygen requirement.  Denies any fevers or chills.  No new productive cough.   In the emergency department, labs remarkable for leukocytosis greater than 14,000.  He does state that he was on high-dose steroids, which completed approximately 2 days ago.  CT imaging was obtained, was negative for pulmonary emboli.  There is some dependent bronchial changes that are suspicious for bronchitis.  Patient admitted for further treatment     Interval Problem Update  Patient seen and examined, afebrile, no nausea or vomiting, still feeling weak and SOB.   Cont steroids, PT/OT eval   5/17: Patient seen and examined, afebrile resting in bed CT head negative for acute issues, echo is pending  Cont on steroids for his COPD   5/18: Patient seen and examined, afebrile, feels weak, no nausea or vomiting, will d/c prednisone today   Wean off O2 as tolerated, home anmol has been ordered   I did discuss with patient regarding goals of care and his prognosis, wants to  remain full code     I have discussed this patient's plan of care and discharge plan at IDT rounds today with Case Management, Nursing, Nursing leadership, and other members of the IDT team.    Consultants/Specialty  None     Code Status  Full Code    Disposition  The patient is not medically cleared for discharge to home or a post-acute facility.      I have placed the appropriate orders for post-discharge needs.    Review of Systems  Review of Systems   Constitutional:  Negative for chills and fever.   HENT:  Negative for congestion, ear discharge, ear pain, nosebleeds, sinus pain and tinnitus.    Eyes:  Negative for double vision, photophobia, pain and discharge.   Respiratory:  Positive for sputum production and shortness of breath. Negative for cough, hemoptysis and stridor.    Cardiovascular:  Negative for chest pain, palpitations and orthopnea.   Gastrointestinal:  Negative for abdominal pain, diarrhea, nausea and vomiting.   Genitourinary:  Negative for dysuria, flank pain, frequency, hematuria and urgency.   Musculoskeletal:  Negative for back pain, joint pain and neck pain.   Neurological:  Positive for dizziness and weakness. Negative for tingling, tremors, sensory change, speech change, focal weakness and headaches.   Psychiatric/Behavioral:  Negative for hallucinations, substance abuse and suicidal ideas. The patient is not nervous/anxious and does not have insomnia.    All other systems reviewed and are negative.       Physical Exam  Temp:  [36.4 °C (97.6 °F)-37.1 °C (98.8 °F)] 36.7 °C (98.1 °F)  Pulse:  [64-99] 99  Resp:  [16-18] 18  BP: ()/(41-88) 92/63  SpO2:  [91 %-97 %] 94 %    Physical Exam  Constitutional:       General: He is not in acute distress.     Appearance: Normal appearance. He is normal weight. He is not ill-appearing, toxic-appearing or diaphoretic.   HENT:      Head: Normocephalic and atraumatic.      Mouth/Throat:      Mouth: Mucous membranes are moist.   Eyes:      Pupils:  Pupils are equal, round, and reactive to light.   Cardiovascular:      Rate and Rhythm: Normal rate and regular rhythm.      Pulses: Normal pulses.      Heart sounds: Normal heart sounds. No murmur heard.     No friction rub. No gallop.   Pulmonary:      Effort: Pulmonary effort is normal. No respiratory distress.      Breath sounds: No stridor. Rales present. No wheezing or rhonchi.   Chest:      Chest wall: No tenderness.   Abdominal:      General: There is no distension.      Palpations: There is no mass.      Tenderness: There is no abdominal tenderness. There is no right CVA tenderness, left CVA tenderness, guarding or rebound.      Hernia: No hernia is present.   Musculoskeletal:         General: No swelling, tenderness, deformity or signs of injury.      Right lower leg: No edema.      Left lower leg: No edema.   Skin:     General: Skin is warm and dry.      Capillary Refill: Capillary refill takes less than 2 seconds.      Coloration: Skin is not jaundiced or pale.      Findings: No bruising, erythema, lesion or rash.   Neurological:      General: No focal deficit present.      Mental Status: He is alert and oriented to person, place, and time. Mental status is at baseline.      Cranial Nerves: No cranial nerve deficit.      Sensory: No sensory deficit.      Motor: No weakness.      Coordination: Coordination normal.      Gait: Gait normal.      Deep Tendon Reflexes: Reflexes normal.   Psychiatric:         Mood and Affect: Mood normal.         Fluids    Intake/Output Summary (Last 24 hours) at 5/18/2024 1533  Last data filed at 5/18/2024 1122  Gross per 24 hour   Intake 600 ml   Output 875 ml   Net -275 ml       Laboratory  Recent Labs     05/16/24  0257 05/17/24  0045 05/18/24  0013   WBC 16.6* 18.7* 17.6*   RBC 2.54* 2.79* 2.69*   HEMOGLOBIN 9.1* 10.0* 9.7*   HEMATOCRIT 27.7* 30.5* 29.3*   .1* 109.3* 108.9*   MCH 35.8* 35.8* 36.1*   MCHC 32.9 32.8 33.1   RDW 69.9* 69.8* 69.6*   PLATELETCT 123* 124*  127*   MPV 10.4 10.1 10.3     Recent Labs     05/16/24  0156 05/17/24  0045 05/18/24  0013   SODIUM 139 139 138   POTASSIUM 3.4* 3.8 3.9   CHLORIDE 102 102 102   CO2 28 27 28   GLUCOSE 114* 125* 121*   BUN 20 21 26*   CREATININE 1.05 0.86 0.85   CALCIUM 7.8* 8.1* 8.2*             Recent Labs     05/16/24  0156   TRIGLYCERIDE 54   HDL 62   LDL 47       Imaging  EC-ECHOCARDIOGRAM COMPLETE W/ CONT   Final Result      CT-HEAD W/O   Final Result         1.  NO ACUTE ABNORMALITIES ARE NOTED ON CT SCAN OF THE HEAD.      2.  Probable sinusitis.      3.  Possible mastoiditis. Mild opacification noted in right middle ear.      Findings are consistent with atrophy.  Decreased attenuation in the periventricular white matter likely indicates microvascular ischemic disease.         CT-CTA CHEST PULMONARY ARTERY W/ RECONS   Final Result      1.  No central or segmental pulmonary embolus   2.  New BILATERAL dependent bronchial changes suspicious for bronchitis. Aspiration related lung disease is a consideration.   3.  Unchanged BILATERAL parenchymal changes compatible with patient's known history of lung cancer without appreciable interval change since the recent metastatic survey   4.  Adrenal adenoma   5.  Atherosclerosis   6.  Trace LEFT pleural effusion            DX-CHEST-PORTABLE (1 VIEW)   Final Result      No significant interval change. No new consolidation or pleural effusion. Posttreatment related changes in this patient with history of lung cancer.           Assessment/Plan  * Chronic hypoxic respiratory failure (HCC)- (present on admission)  Assessment & Plan  Patient with known history of COPD and adenocarcinoma of the lung.  He is followed by pulmonology in outpatient setting.  At baseline he requires between 3-5  L supplemental oxygen.  CT imaging was obtained in the department.  There is no evidence of a pulmonary embolism.  There are some dependent bronchial changes suspicious for bronchitis.    On steroids   Wean  as tolerated     Adrenal adenoma  Assessment & Plan  Incidental finding on CT scan      COPD (chronic obstructive pulmonary disease) (HCC)  Assessment & Plan  Patient with known history of COPD.  Patient reports 5 L supplemental oxygen.  He is followed by pulmonology in outpatient setting  Continue with home dose Spiriva, albuterol, Dulera  Currently not in COPD exacerbation    UTI (urinary tract infection)  Assessment & Plan  Patient was found to have a UTI several days ago.  Was started on oral Levaquin.  States he is currently asymptomatic  Complete course of Levaquin.    Advanced care planning/counseling discussion  Assessment & Plan  I had discussion again today with patient regarding goals  of care and his prognosis Patient wants to remain full code for now  More then 18 min were spent for  goals of care     Leukocytosis  Assessment & Plan  Likely secondary to recent high dose Prednisone use. Patient states that his last dose of Prednisone was two days ago. Unclear why he was placed on it.     Weakness  Assessment & Plan  PT/OT evaluation     Lactic acid acidosis  Assessment & Plan  Patient presented with lactic acid of 3.0.  Likely secondary to dehydration from poor PO intake   Received 1 L of IV fluids in Emergency Department  Follow-up repeat lactic acid level     Deep vein thrombosis (DVT) of right lower extremity (HCC)- (present on admission)  Assessment & Plan  Continue Xarelto     Left lower lobe pulmonary nodule  Assessment & Plan  Patient has adenocarcinoma of the right upper lobe status post biopsy.  There is a lesion in the left lower lobe measuring 11 x 5 mm.  There is concern that this is secondary primary.  He is followed by pulmonology and outpatient.    Adenocarcinoma, lung, right (HCC)- (present on admission)  Assessment & Plan  History of adenocarcinoma of the right lung.  Currently undergoing chemotherapy.  Day team to consult oncology       Greater than 51 minutes spent prepping to see  patient (e.g. review of tests) obtaining and/or reviewing separately obtained history. Performing a medically appropriate examination and/ evaluation.  Counseling and educating the patient/family/caregiver.  Ordering medications, tests, or procedures.  Referring and communicating with other health care professionals.  Documenting clinical information in EPIC.  Independently interpreting results and communicating results to patient/family/caregiver.  Care coordination.      VTE prophylaxis: xarelto     I have performed a physical exam and reviewed and updated ROS and Plan today (5/18/2024). In review of yesterday's note (5/17/2024), there are no changes except as documented above.

## 2024-05-18 NOTE — RESPIRATORY CARE
"COPD EDUCATION by COPD CLINICAL EDUCATOR  5/18/2024  at  3:13 PM by Kamila Miranda, RRT     Patient interviewed by education team.  Patient declined to participate in the full program.  Therefore, a short intervention has been conducted.  A comprehensive packet including information about COPD, types of treatments to manage their disease and safe home Oxygen usage was provided and reviewed with patient at the bedside. Smoking Cessation Intervention and education completed, 6 minutes spent on smoking cessation education with patient.  Provided smoking cessation packet with \"Tips to Quit\" and brochure for \"Free Virtual Smoking Cessation Classes\".   COPD Assessment  COPD Clinical Specialists ONLY  COPD Education Initiated: Yes--Short Intervention (pleasant; [facility] states he is now a non smoker-gave info; review meds and O2 delivery system)  DME Company: [employer]  DME Equipment Type: 3-5 lpm O2  Physician Name: [facility]  Pulmonologist Name: [facility] has upcoming appt this next week  Palliative Care: Yes (referral placed pending consult)  $ Demo/Eval of SVN's, MDI's and Aerosols: Yes (spacer given with instruction)    PFT Results  No results found for: \"PFT\"  (OP) Pulmonary Function Testing: Yes ([facility] no data available [facility]-emphysema)  Interdisciplinary Rounds: Attendance at Rounds (30 Min)    Meds to Beds  [facility] provides bedside medication delivery for all eligible patients at discharge.  Would you like to opt out of this program for any reason?: No - Stay Opted In     MY COPD ACTION PLAN     It is recommended that patients and physicians /healthcare providers complete this action plan together. This plan should be discussed at each physician visit and updated as needed.    The green, yellow and red zones show groups of symptoms of COPD. This list of symptoms is not comprehensive, and you may experience other symptoms. In the \"Actions\" column, your healthcare provider has recommended " "actions for you to take based on your symptoms.    Patient Name: Tyler Zarate   YOB: 1950   Last Updated on: 5/18/2024  3:13 PM   Green Zone:  I am doing well today Actions     Usual activitiy and exercise level   Take daily medications     Usual amounts of cough and phlegm/mucus   Use oxygen as prescribed     Sleep well at night   Continue regular exercise/diet plan     Appetite is good   At all times avoid cigarette smoke, inhaled irritants     Daily Medications (these medications are taken every day):   Fluticasone and Salmeterol (Wixela)  Tiotropium Bromide Monohydrate (Spiriva) 1 Puff  2 Puffs Twice daily  Once daily     Additional Information:  Remember to rinse mouth after using medications    Yellow Zone:  I am having a bad day or a COPD flare Actions     More breathless than usual   Continue daily medications     I have less energy for my daily activities   Use quick relief inhaler as ordered     Increased or thicker phlegm/mucus   Use oxygen as prescribed     Using quick relief inhaler/nebulizer more often   Get plenty of rest     Swelling of ankles more than usual   Use pursed lip breathing     More coughing than usual   At all times avoid cigarette smoke, inhaled irritants     I feel like I have a \"chest cold\"     Poor sleep and my symptoms woke me up     My appetite is not good     My medicine is not helping      Call provider immediately if symptoms don’t improve     Continue daily medications, add rescue medications:   Albuterol 2 Puffs Every 4 hours PRN       Medications to be used during a flare up, (as Discussed with Provider):           Additional Information:  Use with your spacer device    Red Zone:  I need urgent medical care Actions     Severe shortness of breath even at rest   Call 911 or seek medical care immediately     Not able to do any activity because of breathing      Fever or shaking chills      Feeling confused or very drowsy       Chest pains      Coughing up " blood

## 2024-05-19 NOTE — PROGRESS NOTES
Patient will be discharged at 2000. Discharge instructions, personal belongings in possession of a patient. PIV and tele monitor removed.  Copy of discharge instructions in the patient chart, signed and reviewed. Patient verbalizes the understanding of the discharge instructions. Questions / concerns addressed. Patient is instructed to follow up with PCP.  Pt is waiting for family to pick him up.

## 2024-05-20 LAB
BACTERIA BLD CULT: NORMAL
BACTERIA BLD CULT: NORMAL
SIGNIFICANT IND 70042: NORMAL
SIGNIFICANT IND 70042: NORMAL
SITE SITE: NORMAL
SITE SITE: NORMAL
SOURCE SOURCE: NORMAL
SOURCE SOURCE: NORMAL

## 2024-05-20 NOTE — DISCHARGE SUMMARY
Discharge Summary    CHIEF COMPLAINT ON ADMISSION  Chief Complaint   Patient presents with    Shortness of Breath    Weakness    Dizziness     Pt c/o increased oxygen needs and sob since dx with UTI       Reason for Admission  Weakness     Admission Date  5/15/2024    CODE STATUS  Prior    HPI & HOSPITAL COURSE  This is a 74 y.o. male ith past medical history of adenocarcinoma of the lung, currently on chemotherapy, history of COPD and chronic hypoxemic respiratory failure on 5 L supplemental oxygen at baseline who presented 5/15/2024 with worsening generalized weakness. Patient states that over the last few days, he is felt progressively weak. He states that he was recently diagnosed with a UTI, and has been on outpatient oral Levaquin. He states that the UTI symptoms have improved. He states that over the last 1 to 2 days she has had poor p.o. intake, and trouble getting out of bed. As result patient came to the emergency department. In regards to his dyspnea and shortness of breath n the emergency department, labs remarkable for leukocytosis greater than 14,000.  He does state that he was on high-dose steroids, which completed approximately 2 days ago.  CT imaging was obtained, was negative for pulmonary emboli.  There is some dependent bronchial changes that are suspicious for bronchitis.  Patient admitted for further treatment and was started on steroids his symptoms improved, he also had a t head done which was negative for hemorrhage or stroke. He also had an echo done which was negative for acute issues.  I had discussion with patient regarding  goals of care but he wants to remain full code at this time.  Physical therapy also evaluated patient and placement was recommended but patient and family would like to go home so home health was arranged  Will discharge patient home today     The patient met 2-midnight criteria for an inpatient stay at the time of discharge.    Discharge Date  5/18/2024    FOLLOW UP  ITEMS POST DISCHARGE  PCP  Oncology     DISCHARGE DIAGNOSES  Principal Problem:    Chronic hypoxic respiratory failure (HCC) (POA: Yes)  Active Problems:    Adenocarcinoma, lung, right (HCC) (POA: Yes)    Deep vein thrombosis (DVT) of right lower extremity (HCC) (POA: Yes)      Overview: Mar 01, 2012 Entered By: YAZMIN BURCH Comment: followed in coumadin       clinic, goal inr 2.0 - 3.0    Lactic acid acidosis (POA: Unknown)    Weakness (POA: Unknown)    Leukocytosis (POA: Unknown)    Advanced care planning/counseling discussion (POA: Unknown)    UTI (urinary tract infection) (POA: Unknown)    COPD (chronic obstructive pulmonary disease) (HCC) (POA: Unknown)    Adrenal adenoma (POA: Unknown)  Resolved Problems:    * No resolved hospital problems. *      FOLLOW UP  Future Appointments   Date Time Provider Department Center   5/21/2024  8:40 AM Peg Gibson M.D. [facility] None   5[facility]21[facility]2024 10:00 [facility]   6/11/2024 11:00 AM NATE Onofre [facility] None   6/11/2024 11:45 AM [facility]  [address] [place] [id number] [phone]        Marcy Jose M.D.  [address] [facility] [address]  [phone]    Call  As needed      MEDICATIONS ON DISCHARGE     Medication List        CONTINUE taking these medications        Instructions   albuterol 108 (90 Base) MCG/ACT Aers inhalation aerosol   Inhale 2 Puffs every four hours as needed for Shortness of Breath.  Dose: 2 Puff     fluticasone-salmeterol 250-50 MCG/ACT Aepb  Commonly known as: Advair   Inhale 1 Puff 2 times a day.  Dose: 1 Puff     folic acid 1 MG Tabs  Commonly known as: Folvite   Take 1 mg by mouth see administration instructions. Take 1 to 2 days before chemotherapy only  Dose: 1 mg     omeprazole 20 MG delayed-release capsule  Commonly known as: PriLOSEC   Take 1 Capsule by mouth 2 times a day.  Dose: 20 mg     ondansetron 4 MG Tabs  "tablet  Commonly known as: Zofran   Take 1 Tablet by mouth every four hours as needed for Nausea/Vomiting (for nausea, vomiting).  Dose: 4 mg     prochlorperazine 10 MG Tabs  Commonly known as: Compazine   Take 1 Tablet by mouth every 6 hours as needed (for nausea, vomiting).  Dose: 10 mg     rivaroxaban 20 MG Tabs tablet  Commonly known as: Xarelto   Take 20 mg by mouth every day.  Dose: 20 mg     sertraline 50 MG Tabs  Commonly known as: Zoloft   Take 50 mg by mouth every day.  Dose: 50 mg     Spiriva Respimat 2.5 MCG/ACT Aers  Generic drug: tiotropium   Inhale 5 mcg every day.  Dose: 5 mcg            STOP taking these medications      levoFLOXacin 500 MG tablet  Commonly known as: Levaquin     predniSONE 20 MG Tabs  Commonly known as: Deltasone              Allergies  Allergies   Allergen Reactions    Bacitracin-Neomycin-Polymyxin      Ointment around eye caused face and eye swelling    Gabapentin Swelling     Took several medications following cataract surgery. Not sure which medication was cause.     Ketorolac Swelling     Took following cataract surgery. Not sure which medication was cause.     Lipoglycopeptide Antibiotics Swelling     Pt stated he did not remember what kind of antibiotics he was allergic to. Pt stated \"two different kinds\" Face swelling and eyes glazing over.     Tromethamine     Celecoxib Rash       DIET  No orders of the defined types were placed in this encounter.      ACTIVITY  As tolerated.  Weight bearing as tolerated    CONSULTATIONS  None     PROCEDURES  None     LABORATORY  Lab Results   Component Value Date    SODIUM 138 05/18/2024    POTASSIUM 3.9 05/18/2024    CHLORIDE 102 05/18/2024    CO2 28 05/18/2024    GLUCOSE 121 (H) 05/18/2024    BUN 26 (H) 05/18/2024    CREATININE 0.85 05/18/2024        Lab Results   Component Value Date    WBC 17.6 (H) 05/18/2024    HEMOGLOBIN 9.7 (L) 05/18/2024    HEMATOCRIT 29.3 (L) 05/18/2024    PLATELETCT 127 (L) 05/18/2024        Total time of the " discharge process exceeds 35 minutes.

## 2024-05-21 ENCOUNTER — HOSPITAL ENCOUNTER (OUTPATIENT)
Dept: HEMATOLOGY ONCOLOGY | Facility: MEDICAL CENTER | Age: 74
End: 2024-05-21
Attending: STUDENT IN AN ORGANIZED HEALTH CARE EDUCATION/TRAINING PROGRAM
Payer: COMMERCIAL

## 2024-05-21 VITALS
BODY MASS INDEX: 20.34 KG/M2 | OXYGEN SATURATION: 90 % | DIASTOLIC BLOOD PRESSURE: 48 MMHG | HEART RATE: 80 BPM | TEMPERATURE: 98.2 F | HEIGHT: 69 IN | WEIGHT: 137.35 LBS | SYSTOLIC BLOOD PRESSURE: 106 MMHG

## 2024-05-21 DIAGNOSIS — C34.91 ADENOCARCINOMA, LUNG, RIGHT (HCC): ICD-10-CM

## 2024-05-21 RX ORDER — 0.9 % SODIUM CHLORIDE 0.9 %
10 VIAL (ML) INJECTION PRN
OUTPATIENT
Start: 2024-06-11

## 2024-05-21 RX ORDER — METHYLPREDNISOLONE SODIUM SUCCINATE 125 MG/2ML
125 INJECTION, POWDER, LYOPHILIZED, FOR SOLUTION INTRAMUSCULAR; INTRAVENOUS PRN
OUTPATIENT
Start: 2024-06-12

## 2024-05-21 RX ORDER — 0.9 % SODIUM CHLORIDE 0.9 %
VIAL (ML) INJECTION PRN
OUTPATIENT
Start: 2024-06-12

## 2024-05-21 RX ORDER — PROCHLORPERAZINE MALEATE 10 MG
10 TABLET ORAL EVERY 6 HOURS PRN
OUTPATIENT
Start: 2024-06-12

## 2024-05-21 RX ORDER — DIPHENHYDRAMINE HYDROCHLORIDE 50 MG/ML
50 INJECTION INTRAMUSCULAR; INTRAVENOUS PRN
OUTPATIENT
Start: 2024-06-12

## 2024-05-21 RX ORDER — 0.9 % SODIUM CHLORIDE 0.9 %
3 VIAL (ML) INJECTION PRN
OUTPATIENT
Start: 2024-06-12

## 2024-05-21 RX ORDER — 0.9 % SODIUM CHLORIDE 0.9 %
3 VIAL (ML) INJECTION PRN
OUTPATIENT
Start: 2024-06-11

## 2024-05-21 RX ORDER — ONDANSETRON 2 MG/ML
8 INJECTION INTRAMUSCULAR; INTRAVENOUS ONCE
OUTPATIENT
Start: 2024-06-12 | End: 2024-06-12

## 2024-05-21 RX ORDER — 0.9 % SODIUM CHLORIDE 0.9 %
10 VIAL (ML) INJECTION PRN
OUTPATIENT
Start: 2024-06-12

## 2024-05-21 RX ORDER — EPINEPHRINE 1 MG/ML(1)
0.5 AMPUL (ML) INJECTION PRN
OUTPATIENT
Start: 2024-06-12

## 2024-05-21 RX ORDER — ONDANSETRON 8 MG/1
8 TABLET, ORALLY DISINTEGRATING ORAL PRN
OUTPATIENT
Start: 2024-06-12

## 2024-05-21 RX ORDER — 0.9 % SODIUM CHLORIDE 0.9 %
VIAL (ML) INJECTION PRN
OUTPATIENT
Start: 2024-06-11

## 2024-05-21 RX ORDER — ONDANSETRON 2 MG/ML
4 INJECTION INTRAMUSCULAR; INTRAVENOUS PRN
OUTPATIENT
Start: 2024-06-12

## 2024-05-21 RX ORDER — CYANOCOBALAMIN 1000 UG/ML
1000 INJECTION, SOLUTION INTRAMUSCULAR; SUBCUTANEOUS
OUTPATIENT
Start: 2024-06-12

## 2024-05-21 RX ORDER — SODIUM CHLORIDE 9 MG/ML
INJECTION, SOLUTION INTRAVENOUS CONTINUOUS
OUTPATIENT
Start: 2024-06-12

## 2024-05-21 ASSESSMENT — ENCOUNTER SYMPTOMS
SENSORY CHANGE: 0
NERVOUS/ANXIOUS: 1
TINGLING: 0
WHEEZING: 0
SPUTUM PRODUCTION: 0
FOCAL WEAKNESS: 0
ABDOMINAL PAIN: 0
VOMITING: 0
EYE DISCHARGE: 0
TREMORS: 0
ORTHOPNEA: 0
BRUISES/BLEEDS EASILY: 0
FEVER: 0
NAUSEA: 0
DEPRESSION: 0
BLURRED VISION: 0
HEARTBURN: 0
SHORTNESS OF BREATH: 1
DIZZINESS: 0
NECK PAIN: 0
WEIGHT LOSS: 0
HEADACHES: 0
PALPITATIONS: 0
MEMORY LOSS: 0
CHILLS: 0
SORE THROAT: 0
COUGH: 0

## 2024-05-21 ASSESSMENT — FIBROSIS 4 INDEX: FIB4 SCORE: 2.24

## 2024-05-21 NOTE — ADDENDUM NOTE
Encounter addended by: Sunday Galeas on: 5/21/2024 9:13 AM   Actions taken: Charge Capture section accepted

## 2024-05-21 NOTE — PROGRESS NOTES
Consult Note: Hematology/Oncology     Primary Care:  No primary care provider on file.    Chief Complaint   Patient presents with    Lung Cancer     Prechemo        Current Treatment: None    Prior Treatment: None    05/01 - 05/10/23: SBRT to lung - Dr. Jaimes  05/16/23: C1D1 Carboplatin, Pemetrexed and Pembrolizumab  06/06/23: C2D1 Carboplatin, Pemetrexed and Pembrolizumab  06/27/23: C3D1 Carboplatin, Pemetrexed and Pembrolizumab  07/18/23: C4D1 Carboplatin, Pemetrexed and Pembrolizumab  08/08/23: C5D1 Pemetrexed and Pembrolizumab  08/29/23: C6D1: Pemetrexed   09/19/23: C7D1 Pemetrexed and Pembrolizumab  10/1/23: C8D1: Pemetrexed   10/29/23: C9D1: Pemetrexed and Pembrolizumab  11/21/23: C10D1: Pemetrexed -HELD DUE TO PNA  12/06/23: C10D1: Pemetrexed   12/27/23: C11D1: Pemetrexed and Pembrolizumab  1/16/24: C12D1 Pemetrexed   02/07/24: C13D1 Pemetrexed and Pembrolizumab  03/05/24: C14D1 Pemetrexed - DELAYED x1 week d/t winter storm but HELD again today d/t symptoms  03/14/24: C14D1 Pemetrexed  04/09/24: C15D1 Pemetrexed and Pembrolizumab  04/30/24: C16D1 Pemetrexed HELD     Subjective:   History of Presenting Illness:  Tyler Zarate is a 73 y.o. male with a past medical history of COPD and tobacco use, 1 pack/day smoker, alcohol abuse, right leg DVT diagnosed in 2019 who presents today with a new diagnosis of metastatic adeno carcinoma of the lung.    Patient had a CT thorax on January 18, 2023 which showed a new spiculated mass in the anterior right upper lobe arising from the area of presumed previous scar.  It is highly suggestive of disease.    Patient had a biopsy and pathology on 2/17/23 shows adenocarcinoma of the right upper lobe.    A PET scan showed a hypermetabolic RUL mass (64N93gm) as well as a mass in the LLL (11X5mm).     Patient recently moved to [place] from [place].  He moved to be closer to his son.    He reports unintentional weight loss greater than 10% in the last 6 months.    He  also reports a personal history of cancer (basal cell of nose)    Interval History  Patient developed a UTI and was brought to his local hospital.  He was discharged but then declined significantly.  He was re admitted to the hospital.     He had an echo completed which was normal.   No acute findings on head CT.   CTA shows no PE but changes for bronchitis.  No changes in his mass.     Now he needs oxygen whenever he is moving around.     He is here prior to C16,  with pembro and pemetrexed.  He is concerned that the chemo is decreasing his PS.      Past Medical History:   Diagnosis Date    Basal cell carcinoma (BCC) of skin of nose     Cancer (HCC)     skin, Lung    Carcinoma in situ of respiratory system     Lung 2023    Cataract     COPD (chronic obstructive pulmonary disease) (HCC)     Cough     DVT (deep venous thrombosis) (HCC)     RLL    Lumbar radiculopathy     Pain     lower back and sciatic pain    Pneumonia     2018    Shortness of breath     Skin carcinoma     Sputum production     Synovial cyst     L spine        Past Surgical History:   Procedure Laterality Date    IN BRONCHOSCOPY,DIAGNOSTIC  4/12/2023    Procedure: FIBER OPTIC BRONCHOSCOPY WITH  WASH, BRUSH, BRONCHOALVEOLAR LAVAGE, BIOPSY AND FINE NEEDLE ASPIRATION & NAVIGATION, ROBOTICS;  Surgeon: Vladimir Ortiz M.D.;  Location: SURGERY [facility];  Service: Pulmonary Robotic    CATARACT PHACO WITH IOL Bilateral        Social History     Tobacco Use    Smoking status: Every Day     Current packs/day: 0.50     Average packs/day: 0.5 packs/day for 59.4 years (29.7 ttl pk-yrs)     Types: Cigarettes     Start date: 1965     Passive exposure: Never    Smokeless tobacco: Former     Types: Chew   Vaping Use    Vaping status: Never Used   Substance Use Topics    Alcohol use: Not Currently     Alcohol/week: 8.4 oz     Types: 14 Cans of beer per week     Comment: 1-2 per day if that    Drug use: Never        Family History   Problem Relation Age of  "Onset    Cancer Brother         lung       Allergies   Allergen Reactions    Bacitracin-Neomycin-Polymyxin      Ointment around eye caused face and eye swelling    Gabapentin Swelling     Took several medications following cataract surgery. Not sure which medication was cause.     Ketorolac Swelling     Took following cataract surgery. Not sure which medication was cause.     Lipoglycopeptide Antibiotics Swelling     Pt stated he did not remember what kind of antibiotics he was allergic to. Pt stated \"two different kinds\" Face swelling and eyes glazing over.     Tromethamine     Celecoxib Rash       Current Outpatient Medications   Medication Sig Dispense Refill    sertraline (ZOLOFT) 50 MG Tab Take 50 mg by mouth every day.      albuterol 108 (90 Base) MCG/ACT Aero Soln inhalation aerosol Inhale 2 Puffs every four hours as needed for Shortness of Breath.      tiotropium (SPIRIVA RESPIMAT) 2.5 mcg/Act Aero Soln Inhale 5 mcg every day.      omeprazole (PRILOSEC) 20 MG delayed-release capsule Take 1 Capsule by mouth 2 times a day. (Patient taking differently: Take 20 mg by mouth every day.) 60 Capsule 0    folic acid (FOLVITE) 1 MG Tab Take 1 mg by mouth see administration instructions. Take 1 to 2 days before chemotherapy only      rivaroxaban (XARELTO) 20 MG Tab tablet Take 20 mg by mouth every day.        fluticasone-salmeterol (ADVAIR) 250-50 MCG/ACT AEROSOL POWDER, BREATH ACTIVATED Inhale 1 Puff 2 times a day.      prochlorperazine (COMPAZINE) 10 MG Tab Take 1 Tablet by mouth every 6 hours as needed (for nausea, vomiting). 30 Tablet 6    ondansetron (ZOFRAN) 4 MG Tab tablet Take 1 Tablet by mouth every four hours as needed for Nausea/Vomiting (for nausea, vomiting). 30 Tablet 6     No current facility-administered medications for this encounter.       Review of Systems   Constitutional:  Positive for malaise/fatigue. Negative for chills, fever and weight loss.   HENT:  Negative for congestion, ear pain, " "nosebleeds and sore throat.    Eyes:  Negative for blurred vision and discharge.   Respiratory:  Positive for shortness of breath. Negative for cough, sputum production and wheezing.    Cardiovascular:  Negative for chest pain, palpitations, orthopnea and leg swelling.   Gastrointestinal:  Negative for abdominal pain, heartburn, nausea and vomiting.   Genitourinary:  Negative for dysuria, frequency and urgency.   Musculoskeletal:  Negative for neck pain.   Neurological:  Negative for dizziness, tingling, tremors, sensory change, focal weakness and headaches.   Endo/Heme/Allergies:  Does not bruise/bleed easily.   Psychiatric/Behavioral:  Negative for depression, memory loss and suicidal ideas. The patient is nervous/anxious.    All other systems reviewed and are negative.      Problem list, medications, and allergies reviewed by myself today in Epic.     Objective:     Vitals:    05/21/24 0834   BP: 106/48   BP Location: Right arm   Patient Position: Sitting   BP Cuff Size: Adult   Pulse: 80   Temp: 36.8 °C (98.2 °F)   TempSrc: Temporal   SpO2: 90%   Weight: 62.3 kg (137 lb 5.6 oz)   Height: 1.753 m (5' 9.02\")         DESC; KARNOFSKY SCALE WITH ECOG EQUIVALENT: 80, Normal activity with effort; some signs or symptoms of disease (ECOG equivalent 1)    DISTRESS LEVEL: no apparent distress    Physical Exam  Constitutional:       General: He is not in acute distress.     Appearance: Normal appearance.   HENT:      Head: Normocephalic and atraumatic.      Nose: No congestion.      Comments: S/p excision of basal cell/RT     Mouth/Throat:      Mouth: Mucous membranes are moist.      Pharynx: Oropharynx is clear.   Eyes:      General: No scleral icterus.        Right eye: No discharge.         Left eye: No discharge.      Conjunctiva/sclera: Conjunctivae normal.      Pupils: Pupils are equal, round, and reactive to light.   Cardiovascular:      Rate and Rhythm: Normal rate and regular rhythm.      Pulses: Normal pulses.     "  Heart sounds: No murmur heard.     No friction rub.   Pulmonary:      Effort: Pulmonary effort is normal. No respiratory distress.      Breath sounds: No stridor. Wheezing (lower lung bases) and rales present.      Comments: 2L O2  Chest:      Chest wall: No tenderness.   Abdominal:      General: Abdomen is flat. Bowel sounds are normal. There is no distension.      Palpations: Abdomen is soft. There is no mass.      Tenderness: There is no abdominal tenderness. There is no guarding or rebound.   Musculoskeletal:         General: No swelling, tenderness or deformity. Normal range of motion.      Cervical back: Normal range of motion and neck supple. No rigidity or tenderness.      Right lower leg: No edema.      Left lower leg: No edema.   Skin:     General: Skin is warm.      Coloration: Skin is not jaundiced or pale.      Findings: No bruising, erythema or rash.   Neurological:      General: No focal deficit present.      Mental Status: He is alert and oriented to person, place, and time. Mental status is at baseline.      Motor: No weakness.   Psychiatric:         Mood and Affect: Mood normal.         Behavior: Behavior normal.         Thought Content: Thought content normal.         Judgment: Judgment normal.         Labs:   Most recent labs reviewed.    Slight anemia    Imaging:   Most recent images below have been independently reviewed by me.     2/16/24  1.  There is overall stable disease.  2.  The anterior right upper lobe lung nodule and associated parenchymal airspace opacity with bronchiectasis is similar to the most recent CT of 11/16/2023. Unable to measure a discrete mass.  3.  Other bilateral areas of parenchymal scarring and minimal pleural thickening are unchanged.  4.  There is emphysema/COPD.  5.  No evidence of metastatic adenopathy in the chest.  6.  No evidence of metastatic disease in the abdomen or pelvis.    8/1/2023     1.  Positive response to therapy. Right upper lobe mass has  decreased in size, and there is associated scarring.  2.  No new metastatic disease in the chest, abdomen or pelvis.  3.  Emphysema, scattered linear areas of pulmonary scarring and associated mild bronchiectasis.  4.  Chronic trace left pleural effusion.  5.  Stable left adrenal adenoma. Stable nodularity of the right adrenal gland.  6.  Punctate nonobstructing right nephrolithiasis.  7.  Cholelithiasis.    Pathology:  Adenocarcinoma of the RUL    Assessment/Plan:      Cancer Staging   Adenocarcinoma, lung, right (HCC)  Staging form: Lung, AJCC 8th Edition  - Clinical stage from 4/24/2023: Stage SUDHAKAR (cT1c, cN0, cM1a) - Signed by Sam Jaimes M.D. on 4/24/2023       Mr. Zarate is a 72 yo M who presents today with a new diagnosis of adenocarcinoma (PDL1 1%; KEAP1 loss, STK11 loss, CRKL amp, GRANT G109, OZN183, TP53) with Stage IV adenocarcinoma of the left lower lobe as well as the right upper lobe s/p C4 of carbo/pem/pem now on maintenance pem/pem.  He is here prior to C16 to proceed with Pem/Pem.    And had a long discussion regarding the chemotherapy and how he feels debility from it.  We discussed options going forward.  Would like to try single agent pembrolizumab.      Plan  -Proceed with C16 Pembrolizumab in 1 week, pending labs  -Will need CT CAP in 3 months (next 8/16/2023).    -if he has progression, he should be considered for  (he was screened and positive).  If he doesn't want to participate in this trial, we  will do a liquid biopsy to assess for mutations   -if SD or better, can continue on Pembro    Regimen  21-day cycle for 4-6 cycles or until disease progression or unacceptable toxicity    Maintenance therapy 21-day cycle until 24 months of therapy has been completed  Pembrolizumab 200 mg IV over 30 minutes on day 1  Followed by pemetrexed 500 mg per metered squared IV over 10 minutes on day 1    References  NCCN Guidelines Version NSCLC 3.2002     Jimmie BERRIOS, et al. Clin Lung Cancer  2019;20:30-36.e3    Renato WORKMAN, et al. Lung Cancer 2020;140:35-41.    Lai et al NEJM 2018: 378(22):8809-2248    Kelsey CJ Lancet Oncol 2016; 17(11): 7343-7693    Nikki M et al. Eur J Cancer, 2020; 131:68-75    #2  Research  -can be considered for  on progression    #3. BCC Nose  -patient to have biopsy of nose today.  -will await results, but he is getting chemo/IO currently  -met with Rad Onc, no treatment currently    #4.  Depression/Anxiety  -will send to Dr. England    No follow-ups on file.     Any questions and concerns raised by the patient were addressed and answered. Patient denies any further questions.  Patient encouraged to call the office with any concerns or issues.     Peg Gibson M.D.  Hematology/Oncology      35 minutes was spent on this case

## 2024-05-23 ENCOUNTER — HOSPITAL ENCOUNTER (INPATIENT)
Facility: MEDICAL CENTER | Age: 74
LOS: 3 days | DRG: 871 | End: 2024-05-26
Attending: INTERNAL MEDICINE | Admitting: INTERNAL MEDICINE
Payer: COMMERCIAL

## 2024-05-23 DIAGNOSIS — J18.9 PNEUMONIA OF RIGHT LOWER LOBE DUE TO INFECTIOUS ORGANISM: ICD-10-CM

## 2024-05-23 DIAGNOSIS — A41.9 SEPSIS WITH ACUTE HYPOXIC RESPIRATORY FAILURE WITHOUT SEPTIC SHOCK, DUE TO UNSPECIFIED ORGANISM (HCC): ICD-10-CM

## 2024-05-23 DIAGNOSIS — J96.21 ACUTE ON CHRONIC RESPIRATORY FAILURE WITH HYPOXIA (HCC): ICD-10-CM

## 2024-05-23 DIAGNOSIS — R26.81 GAIT INSTABILITY: ICD-10-CM

## 2024-05-23 DIAGNOSIS — R65.20 SEPSIS WITH ACUTE HYPOXIC RESPIRATORY FAILURE WITHOUT SEPTIC SHOCK, DUE TO UNSPECIFIED ORGANISM (HCC): ICD-10-CM

## 2024-05-23 DIAGNOSIS — J96.01 SEPSIS WITH ACUTE HYPOXIC RESPIRATORY FAILURE WITHOUT SEPTIC SHOCK, DUE TO UNSPECIFIED ORGANISM (HCC): ICD-10-CM

## 2024-05-23 PROBLEM — R62.7 FAILURE TO THRIVE IN ADULT: Status: ACTIVE | Noted: 2024-05-23

## 2024-05-23 LAB
FLUAV RNA SPEC QL NAA+PROBE: NEGATIVE
FLUBV RNA SPEC QL NAA+PROBE: NEGATIVE
LACTATE SERPL-SCNC: 3.6 MMOL/L (ref 0.5–2)
PROCALCITONIN SERPL-MCNC: 3.17 NG/ML
RSV RNA SPEC QL NAA+PROBE: NEGATIVE
SARS-COV-2 RNA RESP QL NAA+PROBE: NOTDETECTED
SCCMEC + MECA PNL NOSE NAA+PROBE: NEGATIVE
SPECIMEN SOURCE: NORMAL

## 2024-05-23 PROCEDURE — 700105 HCHG RX REV CODE 258

## 2024-05-23 PROCEDURE — 700111 HCHG RX REV CODE 636 W/ 250 OVERRIDE (IP): Mod: JZ | Performed by: INTERNAL MEDICINE

## 2024-05-23 PROCEDURE — 5A09357 ASSISTANCE WITH RESPIRATORY VENTILATION, LESS THAN 24 CONSECUTIVE HOURS, CONTINUOUS POSITIVE AIRWAY PRESSURE: ICD-10-PCS | Performed by: INTERNAL MEDICINE

## 2024-05-23 PROCEDURE — 700101 HCHG RX REV CODE 250: Performed by: INTERNAL MEDICINE

## 2024-05-23 PROCEDURE — 700105 HCHG RX REV CODE 258: Performed by: INTERNAL MEDICINE

## 2024-05-23 PROCEDURE — 770022 HCHG ROOM/CARE - ICU (200)

## 2024-05-23 PROCEDURE — 94640 AIRWAY INHALATION TREATMENT: CPT

## 2024-05-23 PROCEDURE — 84145 PROCALCITONIN (PCT): CPT

## 2024-05-23 PROCEDURE — 83605 ASSAY OF LACTIC ACID: CPT

## 2024-05-23 PROCEDURE — 0241U HCHG SARS-COV-2 COVID-19 NFCT DS RESP RNA 4 TRGT MIC: CPT

## 2024-05-23 PROCEDURE — 700101 HCHG RX REV CODE 250

## 2024-05-23 PROCEDURE — 99291 CRITICAL CARE FIRST HOUR: CPT | Performed by: INTERNAL MEDICINE

## 2024-05-23 PROCEDURE — 87641 MR-STAPH DNA AMP PROBE: CPT

## 2024-05-23 PROCEDURE — 5A0935A ASSISTANCE WITH RESPIRATORY VENTILATION, LESS THAN 24 CONSECUTIVE HOURS, HIGH NASAL FLOW/VELOCITY: ICD-10-PCS | Performed by: INTERNAL MEDICINE

## 2024-05-23 RX ORDER — IPRATROPIUM BROMIDE AND ALBUTEROL SULFATE 2.5; .5 MG/3ML; MG/3ML
3 SOLUTION RESPIRATORY (INHALATION)
Status: DISCONTINUED | OUTPATIENT
Start: 2024-05-23 | End: 2024-05-26 | Stop reason: HOSPADM

## 2024-05-23 RX ORDER — ONDANSETRON 2 MG/ML
4 INJECTION INTRAMUSCULAR; INTRAVENOUS EVERY 4 HOURS PRN
Status: DISCONTINUED | OUTPATIENT
Start: 2024-05-23 | End: 2024-05-26 | Stop reason: HOSPADM

## 2024-05-23 RX ORDER — SODIUM CHLORIDE, SODIUM LACTATE, POTASSIUM CHLORIDE, AND CALCIUM CHLORIDE .6; .31; .03; .02 G/100ML; G/100ML; G/100ML; G/100ML
500 INJECTION, SOLUTION INTRAVENOUS
Status: COMPLETED | OUTPATIENT
Start: 2024-05-23 | End: 2024-05-23

## 2024-05-23 RX ORDER — METHYLPREDNISOLONE SODIUM SUCCINATE 40 MG/ML
40 INJECTION, POWDER, LYOPHILIZED, FOR SOLUTION INTRAMUSCULAR; INTRAVENOUS DAILY
Qty: 5 ML | Refills: 0 | Status: DISCONTINUED | OUTPATIENT
Start: 2024-05-23 | End: 2024-05-26 | Stop reason: HOSPADM

## 2024-05-23 RX ORDER — IPRATROPIUM BROMIDE AND ALBUTEROL SULFATE 2.5; .5 MG/3ML; MG/3ML
3 SOLUTION RESPIRATORY (INHALATION)
Status: DISCONTINUED | OUTPATIENT
Start: 2024-05-23 | End: 2024-05-26

## 2024-05-23 RX ORDER — NOREPINEPHRINE BITARTRATE 0.03 MG/ML
0-1 INJECTION, SOLUTION INTRAVENOUS CONTINUOUS
Status: DISCONTINUED | OUTPATIENT
Start: 2024-05-23 | End: 2024-05-25

## 2024-05-23 RX ORDER — ONDANSETRON 4 MG/1
4 TABLET, ORALLY DISINTEGRATING ORAL EVERY 4 HOURS PRN
Status: DISCONTINUED | OUTPATIENT
Start: 2024-05-23 | End: 2024-05-26 | Stop reason: HOSPADM

## 2024-05-23 RX ORDER — SODIUM CHLORIDE, SODIUM LACTATE, POTASSIUM CHLORIDE, CALCIUM CHLORIDE 600; 310; 30; 20 MG/100ML; MG/100ML; MG/100ML; MG/100ML
INJECTION, SOLUTION INTRAVENOUS CONTINUOUS
Status: DISCONTINUED | OUTPATIENT
Start: 2024-05-23 | End: 2024-05-25

## 2024-05-23 RX ORDER — ENOXAPARIN SODIUM 100 MG/ML
40 INJECTION SUBCUTANEOUS DAILY
Status: DISCONTINUED | OUTPATIENT
Start: 2024-05-24 | End: 2024-05-24

## 2024-05-23 RX ORDER — ACETAMINOPHEN 325 MG/1
650 TABLET ORAL EVERY 6 HOURS PRN
Status: DISCONTINUED | OUTPATIENT
Start: 2024-05-23 | End: 2024-05-26 | Stop reason: HOSPADM

## 2024-05-23 RX ORDER — LINEZOLID 2 MG/ML
600 INJECTION, SOLUTION INTRAVENOUS EVERY 12 HOURS
Status: DISCONTINUED | OUTPATIENT
Start: 2024-05-23 | End: 2024-05-24

## 2024-05-23 RX ADMIN — IPRATROPIUM BROMIDE AND ALBUTEROL SULFATE 3 ML: 2.5; .5 SOLUTION RESPIRATORY (INHALATION) at 18:27

## 2024-05-23 RX ADMIN — METHYLPREDNISOLONE SODIUM SUCCINATE 40 MG: 40 INJECTION, POWDER, FOR SOLUTION INTRAMUSCULAR; INTRAVENOUS at 18:36

## 2024-05-23 RX ADMIN — CEFEPIME 2 G: 2 INJECTION, POWDER, FOR SOLUTION INTRAVENOUS at 22:54

## 2024-05-23 RX ADMIN — LINEZOLID 600 MG: 600 INJECTION, SOLUTION INTRAVENOUS at 18:35

## 2024-05-23 RX ADMIN — SODIUM CHLORIDE, POTASSIUM CHLORIDE, SODIUM LACTATE AND CALCIUM CHLORIDE 500 ML: 600; 310; 30; 20 INJECTION, SOLUTION INTRAVENOUS at 20:16

## 2024-05-23 RX ADMIN — IPRATROPIUM BROMIDE AND ALBUTEROL SULFATE 3 ML: 2.5; .5 SOLUTION RESPIRATORY (INHALATION) at 21:43

## 2024-05-23 RX ADMIN — SODIUM CHLORIDE, POTASSIUM CHLORIDE, SODIUM LACTATE AND CALCIUM CHLORIDE: 600; 310; 30; 20 INJECTION, SOLUTION INTRAVENOUS at 18:56

## 2024-05-23 RX ADMIN — NOREPINEPHRINE BITARTRATE 0.05 MCG/KG/MIN: 1 INJECTION, SOLUTION, CONCENTRATE INTRAVENOUS at 23:04

## 2024-05-23 ASSESSMENT — LIFESTYLE VARIABLES
EVER FELT BAD OR GUILTY ABOUT YOUR DRINKING: NO
HOW MANY TIMES IN THE PAST YEAR HAVE YOU HAD 5 OR MORE DRINKS IN A DAY: 0
EVER HAD A DRINK FIRST THING IN THE MORNING TO STEADY YOUR NERVES TO GET RID OF A HANGOVER: NO
DOES PATIENT WANT TO STOP DRINKING: NO
CONSUMPTION TOTAL: NEGATIVE
ALCOHOL_USE: NO
TOTAL SCORE: 0
AVERAGE NUMBER OF DAYS PER WEEK YOU HAVE A DRINK CONTAINING ALCOHOL: 0
ON A TYPICAL DAY WHEN YOU DRINK ALCOHOL HOW MANY DRINKS DO YOU HAVE: 0
HAVE YOU EVER FELT YOU SHOULD CUT DOWN ON YOUR DRINKING: NO
TOTAL SCORE: 0
HAVE PEOPLE ANNOYED YOU BY CRITICIZING YOUR DRINKING: NO
TOTAL SCORE: 0

## 2024-05-23 ASSESSMENT — ENCOUNTER SYMPTOMS
PSYCHIATRIC NEGATIVE: 1
WEAKNESS: 1
EYES NEGATIVE: 1
CHILLS: 1
CARDIOVASCULAR NEGATIVE: 1
GASTROINTESTINAL NEGATIVE: 1
SHORTNESS OF BREATH: 1
WEIGHT LOSS: 1
MUSCULOSKELETAL NEGATIVE: 1

## 2024-05-23 ASSESSMENT — PATIENT HEALTH QUESTIONNAIRE - PHQ9
1. LITTLE INTEREST OR PLEASURE IN DOING THINGS: NOT AT ALL
2. FEELING DOWN, DEPRESSED, IRRITABLE, OR HOPELESS: NOT AT ALL
SUM OF ALL RESPONSES TO PHQ9 QUESTIONS 1 AND 2: 0

## 2024-05-23 ASSESSMENT — SOCIAL DETERMINANTS OF HEALTH (SDOH)
WITHIN THE LAST YEAR, HAVE TO BEEN RAPED OR FORCED TO HAVE ANY KIND OF SEXUAL ACTIVITY BY YOUR PARTNER OR EX-PARTNER?: NO
IN THE PAST 12 MONTHS, HAS THE ELECTRIC, GAS, OIL, OR WATER COMPANY THREATENED TO SHUT OFF SERVICE IN YOUR HOME?: NO
WITHIN THE LAST YEAR, HAVE YOU BEEN AFRAID OF YOUR PARTNER OR EX-PARTNER?: NO
WITHIN THE LAST YEAR, HAVE YOU BEEN KICKED, HIT, SLAPPED, OR OTHERWISE PHYSICALLY HURT BY YOUR PARTNER OR EX-PARTNER?: NO
WITHIN THE PAST 12 MONTHS, THE FOOD YOU BOUGHT JUST DIDN'T LAST AND YOU DIDN'T HAVE MONEY TO GET MORE: NEVER TRUE
WITHIN THE LAST YEAR, HAVE YOU BEEN HUMILIATED OR EMOTIONALLY ABUSED IN OTHER WAYS BY YOUR PARTNER OR EX-PARTNER?: NO
WITHIN THE PAST 12 MONTHS, YOU WORRIED THAT YOUR FOOD WOULD RUN OUT BEFORE YOU GOT THE MONEY TO BUY MORE: NEVER TRUE

## 2024-05-23 ASSESSMENT — COGNITIVE AND FUNCTIONAL STATUS - GENERAL
MOVING FROM LYING ON BACK TO SITTING ON SIDE OF FLAT BED: A LITTLE
EATING MEALS: A LITTLE
HELP NEEDED FOR BATHING: A LITTLE
MOVING TO AND FROM BED TO CHAIR: A LITTLE
WALKING IN HOSPITAL ROOM: A LITTLE
DRESSING REGULAR UPPER BODY CLOTHING: A LITTLE
TOILETING: A LITTLE
STANDING UP FROM CHAIR USING ARMS: A LITTLE
TURNING FROM BACK TO SIDE WHILE IN FLAT BAD: A LITTLE
SUGGESTED CMS G CODE MODIFIER DAILY ACTIVITY: CK
DAILY ACTIVITIY SCORE: 18
CLIMB 3 TO 5 STEPS WITH RAILING: A LITTLE
SUGGESTED CMS G CODE MODIFIER MOBILITY: CK
PERSONAL GROOMING: A LITTLE
MOBILITY SCORE: 18
DRESSING REGULAR LOWER BODY CLOTHING: A LITTLE

## 2024-05-23 ASSESSMENT — PAIN DESCRIPTION - PAIN TYPE
TYPE: ACUTE PAIN
TYPE: ACUTE PAIN

## 2024-05-23 ASSESSMENT — FIBROSIS 4 INDEX: FIB4 SCORE: 2.24

## 2024-05-23 NOTE — PROGRESS NOTES
[facility] DIRECT ADMISSION REPORT  Transferring facility: [facility]  Transferring physician: Dr. Cuevas    Chief complaint: Dyspnea/hypoxia  Pertinent history & patient course: This is a 74-year-old male with severe oxygen dependent COPD on 5 LPM oxygen, adenocarcinoma of the lung with ongoing chemotherapy (adenocarcinoma stage Karoline on the right) prior GI bleed, recently hospitalized at [facility] May 15 through 18 and treated with antibiotics.  He presented to the ED there with reports of increasing dyspnea and oxygen requirement for 1 day.  He was found to have an SpO2 of 70% on 5 L which was increased to nonrebreather.  He was received albuterol with some improvement.  He reported chills and was tachycardic heart rate 135 with respiratory rate in the high 20s.  Initially considered transfer to [facility] but callback from transferring facility indicated patient had began desaturating and was more hypotensive.  Considering intubation for safety prior to transport.  Pertinent imaging & lab results: As above  Consultants called prior to transfer and pertinent input from consultants: N/A  Code Status: Full CODE STATUS per transferring provider, I personally verified with the transferring provider patient's code status and the transferring provider has confirmed this with the patient.  Reason for Transfer: ICU level care  Further work up or recommendations requested prior to transfer: N/A    Patient accepted for transfer: Yes  Accepting [facility] Facility: [facility] Nursing to notify the Triage Coordinator in the RTOC via Voalte or Phone ext. [phone] when patient arrives to the unit. The Triage Coordinator will assign the admitting provider.    Consultants to be called upon arrival: Admitting intensivist  Admission status: Inpatient.   Floor requested: ICU  If ICU transfer, name of intensivist case discussed with and pertinent input from critical care: Coreen    The admitting provider is the point  of contact for questions or concerns regarding patient's care.

## 2024-05-24 ENCOUNTER — APPOINTMENT (OUTPATIENT)
Dept: RADIOLOGY | Facility: MEDICAL CENTER | Age: 74
DRG: 871 | End: 2024-05-24
Payer: COMMERCIAL

## 2024-05-24 PROBLEM — Z71.89 GOALS OF CARE, COUNSELING/DISCUSSION: Status: ACTIVE | Noted: 2024-05-24

## 2024-05-24 PROBLEM — J96.90 RESPIRATORY FAILURE (HCC): Status: ACTIVE | Noted: 2024-05-24

## 2024-05-24 LAB
ALBUMIN SERPL BCP-MCNC: 2.3 G/DL (ref 3.2–4.9)
ALBUMIN/GLOB SERPL: 0.8 G/DL
ALP SERPL-CCNC: 77 U/L (ref 30–99)
ALT SERPL-CCNC: 20 U/L (ref 2–50)
ANION GAP SERPL CALC-SCNC: 10 MMOL/L (ref 7–16)
AST SERPL-CCNC: 19 U/L (ref 12–45)
BASOPHILS # BLD AUTO: 0.2 % (ref 0–1.8)
BASOPHILS # BLD: 0.1 K/UL (ref 0–0.12)
BILIRUB SERPL-MCNC: 0.4 MG/DL (ref 0.1–1.5)
BUN SERPL-MCNC: 22 MG/DL (ref 8–22)
CALCIUM ALBUM COR SERPL-MCNC: 9.1 MG/DL (ref 8.5–10.5)
CALCIUM SERPL-MCNC: 7.7 MG/DL (ref 8.5–10.5)
CHLORIDE SERPL-SCNC: 104 MMOL/L (ref 96–112)
CO2 SERPL-SCNC: 24 MMOL/L (ref 20–33)
COMMENT 1642: NORMAL
CREAT SERPL-MCNC: 0.95 MG/DL (ref 0.5–1.4)
EOSINOPHIL # BLD AUTO: 0.04 K/UL (ref 0–0.51)
EOSINOPHIL NFR BLD: 0.1 % (ref 0–6.9)
ERYTHROCYTE [DISTWIDTH] IN BLOOD BY AUTOMATED COUNT: 66.7 FL (ref 35.9–50)
GFR SERPLBLD CREATININE-BSD FMLA CKD-EPI: 83 ML/MIN/1.73 M 2
GLOBULIN SER CALC-MCNC: 3 G/DL (ref 1.9–3.5)
GLUCOSE SERPL-MCNC: 152 MG/DL (ref 65–99)
HCT VFR BLD AUTO: 30.9 % (ref 42–52)
HGB BLD-MCNC: 10 G/DL (ref 14–18)
IMM GRANULOCYTES # BLD AUTO: 1.06 K/UL (ref 0–0.11)
IMM GRANULOCYTES NFR BLD AUTO: 2.6 % (ref 0–0.9)
LACTATE SERPL-SCNC: 1.6 MMOL/L (ref 0.5–2)
LACTATE SERPL-SCNC: 2.8 MMOL/L (ref 0.5–2)
LYMPHOCYTES # BLD AUTO: 2.14 K/UL (ref 1–4.8)
LYMPHOCYTES NFR BLD: 5.2 % (ref 22–41)
MACROCYTES BLD QL SMEAR: ABNORMAL
MCH RBC QN AUTO: 35.2 PG (ref 27–33)
MCHC RBC AUTO-ENTMCNC: 32.4 G/DL (ref 32.3–36.5)
MCV RBC AUTO: 108.8 FL (ref 81.4–97.8)
MONOCYTES # BLD AUTO: 1.27 K/UL (ref 0–0.85)
MONOCYTES NFR BLD AUTO: 3.1 % (ref 0–13.4)
MORPHOLOGY BLD-IMP: NORMAL
NEUTROPHILS # BLD AUTO: 36.38 K/UL (ref 1.82–7.42)
NEUTROPHILS NFR BLD: 88.8 % (ref 44–72)
NRBC # BLD AUTO: 0 K/UL
NRBC BLD-RTO: 0 /100 WBC (ref 0–0.2)
PLATELET # BLD AUTO: 132 K/UL (ref 164–446)
PLATELET BLD QL SMEAR: NORMAL
PMV BLD AUTO: 10.3 FL (ref 9–12.9)
POTASSIUM SERPL-SCNC: 3.4 MMOL/L (ref 3.6–5.5)
PROT SERPL-MCNC: 5.3 G/DL (ref 6–8.2)
RBC # BLD AUTO: 2.84 M/UL (ref 4.7–6.1)
RBC BLD AUTO: PRESENT
SODIUM SERPL-SCNC: 138 MMOL/L (ref 135–145)
WBC # BLD AUTO: 41 K/UL (ref 4.8–10.8)

## 2024-05-24 PROCEDURE — 94664 DEMO&/EVAL PT USE INHALER: CPT

## 2024-05-24 PROCEDURE — 700102 HCHG RX REV CODE 250 W/ 637 OVERRIDE(OP)

## 2024-05-24 PROCEDURE — 700111 HCHG RX REV CODE 636 W/ 250 OVERRIDE (IP): Mod: JZ | Performed by: INTERNAL MEDICINE

## 2024-05-24 PROCEDURE — 94640 AIRWAY INHALATION TREATMENT: CPT

## 2024-05-24 PROCEDURE — 99291 CRITICAL CARE FIRST HOUR: CPT | Mod: GC | Performed by: INTERNAL MEDICINE

## 2024-05-24 PROCEDURE — 97602 WOUND(S) CARE NON-SELECTIVE: CPT

## 2024-05-24 PROCEDURE — 99291 CRITICAL CARE FIRST HOUR: CPT | Performed by: INTERNAL MEDICINE

## 2024-05-24 PROCEDURE — 99407 BEHAV CHNG SMOKING > 10 MIN: CPT

## 2024-05-24 PROCEDURE — 85025 COMPLETE CBC W/AUTO DIFF WBC: CPT

## 2024-05-24 PROCEDURE — 71045 X-RAY EXAM CHEST 1 VIEW: CPT

## 2024-05-24 PROCEDURE — 700101 HCHG RX REV CODE 250: Performed by: INTERNAL MEDICINE

## 2024-05-24 PROCEDURE — 80053 COMPREHEN METABOLIC PANEL: CPT

## 2024-05-24 PROCEDURE — A9270 NON-COVERED ITEM OR SERVICE: HCPCS | Performed by: INTERNAL MEDICINE

## 2024-05-24 PROCEDURE — 99223 1ST HOSP IP/OBS HIGH 75: CPT | Mod: 25 | Performed by: NURSE PRACTITIONER

## 2024-05-24 PROCEDURE — A9270 NON-COVERED ITEM OR SERVICE: HCPCS

## 2024-05-24 PROCEDURE — 700105 HCHG RX REV CODE 258: Performed by: INTERNAL MEDICINE

## 2024-05-24 PROCEDURE — 700102 HCHG RX REV CODE 250 W/ 637 OVERRIDE(OP): Performed by: INTERNAL MEDICINE

## 2024-05-24 PROCEDURE — 99498 ADVNCD CARE PLAN ADDL 30 MIN: CPT | Performed by: NURSE PRACTITIONER

## 2024-05-24 PROCEDURE — 83605 ASSAY OF LACTIC ACID: CPT

## 2024-05-24 PROCEDURE — 94669 MECHANICAL CHEST WALL OSCILL: CPT

## 2024-05-24 PROCEDURE — 99497 ADVNCD CARE PLAN 30 MIN: CPT | Performed by: NURSE PRACTITIONER

## 2024-05-24 PROCEDURE — 770000 HCHG ROOM/CARE - INTERMEDIATE ICU *

## 2024-05-24 RX ORDER — AMOXICILLIN 250 MG
2 CAPSULE ORAL EVERY EVENING
Status: DISCONTINUED | OUTPATIENT
Start: 2024-05-24 | End: 2024-05-26 | Stop reason: HOSPADM

## 2024-05-24 RX ORDER — MIDODRINE HYDROCHLORIDE 5 MG/1
5 TABLET ORAL EVERY 8 HOURS
Status: DISCONTINUED | OUTPATIENT
Start: 2024-05-24 | End: 2024-05-24

## 2024-05-24 RX ORDER — POTASSIUM CHLORIDE 20 MEQ/1
40 TABLET, EXTENDED RELEASE ORAL ONCE
Status: COMPLETED | OUTPATIENT
Start: 2024-05-24 | End: 2024-05-24

## 2024-05-24 RX ORDER — LEVOFLOXACIN 500 MG/1
500 TABLET, FILM COATED ORAL DAILY
Status: ON HOLD | COMMUNITY
End: 2024-05-26

## 2024-05-24 RX ORDER — OMEPRAZOLE 20 MG/1
20 CAPSULE, DELAYED RELEASE ORAL DAILY
Status: DISCONTINUED | OUTPATIENT
Start: 2024-05-24 | End: 2024-05-26 | Stop reason: HOSPADM

## 2024-05-24 RX ORDER — MIDODRINE HYDROCHLORIDE 5 MG/1
5 TABLET ORAL EVERY 8 HOURS
Status: DISCONTINUED | OUTPATIENT
Start: 2024-05-24 | End: 2024-05-26

## 2024-05-24 RX ORDER — POLYETHYLENE GLYCOL 3350 17 G/17G
1 POWDER, FOR SOLUTION ORAL
Status: DISCONTINUED | OUTPATIENT
Start: 2024-05-24 | End: 2024-05-26 | Stop reason: HOSPADM

## 2024-05-24 RX ADMIN — OMEPRAZOLE 20 MG: 20 CAPSULE, DELAYED RELEASE ORAL at 11:46

## 2024-05-24 RX ADMIN — CEFEPIME 2 G: 2 INJECTION, POWDER, FOR SOLUTION INTRAVENOUS at 14:27

## 2024-05-24 RX ADMIN — LINEZOLID 600 MG: 600 INJECTION, SOLUTION INTRAVENOUS at 05:37

## 2024-05-24 RX ADMIN — METHYLPREDNISOLONE SODIUM SUCCINATE 40 MG: 40 INJECTION, POWDER, FOR SOLUTION INTRAMUSCULAR; INTRAVENOUS at 17:12

## 2024-05-24 RX ADMIN — CEFEPIME 2 G: 2 INJECTION, POWDER, FOR SOLUTION INTRAVENOUS at 05:40

## 2024-05-24 RX ADMIN — CEFEPIME 2 G: 2 INJECTION, POWDER, FOR SOLUTION INTRAVENOUS at 21:20

## 2024-05-24 RX ADMIN — IPRATROPIUM BROMIDE AND ALBUTEROL SULFATE 3 ML: 2.5; .5 SOLUTION RESPIRATORY (INHALATION) at 01:57

## 2024-05-24 RX ADMIN — SODIUM CHLORIDE, POTASSIUM CHLORIDE, SODIUM LACTATE AND CALCIUM CHLORIDE: 600; 310; 30; 20 INJECTION, SOLUTION INTRAVENOUS at 21:14

## 2024-05-24 RX ADMIN — SENNOSIDES AND DOCUSATE SODIUM 2 TABLET: 50; 8.6 TABLET ORAL at 17:12

## 2024-05-24 RX ADMIN — IPRATROPIUM BROMIDE AND ALBUTEROL SULFATE 3 ML: 2.5; .5 SOLUTION RESPIRATORY (INHALATION) at 19:57

## 2024-05-24 RX ADMIN — IPRATROPIUM BROMIDE AND ALBUTEROL SULFATE 3 ML: 2.5; .5 SOLUTION RESPIRATORY (INHALATION) at 14:25

## 2024-05-24 RX ADMIN — MIDODRINE HYDROCHLORIDE 5 MG: 5 TABLET ORAL at 11:46

## 2024-05-24 RX ADMIN — IPRATROPIUM BROMIDE AND ALBUTEROL SULFATE 3 ML: 2.5; .5 SOLUTION RESPIRATORY (INHALATION) at 22:07

## 2024-05-24 RX ADMIN — IPRATROPIUM BROMIDE AND ALBUTEROL SULFATE 3 ML: 2.5; .5 SOLUTION RESPIRATORY (INHALATION) at 06:08

## 2024-05-24 RX ADMIN — MIDODRINE HYDROCHLORIDE 5 MG: 5 TABLET ORAL at 21:18

## 2024-05-24 RX ADMIN — RIVAROXABAN 20 MG: 20 TABLET, FILM COATED ORAL at 11:46

## 2024-05-24 RX ADMIN — IPRATROPIUM BROMIDE AND ALBUTEROL SULFATE 3 ML: 2.5; .5 SOLUTION RESPIRATORY (INHALATION) at 11:34

## 2024-05-24 RX ADMIN — SERTRALINE HYDROCHLORIDE 50 MG: 50 TABLET ORAL at 11:46

## 2024-05-24 RX ADMIN — POTASSIUM CHLORIDE 40 MEQ: 1500 TABLET, EXTENDED RELEASE ORAL at 11:46

## 2024-05-24 ASSESSMENT — ENCOUNTER SYMPTOMS
NAUSEA: 0
CONSTIPATION: 0
PALPITATIONS: 0
VOMITING: 0
SENSORY CHANGE: 0
FEVER: 1
PHOTOPHOBIA: 0
TINGLING: 0
HALLUCINATIONS: 0
FOCAL WEAKNESS: 0
CHILLS: 0
ABDOMINAL PAIN: 0
SHORTNESS OF BREATH: 1
STRIDOR: 0
TREMORS: 0
DIZZINESS: 0
FEVER: 0
DOUBLE VISION: 0
DEPRESSION: 0
NERVOUS/ANXIOUS: 0
NECK PAIN: 0
WEIGHT LOSS: 1
HEMOPTYSIS: 0
COUGH: 1
BLURRED VISION: 0
DIARRHEA: 0
COUGH: 0
HEADACHES: 0
EYE PAIN: 0
WHEEZING: 0
BLOOD IN STOOL: 0
WEAKNESS: 1
BACK PAIN: 0
SPUTUM PRODUCTION: 0
ORTHOPNEA: 0
WEIGHT LOSS: 0
SPEECH CHANGE: 0
SHORTNESS OF BREATH: 0
MYALGIAS: 0
SORE THROAT: 0

## 2024-05-24 ASSESSMENT — LIFESTYLE VARIABLES: SUBSTANCE_ABUSE: 0

## 2024-05-24 ASSESSMENT — PAIN DESCRIPTION - PAIN TYPE: TYPE: ACUTE PAIN

## 2024-05-24 NOTE — CONSULTS
Hospital Medicine Consultation    Date of Service  5/24/2024    Referring Physician  Wade Sloan M.D.     Consulting Physician  Christ Murray M.D.    Reason for Consultation  Transfer of care    History of Presenting Illness  74 y.o. male with possible history of COPD, emphysema, ethanol abuse, right leg DVT, metastatic adenocarcinoma of the lung follows [facility] oncology who presented to the ER in the [facility] with worsening weakness and dyspnea as well as chills.  Patient was failed BiPAP due to intolerance.  He was placed on high flow and he also found to lactic acidosis and he transferred to [facility] on 5/23/2024 for higher level of care.  He found to have sepsis with acute respiratory failure with hypoxia and he was started on broad-spectrum antibiotic.  He also found to have acute on chronic respiratory failure with hypoxia.    On May 24, 2024 intensivist Dr. Berrios requested to transfer care to hospital service.  I have routine and examined him at the bedside.  He expressed that he is feeling better.  At the time of evaluation he is requiring 35 L and 40% of FiO2.  He was on Levophed this morning by the time of evaluation he was weaned off from Levophed.  Palliative care evaluated him and made recommendations.  Plan is to transfer him out from ICU to IM.    Review of Systems  Review of Systems   Constitutional:  Negative for chills, fever and weight loss.   HENT:  Negative for hearing loss and tinnitus.    Eyes:  Negative for blurred vision, double vision, photophobia and pain.   Respiratory:  Positive for cough and shortness of breath (Improving). Negative for sputum production.    Cardiovascular:  Negative for chest pain, palpitations, orthopnea and leg swelling.   Gastrointestinal:  Negative for abdominal pain, constipation, diarrhea, nausea and vomiting.   Genitourinary:  Negative for dysuria, frequency and urgency.   Musculoskeletal:  Negative for back pain, joint  pain, myalgias and neck pain.   Skin:  Negative for rash.   Neurological:  Negative for dizziness, tingling, tremors, sensory change, speech change, focal weakness and headaches.   Psychiatric/Behavioral:  Negative for hallucinations and substance abuse.    All other systems reviewed and are negative.      Past Medical History   has a past medical history of Basal cell carcinoma (BCC) of skin of nose, Cancer (HCC), Carcinoma in situ of respiratory system, Cataract, COPD (chronic obstructive pulmonary disease) (HCC), Cough, DVT (deep venous thrombosis) (HCC), Lumbar radiculopathy, Pain, Pneumonia, Shortness of breath, Skin carcinoma, Sputum production, and Synovial cyst.    He has no past medical history of Painful breathing, Psychiatric problem, or Wheezing.    Surgical History   has a past surgical history that includes cataract phaco with iol (Bilateral) and pr bronchoscopy,diagnostic (4/12/2023).    Family History  family history includes Cancer in his brother.    Social History   reports that he has been smoking cigarettes. He started smoking about 59 years ago. He has a 29.7 pack-year smoking history. He has never been exposed to tobacco smoke. He has quit using smokeless tobacco.  His smokeless tobacco use included chew. He reports that he does not currently use alcohol after a past usage of about 8.4 oz of alcohol per week. He reports that he does not use drugs.    Medications  Prior to Admission Medications   Prescriptions Last Dose Informant Patient Reported? Taking?   albuterol 108 (90 Base) MCG/ACT Aero Soln inhalation aerosol  Patient, Family Member Yes No   Sig: Inhale 2 Puffs every four hours as needed for Shortness of Breath.   fluticasone-salmeterol (ADVAIR) 250-50 MCG/ACT AEROSOL POWDER, BREATH ACTIVATED 5/23/2024 Patient, Family Member Yes Yes   Sig: Inhale 1 Puff 2 times a day.   folic acid (FOLVITE) 1 MG Tab  Patient, Family Member Yes No   Sig: Take 1 mg by mouth see administration instructions.  "Take 1 to 2 days before chemotherapy only   levoFLOXacin (LEVAQUIN) 500 MG tablet 5/18/2024 at completed  Yes Yes   Sig: Take 500 mg by mouth every day.   omeprazole (PRILOSEC) 20 MG delayed-release capsule 5/23/2024 Patient, Family Member No Yes   Sig: Take 1 Capsule by mouth 2 times a day.   Patient taking differently: Take 20 mg by mouth every day. Pt takes 10 mg twice daily per family   ondansetron (ZOFRAN) 4 MG Tab tablet 5/23/2024 Patient, Family Member No Yes   Sig: Take 1 Tablet by mouth every four hours as needed for Nausea/Vomiting (for nausea, vomiting).   prochlorperazine (COMPAZINE) 10 MG Tab 5/23/2024 Patient, Family Member No Yes   Sig: Take 1 Tablet by mouth every 6 hours as needed (for nausea, vomiting).   rivaroxaban (XARELTO) 20 MG Tab tablet 5/23/2024 Patient, Family Member Yes Yes   Sig: Take 20 mg by mouth every day.     sertraline (ZOLOFT) 50 MG Tab 5/23/2024 Patient, Family Member Yes Yes   Sig: Take 50 mg by mouth every day.   tiotropium (SPIRIVA RESPIMAT) 2.5 mcg/Act Aero Soln 5/23/2024 Patient, Family Member Yes Yes   Sig: Inhale 5 mcg every day.      Facility-Administered Medications: None       Allergies  Allergies   Allergen Reactions    Bacitracin-Neomycin-Polymyxin      Ointment around eye caused face and eye swelling    Gabapentin Swelling     Took several medications following cataract surgery. Not sure which medication was cause.     Ketorolac Swelling     Took following cataract surgery. Not sure which medication was cause.     Lipoglycopeptide Antibiotics Swelling     Pt stated he did not remember what kind of antibiotics he was allergic to. Pt stated \"two different kinds\" Face swelling and eyes glazing over.     Tromethamine     Celecoxib Rash       Physical Exam  Temp:  [36.3 °C (97.4 °F)-36.7 °C (98 °F)] 36.7 °C (98 °F)  Pulse:  [] 85  Resp:  [9-46] 18  BP: ()/(39-60) 94/55  SpO2:  [82 %-100 %] 95 %    Physical Exam  Vitals reviewed.   Constitutional:       " General: He is not in acute distress.  HENT:      Head: Normocephalic and atraumatic. No contusion.      Right Ear: External ear normal.      Left Ear: External ear normal.      Nose: Nose normal.      Comments: High flow nasal cannula     Mouth/Throat:      Mouth: Mucous membranes are dry.      Pharynx: No oropharyngeal exudate.   Eyes:      General:         Right eye: No discharge.         Left eye: No discharge.      Pupils: Pupils are equal, round, and reactive to light.   Cardiovascular:      Rate and Rhythm: Normal rate and regular rhythm.      Heart sounds: No murmur heard.     No friction rub. No gallop.   Pulmonary:      Effort: Respiratory distress present.      Breath sounds: No wheezing or rhonchi.      Comments: Tachypnea  Abdominal:      General: Bowel sounds are normal. There is no distension.      Palpations: Abdomen is soft.      Tenderness: There is no abdominal tenderness. There is no rebound.   Musculoskeletal:         General: No swelling or tenderness. Normal range of motion.      Cervical back: No rigidity. No muscular tenderness.   Skin:     General: Skin is warm and dry.      Coloration: Skin is not jaundiced.   Neurological:      General: No focal deficit present.      Mental Status: He is alert.      Cranial Nerves: No cranial nerve deficit.      Sensory: No sensory deficit.   Psychiatric:         Mood and Affect: Mood normal.         Fluids  Date 05/24/24 0700 - 05/25/24 0659   Shift 3584-8803 6291-9026 6915-8285 24 Hour Total   INTAKE   P.O. 360   360   I.V. 1702.1   1702.1   IV Piggyback 772.7   772.7   Shift Total 2834.9   2834.9   OUTPUT   Urine 400   400   Shift Total 400   400   Weight (kg) 64.5 64.5 64.5 64.5       Laboratory  Recent Labs     05/24/24  0551   WBC 41.0*   RBC 2.84*   HEMOGLOBIN 10.0*   HEMATOCRIT 30.9*   .8*   MCH 35.2*   MCHC 32.4   RDW 66.7*   PLATELETCT 132*   MPV 10.3     Recent Labs     05/24/24  0551   SODIUM 138   POTASSIUM 3.4*   CHLORIDE 104   CO2 24    GLUCOSE 152*   BUN 22   CREATININE 0.95   CALCIUM 7.7*                     Imaging  DX-CHEST-PORTABLE (1 VIEW)   Final Result      1. New patchy opacities in the lung bases and the right midlung are most compatible with areas of multifocal pneumonitis.   2. Mild interstitial edema.   3. Additional spiculated opacities in the right upper lobe and left lower lobe are stable.      OUTSIDE IMAGES-DX CHEST   Final Result          Assessment/Plan  Sepsis with acute hypoxic respiratory failure without septic shock (HCC)- (present on admission)  Assessment & Plan  This is Sepsis Present on admission  SIRS criteria identified on my evaluation include: Tachycardia, with heart rate greater than 90 BPM and Leukocytosis, with WBC greater than 12,000  Clinical indicators of end organ dysfunction include Acute Respiratory Failure - (mechanical ventilation or BiPap or PaO2/FiO2 ratio < 300)  Source is lungs right LLL pna  Sepsis protocol initiated  Crystalloid Fluid Administration: Resuscitation volume of 75 cc rate LR Reason that resuscitation volume of less than 30ml/kg was ordered  given at outside hospital  IV antibiotics as appropriate for source of sepsis - cefepime    Reassessment: I have reassessed the patient's hemodynamic status  I am continuing IV cefepime  Follow-up on culture results   I am continuing midodrine.  Currently is requiring high flow nasal cannula to maintain normal saturation.       COPD (chronic obstructive pulmonary disease) (HCC)- (present on admission)  Assessment & Plan  Does not appear to be in acute exacerbation  IV Methylprednisolone 40 mg Q Daily  Continue DuoNeb for breathing treatment.      Acute on chronic respiratory failure with hypoxia (HCC)- (present on admission)  Assessment & Plan  Baseline 5 l NC  New RLL pna concerning for hospital acquired pna  Received zosyn  Change to linezolid and cefepime  MRSA nasal swab  Viral Swab  Follow up blood cxs ast [facility]  RR 22 more shivers  and chills  Moving air  Currently patient is requiring 35 L of oxygen and 40% of FiO2 to maintain oxygen saturation  Titrate down oxygen as tolerated  Discussed plan of care with bedside RN and ICU   Discussed plan of care with intensivist       Adenocarcinoma, lung, right (HCC)- (present on admission)  Assessment & Plan  Stage IV A RUL and LLL  Last immunotherapy 4/9/2024   Followed by Dr. Gibson  Palliative care evaluated this patient today.  I discussed plan of care with intensivist Dr. Sloan.        Thank you for allowing me to participate in patient care.  I will continue to follow this patient.    I personally discussed case with intensivist Dr. Sloan regarding this.  Current medical condition and plan of care.    I discussed all of care with bedside RN in ICU.    Patient is critically ill.   The patient continues to have: Acute respiratory failure with hypoxia  The vital organ system that is affected is the: Pulmonary  If untreated there is a high chance of deterioration into: Pulmonary arrest  And eventually death.   The critical care that I am providing today is: I am continuing and titrating high flow nasal cannula.  At the time of evaluation patient is on 35 L of oxygen and 40% of FiO2.  The critical that has been undertaken is medically complex.   There has been no overlap in critical care time.   Critical Care Time not including procedures: 41 minutes

## 2024-05-24 NOTE — PROGRESS NOTES
PALLIATIVE CARE SOCIAL WORK NOTE    Patient: Tyler Zarate  Age: 74  Gender: Male  MRN: [id number]  Insurance: Medicare  Date Admitted: 5/23/24  Date of Service: 5/24/24    LMSW met with patient and Will BARNES regarding advance directives. Patient wants to discuss with his son who will be visiting him tomorrow. LMSW [place]family to [place]. LMSW will follow up with patient on 5/28 to provide assistance, if needed.     Ciara Echols LMSW  Palliative Care

## 2024-05-24 NOTE — CONSULTS
Critical Care Consultation (H and P    Date of consult: 5/23/2024    Referring Physician  DR. Mason LANCE  Transferred from [facility]    Reason for Consultation  Hypoxic respiratory failure     History of Presenting Illness  74 y.o. male who presented 5/23/2024 with hx of COPD  with emphysema baseline 5l o2, 1 pk/day smoking, etoh abuse, right leg DVT dx in 2019, metastatic adenoca of the lung (1/2023 showed a spiculated mass in RUL, bx 2/17/23 adenoca, Followed by PET that showed additional mass in LLL) and hx of recurrent basal cell  Follows with [facility] - DR. Gibson  Tx hx below  05/01 - 05/10/23: SBRT to lung - Dr. Jaimes, then has been on 4 cycle of Carboplatin, Pemetrexed and Pembrolizumab, total of 15 cycles of Pemetrexed and Pembrolizumab last 4/9 amd tx stopped due to admissions in the hospital   Last admission 5/15 to 5/18 for weakness and suspicion for bronchitis and rx with steroids. Discharged with home health care  Pt presented to the ER in [facility] with worsening weakness and dyspnea as well as chills  Very dyspneac failed BIPAP due to intolerance  Placed on hiflo 60 l 100%  Wbc 27, lactic acid 6.8  Given Zosyn, NS bolus of fluids 30 ml/kg given  BP was never below 90 mmhg  Blood cxs done     Code Status  Full Code    Review of Systems  Review of Systems   Constitutional:  Positive for chills, malaise/fatigue and weight loss.   HENT: Negative.     Eyes: Negative.    Respiratory:  Positive for shortness of breath.    Cardiovascular: Negative.    Gastrointestinal: Negative.    Genitourinary: Negative.    Musculoskeletal: Negative.    Skin: Negative.    Neurological:  Positive for weakness.   Endo/Heme/Allergies: Negative.    Psychiatric/Behavioral: Negative.         Past Medical History   has a past medical history of Basal cell carcinoma (BCC) of skin of nose, Cancer (HCC), Carcinoma in situ of respiratory system, Cataract, COPD (chronic obstructive pulmonary disease) (HCC), Cough, DVT (deep  venous thrombosis) (HCC), Lumbar radiculopathy, Pain, Pneumonia, Shortness of breath, Skin carcinoma, Sputum production, and Synovial cyst.    He has no past medical history of Painful breathing, Psychiatric problem, or Wheezing.    Surgical History   has a past surgical history that includes cataract phaco with iol (Bilateral) and pr bronchoscopy,diagnostic (4/12/2023).    Family History  family history includes Cancer in his brother.    Social History   reports that he has been smoking cigarettes. He started smoking about 59 years ago. He has a 29.7 pack-year smoking history. He has never been exposed to tobacco smoke. He has quit using smokeless tobacco.  His smokeless tobacco use included chew. He reports that he does not currently use alcohol after a past usage of about 8.4 oz of alcohol per week. He reports that he does not use drugs.    Medications  Home Medications    **Home medications have not yet been reviewed for this encounter**       Audit from Redirected Encounters    **Home medications have not yet been reviewed for this encounter**       Current Facility-Administered Medications   Medication Dose Route Frequency Provider Last Rate Last Admin    Respiratory Therapy Consult   Nebulization Continuous RT Ela Pantoja M.D.        ipratropium-albuterol (DUONEB) nebulizer solution  3 mL Nebulization Q4HRS (RT) Ela Pantoja M.D.   3 mL at 05/23/24 1827    ipratropium-albuterol (DUONEB) nebulizer solution  3 mL Nebulization Q2HRS PRN (RT) Ela Pantoja M.D.        methylPREDNISolone (SOLU-MEDROL) 40 MG injection 40 mg  40 mg Intravenous DAILY Ela Pantoja M.D.   40 mg at 05/23/24 1836    lactated ringers infusion   Intravenous Continuous Ela Pantoja M.D.        enoxaparin (Lovenox) inj 40 mg  40 mg Subcutaneous DAILY AT 1800 Ela Pantoja M.D.        acetaminophen (Tylenol) tablet 650 mg  650 mg Oral Q6HRS PRN Ela Pantoja M.D.        ondansetron  "(Zofran) syringe/vial injection 4 mg  4 mg Intravenous Q4HRS PRN Ela Pantoja M.D.        ondansetron (Zofran ODT) dispertab 4 mg  4 mg Oral Q4HRS PRN Ela Pantoja M.D.        Linezolid (Zyvox) premix 600 mg  600 mg Intravenous Q12HRS Ela Pantoja M.D. 300 mL/hr at 05/23/24 1835 600 mg at 05/23/24 1835    cefepime (Maxipime) 2 g in  mL IVPB  2 g Intravenous Q8HRS Ela Pantoja M.D.        LR (Bolus) infusion 500 mL  500 mL Intravenous Once PRN Ela Pantoja M.D.           Allergies  Allergies   Allergen Reactions    Bacitracin-Neomycin-Polymyxin      Ointment around eye caused face and eye swelling    Gabapentin Swelling     Took several medications following cataract surgery. Not sure which medication was cause.     Ketorolac Swelling     Took following cataract surgery. Not sure which medication was cause.     Lipoglycopeptide Antibiotics Swelling     Pt stated he did not remember what kind of antibiotics he was allergic to. Pt stated \"two different kinds\" Face swelling and eyes glazing over.     Tromethamine     Celecoxib Rash       Vital Signs last 24 hours  Temp:  [36.3 °C (97.4 °F)] 36.3 °C (97.4 °F)  Pulse:  [106-111] 106  Resp:  [24-41] 24  BP: ()/(49-57) 95/49  SpO2:  [82 %-96 %] 96 %    Physical Exam  Physical Exam  Constitutional:       Appearance: He is ill-appearing and toxic-appearing.      Comments: Unkempt and cachectic  Shivering    HENT:      Head: Normocephalic and atraumatic.      Mouth/Throat:      Mouth: Mucous membranes are dry.   Eyes:      Extraocular Movements: Extraocular movements intact.      Pupils: Pupils are equal, round, and reactive to light.   Cardiovascular:      Rate and Rhythm: Tachycardia present.   Pulmonary:      Breath sounds: No wheezing.   Musculoskeletal:      Right lower leg: No edema.      Left lower leg: No edema.   Skin:     General: Skin is warm and dry.      Findings: Bruising present.   Neurological:      " General: No focal deficit present.      Mental Status: He is oriented to person, place, and time.   Psychiatric:         Mood and Affect: Mood normal.         Behavior: Behavior normal.         Thought Content: Thought content normal.         Judgment: Judgment normal.         Fluids  No intake or output data in the 24 hours ending 05/23/24 1845    Laboratory  Done 5/23 13:20 pm  Glucose 116, bun 20, creatinine 1.24, ca 8.5, Co2 26, cl 104, Na 145. K 3.7 LFTs normal except albumin 2.5,l lactic acid is 6.8  Wbc 27, hct 39, platelet 170    EKG sinus tachy 119 bpm no obvious ST elevaion    Imaging  Right lower lobe infiltrate new since 5/15      Assessment/Plan  Sepsis with acute hypoxic respiratory failure without septic shock (HCC)- (present on admission)  Assessment & Plan  This is Sepsis Present on admission  SIRS criteria identified on my evaluation include: Tachycardia, with heart rate greater than 90 BPM and Leukocytosis, with WBC greater than 12,000  Clinical indicators of end organ dysfunction include Acute Respiratory Failure - (mechanical ventilation or BiPap or PaO2/FiO2 ratio < 300)  Source is lungs right LLL pna  Sepsis protocol initiated  Crystalloid Fluid Administration: Resuscitation volume of 75 cc rate LR Reason that resuscitation volume of less than 30ml/kg was ordered  given at outside hospital  IV antibiotics as appropriate for source of sepsis - cefepime and linezolid  Reassessment: I have reassessed the patient's hemodynamic status  Systolic , , RR 22  Will trend lactic    Acute on chronic respiratory failure with hypoxia (HCC)- (present on admission)  Assessment & Plan  Baseline 5 l NC  New RLL pna concerning for hospital acquired pna  Received zosyn  Change to linezolid and cefepime  MRSA nasal swab  Viral Swab  Follow up blood cxs ast [facility]  RR 22 more shivers and chills  Moving air  Hiflo 60 l 100%    Failure to thrive in adult- (present on admission)  Assessment &  Plan  Recurrent hospital visits  Losing weight  Albumin 2.5  Palliative care should be consulted as with current status he is ECOG 4 and not a candidate for further chemo or immunotherapy    COPD (chronic obstructive pulmonary disease) (HCC)- (present on admission)  Assessment & Plan  Do not appreciate any wheezing on exam   Do not think he is in exacerbation   Received steroids [place]  Due to underlying severity of his lungs will start steroids but reassess if we need to continue  Once more stable can restart advair and spiriva    Adenocarcinoma, lung, right (HCC)- (present on admission)  Assessment & Plan  Stage IV A RUL and LLL  Last immunotherapy 4/9/2024 since then he has had recurrent ER visits and a recent hospitalization  Continues to lose weight and pt states not able to eat  Prognosis is guarded        Discussed patient condition and risk of morbidity and/or mortality with RN and Patient.    The patient remains critically ill.  Critical care time = 34 minutes in directly providing and coordinating critical care and extensive data review.  No time overlap and excludes procedures.

## 2024-05-24 NOTE — ASSESSMENT & PLAN NOTE
This is Sepsis Present on admission  SIRS criteria identified on my evaluation include: Tachycardia, with heart rate greater than 90 BPM and Leukocytosis, with WBC greater than 12,000  Clinical indicators of end organ dysfunction include Acute Respiratory Failure - (mechanical ventilation or BiPap or PaO2/FiO2 ratio < 300)  Source is lungs right LLL pna  Sepsis protocol initiated  Crystalloid Fluid Administration: Resuscitation volume of 75 cc rate LR Reason that resuscitation volume of less than 30ml/kg was ordered  given at outside hospital  IV antibiotics as appropriate for source of sepsis - cefepime and linezolid  Reassessment: I have reassessed the patient's hemodynamic status  Systolic , , RR 22  Will trend lactic-improved  MRSA nares negative de-escalating antibiotics  Blood cultures pending  Continue Zosyn

## 2024-05-24 NOTE — CARE PLAN
The patient is Stable - Low risk of patient condition declining or worsening    Shift Goals  Clinical Goals: reduce oxyen and BP support  Patient Goals: breath easier, eat  Family Goals: get better, updates    Progress made toward(s) clinical / shift goals:    Problem: Knowledge Deficit - Standard  Goal: Patient and family/care givers will demonstrate understanding of plan of care, disease process/condition, diagnostic tests and medications  Outcome: Progressing     Problem: Skin Integrity  Goal: Skin integrity is maintained or improved  Outcome: Progressing     Problem: Fall Risk  Goal: Patient will remain free from falls  Outcome: Progressing       Patient is not progressing towards the following goals:

## 2024-05-24 NOTE — PROGRESS NOTES
Med rec completed per previous med rec 5/15. Verified with family that no medications were added or removed since previous med rec except for levofloxacin, which was completed 5/18.  Home antibiotics in past 30 days:   - Levaquin 500mg QD, completed 5/18  - One dose of Zosyn noted at outside facility, 4.5g given 5/23 @ 1323   Anticoagulants/antiplatelets in past 14 days:   - Xarelto 20mg daily    Hina uLis, Pharmacy Intern

## 2024-05-24 NOTE — PROGRESS NOTES
Patient hypotensive with BP 79/44 around 8pm, 500cc fluid bolus prn order was used by nurse with subsequent slight improvement in SBP to the 90s. Notified by the nurse around 11pm of again soft BP's 84/39. Discussed with Dr. Gomez, will initiate low dose levophed at this time.

## 2024-05-24 NOTE — ASSESSMENT & PLAN NOTE
Do not appreciate any wheezing on exam but diminished airflow  More likely pneumonic process and severe COPD than COPD exacerbation  Received steroids OH, continue DuoNeb every 4  Due to underlying severity of his lungs will start steroids but reassess if we need to continue-taper slowly  Once more stable can restart advair and spiriva  RT protocols  Update vaccines prior to discharge as clinically appropriate

## 2024-05-24 NOTE — ASSESSMENT & PLAN NOTE
Does not appear to be in acute exacerbation  IV Methylprednisolone 40 mg Q Daily  Continue DuoNeb for breathing treatment.  Continue to monitor respiratory status closely.

## 2024-05-24 NOTE — ASSESSMENT & PLAN NOTE
Baseline 5 l NC  New RLL pna concerning for hospital acquired pna  Received zosyn  Change to linezolid and cefepime  MRSA nasal swab  Viral Swab  Follow up blood cxs ast [facility]  RR 22 more shivers and chills  Moving air  Oxygen requirement has been trending down he was transition from high flow nasal cannula to 6 L of oxygen.  Continue to monitor closely his respiratory status.  I discussed plan of care with patient and his son at the bedside  Continue antibiotics  Respiratory therapy per protocol.

## 2024-05-24 NOTE — ASSESSMENT & PLAN NOTE
Recurrent hospital visits  Losing weight  Albumin 2.5  Palliative care should be consulted as with current status he is ECOG 4 and not a candidate for further chemo or immunotherapy  Reviewed goals of care and CODE BLUE at length with patient, family and palliative care APRN  POLST being considered as well  Patient needs some time to digest our discussion from 5/24, remains full code

## 2024-05-24 NOTE — WOUND TEAM
[facility]  Inpatient Services  Wound and Skin Care Brief Evaluation    Admission Date: 5/23/2024     Last order of IP CONSULT TO WOUND CARE was found on 5/23/2024 from Hospital Encounter on 5/23/2024     HPI, PMH, SH: Reviewed    No chief complaint on file.    Diagnosis: Respiratory failure (HCC) [J96.90]    Unit where seen by Wound Team: T620/00     Wound consult placed regarding Sacrum, R hand, L hallux, and B heels. Chart and images reviewed. This discussed with bedside RN. This clinician in to assess patient. Patient pleasant and agreeable. Patient's B heels, sacrum and buttocks pink, intact, and blanching. R hand with healing skin tear left open to air. L hallux with slight bruising, but does not appear to be pressure related. Non-selectively debrided with Moist warm washcloth.     No pressure injuries or advanced wound care needs identified. Wound consult completed. No further follow up unless indicated and consulted.      Sacrum     L hallux     L heel     R heel     R hand skin tear      PREVENTATIVE INTERVENTIONS:    Q shift Tremaine - performed per nursing policy  Q shift pressure point assessments - performed per nursing policy    Surface/Positioning  Low Airloss - Currently in Place  Reposition q 2 hours - Currently in Place  TAPs Turning system - Currently in Place    Offloading/Redistribution  Sacral offloading dressing (Silicone dressing) - Currently in Place  Heel offloading dressing (Silicone dressing) - Currently in Place      Respiratory  N/A    Containment/Moisture Prevention    Dri-rowan pad - Currently in Place

## 2024-05-24 NOTE — ASSESSMENT & PLAN NOTE
This is Sepsis Present on admission  SIRS criteria identified on my evaluation include: Tachycardia, with heart rate greater than 90 BPM and Leukocytosis, with WBC greater than 12,000  Clinical indicators of end organ dysfunction include Acute Respiratory Failure - (mechanical ventilation or BiPap or PaO2/FiO2 ratio < 300)  Source is lungs right LLL pna  Sepsis protocol initiated  Crystalloid Fluid Administration: Resuscitation volume of 75 cc rate LR Reason that resuscitation volume of less than 30ml/kg was ordered  given at outside hospital  IV antibiotics as appropriate for source of sepsis - cefepime    Reassessment: I have reassessed the patient's hemodynamic status\  Follow-up on culture results   I am continuing midodrine.  This morning he was on 30 L of oxygen that was transitioned to 6 L of oxygen and now is maintaining oxygen saturation.  I switch his antibiotic from IV cefepime to Unasyn.  Now plan is to transfer him out from [facility] to telemetry.

## 2024-05-24 NOTE — ASSESSMENT & PLAN NOTE
Stage IV A RUL and LLL  Last immunotherapy 4/9/2024 since then he has had recurrent ER visits and a recent hospitalization  Continues to lose weight and pt states not able to eat or drink much  Prognosis is guarded  Palliative care consultation  POLST?

## 2024-05-24 NOTE — CARE PLAN
The patient is Watcher - Medium risk of patient condition declining or worsening    Shift Goals  Clinical Goals: respiratory and hemodynamic stability  Patient Goals: breath better  Family Goals: get better, updates    Progress made toward(s) clinical / shift goals:    Problem: Skin Integrity  Goal: Skin integrity is maintained or improved  Outcome: Progressing  Note: Pt turns self from side to side.  Pt educated about the importance of frequent repositioning to prevent skin breakdown. Encouraged turning in bed and notifying staff for help. Pt verbalized understanding. Appropriate dressings in place. Extra pillows in place for comfort, between knees, and to float heels. Barrier cream applied as appropriate. Pt cleaned and linens changed frequently as appropriate.             Patient is not progressing towards the following goals:      Problem: Hemodynamics  Goal: Patient's hemodynamics, fluid balance and neurologic status will be stable or improve  Outcome: Not Progressing  Note: Levo added to help maintain appropriate BP

## 2024-05-24 NOTE — DISCHARGE PLANNING
Care Transition Team Assessment    Admission Date: 5/23/2024  GMLOS: 5.1  ALOS: 1    6-Clicks ADL Score: 18  6-Clicks Mobility Score: 18      Anticipated Discharge Dispo: Discharge Disposition: Discharged to home/self care (01)    DME Needed: No    Action(s) Taken:     Pt discharged on with  on 5/18. Pt readmitted on 5/23 as transfer from [facility] for hypoxic respiratory failure. Pt is currently alert and oriented, HFNC 40L 50%, and on levo drip.     Pt lives in a two-story home with family in [place], [place]. Pt's PCP is Dr. Marcy Jose. [facility]. Pt is currently retired and is not service connected. Insured with [facility] and has Medicare A only. Prior to current hospitalization, pt was needs assistance with ADLS such as ambulation and has had frequent falls recently. Pt owns FWW, WC, and bedside commode. Pt is on home 02 from B&B: 2-5 L baseline depending on oxygen needs per daughter-in-law, max 8L when feeling SOB. On service with [employer] . Pt reports 29.7 pack-year smoking history. Reports that he does not currently use alcohol after a past usage of about 8.4 oz of alcohol per week.     Escalations Completed: None    Medically Clear: No    Next Steps: RN CM to assist with DC needs    Barriers to Discharge: Medical clearance    Is the patient up for discharge tomorrow: No    Information Source  Orientation Level: Oriented X4  Information Given By: Patient  Informant's Name: Tyler Zarate  Who is responsible for making decisions for patient? : Patient    Readmission Evaluation  Is this a readmission?: Yes - unplanned readmission  Why do you think you were readmitted?: complex needs    Elopement Risk  Legal Hold: No  Ambulatory or Self Mobile in Wheelchair: No-Not an Elopement Risk  Disoriented: No  Psychiatric Symptoms: None  History of Wandering: No  Elopement this Admit: No  Vocalizing Wanting to Leave: No  Displays Behaviors, Body Language Wanting to Leave: No-Not at Risk for  Elopement  Elopement Risk: Not at Risk for Elopement    Interdisciplinary Discharge Planning  Lives with - Patient's Self Care Capacity: Adult Children  Patient or legal guardian wants to designate a caregiver: No  Support Systems: Spouse / Significant Other  Housing / Facility: 2 Story House    Discharge Preparedness  What is your plan after discharge?: Uncertain - pending medical team collaboration  What are your discharge supports?: Child  Prior Functional Level: Needs Assist with Activities of Daily Living, Uses Wheelchair  Difficulity with ADLs: Walking  Difficulity with IADLs: Managing medication    Functional Assesment  Prior Functional Level: Needs Assist with Activities of Daily Living, Uses Wheelchair    Finances  Financial Barriers to Discharge: No  Prescription Coverage: Yes    Vision / Hearing Impairment  Vision Impairment : No  Hearing Impairment : Yes  Hearing Impairment: Both Ears  Does Pt Need Special Equipment for the Hearing Impaired?: No    Advance Directive  Advance Directive?: None    Domestic Abuse  Possible Abuse/Neglect Reported to:: Not Applicable    Discharge Risks or Barriers  Discharge risks or barriers?: Complex medical needs  Patient risk factors: Complex medical needs    Anticipated Discharge Information  Discharge Disposition: Discharged to home/self care (01)

## 2024-05-24 NOTE — ASSESSMENT & PLAN NOTE
Stage IV A RUL and LLL  Last immunotherapy 4/9/2024   Followed by Dr. Gibson  I recommended outpatient follow-up.  Patient's son is wondering about continuation of immunotherapy.  I explained them afebrile continue to improve he can further discuss it with his oncologist for continuation of immunotherapy.

## 2024-05-24 NOTE — PROGRESS NOTES
[facility] ICU Progress Note      Admit Date: 5/23/2024    Resident(s): Ale James D.O.   Attending:  DOMINICK CONTRERAS/ OSWALDO Lawson     Patient ID:    Name:  Tyler Zarate   YOB: 1950  Age:  74 y.o.  male   MRN:  [id number]    Hospital Course (carried forward and updated):  Tyler Zarate is a 74 y.o. male with a pmh significant for adenocarcinoma of the lung, currently on immunotherapy, history of COPD and chronic hypoxemic respiratory failure on 5 L supplemental oxygen who was admitted  for acute on chronic hypoxemic respiratory failure secondary to RLL pneumonia.     Consultants:  Critical Care  Cardiology     Interval Events:    5/24- Remains of Hi flow FiO2 50% 40L, On NE 0.05. WBC increased 17 to 41.  MRSA nares negative, d/c Zyvox. Midodrine 5mg TID. Palliative consult placed.         Vitals Range last 24h:  Temp:  [36.3 °C (97.4 °F)-36.7 °C (98 °F)] 36.7 °C (98 °F)  Pulse:  [] 85  Resp:  [9-46] 18  BP: ()/(39-60) 94/55  SpO2:  [82 %-100 %] 95 %      Intake/Output Summary (Last 24 hours) at 5/24/2024 1313  Last data filed at 5/24/2024 1249  Gross per 24 hour   Intake 3939.73 ml   Output 1000 ml   Net 2939.73 ml        Review of Systems   Constitutional:  Positive for fever, malaise/fatigue and weight loss. Negative for chills.   HENT:  Negative for sore throat.    Eyes:  Negative for blurred vision and double vision.   Respiratory:  Positive for shortness of breath. Negative for cough, sputum production, wheezing and stridor.    Cardiovascular:  Negative for chest pain and palpitations.   Gastrointestinal:  Negative for abdominal pain, diarrhea and vomiting.   Genitourinary:  Negative for dysuria and urgency.   Musculoskeletal:  Negative for myalgias and neck pain.   Neurological:  Negative for dizziness and headaches.   Psychiatric/Behavioral:  Negative for depression.        PHYSICAL EXAM:  Vitals:    05/24/24 1200 05/24/24 1215 05/24/24 1230 05/24/24 1245   BP:  104/53 100/50 (!) 86/54 94/55   Pulse: 84 85 90 85   Resp: (!) 21 19 (!) 23 18   Temp:       TempSrc:       SpO2: 94% 92% 92% 95%   Weight:       Height:        Body mass index is 20.4 kg/m².    O2 therapy: Pulse Oximetry: 95 %, O2 (LPM): 35, O2 Delivery Device: Heated High Flow Nasal Cannula    Date 05/24/24 0700 - 05/25/24 0659   Shift 7205-8540 8397-0042 6110-1862 24 Hour Total   INTAKE   P.O. 360   360     P.O. 360   360   I.V. 1702.1   1702.1     Norepinephrine Volume 77.7   77.7     Volume (mL) (LR (Bolus) infusion 500 mL) 467.5   467.5     Volume (mL) (lactated ringers infusion) 1157   1157   IV Piggyback 772.7   772.7     Volume (mL) (Linezolid (Zyvox) premix 600 mg) 580.4   580.4     Volume (mL) (cefepime (Maxipime) 2 g in  mL IVPB) 192.3   192.3   Shift Total 2834.9   2834.9   OUTPUT   Urine 400   400     Urine Void (mL) 400   400   Shift Total 400   400   NET 2434.9   2434.9        Physical Exam  Constitutional:       Appearance: Normal appearance. He is cachectic. He is ill-appearing and toxic-appearing.   HENT:      Head: Normocephalic and atraumatic.      Mouth/Throat:      Mouth: Mucous membranes are moist.      Pharynx: Oropharynx is clear.   Eyes:      General:         Right eye: No discharge.         Left eye: No discharge.      Extraocular Movements: Extraocular movements intact.      Conjunctiva/sclera: Conjunctivae normal.      Pupils: Pupils are equal, round, and reactive to light.   Cardiovascular:      Rate and Rhythm: Normal rate.      Pulses: Normal pulses.      Heart sounds: No murmur heard.  Pulmonary:      Effort: Pulmonary effort is normal. No respiratory distress.      Breath sounds: Wheezing and rales present.   Abdominal:      General: Abdomen is flat. Bowel sounds are normal. There is no distension.   Musculoskeletal:      Right lower leg: No edema.      Left lower leg: No edema.   Skin:     General: Skin is warm and dry.   Neurological:      General: No focal deficit present.       Mental Status: He is alert and oriented to person, place, and time.   Psychiatric:         Mood and Affect: Mood normal.         Behavior: Behavior normal.             Recent Labs     05/24/24  0551   SODIUM 138   POTASSIUM 3.4*   CHLORIDE 104   CO2 24   BUN 22   CREATININE 0.95   CALCIUM 7.7*     Recent Labs     05/24/24  0551   ALTSGPT 20   ASTSGOT 19   ALKPHOSPHAT 77   TBILIRUBIN 0.4   GLUCOSE 152*     Recent Labs     05/24/24  0551   RBC 2.84*   HEMOGLOBIN 10.0*   HEMATOCRIT 30.9*   PLATELETCT 132*     Recent Labs     05/24/24  0551   WBC 41.0*   NEUTSPOLYS 88.80*   LYMPHOCYTES 5.20*   MONOCYTES 3.10   EOSINOPHILS 0.10   BASOPHILS 0.20   ASTSGOT 19   ALTSGPT 20   ALKPHOSPHAT 77   TBILIRUBIN 0.4       Meds:   omeprazole  20 mg      sertraline  50 mg      rivaroxaban  20 mg      MD Alert...Adult ICU Electrolyte Replacement per Pharmacy        senna-docusate  2 Tablet      And    polyethylene glycol/lytes  1 Packet      midodrine  5 mg      Respiratory Therapy Consult        ipratropium-albuterol  3 mL      ipratropium-albuterol  3 mL      methylPREDNISolone  40 mg      LR   75 mL/hr at 05/23/24 1856    acetaminophen  650 mg      ondansetron  4 mg      ondansetron  4 mg      cefepime  2 g Stopped (05/24/24 0610)    NORepinephrine  0-1 mcg/kg/min Stopped (05/24/24 1159)        Procedures:      Imaging:  DX-CHEST-PORTABLE (1 VIEW)   Final Result      1. New patchy opacities in the lung bases and the right midlung are most compatible with areas of multifocal pneumonitis.   2. Mild interstitial edema.   3. Additional spiculated opacities in the right upper lobe and left lower lobe are stable.      OUTSIDE IMAGES-DX CHEST   Final Result          ASSESSEMENT and PLAN:    Sepsis with acute hypoxic respiratory failure without septic shock (HCC)- (present on admission)  Assessment & Plan  This is Sepsis Present on admission  SIRS criteria identified on my evaluation include: Tachycardia, with heart rate greater than 90  BPM and Leukocytosis, with WBC greater than 12,000  Clinical indicators of end organ dysfunction include Acute Respiratory Failure - (mechanical ventilation or BiPap or PaO2/FiO2 ratio < 300)  Source is lungs right LLL pna  Sepsis protocol initiated  Crystalloid Fluid Administration: Resuscitation volume of 75 cc rate LR Reason that resuscitation volume of less than 30ml/kg was ordered  given at outside hospital  IV antibiotics as appropriate for source of sepsis - cefepime    Reassessment: I have reassessed the patient's hemodynamic status   RLL pneumonia  On cefepime  Given IVF  Midodrine 5mg TID          COPD (chronic obstructive pulmonary disease) (HCC)- (present on admission)  Assessment & Plan  Does not appear to be in acute exacerbation  IV Methylprednisolone 40 mg Q Daily  Restart home inhaler when appropriate        Acute on chronic respiratory failure with hypoxia (HCC)- (present on admission)  Assessment & Plan  CXR demonstrating RLL Pneumonia   Sputum cultures  Zosyn,IV methylprednisolone  Incentive spirometry  Supplemental o2  Maintain O2 above 90%     Adenocarcinoma, lung, right (HCC)- (present on admission)  Assessment & Plan  Stage IV A RUL and LLL  Last immunotherapy 4/9/2024   Followed by Dr. Gibson        DISPO: Medical floor    CODE STATUS: Full    Quality Measures:  Feeding: Oral  Analgesia: Tylenol  Sedation: None  Thromboprophylaxis: Enoxaparin  Head of bed: >30 degrees  Ulcer prophylaxis: N/A  Glycemic control: N?A  Bowel care: bowel regimen   Indwelling lines: PIV  Deescalation of antibiotics: Cefepime      Ale James D.O.

## 2024-05-24 NOTE — RESPIRATORY CARE
"  COPD EDUCATION by COPD CLINICAL EDUCATOR  5/24/2024  at  4:02 PM by Izabela Rodriguez, RRT     Patient interviewed by education team.  Patient declined or is unable to participate in the full program.  Therefore, a short intervention has been conducted.  A comprehensive packet including information about COPD, types of treatments to manage their disease and safe home Oxygen usage was provided and reviewed with patient at the bedside.      Smoking Cessation Intervention and education completed, 10 minutes spent on smoking cessation education with patient.  Provided smoking cessation packet with \"Tips to Quit\" and brochure for \"Free Smoking Cessation Classes\".     COPD Screen  COPD Risk Screening  Do you have a history of COPD?: Yes  Do you have a Pulmonologist?:  [facility])    COPD Assessment  COPD Clinical Specialists ONLY  COPD Education Initiated: Yes--Short Intervention (Meet with Pt and Daughter at bedside - Pt / family seem knowledgeable of disease process & and tx options- Pt recently quit smoking 4/23- is on home 02/ + LABA, LAMA  [facility]. Provided  smoking cessation / COPD discussion + handouts.))  Is this a COPD exacerbation patient?: Yes  DME Company: [employer]  DME Equipment Type: Home 02 @ 3-4 LN  [facility]  [facility] - [facility] @ [facility] + updated PFT testing ?  Referrals Initiated: Yes  Smoking Cessation: Yes  $ Smoking Cessation >10 Minutes: Symptomatic (Pt recently stopped smoking - 4/2023. Pt states he no longer has the desire / and or no longer suffers from cravings. Offered additional resources + support including class enrollment info)  Hospice: N/A  Home Health Care:  (TBD)  Mobile Urgent Care Services:  (TBD)  Geriatric Specialty Group: N/A  Private In-Home Care Agency: N/A  $ Demo/Eval of SVN's, MDI's and Aerosols: Yes (Reviewed respiratory medications and proper use)  (OP) Pulmonary Function " "Testing:  (Pt states he \" may\" have [facility] PFT [facility] [place] - will ask local [facility] to arrange updated testing for PT / and or a referral for [facility] to complete))  Interdisciplinary Rounds: Attendance at Rounds (30 Min)    PFT Results    No results found for: \"PFT\"    Meds to Beds  [facility] provides bedside medication delivery for all eligible patients at discharge.  Would you like to opt out of this program for any reason?: No - Stay Opted In     MY COPD ACTION PLAN     It is recommended that patients and physicians /healthcare providers complete this action plan together. This plan should be discussed at each physician visit and updated as needed.    The green, yellow and red zones show groups of symptoms of COPD. This list of symptoms is not comprehensive, and you may experience other symptoms. In the \"Actions\" column, your healthcare provider has recommended actions for you to take based on your symptoms.    Patient Name: Tyler Zarate   YOB: 1950   Last Updated on: 5/24/2024  4:00 PM   Green Zone:  I am doing well today Actions     Usual activitiy and exercise level   Take daily medications     Usual amounts of cough and phlegm/mucus   Use oxygen as prescribed     Sleep well at night   Continue regular exercise/diet plan     Appetite is good   At all times avoid cigarette smoke, inhaled irritants     Daily Medications (these medications are taken every day):   Fluticosone/Salmeterol (Advair)  Tiotropium Bromide Monohydrate (Spiriva) 1 Puff  2 Puffs Twice daily  Once daily     Additional Information:  Use as directed    Yellow Zone:  I am having a bad day or a COPD flare Actions     More breathless than usual   Continue daily medications     I have less energy for my daily activities   Use quick relief inhaler as ordered     Increased or thicker phlegm/mucus   Use oxygen as prescribed     Using quick relief inhaler/nebulizer more often   Get plenty of rest     Swelling of ankles more than " "usual   Use pursed lip breathing     More coughing than usual   At all times avoid cigarette smoke, inhaled irritants     I feel like I have a \"chest cold\"     Poor sleep and my symptoms woke me up     My appetite is not good     My medicine is not helping      Call provider immediately if symptoms don’t improve     Continue daily medications, add rescue medications:   Albuterol 2 Puffs Every 4 hours PRN       Medications to be used during a flare up, (as Discussed with Provider):           Additional Information:  Use with your spacer device    Red Zone:  I need urgent medical care Actions     Severe shortness of breath even at rest   Call 911 or seek medical care immediately     Not able to do any activity because of breathing      Fever or shaking chills      Feeling confused or very drowsy       Chest pains      Coughing up blood                  "

## 2024-05-24 NOTE — PROGRESS NOTES
Critical Care Progress Note    Date of admission  5/23/2024    Chief Complaint  74 y.o. male who presented 5/23/2024 with hx of COPD  with emphysema baseline 5l o2, 1 pk/day smoking, etoh abuse, right leg DVT dx in 2019, metastatic adenoca of the lung (1/2023 showed a spiculated mass in RUL, bx 2/17/23 adenoca, Followed by PET that showed additional mass in LLL) and hx of recurrent basal cell  Follows with [facility] - DR. Gibson.  Tx hx below:  05/01 - 05/10/23: SBRT to lung - Dr. Jaimes, then has been on 4 cycle of Carboplatin, Pemetrexed and Pembrolizumab, total of 15 cycles of Pemetrexed and Pembrolizumab last 4/9 amd tx stopped due to admissions in the hospital.  Last admission 5/15 to 5/18 for weakness and suspicion for bronchitis and rx with steroids. Discharged with home health care  Pt presented to the ER in [place] with worsening weakness and dyspnea as well as chills.  Very dyspneac failed BIPAP due to intolerance.  Placed on hiflo 60 l 100%.  Wbc 27, lactic acid 6.8  Given Zosyn, NS bolus of fluids 30 ml/kg given.  BP was never below 90 mmhg.  Blood cxs done.  (Dr Gomez HPI 5/23)     Hospital Course  No notes on file    Interval Problem Update  Reviewed last 24 hour events:    A&O x4  Reviewing code status with patient and family  SR 80s  SBp 100-110s  Lactated Ringer's 75 cc/HR  Norepinephrine 0.05  UO adequate, 600 cc overnight  Fluid balance +505 cc/admit  Echocardiogram 5/17/2024: EF 60%, normal study  HFNC 40/50  CXR film here, film yesterday with right upper lobe mass and query opacity right base, new mass left lower lobe by PET  WOB  DuoNeb every 4  O2 sats 94-98%  Tmax 98.0  WBC 41.0  Cefepime/linezolid  Solu-Medrol  Procalcitonin 3.17 on admission  Lactic acid 3.6 trending down to 2.8 and this a.m. 1.6  MRSA PCR nares negative  Respiratory PCR for influenza, COVID and RSV all negative  Blood cultures pending  Hemoglobin 10,   , K3.4, HCO3 24, AG 10,  BUN 22, creatinine  0.95  LFTs, alk phos and bilirubin normal  Albumin 2.3, glucose 152  Lovenox PPx  Xarelto    Pall care consultation  Midodrine p.o.  Fluids bolus as needed  IS encouraged every hours  D/c Zyvox, MRSA nares PCR negative  K supplementation    Serial follow-up  Weaning down on norepinephrine  Weaning down on FiO2/flow for HFNC  Case reviewed with hospitalist, okay to stepdown if bed needed    Serial follow-up with family, patient and palliative care team  Reviewed CODE STATUS and current condition at length, patient to consider options and asked more questions, reassess tomorrow, remains a full code    Review of Systems  Review of Systems   Constitutional:  Positive for malaise/fatigue.   HENT:  Negative for congestion and sore throat.    Respiratory:  Positive for cough and shortness of breath. Negative for hemoptysis.    Cardiovascular:  Negative for chest pain, palpitations and leg swelling.   Gastrointestinal:  Negative for abdominal pain, blood in stool, diarrhea, nausea and vomiting.   Genitourinary:  Negative for dysuria.   Musculoskeletal:  Negative for back pain.   Neurological:  Positive for weakness (diffuse). Negative for focal weakness and headaches.   Psychiatric/Behavioral:  The patient is not nervous/anxious.         Vital Signs for last 24 hours   Temp:  [36.4 °C (97.6 °F)-36.7 °C (98 °F)] 36.7 °C (98 °F)  Pulse:  [71-98] 82  Resp:  [9-38] 18  BP: ()/(39-60) 91/55  SpO2:  [90 %-100 %] 95 %    Hemodynamic parameters for last 24 hours       Respiratory Information for the last 24 hours       Physical Exam   Physical Exam  Vitals reviewed.   Constitutional:       Appearance: He is normal weight. He is ill-appearing. He is not toxic-appearing.      Comments: HFNC   HENT:      Head: Normocephalic and atraumatic.      Mouth/Throat:      Mouth: Mucous membranes are dry.   Eyes:      General: No scleral icterus.     Extraocular Movements: Extraocular movements intact.      Pupils: Pupils are equal,  round, and reactive to light.   Neck:      Vascular: No JVD.   Cardiovascular:      Rate and Rhythm: Normal rate and regular rhythm.      Heart sounds: No murmur heard.     No gallop.   Pulmonary:      Effort: Pulmonary effort is normal. No respiratory distress.      Breath sounds: Wheezing, rhonchi and rales present.   Abdominal:      General: Bowel sounds are normal. There is no distension.      Palpations: Abdomen is soft.      Tenderness: There is no abdominal tenderness. There is no right CVA tenderness, left CVA tenderness or guarding.   Musculoskeletal:      Cervical back: Neck supple.      Right lower leg: No edema.      Left lower leg: No edema.   Lymphadenopathy:      Cervical: No cervical adenopathy.   Skin:     General: Skin is warm and dry.      Capillary Refill: Capillary refill takes less than 2 seconds.      Coloration: Skin is not cyanotic or mottled.      Nails: There is no clubbing.   Neurological:      General: No focal deficit present.      Mental Status: He is alert and oriented to person, place, and time. Mental status is at baseline.   Psychiatric:         Mood and Affect: Mood normal.         Speech: Speech normal.         Behavior: Behavior normal. Behavior is cooperative.         Thought Content: Thought content normal.         Cognition and Memory: Cognition normal.         Medications  Current Facility-Administered Medications   Medication Dose Route Frequency Provider Last Rate Last Admin    omeprazole (PriLOSEC) capsule 20 mg  20 mg Oral DAILY Ale James, D.O.   20 mg at 05/24/24 1146    sertraline (Zoloft) tablet 50 mg  50 mg Oral DAILY Ale James, D.O.   50 mg at 05/24/24 1146    rivaroxaban (Xarelto) tablet 20 mg  20 mg Oral DAILY Ale James, D.O.   20 mg at 05/24/24 1146    MD Alert...ICU Electrolyte Replacement per Pharmacy   Other PHARMACY TO DOSE Wade Sloan M.D.        senna-docusate (Pericolace Or Senokot S) 8.6-50 MG per tablet 2 Tablet  2 Tablet Oral Q  EVENING Wade Sloan M.D.   2 Tablet at 05/24/24 1712    And    polyethylene glycol/lytes (Miralax) Packet 1 Packet  1 Packet Oral QDAY PRN Wade Sloan M.D.        midodrine (Proamatine) tablet 5 mg  5 mg Oral Q8HRS Wade Sloan M.D.   5 mg at 05/24/24 1146    Respiratory Therapy Consult   Nebulization Continuous RT Ela Pantoja M.D.        ipratropium-albuterol (DUONEB) nebulizer solution  3 mL Nebulization Q4HRS (RT) Ela Pantoja M.D.   3 mL at 05/24/24 1425    ipratropium-albuterol (DUONEB) nebulizer solution  3 mL Nebulization Q2HRS PRN (RT) Ela Pantoja M.D.        methylPREDNISolone (SOLU-MEDROL) 40 MG injection 40 mg  40 mg Intravenous DAILY Ela Pantoja M.D.   40 mg at 05/24/24 1712    lactated ringers infusion   Intravenous Continuous Ela Pantoja M.D. 75 mL/hr at 05/23/24 1856 New Bag at 05/23/24 1856    acetaminophen (Tylenol) tablet 650 mg  650 mg Oral Q6HRS PRN Ela Pantoja M.D.        ondansetron (Zofran) syringe/vial injection 4 mg  4 mg Intravenous Q4HRS PRN Ela Pantoja M.D.        ondansetron (Zofran ODT) dispertab 4 mg  4 mg Oral Q4HRS PRN Ela Pantoja M.D.        cefepime (Maxipime) 2 g in  mL IVPB  2 g Intravenous Q8HRS Ela Pantoja M.D.   Stopped at 05/24/24 1457    norepinephrine (Levophed) 8 mg in 250 mL NS infusion (premix)  0-1 mcg/kg/min Intravenous Continuous Flakita Yip D.STACY   Stopped at 05/24/24 1159       Fluids    Intake/Output Summary (Last 24 hours) at 5/24/2024 1921  Last data filed at 5/24/2024 1800  Gross per 24 hour   Intake 4727.75 ml   Output 1350 ml   Net 3377.75 ml       Laboratory          Recent Labs     05/24/24  0551   SODIUM 138   POTASSIUM 3.4*   CHLORIDE 104   CO2 24   BUN 22   CREATININE 0.95   CALCIUM 7.7*     Recent Labs     05/24/24  0551   ALTSGPT 20   ASTSGOT 19   ALKPHOSPHAT 77   TBILIRUBIN 0.4   GLUCOSE 152*     Recent Labs     05/24/24  0551   WBC 41.0*    NEUTSPOLYS 88.80*   LYMPHOCYTES 5.20*   MONOCYTES 3.10   EOSINOPHILS 0.10   BASOPHILS 0.20   ASTSGOT 19   ALTSGPT 20   ALKPHOSPHAT 77   TBILIRUBIN 0.4     Recent Labs     05/24/24  0551   RBC 2.84*   HEMOGLOBIN 10.0*   HEMATOCRIT 30.9*   PLATELETCT 132*       Imaging  X-Ray:  I have personally reviewed the images and compared with prior images.  EKG:  I have personally reviewed the images and compared with prior images.  Echo:   Reviewed    Assessment/Plan  * Sepsis with acute hypoxic respiratory failure without septic shock (HCC)- (present on admission)  Assessment & Plan  This is Sepsis Present on admission  SIRS criteria identified on my evaluation include: Tachycardia, with heart rate greater than 90 BPM and Leukocytosis, with WBC greater than 12,000  Clinical indicators of end organ dysfunction include Acute Respiratory Failure - (mechanical ventilation or BiPap or PaO2/FiO2 ratio < 300)  Source is lungs right LLL pna  Sepsis protocol initiated  Crystalloid Fluid Administration: Resuscitation volume of 75 cc rate LR Reason that resuscitation volume of less than 30ml/kg was ordered  given at outside hospital  IV antibiotics as appropriate for source of sepsis - cefepime and linezolid  Reassessment: I have reassessed the patient's hemodynamic status  Systolic , , RR 22  Will trend lactic-improved  MRSA nares negative de-escalating antibiotics  Blood cultures pending  Continue Zosyn    Acute on chronic respiratory failure with hypoxia (HCC)- (present on admission)  Assessment & Plan  Baseline 5 l NC  New RLL pna concerning for hospital acquired pna  Received zosyn  Change to linezolid and cefepime  MRSA nasal swab  Viral Swab  Follow up blood cxs ast [facility]  RR 22 more shivers and chills  Moving air  Hiflo 60 l 100%    Work of breathing improved  HF NC weaned to 40/40  MRSA nares negative, stopping linezolid    COPD (chronic obstructive pulmonary disease) (HCC)- (present on  admission)  Assessment & Plan  Do not appreciate any wheezing on exam but diminished airflow  More likely pneumonic process and severe COPD than COPD exacerbation  Received steroids OH, continue DuoNeb every 4  Due to underlying severity of his lungs will start steroids but reassess if we need to continue-taper slowly  Once more stable can restart advair and spiriva  RT protocols  Update vaccines prior to discharge as clinically appropriate    Adenocarcinoma, lung, right (HCC)- (present on admission)  Assessment & Plan  Stage IV A RUL and LLL  Last immunotherapy 4/9/2024 since then he has had recurrent ER visits and a recent hospitalization  Continues to lose weight and pt states not able to eat or drink much  Prognosis is guarded  Palliative care consultation  POLST?    Goals of care, counseling/discussion  Assessment & Plan  Palliative care consultation ongoing  Patient with stage IV lung cancer and likely end-stage COPD on home O2  Now with pneumonic process and sepsis/respiratory failure although improving today  Goals of care discussion ongoing, patient needs time to consider the discussions  Remains full code  Family member is a nurse and is facilitating conversations nicely    Failure to thrive in adult- (present on admission)  Assessment & Plan  Recurrent hospital visits  Losing weight  Albumin 2.5  Palliative care should be consulted as with current status he is ECOG 4 and not a candidate for further chemo or immunotherapy  Reviewed goals of care and CODE BLUE at length with patient, family and palliative care APRRADHA  POLST being considered as well  Patient needs some time to digest our discussion from 5/24, remains full code    Deep vein thrombosis (DVT) of right lower extremity (HCC)- (present on admission)  Assessment & Plan  History of  Continue Xarelto         VTE:  Lovenox  Ulcer: Not Indicated  Lines: Calderón Catheter  Ongoing indication addressed    I have performed a physical exam and reviewed and  updated ROS and Plan today (5/24/2024). In review of yesterday's note (5/23/2024), there are no changes except as documented above.     Discussed patient condition and risk of morbidity and/or mortality with Hospitalist, Family, RN, RT, Pharmacy, [employer] [facility] resident, Code status disscussed, Charge nurse / hot rounds, Patient, and palliative care APRN  The patient remains critically ill.  Critical care time = 50 minutes in directly providing and coordinating critical care and extensive data review.  No time overlap and excludes procedures.

## 2024-05-24 NOTE — PROGRESS NOTES
4 Eyes Skin Assessment Completed by KOFI Fragoso and KOFI Chew.    Head Scab  Ears WDL  Nose Scab  Mouth WDL  Neck WDL  Breast/Chest WDL  Shoulder Blades Redness and Blanching  Spine Redness and Blanching  (R) Arm/Elbow/Hand Redness, Non-Blanching, and Discoloration, Bruising   (L) Arm/Elbow/Hand Redness, Non-Blanching, Bruising, and Abrasion, Skin tear   Abdomen WDL  Groin WDL  Scrotum/Coccyx/Buttocks Redness and Blanching, extremely slow to martha   (R) Leg Bruising  (L) Leg WDL  (R) Heel/Foot/Toe Redness and Blanching  (L) Heel/Foot/Toe Redness and Blanching, Bruise big toe       Devices In Places ECG, Tele Box, Blood Pressure Cuff, Pulse Ox, and SCD's      Interventions In Place Pastor Ear Foams, NC Cheek Stickers, Heel Mepilex, Sacral Mepilex, Heel Float Boots, Pillows, Elbow Mepilex, Q2 Turns, Low Air Loss Mattress, Heels Loaded W/Pillows, and Pressure Redistribution Mattress    Possible Skin Injury Yes    Pictures Uploaded Into Epic Yes  Wound Consult Placed Yes  RN Wound Prevention Protocol Ordered Yes

## 2024-05-24 NOTE — ASSESSMENT & PLAN NOTE
Baseline 5 l NC  New RLL pna concerning for hospital acquired pna  Received zosyn  Change to linezolid and cefepime  MRSA nasal swab  Viral Swab  Follow up blood cxs ast [facility]  RR 22 more shivers and chills  Moving air  Hiflo 60 l 100%    Work of breathing improved  HF NC weaned to 40/40  MRSA nares negative, stopping linezolid

## 2024-05-25 ENCOUNTER — APPOINTMENT (OUTPATIENT)
Dept: RADIOLOGY | Facility: MEDICAL CENTER | Age: 74
DRG: 871 | End: 2024-05-25
Payer: COMMERCIAL

## 2024-05-25 PROBLEM — D53.9 MACROCYTIC ANEMIA: Status: ACTIVE | Noted: 2024-05-25

## 2024-05-25 LAB
ALBUMIN SERPL BCP-MCNC: 2.2 G/DL (ref 3.2–4.9)
ALBUMIN/GLOB SERPL: 0.8 G/DL
ALP SERPL-CCNC: 87 U/L (ref 30–99)
ALT SERPL-CCNC: 16 U/L (ref 2–50)
ANION GAP SERPL CALC-SCNC: 9 MMOL/L (ref 7–16)
AST SERPL-CCNC: 17 U/L (ref 12–45)
BASOPHILS # BLD AUTO: 0.1 % (ref 0–1.8)
BASOPHILS # BLD: 0.02 K/UL (ref 0–0.12)
BILIRUB SERPL-MCNC: 0.3 MG/DL (ref 0.1–1.5)
BUN SERPL-MCNC: 21 MG/DL (ref 8–22)
CALCIUM ALBUM COR SERPL-MCNC: 9.3 MG/DL (ref 8.5–10.5)
CALCIUM SERPL-MCNC: 7.9 MG/DL (ref 8.5–10.5)
CHLORIDE SERPL-SCNC: 104 MMOL/L (ref 96–112)
CO2 SERPL-SCNC: 25 MMOL/L (ref 20–33)
CREAT SERPL-MCNC: 0.81 MG/DL (ref 0.5–1.4)
EOSINOPHIL # BLD AUTO: 0 K/UL (ref 0–0.51)
EOSINOPHIL NFR BLD: 0 % (ref 0–6.9)
ERYTHROCYTE [DISTWIDTH] IN BLOOD BY AUTOMATED COUNT: 67.2 FL (ref 35.9–50)
GFR SERPLBLD CREATININE-BSD FMLA CKD-EPI: 92 ML/MIN/1.73 M 2
GLOBULIN SER CALC-MCNC: 2.7 G/DL (ref 1.9–3.5)
GLUCOSE SERPL-MCNC: 127 MG/DL (ref 65–99)
HCT VFR BLD AUTO: 28.6 % (ref 42–52)
HGB BLD-MCNC: 9.4 G/DL (ref 14–18)
IMM GRANULOCYTES # BLD AUTO: 0.37 K/UL (ref 0–0.11)
IMM GRANULOCYTES NFR BLD AUTO: 1.4 % (ref 0–0.9)
LYMPHOCYTES # BLD AUTO: 1.07 K/UL (ref 1–4.8)
LYMPHOCYTES NFR BLD: 4.2 % (ref 22–41)
MAGNESIUM SERPL-MCNC: 1.8 MG/DL (ref 1.5–2.5)
MCH RBC QN AUTO: 35.7 PG (ref 27–33)
MCHC RBC AUTO-ENTMCNC: 32.9 G/DL (ref 32.3–36.5)
MCV RBC AUTO: 108.7 FL (ref 81.4–97.8)
MONOCYTES # BLD AUTO: 0.62 K/UL (ref 0–0.85)
MONOCYTES NFR BLD AUTO: 2.4 % (ref 0–13.4)
NEUTROPHILS # BLD AUTO: 23.49 K/UL (ref 1.82–7.42)
NEUTROPHILS NFR BLD: 91.9 % (ref 44–72)
NRBC # BLD AUTO: 0 K/UL
NRBC BLD-RTO: 0 /100 WBC (ref 0–0.2)
PHOSPHATE SERPL-MCNC: 2.9 MG/DL (ref 2.5–4.5)
PLATELET # BLD AUTO: 117 K/UL (ref 164–446)
PMV BLD AUTO: 10.5 FL (ref 9–12.9)
POTASSIUM SERPL-SCNC: 3.8 MMOL/L (ref 3.6–5.5)
PROT SERPL-MCNC: 4.9 G/DL (ref 6–8.2)
RBC # BLD AUTO: 2.63 M/UL (ref 4.7–6.1)
SODIUM SERPL-SCNC: 138 MMOL/L (ref 135–145)
WBC # BLD AUTO: 25.6 K/UL (ref 4.8–10.8)

## 2024-05-25 PROCEDURE — 84100 ASSAY OF PHOSPHORUS: CPT

## 2024-05-25 PROCEDURE — 94640 AIRWAY INHALATION TREATMENT: CPT

## 2024-05-25 PROCEDURE — 700102 HCHG RX REV CODE 250 W/ 637 OVERRIDE(OP): Mod: JZ | Performed by: INTERNAL MEDICINE

## 2024-05-25 PROCEDURE — 99233 SBSQ HOSP IP/OBS HIGH 50: CPT | Mod: GC,25 | Performed by: INTERNAL MEDICINE

## 2024-05-25 PROCEDURE — A9270 NON-COVERED ITEM OR SERVICE: HCPCS | Mod: JZ | Performed by: INTERNAL MEDICINE

## 2024-05-25 PROCEDURE — 83735 ASSAY OF MAGNESIUM: CPT

## 2024-05-25 PROCEDURE — 85025 COMPLETE CBC W/AUTO DIFF WBC: CPT

## 2024-05-25 PROCEDURE — 80053 COMPREHEN METABOLIC PANEL: CPT

## 2024-05-25 PROCEDURE — 700111 HCHG RX REV CODE 636 W/ 250 OVERRIDE (IP): Mod: JZ | Performed by: INTERNAL MEDICINE

## 2024-05-25 PROCEDURE — 71045 X-RAY EXAM CHEST 1 VIEW: CPT

## 2024-05-25 PROCEDURE — 700105 HCHG RX REV CODE 258: Performed by: INTERNAL MEDICINE

## 2024-05-25 PROCEDURE — A9270 NON-COVERED ITEM OR SERVICE: HCPCS

## 2024-05-25 PROCEDURE — 99497 ADVNCD CARE PLAN 30 MIN: CPT | Performed by: INTERNAL MEDICINE

## 2024-05-25 PROCEDURE — 94669 MECHANICAL CHEST WALL OSCILL: CPT

## 2024-05-25 PROCEDURE — 770020 HCHG ROOM/CARE - TELE (206)

## 2024-05-25 PROCEDURE — 700101 HCHG RX REV CODE 250: Performed by: INTERNAL MEDICINE

## 2024-05-25 PROCEDURE — A9270 NON-COVERED ITEM OR SERVICE: HCPCS | Performed by: INTERNAL MEDICINE

## 2024-05-25 PROCEDURE — 700102 HCHG RX REV CODE 250 W/ 637 OVERRIDE(OP)

## 2024-05-25 PROCEDURE — 700102 HCHG RX REV CODE 250 W/ 637 OVERRIDE(OP): Performed by: INTERNAL MEDICINE

## 2024-05-25 RX ORDER — POTASSIUM CHLORIDE 20 MEQ/1
40 TABLET, EXTENDED RELEASE ORAL ONCE
Status: COMPLETED | OUTPATIENT
Start: 2024-05-25 | End: 2024-05-25

## 2024-05-25 RX ORDER — MAGNESIUM SULFATE HEPTAHYDRATE 40 MG/ML
2 INJECTION, SOLUTION INTRAVENOUS ONCE
Status: COMPLETED | OUTPATIENT
Start: 2024-05-25 | End: 2024-05-25

## 2024-05-25 RX ADMIN — AMPICILLIN AND SULBACTAM 3 G: 1; 2 INJECTION, POWDER, FOR SOLUTION INTRAMUSCULAR; INTRAVENOUS at 12:02

## 2024-05-25 RX ADMIN — IPRATROPIUM BROMIDE AND ALBUTEROL SULFATE 3 ML: 2.5; .5 SOLUTION RESPIRATORY (INHALATION) at 14:21

## 2024-05-25 RX ADMIN — SERTRALINE HYDROCHLORIDE 50 MG: 50 TABLET ORAL at 04:42

## 2024-05-25 RX ADMIN — IPRATROPIUM BROMIDE AND ALBUTEROL SULFATE 3 ML: 2.5; .5 SOLUTION RESPIRATORY (INHALATION) at 23:02

## 2024-05-25 RX ADMIN — MAGNESIUM SULFATE HEPTAHYDRATE 2 G: 2 INJECTION, SOLUTION INTRAVENOUS at 16:02

## 2024-05-25 RX ADMIN — CEFEPIME 2 G: 2 INJECTION, POWDER, FOR SOLUTION INTRAVENOUS at 04:48

## 2024-05-25 RX ADMIN — POTASSIUM CHLORIDE 40 MEQ: 1500 TABLET, EXTENDED RELEASE ORAL at 18:24

## 2024-05-25 RX ADMIN — RIVAROXABAN 20 MG: 20 TABLET, FILM COATED ORAL at 04:44

## 2024-05-25 RX ADMIN — SODIUM CHLORIDE, POTASSIUM CHLORIDE, SODIUM LACTATE AND CALCIUM CHLORIDE: 600; 310; 30; 20 INJECTION, SOLUTION INTRAVENOUS at 10:49

## 2024-05-25 RX ADMIN — MIDODRINE HYDROCHLORIDE 5 MG: 5 TABLET ORAL at 14:51

## 2024-05-25 RX ADMIN — IPRATROPIUM BROMIDE AND ALBUTEROL SULFATE 3 ML: 2.5; .5 SOLUTION RESPIRATORY (INHALATION) at 10:44

## 2024-05-25 RX ADMIN — METHYLPREDNISOLONE SODIUM SUCCINATE 40 MG: 40 INJECTION, POWDER, FOR SOLUTION INTRAMUSCULAR; INTRAVENOUS at 18:23

## 2024-05-25 RX ADMIN — MIDODRINE HYDROCHLORIDE 5 MG: 5 TABLET ORAL at 04:41

## 2024-05-25 RX ADMIN — AMPICILLIN AND SULBACTAM 3 G: 1; 2 INJECTION, POWDER, FOR SOLUTION INTRAMUSCULAR; INTRAVENOUS at 18:26

## 2024-05-25 RX ADMIN — IPRATROPIUM BROMIDE AND ALBUTEROL SULFATE 3 ML: 2.5; .5 SOLUTION RESPIRATORY (INHALATION) at 02:23

## 2024-05-25 RX ADMIN — OMEPRAZOLE 20 MG: 20 CAPSULE, DELAYED RELEASE ORAL at 04:41

## 2024-05-25 RX ADMIN — MIDODRINE HYDROCHLORIDE 5 MG: 5 TABLET ORAL at 22:04

## 2024-05-25 RX ADMIN — IPRATROPIUM BROMIDE AND ALBUTEROL SULFATE 3 ML: 2.5; .5 SOLUTION RESPIRATORY (INHALATION) at 08:04

## 2024-05-25 RX ADMIN — IPRATROPIUM BROMIDE AND ALBUTEROL SULFATE 3 ML: 2.5; .5 SOLUTION RESPIRATORY (INHALATION) at 19:21

## 2024-05-25 RX ADMIN — AMPICILLIN AND SULBACTAM 3 G: 1; 2 INJECTION, POWDER, FOR SOLUTION INTRAMUSCULAR; INTRAVENOUS at 23:25

## 2024-05-25 ASSESSMENT — PATIENT HEALTH QUESTIONNAIRE - PHQ9
SUM OF ALL RESPONSES TO PHQ9 QUESTIONS 1 AND 2: 0
1. LITTLE INTEREST OR PLEASURE IN DOING THINGS: NOT AT ALL
1. LITTLE INTEREST OR PLEASURE IN DOING THINGS: NOT AT ALL
SUM OF ALL RESPONSES TO PHQ9 QUESTIONS 1 AND 2: 0
2. FEELING DOWN, DEPRESSED, IRRITABLE, OR HOPELESS: NOT AT ALL
2. FEELING DOWN, DEPRESSED, IRRITABLE, OR HOPELESS: NOT AT ALL

## 2024-05-25 ASSESSMENT — FIBROSIS 4 INDEX
FIB4 SCORE: 2.72
FIB4 SCORE: 2.72

## 2024-05-25 ASSESSMENT — PAIN DESCRIPTION - PAIN TYPE
TYPE: ACUTE PAIN

## 2024-05-25 NOTE — ASSESSMENT & PLAN NOTE
Palliative care consultation ongoing  Patient with stage IV lung cancer and likely end-stage COPD on home O2  Now with pneumonic process and sepsis/respiratory failure although improving today  Goals of care discussion ongoing, patient needs time to consider the discussions  Remains full code  Family member is a nurse and is facilitating conversations nicely

## 2024-05-25 NOTE — CARE PLAN
The patient is Stable - Low risk of patient condition declining or worsening    Shift Goals  Clinical Goals: hemodynamic stability  Patient Goals: rest,sleep  Family Goals: ROMAIN    Progress made toward(s) clinical / shift goals:  Patient is A0x4, on high flow heated 0xygen 30 L 40% Fi02. Blood pressure is soft, positioned in modified trendelenburg position , then rechecked. Safety measure done. Hourly rounds to ensure safety and comfort. Call bell within reach .     Patient is not progressing towards the following goals:

## 2024-05-25 NOTE — PROGRESS NOTES
4 Eyes Skin Assessment Completed by KOFI Velazquez and KOFI Wong.    Head Scab  Ears Redness and Non-Blanching  Nose Scab  Mouth WDL  Neck WDL  Breast/Chest WDL  Shoulder Blades Redness and Blanching  Spine Redness and Blanching  (R) Arm/Elbow/Hand Redness, Non-Blanching, and Bruising  (L) Arm/Elbow/Hand Redness, Non-Blanching, Bruising, and Abrasion  Abdomen WDL  Groin WDL  Scrotum/Coccyx/Buttocks Redness and Blanching  (R) Leg Scab and Bruising  (L) Leg Scab  (R) Heel/Foot/Toe Redness and Blanching  (L) Heel/Foot/Toe Redness and Blanching, Bruise          Devices In Places ECG, Blood Pressure Cuff, Pulse Ox, and HFNC      Interventions In Place Gray Ear Foams, Heel Mepilex, TAP System, Pillows, Q2 Turns, Low Air Loss Mattress, Dri-Yehuda Pads, and Heels Loaded W/Pillows    Possible Skin Injury Yes    Pictures Uploaded Into Epic N/A  Wound Consult Placed Yes  RN Wound Prevention Protocol Ordered Yes

## 2024-05-25 NOTE — CONSULTS
MRN: [id number]  Date of palliative consult: 24   Reason for consult: ACP  Referring provider: Dr. Gomez  Location of consult: T620-00  Additional consulting services: critical care     HPI:   Tyler Zarate is a 74 y.o. male with medical history significant for metastatic lung adenocarcinoma followed by Dr. Gibson with [facility], COPD baseline home oxygen 5 L/min, alcohol and tobacco use, right leg DVT  transferred from [facility] 2024 for sepsis.  Patient with acute on chronic hypoxic respiratory failure and failure to thrive.    ROS:    Review of Systems   Constitutional:  Positive for weight loss.   Respiratory:  Negative for shortness of breath.        PE:   Recent vital signs  BMI: Body mass index is 20.4 kg/m².    Temp (24hrs), Av.6 °C (97.8 °F), Min:36.3 °C (97.4 °F), Max:36.7 °C (98 °F)  Temperature: 36.7 °C (98 °F)  Pulse  Av.1  Min: 71  Max: 120   Blood Pressure : (!) 88/49       Physical Exam  Constitutional:       Appearance: He is cachectic. He is ill-appearing.      Comments: HFNC @ 30 L/min required increase to 35 L/min while talking (down from 60 L/min this morning)   Pulmonary:      Effort: Tachypnea present.      Comments: Pursed lip breathing between talking in short sentences.      ASSESSMENT/PLAN WITH SHARED DECISION MAKING:   PHYSICAL ASPECTS OF CARE  Palliative Performance Scale: 30-40%    #Sepsis  # Pneumonia  # Failure to thrive  # Metastatic lung adenocarcinoma  # COPD  # Cancer cachexia    SOCIAL ASPECTS OF CARE  Patient lives in [place] [facility] [place] [facility] with his son Ran and his daughter-in-law Will and his 7 grandchildren ranging from age 2-15.  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  Patient's son Ran is a paramedic and patient's daughter-in-law works part-time as a lactation consultant however she worked as a CNA and an EKG tech on a telemetry unit.    SPIRITUAL ASPECTS OF CARE   Rastafari.  Declined spiritual care visit.    GOALS OF  CARE/SERIOUS ILLNESS CONVERSATION  Introduced myself to patient and patient's daughter-in-law at bedside. Discussed role of palliative care and reason for consult.  Patient agreeable to discuss goals of care.  He confirms he understands he has stage IV cancer as well as COPD.  He confirms overall he is told he is improving with decrease in his oxygen needs and that he will be transferred out of the ICU.  He has not completed any type of healthcare directive that he is aware of.  Patient confirms he would want his son Ran acting as primary healthcare agent. Patient reports he has another son Dontae who is not involved with his care and patient does not want involved with any decision making. Recommended completion of advance directive to ensure patient's healthcare wishes are known as well as who he would like making decisions.  [facility] who confirms he does not have an advance directive on file.      Asked if they would like assistance with completion of a [place] advanced directive given patient is a [place] resident.  They would like to work on this ASAP.  I reached out to palliative care  Ciara who is able to follow-up with patient/family today.      Reviewed POLST form and discussed CODE STATUS.  Patient confirmed he is already spoken with the provider about this today.  Asked permission to share my thoughts.  First encouraged patient to consider how he would like to spend his time given that his time is limited due to terminal diagnoses.  Provided multiple options including full code/full treat, DNR/DNI with continued selective treatment, versus comfort focused treatment/hospice.  Asked permission to share my thoughts.  Discussed that given patient has advanced metastatic cancer as well as lung disease and obvious frailty and deconditioning recommended for minimum of DNR/DNI given poor survival rates of CPR for patients with advanced cancer.  Discussed  "we want to prevent distressing interventions and symptoms at end-of-life that likely will not help patient's.  He stated he would like time to consider his options and talk with family.  Framed this in the sense that patient condition is improving however he remains frail and at risk for morbidity and mortality.  He and his daughter-in-law denied having other questions or needs at this time.  Provided palliative care contact information and encouraged family to reach out with any questions/needs.     Updated Dr. Sloan    Code Status: Full code/encourage patient to consider DNR/DNI    ACP Documents: Palliative care  to follow-up with advanced directive.  [facility].  POLST form reviewed.    50 minutes spent discussing advance care planning, this time excludes any other billed services.    Interval diagnostic studies and medical documentation entries pertinent to this case were reviewed independently by me. This patient has at least one acute or chronic illness or injury that poses a threat to life or bodily function. This patient suffers from a high risk of morbidity from additional invasive diagnostic testing or intensive treatment. Discussion of recommendations and coordination of care undertaken with primary provider/treatment team.      Sammie \"Same[facility]\" [facility]  Inpatient Palliative Care (service hours Mon-Fri 8AM - 5PM)  [phone]      "

## 2024-05-25 NOTE — CARE PLAN
Problem: Bronchoconstriction:  Goal: Improve in air movement and diminished wheezing  Outcome: Not Met     Problem: Humidified High Flow Nasal Cannula  Goal: Maintain adequate oxygenation dependent on patient condition  Description: Target End Date:  resolve prior to discharge or when underlying condition is resolved/stabilized    1.  Implement humidified high flow oxygen therapy  2.  Titrate high flow oxygen to maintain appropriate SpO2  5/25/2024 0424 by Derrick Harris, RRT  Outcome: Progressing  5/25/2024 0423 by Derrick Harris, RRT  Outcome: Progressing      HHFNC 30L30% (wean as tolerated)     Cont. Q4 Duo & QID Flutter

## 2024-05-25 NOTE — CARE PLAN
Problem: Bronchoconstriction:  Goal: Improve in air movement and diminished wheezing  5/25/2024 1501 by Prem Jones, RRT  Outcome: Progressing  5/25/2024 1501 by Prem Jones, RRT  Outcome: Progressing     Problem: Bronchopulmonary Hygiene  Goal: Increase mobilization of retained secretions  Description: Target End Date:  2 to 3 days    1.  Perform bronchopulmonary therapy as indicated by assessment  2.  Perform airway suctioning  3.  Perform actions to maintain patient airway  Outcome: Progressing     Q4 duoneb. 4L NC SpO2 97%. QID Flutter.

## 2024-05-25 NOTE — DOCUMENTATION QUERY
"                                                                         [facility]                                                                       Query Response Note      PATIENT:               DENISE ABREU  ACCT #:                  [id number]  MRN:                     [id number]  :                      1950  ADMIT DATE:       2024 5:40 PM  DISCH DATE:          RESPONDING  PROVIDER #:        [id number]           QUERY TEXT:    Your help is needed in capturing a diagnosis for the corresponding medication ordered.   Please clarify in the Medical Record the diagnosis for the following medication:  Norepinephrine infusion:             The patient's Clinical Indicators include:  74 y.o. M presents as transfer with hypoxia, hypotensive      Critical care note: \"Patient hypotensive with BP 79/44 around 8pm, 500cc fluid bolus prn order was used by nurse with subsequent slight improvement in SBP to the 90s. Notified by the nurse around 11pm of again soft  BP's 84/39. Discussed with Dr. Gomez, will initiate low dose levophed at this time.\"     Hospital medicine PN:   \"Sepsis with acute hypoxic respiratory failure without septic shock \"  \"New RLL pna \"     Critical care PN:  \"Norepinephrine 0.05\"  \"Lactic acid 3.6 trending down to 2.8 and this a.m. 1.6\"  \"Weaning down on norepinephrine\"    per nursing flowsheet-   2000- BP- 79/44  MAP- 55  2300- BP- 84/39  MAP- 54   0045- BP- 89/54  MAP- 66   0300- BP- 84/53  MAP- 63    Risk factors:  sepsis, metastatic adenocarcinoma of lung, acute on chronic respiratory failure    Treatment:  Norepinephrine infusion given 2304 to  1159 - (dose range given- 1.935-mcg/min - 5.16 mcg/min) titrate to MAP greater than 65 mmHg or SBP greater than 90 mmHg), ICU monitoring, midodrine, 500 ml LR bolus given ; IVF at 75 c/hr      Note: If you agree with a diagnosis listed above, please remember to include it in your concurrent daily " documentation and onto the Discharge Summary.    If you have any questions, please contact:  Kristina SMITH RN CCDS  Critical access hospital  [email or web address]  Kristina Miller [address] [email or web address]  Options provided:   -- Hypotension   -- Septic shock   -- Circulatory shock   -- Other shock, (please specify other type of shock)   -- Other explanation, (please specify the other explanation)      Query created by: Kristina Miller on 5/25/2024 9:47 AM    RESPONSE TEXT:    Septic shock          Electronically signed by:  HELENA MONTES DE OCA MD 5/25/2024 10:52 AM

## 2024-05-26 ENCOUNTER — APPOINTMENT (OUTPATIENT)
Dept: RADIOLOGY | Facility: MEDICAL CENTER | Age: 74
DRG: 871 | End: 2024-05-26
Payer: COMMERCIAL

## 2024-05-26 ENCOUNTER — HOSPITAL ENCOUNTER (INPATIENT)
Dept: RADIOLOGY | Facility: MEDICAL CENTER | Age: 74
DRG: 871 | End: 2024-05-26
Admitting: INTERNAL MEDICINE
Payer: COMMERCIAL

## 2024-05-26 ENCOUNTER — PHARMACY VISIT (OUTPATIENT)
Dept: PHARMACY | Facility: MEDICAL CENTER | Age: 74
End: 2024-05-26
Payer: COMMERCIAL

## 2024-05-26 VITALS
BODY MASS INDEX: 21.94 KG/M2 | HEIGHT: 70 IN | DIASTOLIC BLOOD PRESSURE: 53 MMHG | TEMPERATURE: 98.2 F | RESPIRATION RATE: 18 BRPM | SYSTOLIC BLOOD PRESSURE: 102 MMHG | WEIGHT: 153.22 LBS | HEART RATE: 82 BPM | OXYGEN SATURATION: 95 %

## 2024-05-26 PROBLEM — A41.9 SEPSIS WITH ACUTE HYPOXIC RESPIRATORY FAILURE WITHOUT SEPTIC SHOCK (HCC): Status: RESOLVED | Noted: 2024-05-23 | Resolved: 2024-05-26

## 2024-05-26 PROBLEM — J96.01 SEPSIS WITH ACUTE HYPOXIC RESPIRATORY FAILURE WITHOUT SEPTIC SHOCK (HCC): Status: RESOLVED | Noted: 2024-05-23 | Resolved: 2024-05-26

## 2024-05-26 PROBLEM — R65.20 SEPSIS WITH ACUTE HYPOXIC RESPIRATORY FAILURE WITHOUT SEPTIC SHOCK (HCC): Status: RESOLVED | Noted: 2024-05-23 | Resolved: 2024-05-26

## 2024-05-26 PROBLEM — E43 SEVERE PROTEIN-CALORIE MALNUTRITION (HCC): Status: ACTIVE | Noted: 2024-05-26

## 2024-05-26 PROBLEM — J96.21 ACUTE ON CHRONIC RESPIRATORY FAILURE WITH HYPOXIA (HCC): Status: RESOLVED | Noted: 2024-05-15 | Resolved: 2024-05-26

## 2024-05-26 LAB
ALBUMIN SERPL BCP-MCNC: 2.7 G/DL (ref 3.2–4.9)
ALBUMIN/GLOB SERPL: 0.8 G/DL
ALP SERPL-CCNC: 108 U/L (ref 30–99)
ALT SERPL-CCNC: 37 U/L (ref 2–50)
ANION GAP SERPL CALC-SCNC: 9 MMOL/L (ref 7–16)
AST SERPL-CCNC: 39 U/L (ref 12–45)
BASOPHILS # BLD AUTO: 0.1 % (ref 0–1.8)
BASOPHILS # BLD: 0.02 K/UL (ref 0–0.12)
BILIRUB SERPL-MCNC: 0.3 MG/DL (ref 0.1–1.5)
BUN SERPL-MCNC: 19 MG/DL (ref 8–22)
CALCIUM ALBUM COR SERPL-MCNC: 9.4 MG/DL (ref 8.5–10.5)
CALCIUM SERPL-MCNC: 8.4 MG/DL (ref 8.5–10.5)
CHLORIDE SERPL-SCNC: 105 MMOL/L (ref 96–112)
CO2 SERPL-SCNC: 27 MMOL/L (ref 20–33)
CREAT SERPL-MCNC: 0.74 MG/DL (ref 0.5–1.4)
EOSINOPHIL # BLD AUTO: 0 K/UL (ref 0–0.51)
EOSINOPHIL NFR BLD: 0 % (ref 0–6.9)
ERYTHROCYTE [DISTWIDTH] IN BLOOD BY AUTOMATED COUNT: 66.5 FL (ref 35.9–50)
GFR SERPLBLD CREATININE-BSD FMLA CKD-EPI: 94 ML/MIN/1.73 M 2
GLOBULIN SER CALC-MCNC: 3.5 G/DL (ref 1.9–3.5)
GLUCOSE SERPL-MCNC: 106 MG/DL (ref 65–99)
HCT VFR BLD AUTO: 31.2 % (ref 42–52)
HGB BLD-MCNC: 10.2 G/DL (ref 14–18)
IMM GRANULOCYTES # BLD AUTO: 0.11 K/UL (ref 0–0.11)
IMM GRANULOCYTES NFR BLD AUTO: 0.6 % (ref 0–0.9)
LYMPHOCYTES # BLD AUTO: 1.21 K/UL (ref 1–4.8)
LYMPHOCYTES NFR BLD: 6.9 % (ref 22–41)
MAGNESIUM SERPL-MCNC: 2.2 MG/DL (ref 1.5–2.5)
MCH RBC QN AUTO: 35.4 PG (ref 27–33)
MCHC RBC AUTO-ENTMCNC: 32.7 G/DL (ref 32.3–36.5)
MCV RBC AUTO: 108.3 FL (ref 81.4–97.8)
MONOCYTES # BLD AUTO: 0.58 K/UL (ref 0–0.85)
MONOCYTES NFR BLD AUTO: 3.3 % (ref 0–13.4)
NEUTROPHILS # BLD AUTO: 15.62 K/UL (ref 1.82–7.42)
NEUTROPHILS NFR BLD: 89.1 % (ref 44–72)
NRBC # BLD AUTO: 0 K/UL
NRBC BLD-RTO: 0 /100 WBC (ref 0–0.2)
PHOSPHATE SERPL-MCNC: 2.7 MG/DL (ref 2.5–4.5)
PLATELET # BLD AUTO: 129 K/UL (ref 164–446)
PMV BLD AUTO: 10.9 FL (ref 9–12.9)
POTASSIUM SERPL-SCNC: 4.6 MMOL/L (ref 3.6–5.5)
PROCALCITONIN SERPL-MCNC: 3.8 NG/ML
PROT SERPL-MCNC: 6.2 G/DL (ref 6–8.2)
RBC # BLD AUTO: 2.88 M/UL (ref 4.7–6.1)
SODIUM SERPL-SCNC: 141 MMOL/L (ref 135–145)
VIT B12 SERPL-MCNC: 1492 PG/ML (ref 211–911)
WBC # BLD AUTO: 17.5 K/UL (ref 4.8–10.8)

## 2024-05-26 PROCEDURE — 97162 PT EVAL MOD COMPLEX 30 MIN: CPT

## 2024-05-26 PROCEDURE — 94640 AIRWAY INHALATION TREATMENT: CPT

## 2024-05-26 PROCEDURE — 700105 HCHG RX REV CODE 258: Performed by: INTERNAL MEDICINE

## 2024-05-26 PROCEDURE — 307059 PAD,EAR PROTECTOR: Performed by: INTERNAL MEDICINE

## 2024-05-26 PROCEDURE — A9270 NON-COVERED ITEM OR SERVICE: HCPCS | Performed by: STUDENT IN AN ORGANIZED HEALTH CARE EDUCATION/TRAINING PROGRAM

## 2024-05-26 PROCEDURE — 700101 HCHG RX REV CODE 250: Performed by: INTERNAL MEDICINE

## 2024-05-26 PROCEDURE — RXMED WILLOW AMBULATORY MEDICATION CHARGE: Performed by: STUDENT IN AN ORGANIZED HEALTH CARE EDUCATION/TRAINING PROGRAM

## 2024-05-26 PROCEDURE — 700102 HCHG RX REV CODE 250 W/ 637 OVERRIDE(OP): Performed by: STUDENT IN AN ORGANIZED HEALTH CARE EDUCATION/TRAINING PROGRAM

## 2024-05-26 PROCEDURE — 36415 COLL VENOUS BLD VENIPUNCTURE: CPT

## 2024-05-26 PROCEDURE — 700102 HCHG RX REV CODE 250 W/ 637 OVERRIDE(OP)

## 2024-05-26 PROCEDURE — 85025 COMPLETE CBC W/AUTO DIFF WBC: CPT

## 2024-05-26 PROCEDURE — A9270 NON-COVERED ITEM OR SERVICE: HCPCS

## 2024-05-26 PROCEDURE — 71045 X-RAY EXAM CHEST 1 VIEW: CPT

## 2024-05-26 PROCEDURE — 82607 VITAMIN B-12: CPT

## 2024-05-26 PROCEDURE — 84145 PROCALCITONIN (PCT): CPT

## 2024-05-26 PROCEDURE — 700102 HCHG RX REV CODE 250 W/ 637 OVERRIDE(OP): Performed by: INTERNAL MEDICINE

## 2024-05-26 PROCEDURE — 700111 HCHG RX REV CODE 636 W/ 250 OVERRIDE (IP): Mod: JZ | Performed by: INTERNAL MEDICINE

## 2024-05-26 PROCEDURE — A9270 NON-COVERED ITEM OR SERVICE: HCPCS | Performed by: INTERNAL MEDICINE

## 2024-05-26 PROCEDURE — 94669 MECHANICAL CHEST WALL OSCILL: CPT

## 2024-05-26 PROCEDURE — 84100 ASSAY OF PHOSPHORUS: CPT

## 2024-05-26 PROCEDURE — 99239 HOSP IP/OBS DSCHRG MGMT >30: CPT | Performed by: STUDENT IN AN ORGANIZED HEALTH CARE EDUCATION/TRAINING PROGRAM

## 2024-05-26 PROCEDURE — 80053 COMPREHEN METABOLIC PANEL: CPT

## 2024-05-26 PROCEDURE — 97166 OT EVAL MOD COMPLEX 45 MIN: CPT

## 2024-05-26 PROCEDURE — 83735 ASSAY OF MAGNESIUM: CPT

## 2024-05-26 RX ORDER — DOXYCYCLINE 100 MG/1
100 TABLET ORAL EVERY 12 HOURS
Qty: 10 TABLET | Refills: 0 | Status: ACTIVE | OUTPATIENT
Start: 2024-05-26 | End: 2024-05-31

## 2024-05-26 RX ORDER — MIRTAZAPINE 15 MG/1
15 TABLET, FILM COATED ORAL NIGHTLY
Qty: 30 TABLET | Refills: 2 | Status: SHIPPED | OUTPATIENT
Start: 2024-05-26

## 2024-05-26 RX ORDER — POTASSIUM CHLORIDE 20 MEQ/1
40 TABLET, EXTENDED RELEASE ORAL ONCE
Status: COMPLETED | OUTPATIENT
Start: 2024-05-26 | End: 2024-05-26

## 2024-05-26 RX ORDER — IPRATROPIUM BROMIDE AND ALBUTEROL SULFATE 2.5; .5 MG/3ML; MG/3ML
3 SOLUTION RESPIRATORY (INHALATION)
Status: DISCONTINUED | OUTPATIENT
Start: 2024-05-26 | End: 2024-05-26 | Stop reason: HOSPADM

## 2024-05-26 RX ORDER — ALBUTEROL SULFATE 90 UG/1
2 AEROSOL, METERED RESPIRATORY (INHALATION) EVERY 4 HOURS PRN
Qty: 6.7 G | Refills: 2 | Status: SHIPPED | OUTPATIENT
Start: 2024-05-26

## 2024-05-26 RX ORDER — TIOTROPIUM BROMIDE INHALATION SPRAY 3.12 UG/1
5 SPRAY, METERED RESPIRATORY (INHALATION) DAILY
Qty: 1 EACH | Refills: 2 | Status: SHIPPED | OUTPATIENT
Start: 2024-05-26

## 2024-05-26 RX ORDER — FLUTICASONE PROPIONATE AND SALMETEROL 250; 50 UG/1; UG/1
1 POWDER RESPIRATORY (INHALATION) 2 TIMES DAILY
Qty: 60 EACH | Refills: 1 | Status: SHIPPED | OUTPATIENT
Start: 2024-05-26

## 2024-05-26 RX ORDER — AMOXICILLIN AND CLAVULANATE POTASSIUM 500; 125 MG/1; MG/1
1 TABLET, FILM COATED ORAL 3 TIMES DAILY
Qty: 21 TABLET | Refills: 0 | Status: ACTIVE | OUTPATIENT
Start: 2024-05-26 | End: 2024-06-02

## 2024-05-26 RX ORDER — DOXYCYCLINE 100 MG/1
100 TABLET ORAL EVERY 12 HOURS
Status: DISCONTINUED | OUTPATIENT
Start: 2024-05-26 | End: 2024-05-26 | Stop reason: HOSPADM

## 2024-05-26 RX ORDER — PREDNISONE 10 MG/1
TABLET ORAL
Qty: 21 TABLET | Refills: 0 | Status: SHIPPED | OUTPATIENT
Start: 2024-05-26 | End: 2024-06-05

## 2024-05-26 RX ORDER — MIDODRINE HYDROCHLORIDE 5 MG/1
2.5 TABLET ORAL EVERY 8 HOURS
Status: DISCONTINUED | OUTPATIENT
Start: 2024-05-26 | End: 2024-05-26 | Stop reason: HOSPADM

## 2024-05-26 RX ORDER — MIDODRINE HYDROCHLORIDE 2.5 MG/1
2.5 TABLET ORAL EVERY 8 HOURS
Qty: 21 TABLET | Refills: 0 | Status: SHIPPED | OUTPATIENT
Start: 2024-05-26 | End: 2024-06-02

## 2024-05-26 RX ADMIN — POTASSIUM CHLORIDE 40 MEQ: 1500 TABLET, EXTENDED RELEASE ORAL at 11:48

## 2024-05-26 RX ADMIN — RIVAROXABAN 20 MG: 20 TABLET, FILM COATED ORAL at 05:12

## 2024-05-26 RX ADMIN — IPRATROPIUM BROMIDE AND ALBUTEROL SULFATE 3 ML: 2.5; .5 SOLUTION RESPIRATORY (INHALATION) at 11:28

## 2024-05-26 RX ADMIN — AMPICILLIN AND SULBACTAM 3 G: 1; 2 INJECTION, POWDER, FOR SOLUTION INTRAMUSCULAR; INTRAVENOUS at 11:55

## 2024-05-26 RX ADMIN — SERTRALINE HYDROCHLORIDE 50 MG: 50 TABLET ORAL at 05:11

## 2024-05-26 RX ADMIN — MIDODRINE HYDROCHLORIDE 2.5 MG: 5 TABLET ORAL at 14:23

## 2024-05-26 RX ADMIN — MIDODRINE HYDROCHLORIDE 5 MG: 5 TABLET ORAL at 05:11

## 2024-05-26 RX ADMIN — OMEPRAZOLE 20 MG: 20 CAPSULE, DELAYED RELEASE ORAL at 05:12

## 2024-05-26 RX ADMIN — DOXYCYCLINE 100 MG: 100 TABLET, FILM COATED ORAL at 11:47

## 2024-05-26 RX ADMIN — AMPICILLIN AND SULBACTAM 3 G: 1; 2 INJECTION, POWDER, FOR SOLUTION INTRAMUSCULAR; INTRAVENOUS at 05:15

## 2024-05-26 ASSESSMENT — ACTIVITIES OF DAILY LIVING (ADL): TOILETING: INDEPENDENT

## 2024-05-26 ASSESSMENT — COGNITIVE AND FUNCTIONAL STATUS - GENERAL
SUGGESTED CMS G CODE MODIFIER MOBILITY: CK
PERSONAL GROOMING: A LITTLE
SUGGESTED CMS G CODE MODIFIER DAILY ACTIVITY: CK
DAILY ACTIVITIY SCORE: 17
TURNING FROM BACK TO SIDE WHILE IN FLAT BAD: A LITTLE
MOVING TO AND FROM BED TO CHAIR: A LITTLE
MOVING FROM LYING ON BACK TO SITTING ON SIDE OF FLAT BED: A LITTLE
HELP NEEDED FOR BATHING: A LITTLE
EATING MEALS: A LITTLE
WALKING IN HOSPITAL ROOM: A LITTLE
TOILETING: A LOT
DRESSING REGULAR LOWER BODY CLOTHING: A LITTLE
STANDING UP FROM CHAIR USING ARMS: A LITTLE
MOBILITY SCORE: 18
DRESSING REGULAR UPPER BODY CLOTHING: A LITTLE
CLIMB 3 TO 5 STEPS WITH RAILING: A LITTLE

## 2024-05-26 ASSESSMENT — GAIT ASSESSMENTS
DISTANCE (FEET): 80
ASSISTIVE DEVICE: FRONT WHEEL WALKER
GAIT LEVEL OF ASSIST: CONTACT GUARD ASSIST
DEVIATION: OTHER (COMMENT)

## 2024-05-26 ASSESSMENT — PAIN DESCRIPTION - PAIN TYPE: TYPE: ACUTE PAIN

## 2024-05-26 NOTE — ASSESSMENT & PLAN NOTE
On May 25, 2024 I discussed plan of care with patient and her son at the bedside.  I discussed overall Mr. Zarate medical condition and diagnosis of cancer.  I discussed with him regarding difference between full code, DNR and hospice and comfort care.  I explained him that if he will decide to be DNR it would not change his continuation of medical management the only thing that changes resuscitation part.  I discussed plan of care with patient and his son at the bedside and answered all of their questions.  Patient's son expressed that he is explaining CODE STATUS to Mr. Zarate and will update medical team for any change in plan.  I updated bedside RN.    Time spent 17 minutes

## 2024-05-26 NOTE — DISCHARGE PLANNING
Case Management Discharge Planning    Admission Date: 5/23/2024  GMLOS: 5.1  ALOS: 3    6-Clicks ADL Score: 18  6-Clicks Mobility Score: 18      Anticipated Discharge Dispo: Discharge Disposition: D/T to home under HHA care in anticipation of covered skilled care (06)    DANNY faxed choice forms to DPA for [facility] via [facility] and [facility] (resumption of care).

## 2024-05-26 NOTE — DISCHARGE PLANNING
Received choice form @: No choice received  Agency/Facility name:  [facility]  Sent referral per choice form @: 1516

## 2024-05-26 NOTE — DISCHARGE SUMMARY
Discharge Summary    CHIEF COMPLAINT ON ADMISSION  Shortness of breath and generalized weakness    Reason for Admission  Sepsis with acute hypoxic respiratory failure without septic shock    Admission Date  5/23/2024    CODE STATUS  Full Code    HPI & HOSPITAL COURSE  Tyler Zarate is a 74 y.o. male  who presented on 5/15/2024 with worsening shortness of breath and weakness.  This is a pleasant gentleman with a history of stage IV adenocarcinoma of the lung undergoing chemo and immunotherapy, COPD, chronic respiratory failure with hypoxia on 3-5 LPM via nasal cannula, alcohol abuse, and right lower extremity DVT.  He was recently admitted to this institution from 5/15/2024 to 5/18/2024, when he presented with with worsening generalized weakness.  He was started on steroids with improvement of his symptoms.  He had been undergoing chemo and immunotherapy (carboplatin, pemetrexed and pembrolizumab) with Dr. Peg Gibson of [facility] oncology.  Per discussion with the patient prior to discharge, patient wanted to remain full code.    Fall patient is discharged, he will be presented to [facility] emergency room with worsening weakness and shortness of breath as well as chills.  He was noted to have elevated lactic acid levels.  He failed BiPAP due to intolerance.  He was placed on high flow oxygen and transferred to [facility] for higher level care.  Patient was found to have sepsis with acute respiratory failure with hypoxia and was admitted to the intensive care unit for further care and evaluation with initiation of intravenous norepinephrine.    Patient was hospital care was transferred to hospital service on 5/24/2024 where he continued to improve on high flow oxygen and stabilization of blood pressures off pressor support.  Patient was continued on IV cefepime for treatment of left lower lobe pneumonia, suspected source of patient's sepsis.  Patient's cefepime  and linezolid was changed to Unasyn with continued improvement.  Palliative medicine was consulted recommended patient completing advance care directives.  Patient wanted to continue full medical treatment under full CODE STATUS.    Patient's oxygen requirements decreased to 3.5 LPM near his baseline of 3-5 LPM on the day of discharge.  His respiratory symptoms improved significantly and patient's Unasyn was transitioned to Augmentin and doxycycline upon discharge.  White count trended down to 17.5 from 41.0.  Patient remained afebrile.  Procalcitonin level was still elevated at 3.80 on the day of discharge from 3.17 on 5/23/2024.  Patient was continued on prednisone with a taper prescribed upon discharge.    The patient's son Ran had significant concerns about the patient's discharge and his continued decline.  Given that the patient had signs of severe protein calorie malnutrition, registered dietitian was consulted.  Per malnutrition risk assessment, patient met criteria for severe chronic malnutrition related to hypermetabolic disease state of cancer.  Noted to have severe fat and severe muscle loss.  Recommended whole milk with meals as well as encourage intake of meals.  Recommended nutritional supplementation.  Mirtazepine was started to increase patient's appetite.    Patient's son Ran was assured of the the downtrending white count on antibiotics although procalcitonin level was still elevated may have been even higher in between the procalcitonin level checks.  Every given patient's decline reserve, anticipated a very prolonged and slow recovery for the patient.    Patient was evaluated by physical and occupational therapy, recommended postacute placement.  However, the patient declined going to a skilled nursing facility.  Patient was therefore, referred for home health services.    Given that the patient had been on pembrolizumab, autoimmune pneumonitis especially since the patient has been responding  to steroids as well.  Consideration should be given for increasing dosing of prednisone if patient's symptoms worsen with prednisone taper, which point his high-dose prednisone should be tapered very slowly over the course of several weeks to months under the supervision of the oncologist.    Given patient's continued decline in ADLs, consideration should be given for hospice if patient continues to deteriorate.  Per notes, patient was not able to walk by himself from his bed to his bathroom (15 feet).  Patient was giving pushback to go back to [facility] on 5/13/2024.    Patient was discharged to home with his son as per his request.  I offered to keep the patient another night to ensure downtrending of the white count with transition to oral antibiotics given his readmission and frail state, but the patient and son decided to be discharged to home.    Therefore, he is discharged in good and stable condition to home with close outpatient follow-up.    The patient met 2-midnight criteria for an inpatient stay at the time of discharge.    Discharge Date  5/26/2024    FOLLOW UP ITEMS POST DISCHARGE  -Follow-up with primary care provider at [facility] in 3 to 5 days.  -Follow-up with [facility] oncology.    DISCHARGE DIAGNOSES  Principal Problem (Resolved):    Sepsis with acute hypoxic respiratory failure without septic shock (HCC) (POA: Yes)  Active Problems:    Metastatic lung cancer (metastasis from lung to other site) (HCC) (POA: Unknown)    Adenocarcinoma, lung, right (HCC) (POA: Yes)    Deep vein thrombosis (DVT) of right lower extremity (HCC) (POA: Yes)      Overview: Mar 01, 2012 Entered By: YAZMIN BURCH Comment: followed in coumadin       clinic, goal inr 2.0 - 3.0    Leukocytosis (POA: Yes)    COPD (chronic obstructive pulmonary disease) (HCC) (POA: Yes)    Failure to thrive in adult (POA: Yes)    Advance care planning (POA: Yes)    Macrocytic anemia (POA: Yes)    Severe protein-calorie malnutrition (HCC)  (POA: Yes)    Community acquired pneumonia of left lower lobe of lung (POA: Yes)    Thrombocytopenia (HCC) (POA: Yes)  Resolved Problems:    Acute on chronic respiratory failure with hypoxia (HCC) (POA: Yes)      FOLLOW UP  Future Appointments   Date Time Provider Department Center   6/11/2024 11:00 AM [facility] On[facility] None   6/11/2024 11:45 [facility]     Marcy Jose M.D.  [address] [facility] [address]  [phone]    Follow up in 3 day(s)        MEDICATIONS ON DISCHARGE     Medication List        START taking these medications        Instructions   amoxicillin-clavulanate 500-125 MG Tabs  Commonly known as: Augmentin   Take 1 Tablet by mouth 3 times a day for 7 days.  Dose: 1 Tablet     midodrine 2.5 MG Tabs  Commonly known as: Proamatine   Take 1 Tablet by mouth every 8 hours for 7 days.  Dose: 2.5 mg     mirtazapine 15 MG Tabs  Commonly known as: Remeron   Take 1 Tablet by mouth every evening.  Dose: 15 mg     predniSONE 10 MG Tabs  Start taking on: May 26, 2024  Commonly known as: Deltasone   Take 4 Tablets by mouth every day for 2 days, THEN 3 Tablets every day for 2 days, THEN 2 Tablets every day for 2 days, THEN 1 Tablet every day for 2 days, THEN 0.5 Tablets every day for 2 days.            CONTINUE taking these medications        Instructions   albuterol 108 (90 Base) MCG/ACT Aers inhalation aerosol   Inhale 2 Puffs every four hours as needed for Shortness of Breath.  Dose: 2 Puff     fluticasone-salmeterol 250-50 MCG/ACT Aepb  Commonly known as: Advair   Inhale 1 Puff 2 times a day.  Dose: 1 Puff     folic acid 1 MG Tabs  Commonly known as: Folvite   Take 1 mg by mouth see administration instructions. Take 1 to 2 days before chemotherapy only  Dose: 1 mg     omeprazole 20 MG delayed-release capsule  Commonly known as: PriLOSEC   Take 1 Capsule by mouth 2 times a day.  Dose: 20 mg     ondansetron 4 MG Tabs tablet  Commonly known as: Zofran   Take 1  "Tablet by mouth every four hours as needed for Nausea/Vomiting (for nausea, vomiting).  Dose: 4 mg     prochlorperazine 10 MG Tabs  Commonly known as: Compazine   Take 1 Tablet by mouth every 6 hours as needed (for nausea, vomiting).  Dose: 10 mg     rivaroxaban 20 MG Tabs tablet  Commonly known as: Xarelto   Take 20 mg by mouth every day.  Dose: 20 mg     sertraline 50 MG Tabs  Commonly known as: Zoloft   Take 50 mg by mouth every day.  Dose: 50 mg     Spiriva Respimat 2.5 MCG/ACT Aers  Generic drug: tiotropium   Inhale 2 Inhalations every day.  Dose: 5 mcg            STOP taking these medications      levoFLOXacin 500 MG tablet  Commonly known as: Levaquin              Allergies  Allergies   Allergen Reactions    Bacitracin-Neomycin-Polymyxin      Ointment around eye caused face and eye swelling    Gabapentin Swelling     Took several medications following cataract surgery. Not sure which medication was cause.     Ketorolac Swelling     Took following cataract surgery. Not sure which medication was cause.     Lipoglycopeptide Antibiotics Swelling     Pt stated he did not remember what kind of antibiotics he was allergic to. Pt stated \"two different kinds\" Face swelling and eyes glazing over.     Tromethamine     Celecoxib Rash       DIET  Orders Placed This Encounter   Procedures    Diet Order Diet: Regular     Standing Status:   Standing     Number of Occurrences:   1     Order Specific Question:   Diet:     Answer:   Regular [1]       ACTIVITY  As tolerated.  Weight bearing as tolerated    CONSULTATIONS  Critical care  Per medicine  Hospital medicine services    PROCEDURES  None    LABORATORY  Lab Results   Component Value Date    SODIUM 141 05/26/2024    POTASSIUM 4.6 05/26/2024    CHLORIDE 105 05/26/2024    CO2 27 05/26/2024    GLUCOSE 106 (H) 05/26/2024    BUN 19 05/26/2024    CREATININE 0.74 05/26/2024        Lab Results   Component Value Date    WBC 17.5 (H) 05/26/2024    HEMOGLOBIN 10.2 (L) 05/26/2024    " HEMATOCRIT 31.2 (L) 05/26/2024    PLATELETCT 129 (L) 05/26/2024        Total time of the discharge process 39 minutes.

## 2024-05-26 NOTE — ASSESSMENT & PLAN NOTE
He found to have macrocytic anemia  Ordered vitamin B2 level for tomorrow.  No signs of active bleeding.  TSH within normal limits.

## 2024-05-26 NOTE — FACE TO FACE
Face to Face Note  -  Durable Medical Equipment    Ryoer Wren M.D. - NPI: [id number]  I certify that this patient is under my care and that they had a durable medical equipment(DME)face to face encounter by myself that meets the physician DME face-to-face encounter requirements with this patient on:    Date of encounter:   Patient:                    MRN:                       YOB: 2025  Tyler Zarate  [id number]  1950     The encounter with the patient was in whole, or in part, for the following medical condition, which is the primary reason for durable medical equipment:  Other - gait instability    I certify that, based on my findings, the following durable medical equipment is medically necessary:    Walkers.    My Clinical findings support the need for the above equipment due to:  Abnormal Gait

## 2024-05-26 NOTE — CARE PLAN
The patient is Stable - Low risk of patient condition declining or worsening    Shift Goals  Clinical Goals: IV abx, monitor respiratory status  Patient Goals: Go home  Family Goals: ROMAIN    Progress made toward(s) clinical / shift goals:    Problem: Knowledge Deficit - Standard  Goal: Patient and family/care givers will demonstrate understanding of plan of care, disease process/condition, diagnostic tests and medications  Outcome: Progressing  Note: Plan of care discussed, all questions answered.     Problem: Fall Risk  Goal: Patient will remain free from falls  Outcome: Progressing  Note: Bed in lowest position, patient wearing treaded socks, bed alarm on, call light with in reach, and patient was provided fall education, patient demonstrated verbal understanding       Patient is not progressing towards the following goals:

## 2024-05-26 NOTE — DISCHARGE INSTRUCTIONS
Thank you for coming to [facility].  It has been my pleasure to take care of you!      -Please follow-up with your primary care provider in 3 to 5 days with labs.

## 2024-05-26 NOTE — DISCHARGE PLANNING
Case Management Discharge Planning    Admission Date: 5/23/2024  GMLOS: 5.1  ALOS: 3    6-Clicks ADL Score: 18  6-Clicks Mobility Score: 18      Anticipated Discharge Dispo: Discharge Disposition: D/T to home under HHA care in anticipation of covered skilled care (06)    SW received update that family has questions. SW called dtr in law Will and son Ran. They reports pt never was on [facility] services and they have concerns regarding abrupt discharge. They would like to see PT/OT evaluation prior to discharge home and updated O2 study, as their concentrator at home only goes up to 8L. Per family, pt is really difficult to encourage to seek medical care when needed, so they'd like to ensure pt's condition is stable prior to bringing home. SW updated provider of family's concerns and provider plans to touch base.    1550: DANNY met with pt and son at bedside. They want to discharge to home with outpatient PT/OT. They have portable tanks in car for ride home. DANNY notified MD.

## 2024-05-26 NOTE — DISCHARGE PLANNING
Received choice form @: 1114  Agency/Facility name: [facility]  Sent referral per choice form @: 1208    Received choice form @: 1202  Agency/Facility name: [facility]  Sent referral per choice form @: 1219

## 2024-05-26 NOTE — PROGRESS NOTES
4 Eyes Skin Assessment Completed by KOFI Thompson and KOFI Navas.    Head WDL  Ears Redness and Blanching  Nose Redness and Blanching  Mouth WDL  Neck WDL  Breast/Chest WDL  Shoulder Blades Redness and Blanching  Spine Redness and Blanching  (R) Arm/Elbow/Hand Redness and Blanching  (L) Arm/Elbow/Hand Redness and Blanching  Abdomen WDL  Groin WDL  Scrotum/Coccyx/Buttocks Redness and Non-Blanching  (R) Leg Svabs, discoloration, bruising   (L) Leg Scab and Bruising  (R) Heel/Foot/Toe Redness, Blanching, and Boggy  (L) Heel/Foot/Toe Redness, Blanching, and Boggy          Devices In Places Tele Box, Blood Pressure Cuff, Pulse Ox, and Nasal Cannula      Interventions In Place Gray Ear Foams, NC W/Ear Foams, NC Cheek Stickers, Heel Mepilex, Sacral Mepilex, Q2 Turns, and Low Air Loss Mattress    Possible Skin Injury Yes    Pictures Uploaded Into Epic Yes  Wound Consult Placed Yes  RN Wound Prevention Protocol Ordered Yes

## 2024-05-26 NOTE — PROGRESS NOTES
4 Eyes Skin Assessment Completed by KOFI Salinas and KOFI Moore.    Head Scab and facial redness  Ears Redness and Non-Blanching  Nose Scab, redness  Mouth WDL  Neck WDL  Breast/Chest WDL  Shoulder Blades Redness and Blanching  Spine Redness and Blanching  (R) Arm/Elbow/Hand Redness, Non-Blanching, and Bruising  (L) Arm/Elbow/Hand Redness, Non-Blanching, Bruising, and Abrasion  Abdomen WDL  Groin WDL  Scrotum/Coccyx/Buttocks Redness and Blanching  (R) Leg Scab and Bruising  (L) Leg Scab  (R) Heel/Foot/Toe Redness and Blanching  (L) Heel/Foot/Toe Redness and Blanching, Bruise              Devices In Places ECG, Blood Pressure Cuff, and Pulse Ox.    Interventions In Place Gray Ear Foams, Heel Mepilex, TAP System, Pillows, Q2 Turns, Low Air Loss Mattress, Dri-Yehuda Pads, and Heels Loaded W/Pillows     Possible Skin Injury Yes     Pictures Uploaded Into Epic N/A  Wound Consult Placed N/A order already in place  RN Wound Prevention Protocol Ordered N/A

## 2024-05-26 NOTE — PROGRESS NOTES
Report received from IMCU RN, pt transported. Pt resting in bed SOB noted SpO2 WDL. Pt A&O4, on 5L NC 97%, on telemetry monitoring rate and rhythm verified, and VSS. Pt reporting 0/10 pain. POC discussed, pt denies questions or needs at this time. All fall precautions in place, hourly rounding initiated.

## 2024-05-26 NOTE — DIETARY
"Nutrition services: Day 3 of admit.  Tyler Zarate is a 74 y.o. male with admitting DX of respiratory failure.     Consult received for failure to thrive, wt loss (14-23 lbs in 1 month), poor PO per admit screen. Met with pt at bedside. Pt was sitting up in bed. Severe fat loss evidenced by loose facial skin, prominent zygomatic arches, depressed temporals; severe muscle loss evidenced by prominent clavicles, squared shoulders and depression to dorsal hand area. Pt reports an ongoing poor appetite related to taste changes 2' chemotherapy. Breakfast tray at bedside was mainly untouched. Pt shared that his previous UBW was 170-186 lbs, he reports wt loss. Discussed oral nutrition supplements with pt, he politely declined stating he is discharging today. He was provided with whole milk during previous admit. RD will add to all meals.     Assessment:  Height: 177.8 cm (5' 10\")  Weight: 69.5 kg (153 lb 3.5 oz)  Body mass index is 21.98 kg/m²., BMI classification: normal  Diet/Intake: Regular; PO refused to 100%    Evaluation:   Admitted with shortness of breath, weakness and dizziness.   PMH: basal cell carcinoma, adenocarcinoma of lung, COPD.  Labs and meds reviewed.   Wt hx per chart review: 155 lbs 3/11/24, 156 lbs 4/30/24, 161 lbs 12/6/23, 156 lbs 7/18/23.    Malnutrition Risk: Pt with severe, chronic malnutrition related to hypermetabolic disease state of cancer, AEB physical markers for severe fat and severe muscle loss as noted above and <75% of estimated energy intake >1 month.    Recommendations/Plan:  Whole milk with meals.  Encourage intake of meals.  Document intake of all mals as % taken in ADLs to provide interdisciplinary communication across all shifts.   Monitor weight.  Nutrition rep will continue to see patient for ongoing meal and snack preferences.     RD will follow.   "

## 2024-05-26 NOTE — THERAPY
Occupational Therapy   Initial Evaluation     Patient Name: Tyler Zarate  Age:  74 y.o., Sex:  male  Medical Record #: [id number]  Today's Date: 5/26/2024     Precautions  Precautions: Fall Risk    Assessment    Patient is 74 y.o. male admitted with respiratory failure. Other pertinent medical history includes COPD, emphysema, ethanol abuse, R LE DVT on Xarelto, and metastatic adenocarcinoma of the lung. Pt had a recent admission 5/15-5/18 for weakness and suspicion of bronchitis with d/c home. Pt seen for OT evaluation. Pt required CGA-min A for functional mobility/ADL transfer, min A to don/doff underwear, max-total A for toilet hygiene (pt incontinent), and CGA to wash hands while standing. Pt did desat to 87% on 3.5L nasal cannula while walking to the bathroom. Pt recovered to 91% with seated rest and pursed lip breathing. Pt reported that he lives with his son, daughter in law, and seven children who can assist as needed when home. Pt educated regarding the role of OT and energy conservation strategies. Pt current functional performance limited by impaired activity tolerance, impaired balance, and generalized weakness. Pt will benefit from skilled OT while admitted to acute care.     Plan    Occupational Therapy Initial Treatment Plan   Treatment Interventions: Self Care / Activities of Daily Living, Adaptive Equipment, Neuro Re-Education / Balance, Therapeutic Exercises, Therapeutic Activity  Treatment Frequency: 3 Times per Week  Duration: Until Therapy Goals Met    DC Equipment Recommendations: Unable to determine at this time  Discharge Recommendations: Recommend post-acute placement for additional occupational therapy services prior to discharge home (Unless family is comfortable/able to provide assist required, then home with HH)      Objective     05/26/24 1448   Prior Living Situation   Prior Services None   Housing / Facility 2 Story House  (stays on first floor only)   Steps Into Home 2   Steps In  Home   (FOS, pt does not go to second floor)   Bathroom Set up Walk In Shower;Shower Chair;Grab Bars   Equipment Owned Single Point Cane;Tub / Shower Seat;Grab Bar(s) In Tub / Shower   Lives with - Patient's Self Care Capacity Adult Children;Child Less than 18 Years of Age   Comments Pt live with his son, daugther in law and seven children. Someone is usually home and can assist if needed.   Prior Level of ADL Function   Self Feeding Independent   Grooming / Hygiene Independent   Bathing Independent   Dressing Independent   Toileting Independent   Prior Level of IADL Function   Medication Management Requires Assist  (recently family has been assisting with medication management)   Laundry Requires Assist   Kitchen Mobility Requires Assist   Home Management Requires Assist   Shopping Dependent   Prior Level Of Mobility   (uses cane sometimes at home)   Driving / Transportation Relatives / Others Provide Transportation   Occupation (Pre-Hospital Vocational) Retired Due To Age   History of Falls   History of Falls Yes   Date of Last Fall   (Pt reported falling while getting out of car after last admission from hospital)   Precautions   Precautions Fall Risk   Vitals   Pulse 93   Vitals Comments O2 desat to 87% after walking from bed to bathroom, recovered with seated rest and pursed lip breathing to 91%. RN aware.   Pain   Pain Scales 0 to 10 Scale    Pain 0 - 10 Group   Therapist Pain Assessment Post Activity Pain Same as Prior to Activity;Nurse Notified  (not rated, agreeable to session)   Non Verbal Descriptors   Non Verbal Scale  Calm   Cognition    Cognition / Consciousness WDL   Level of Consciousness Alert   Comments Cooperative   Passive ROM Upper Body   Passive ROM Upper Body WDL   Active ROM Upper Body   Active ROM Upper Body  WDL   Strength Upper Body   Comments 3+/5 bilaterally   Upper Body Muscle Tone   Upper Body Muscle Tone  WDL   Coordination Upper Body   Comments tremors noted throughout eval to B UE    Balance Assessment   Sitting Balance (Static) Fair   Sitting Balance (Dynamic) Fair -   Standing Balance (Static) Fair -   Standing Balance (Dynamic) Fair -   Weight Shift Sitting Fair   Weight Shift Standing Fair   Comments w/ FWW   Bed Mobility    Supine to Sit Standby Assist   Sit to Supine Standby Assist   Scooting Standby Assist   Rolling Standby Assist   Comments HOB elevted, reported he has an adjustable bed at home   ADL Assessment   Grooming Contact Guard Assist;Standing  (washed hands at sink)   Lower Body Dressing Minimal Assist  (don/doff underwear)   Toileting Maximal Assist  (incontinent of urine)   Functional Mobility   Sit to Stand Standby Assist   Bed, Chair, Wheelchair Transfer Contact Guard Assist   Toilet Transfers Minimal Assist   Transfer Method Stand Step   Mobility EOB>bathroom>bed   Comments w/ FWW   Visual Perception   Visual Perception  Not Tested   Activity Tolerance   Sitting in Chair <2 min on toilet   Sitting Edge of Bed >5 min   Standing ~5 min   Comments limited by increased work of breathing and O2 desat   Patient / Family Goals   Patient / Family Goal #1 to go home   Short Term Goals   Short Term Goal # 1 Pt will perform ADL transfer w/ supv   Short Term Goal # 2 Pt will perform LB dressing w/ supv   Short Term Goal # 3 Pt will perform toilet transfer w/ supv   Education Group   Education Provided Role of Occupational Therapist;Activities of Daily Living;Energy Conservation   Role of Occupational Therapist Patient Response Patient;Acceptance;Explanation;Verbal Demonstration   Energy Conservation Patient Response Patient;Acceptance;Explanation;Verbal Demonstration   ADL Patient Response Patient;Acceptance;Explanation;Demonstration;Verbal Demonstration;Action Demonstration   Occupational Therapy Initial Treatment Plan    Treatment Interventions Self Care / Activities of Daily Living;Adaptive Equipment;Neuro Re-Education / Balance;Therapeutic Exercises;Therapeutic Activity    Treatment Frequency 3 Times per Week   Duration Until Therapy Goals Met   Problem List   Problem List Decreased Active Daily Living Skills;Decreased Homemaking Skills;Decreased Activity Tolerance;Safety Awareness Deficits / Cognition;Impaired Postural Control / Balance

## 2024-05-26 NOTE — PROGRESS NOTES
Hospital Medicine Daily Progress Note    Date of Service  5/25/2024    Chief Complaint  Tyler Zarate is a 74 y.o. male admitted 5/23/2024 with respiratory failure    Hospital Course    74 y.o. male with possible history of COPD, emphysema, ethanol abuse, right leg DVT, metastatic adenocarcinoma of the lung follows [facility] oncology who presented to the ER in the [place] with worsening weakness and dyspnea as well as chills.  Patient was failed BiPAP due to intolerance.  He was placed on high flow and he also found to lactic acidosis and he transferred to [facility] on 5/23/2024 for higher level of care.  He found to have sepsis with acute respiratory failure with hypoxia and he was started on broad-spectrum antibiotic.  He also found to have acute on chronic respiratory failure with hypoxia.     On May 24, 2024 intensivist Dr. Sloan requested to transfer care to hospital service.  I have routine and examined him at the bedside.  He expressed that he is feeling better.  At the time of evaluation he is requiring 35 L and 40% of FiO2.  He was on Levophed this morning by the time of evaluation he was weaned off from Levophed.  Palliative care evaluated him and made recommendations.  Plan is to transfer him out from ICU to IMCU.    Interval Problem Update    05/25/24    I routed and examined him at the bedside.  He reported that overall he is feeling better but  He was on high flow nasal cannula this morning with 30 L of oxygen that was transitioned to 6 L of oxygen.  I discussed plan of care with patient and his son at the bedside.  I discussed overall Mr. Zarate medical condition and diagnosis of cancer.  I discussed with him regarding difference between full code, DNR and hospice and comfort care.  I explained him that if he will decide to be DNR it would not change his continuation of medical management the only thing that changes resuscitation part.  I discussed plan of care with patient and  his son at the bedside and answered all of their questions.  Patient's son expressed that he is explaining CODE STATUS to Mr. Zarate and will update medical team for any change in plan.    I have discussed this patient's plan of care and discharge plan at IDT rounds today with Case Management, Nursing, Nursing leadership, and other members of the IDT team.    Consultants/Specialty  critical care    Code Status  Full Code    Disposition  The patient is not medically cleared for discharge to home or a post-acute facility.      I have placed the appropriate orders for post-discharge needs.    Review of Systems  ROS     Physical Exam  Pulse:  [68-90] 73  Resp:  [12-28] 18  BP: ()/(48-63) 133/63  SpO2:  [88 %-99 %] 91 %    Physical Exam    Fluids    Intake/Output Summary (Last 24 hours) at 5/25/2024 1737  Last data filed at 5/25/2024 1200  Gross per 24 hour   Intake 630.03 ml   Output 900 ml   Net -269.97 ml       Laboratory  Recent Labs     05/24/24  0551 05/25/24  0459   WBC 41.0* 25.6*   RBC 2.84* 2.63*   HEMOGLOBIN 10.0* 9.4*   HEMATOCRIT 30.9* 28.6*   .8* 108.7*   MCH 35.2* 35.7*   MCHC 32.4 32.9   RDW 66.7* 67.2*   PLATELETCT 132* 117*   MPV 10.3 10.5     Recent Labs     05/24/24  0551 05/25/24  0459   SODIUM 138 138   POTASSIUM 3.4* 3.8   CHLORIDE 104 104   CO2 24 25   GLUCOSE 152* 127*   BUN 22 21   CREATININE 0.95 0.81   CALCIUM 7.7* 7.9*                   Imaging  DX-CHEST-PORTABLE (1 VIEW)   Final Result         1. No significant interval change.      DX-CHEST-PORTABLE (1 VIEW)   Final Result      1. New patchy opacities in the lung bases and the right midlung are most compatible with areas of multifocal pneumonitis.   2. Mild interstitial edema.   3. Additional spiculated opacities in the right upper lobe and left lower lobe are stable.      OUTSIDE IMAGES-DX CHEST   Final Result           Assessment/Plan  * Sepsis with acute hypoxic respiratory failure without septic shock (HCC)- (present on  admission)  Assessment & Plan  This is Sepsis Present on admission  SIRS criteria identified on my evaluation include: Tachycardia, with heart rate greater than 90 BPM and Leukocytosis, with WBC greater than 12,000  Clinical indicators of end organ dysfunction include Acute Respiratory Failure - (mechanical ventilation or BiPap or PaO2/FiO2 ratio < 300)  Source is lungs right LLL pna  Sepsis protocol initiated  Crystalloid Fluid Administration: Resuscitation volume of 75 cc rate LR Reason that resuscitation volume of less than 30ml/kg was ordered  given at outside hospital  IV antibiotics as appropriate for source of sepsis - cefepime    Reassessment: I have reassessed the patient's hemodynamic status\  Follow-up on culture results   I am continuing midodrine.  This morning he was on 30 L of oxygen that was transitioned to 6 L of oxygen and now is maintaining oxygen saturation.  I switch his antibiotic from IV cefepime to Unasyn.  Now plan is to transfer him out from [facility] to telemetry.       Macrocytic anemia  Assessment & Plan  He found to have macrocytic anemia  Ordered vitamin B2 level for tomorrow.  No signs of active bleeding.  TSH within normal limits.    Advance care planning  Assessment & Plan  On May 25, 2024 I discussed plan of care with patient and her son at the bedside.  I discussed overall Mr. Zarate medical condition and diagnosis of cancer.  I discussed with him regarding difference between full code, DNR and hospice and comfort care.  I explained him that if he will decide to be DNR it would not change his continuation of medical management the only thing that changes resuscitation part.  I discussed plan of care with patient and his son at the bedside and answered all of their questions.  Patient's son expressed that he is explaining CODE STATUS to Mr. Zarate and will update medical team for any change in plan.  I updated bedside RN.    Time spent 17 minutes    COPD (chronic obstructive pulmonary  disease) (HCC)- (present on admission)  Assessment & Plan  Does not appear to be in acute exacerbation  IV Methylprednisolone 40 mg Q Daily  Continue DuoNeb for breathing treatment.  Continue to monitor respiratory status closely.      Leukocytosis- (present on admission)  Assessment & Plan  Continue IV antibiotics  Ordered CBC for tomorrow.    Acute on chronic respiratory failure with hypoxia (HCC)- (present on admission)  Assessment & Plan  Baseline 5 l NC  New RLL pna concerning for hospital acquired pna  Received zosyn  Change to linezolid and cefepime  MRSA nasal swab  Viral Swab  Follow up blood cxs ast [facility]  RR 22 more shivers and chills  Moving air  Oxygen requirement has been trending down he was transition from high flow nasal cannula to 6 L of oxygen.  Continue to monitor closely his respiratory status.  I discussed plan of care with patient and his son at the bedside  Continue antibiotics  Respiratory therapy per protocol.      Deep vein thrombosis (DVT) of right lower extremity (HCC)- (present on admission)  Assessment & Plan  History of  I am continuing Xarelto    Adenocarcinoma, lung, right (HCC)- (present on admission)  Assessment & Plan  Stage IV A RUL and LLL  Last immunotherapy 4/9/2024   Followed by Dr. Gibson  I recommended outpatient follow-up.  Patient's son is wondering about continuation of immunotherapy.  I explained them afebrile continue to improve he can further discuss it with his oncologist for continuation of immunotherapy.        I discussed plan of care during multidisciplinary rounds regarding patient's current medical condition and plan of care.         VTE prophylaxis:    therapeutic anticoagulation with xarelto 20 mg daily witih meals      I have performed a physical exam and reviewed and updated ROS and Plan today (5/25/2024). In review of yesterday's note (5/24/2024), there are no changes except as documented above.

## 2024-05-27 NOTE — PROGRESS NOTES
Discharge instructions reviewed and signed. IV and tele monitor removed. Pt. And son waiting for [id number] to be filled.

## 2024-05-27 NOTE — PROGRESS NOTES
Patient left with son Ran before IV could be removed. Patient called and says son will remove it.

## 2024-05-27 NOTE — THERAPY
Physical Therapy   Initial Evaluation     Patient Name: Tyler Zarate  Age:  74 y.o., Sex:  male  Medical Record #: [id number]  Today's Date: 5/26/2024     Precautions  Precautions: Fall Risk    Assessment  Patient is 74 y.o. male presenting with worsening weakness and dyspnea as well as chills. Pt found to have sepsis with acute on chronic respiratory failure and hypoxia. Pt with PMH including COPD, emphysema, ethanol abuse, right leg DVT, metastatic adenocarcinoma of the lung. Pt is independent with functional mobility at baseline using no AD most of the time vs SPC sometimes. Lives in [facility] with son, DIL, and their 7 kids ages 1-15 yo. Pt stays exclusively on the ground floor and son reports that there is always someone home to help as needed.     During current session, pt presents with generalized weakness, new O2 needs above baseline, impaired balance, and decreased endurance requiring overall CGA for OOB mobility as detailed below. Able to walk 80 ft with FWW, no LOB. Encouraged pt to continue mobilizing OOB with nursing as tolerated. Recommend d/c home with FWW and HHPT. Pt would benefit from continued acute PT services to improve functional mobility prior to d/c.    Plan    Physical Therapy Initial Treatment Plan   Treatment Plan : Bed Mobility, Equipment, Family / Caregiver Training, Gait Training, Manual Therapy, Neuro Re-Education / Balance, Self Care / Home Evaluation, Stair Training, Therapeutic Activities, Therapeutic Exercise  Treatment Frequency: 4 Times per Week  Duration: Until Therapy Goals Met    DC Equipment Recommendations: Front-Wheel Walker  Discharge Recommendations: Recommend home health for continued physical therapy services       Subjective    Pt received resting in bed, agreeable to participate.      Objective       05/26/24 1533   Initial Contact Note    Initial Contact Note Order Received and Verified, Physical Therapy Evaluation in Progress with Full Report to Follow.   Precautions    Precautions Fall Risk   Vitals   O2 (LPM) 5   O2 Delivery Device Nasal Cannula   Vitals Comments new O2 needs above baseline, pt endorsed 2-3L use at home   Pain 0 - 10 Group   Therapist Pain Assessment Post Activity Pain Same as Prior to Activity;Nurse Notified  (no c/o increased pain with mobility)   Prior Living Situation   Prior Services Home-Independent   Housing / Facility 2 Story House   Steps Into Home 2   Steps In Home   (FOS, however pt stays exclusively on ground floor)   Rail None  (no HR on YOGESH, pt endorses hanging onto door frame for support)   Equipment Owned Single Point Cane;Wheelchair;Bed Side Commode;Oxygen;Other (Comments)  (also adjustable bed. Son reports that they are borrowing wc and BSC from his work. They are in the [facility])   Lives with - Patient's Self Care Capacity Adult Children;Child Less than 18 Years of Age   Comments Lives in [place], [place] with sonMOLLY, and their 7 kids ages 1-15 yo. Son reports that there is always someone home to help as needed   Prior Level of Functional Mobility   Bed Mobility Independent   Transfer Status Independent   Ambulation Independent   Ambulation Distance household   Assistive Devices Used None   Stairs Independent   Comments pt reports using SPC sometimes   History of Falls   History of Falls Yes   Date of Last Fall   (h/o falls per chart)   Cognition    Cognition / Consciousness WDL   Level of Consciousness Alert   Comments cooperative, kennedy but overall agreeable   Passive ROM Lower Body   Passive ROM Lower Body WDL   Comments assessed functionally   Active ROM Lower Body    Active ROM Lower Body  WDL   Comments assessed functionally   Strength Lower Body   Lower Body Strength  X   Gross Strength Generalized Weakness, Equal Bilaterally   Comments functional for household ambulation   Sensation Lower Body   Comments no c/o altered sensation BLE   Coordination Lower Body    Coordination Lower Body  WDL    Comments assessed functionally, within age norms   Balance Assessment   Sitting Balance (Static) Fair   Sitting Balance (Dynamic) Fair   Standing Balance (Static) Fair -   Standing Balance (Dynamic) Fair -   Weight Shift Sitting Fair   Weight Shift Standing Fair   Comments FWW   Bed Mobility    Supine to Sit Supervised   Scooting Supervised   Rolling Supervised   Comments HOBE (pt endorsed adjustable bed at home), up in recliner post   Gait Analysis   Gait Level Of Assist Contact Guard Assist   Assistive Device Front Wheel Walker   Distance (Feet) 80   # of Times Distance was Traveled 1   Deviation Other (Comment)  (kyphotic posture)   # of Stairs Climbed 0   Comments pt self-limited distance d/t fatigue   Functional Mobility   Sit to Stand Contact Guard Assist   Bed, Chair, Wheelchair Transfer Contact Guard Assist   Transfer Method Stand Step   Mobility bed>up in hallway FWW>recliner   6 Clicks Assessment - How much HELP from from another person do you currently need... (If the patient hasn't done an activity recently, how much help from another person do you think he/she would need if he/she tried?)   Turning from your back to your side while in a flat bed without using bedrails? 3   Moving from lying on your back to sitting on the side of a flat bed without using bedrails? 3   Moving to and from a bed to a chair (including a wheelchair)? 3   Standing up from a chair using your arms (e.g., wheelchair, or bedside chair)? 3   Walking in hospital room? 3   Climbing 3-5 steps with a railing? 3   6 clicks Mobility Score 18   Patient / Family Goals    Patient / Family Goal #1 to go home   Short Term Goals    Short Term Goal # 1 Pt will perform sit<>stand and stand step transfers with FWW and SPV in 6 visits to get in/out of chair.   Short Term Goal # 2 Pt will ambulate 150 ft with FWW and SPV in 6 visits to access home environment.   Short Term Goal # 3 Pt will ascend/descend 2 steps with unilateral UE support and CGA  in 6 visits to safely enter/exit home.   Education Group   Education Provided Role of Physical Therapist   Role of Physical Therapist Patient Response Patient;Family;Acceptance;Explanation;Demonstration;Verbal Demonstration;Action Demonstration   Physical Therapy Initial Treatment Plan    Treatment Plan  Bed Mobility;Equipment;Family / Caregiver Training;Gait Training;Manual Therapy;Neuro Re-Education / Balance;Self Care / Home Evaluation;Stair Training;Therapeutic Activities;Therapeutic Exercise   Treatment Frequency 4 Times per Week   Duration Until Therapy Goals Met   Problem List    Problems Impaired Transfers;Impaired Ambulation;Functional Strength Deficit;Impaired Balance;Decreased Activity Tolerance   Anticipated Discharge Equipment and Recommendations   DC Equipment Recommendations Front-Wheel Walker   Discharge Recommendations Recommend home health for continued physical therapy services   Interdisciplinary Plan of Care Collaboration   IDT Collaboration with  Nursing;Family / Caregiver   Patient Position at End of Therapy Seated;Chair Alarm On;Call Light within Reach;Tray Table within Reach;Phone within Reach;Family / Friend in Room   Collaboration Comments RN updated, son at bedside   Session Information   Date / Session Number  5/26- 1(1/4, 6/1)

## 2024-05-29 PROBLEM — R65.21 SEPSIS WITH ACUTE HYPOXIC RESPIRATORY FAILURE AND SEPTIC SHOCK (HCC): Status: RESOLVED | Noted: 2024-05-23 | Resolved: 2024-05-26

## 2024-05-29 PROBLEM — C34.90 METASTATIC LUNG CANCER (METASTASIS FROM LUNG TO OTHER SITE) (HCC): Status: ACTIVE | Noted: 2024-05-29

## 2024-05-29 PROBLEM — J18.9 COMMUNITY ACQUIRED PNEUMONIA OF LEFT LOWER LOBE OF LUNG: Status: ACTIVE | Noted: 2024-05-29

## 2024-05-29 PROBLEM — D69.6 THROMBOCYTOPENIA (HCC): Status: ACTIVE | Noted: 2024-05-29

## 2024-05-29 NOTE — DISCHARGE PLANNING
Late Entry-  Agency/Facility Name: [facility]  Plan or Request: Vm received from [facility] asking for the name of PCP. [facility] faxed d/c summary and chart notes to [facility] - Attn [facility]

## 2024-06-07 NOTE — PROGRESS NOTES
"Pharmacy Chemotherapy Calculation:    Dx: NSCLC stage IV (cT1c, cN0, cM1a)     Protocol: CARBOplatin/PEMEtrexed + Pembrolizumab  Pembrolizumab 200 mg IV over 30 minutes on Day 1 followed by   Pemetrexed 500 mg/m2 IV over 10 minutes on Day 1 followed by   CARBOplatin AUC 5 IV over 30 minutes on Day 1   21 day cycle for 4-6 cycles *completed 7/18/23  ~Followed by~  Pembrolizumab 400 mg IV over 30 minutes on Day 1 every other cycle followed by       Removed from plan at this time, proceeding with pembro maintenance at this time, per Dr Gibson 6/11/24  21 day cycle until 24 months of therapy has been completed   ~Followed by~  Pemetrexed 500 mg/m2 IV over 10 minutes on Day 1   21 day cycle until DP or UT    NCCN Guidelines for Non-Small Cell Lung Cancer V.1.2023  Nikki M et al. Eur J Cancer. 2020;131:68-75.  Kelsey CJ, et al. Lancet Oncol. 2016;17(11):4570-0096.  Joelle SERNA et al. N Engl J Med.  2018;378(22):0539-9693.    Allergies:  Bacitracin-neomycin-polymyxin, Gabapentin, Ketorolac, Lipoglycopeptide antibiotics, and Tromethamine     /52   Pulse 90   Temp 36.1 °C (97 °F) (Temporal)   Resp 18   Ht 1.77 m (5' 9.69\")   Wt 69 kg (152 lb 1.9 oz)   SpO2 90%   BMI 22.02 kg/m²  Body surface area is 1.84 meters squared.    Labs 6/11/24:  ANC~ 01769 Plt = 180k   Hgb = 12.2     SCr = 0.86 mg/dL CrCl ~ 72.3 mL/min   AST/ALT/AP = WNL TBili = 0.4  TSH = 1.91 Free T4 = 1.3         Drug Order   (Drug name, dose, route, IV Fluid & volume, frequency, number of doses) Cycle 16 delayed for hospitalization   Previous treatment: C15 on 4/9/24     Medication = Pembrolizumab   Base Dose= 400 mg   Fixed dose, no calculation required.  Final Dose = 400 mg  Route = IV  Fluid & Volume = NS 50 mL  Admin Duration = Over 30 minutes   Every OTHER cycle  (ODD cycles)      Fixed dose, no calculation required. Okay to treat with final dose.     By my signature below, I confirm this process was performed independently with the BSA and all " final chemotherapy dosing calculations congruent. I have reviewed the above chemotherapy order and that my calculation of the final dose and BSA (when applicable) corroborate those calculations of the  pharmacist. Discrepancies of 10% or greater in the written dose have been addressed and documented within the EPIC Progress notes.    Carmela Winters, PharmD,

## 2024-06-10 NOTE — PROGRESS NOTES
"Pharmacy Chemotherapy Verification    Dx: NSCLC  Cycle 16 total/C1 pembro maintenance (delayed for hospital admission)  Previous treatment = 4/9/24    Protocol: Pembrolizumab + Pemetrexed/CARBOplatin  Pembrolizumab 200 mg IV over 30 minutes on Day 1 followed by  Pemetrexed 500 mg/m2 IV over 10 minutes on Day 1 followed by  CARBOplatin AUC 5 IV over 30 minutes on Day 1  21-day cycle for 4-6 cycles  ~Followed by~  Pembrolizumab 200 mg IV over 30 minutes on Day 1 or Pembrolizumab 400 mg IV over 30 minutes every other cycle followed by  - 6/11/24: removed from plan by MD, proceeding with Pembrolizumab maintenance at this time  Maintenance therapy: 21-day cycle until 24 months of therapy has been completed  ~Followed by~  Pemetrexed 500 mg/m2 IV over 10 minutes on Day 1   Maintenance therapy: 21-day cycle until DP/UT  NCCN Guidelines for Non-Small Cell Lung Cancer V.1.2023.  Nikki M, et al. Eur J Cancer. 2020;131:68-75.  Kelsey CJ, et al. Lancet Oncol. 2016;17(11):7605-1981.  Joelle L, et al. N Engl J Med. 2018;378(22):8218-4027.    Allergies:  Bacitracin-neomycin-polymyxin, Gabapentin, Ketorolac, Lipoglycopeptide antibiotics, and Tromethamine     /52   Pulse 90   Temp 36.1 °C (97 °F) (Temporal)   Resp 18   Ht 1.77 m (5' 9.69\")   Wt 69 kg (152 lb 1.9 oz)   SpO2 90%   BMI 22.02 kg/m²  Body surface area is 1.84 meters squared.    Labs 6/11/24:  ANC~ 38097 Plt = 180k   Hgb = 12.2     SCr = 0.86 mg/dL CrCl ~ 72 mL/min   AST/ALT/AP = 23/12/94 TBili = 0.4  TSH/Free T4 = pending    Pembrolizumab 400 mg fixed dose   No calculation required, OK to treat with final dose = 400 mg    Lawrence Garcia, PharmD  "

## 2024-06-11 ENCOUNTER — OUTPATIENT INFUSION SERVICES (OUTPATIENT)
Dept: ONCOLOGY | Facility: MEDICAL CENTER | Age: 74
End: 2024-06-11
Attending: STUDENT IN AN ORGANIZED HEALTH CARE EDUCATION/TRAINING PROGRAM
Payer: COMMERCIAL

## 2024-06-11 ENCOUNTER — HOSPITAL ENCOUNTER (OUTPATIENT)
Dept: HEMATOLOGY ONCOLOGY | Facility: MEDICAL CENTER | Age: 74
End: 2024-06-11
Attending: STUDENT IN AN ORGANIZED HEALTH CARE EDUCATION/TRAINING PROGRAM
Payer: COMMERCIAL

## 2024-06-11 VITALS
OXYGEN SATURATION: 90 % | BODY MASS INDEX: 21.78 KG/M2 | TEMPERATURE: 97 F | RESPIRATION RATE: 18 BRPM | HEIGHT: 70 IN | WEIGHT: 152.12 LBS | DIASTOLIC BLOOD PRESSURE: 52 MMHG | SYSTOLIC BLOOD PRESSURE: 104 MMHG | HEART RATE: 90 BPM

## 2024-06-11 VITALS
OXYGEN SATURATION: 93 % | WEIGHT: 125.44 LBS | BODY MASS INDEX: 17.96 KG/M2 | TEMPERATURE: 99.3 F | DIASTOLIC BLOOD PRESSURE: 52 MMHG | HEART RATE: 102 BPM | SYSTOLIC BLOOD PRESSURE: 102 MMHG | HEIGHT: 70 IN

## 2024-06-11 DIAGNOSIS — C34.91 ADENOCARCINOMA, LUNG, RIGHT (HCC): ICD-10-CM

## 2024-06-11 DIAGNOSIS — Z79.899 ENCOUNTER FOR LONG-TERM (CURRENT) USE OF HIGH-RISK MEDICATION: ICD-10-CM

## 2024-06-11 DIAGNOSIS — R06.02 SOB (SHORTNESS OF BREATH): ICD-10-CM

## 2024-06-11 LAB
ALBUMIN SERPL BCP-MCNC: 2.6 G/DL (ref 3.2–4.9)
ALBUMIN/GLOB SERPL: 0.7 G/DL
ALP SERPL-CCNC: 94 U/L (ref 30–99)
ALT SERPL-CCNC: 12 U/L (ref 2–50)
ANION GAP SERPL CALC-SCNC: 10 MMOL/L (ref 7–16)
AST SERPL-CCNC: 23 U/L (ref 12–45)
BASOPHILS # BLD AUTO: 0.2 % (ref 0–1.8)
BASOPHILS # BLD: 0.02 K/UL (ref 0–0.12)
BILIRUB SERPL-MCNC: 0.4 MG/DL (ref 0.1–1.5)
BUN SERPL-MCNC: 18 MG/DL (ref 8–22)
CALCIUM ALBUM COR SERPL-MCNC: 9.7 MG/DL (ref 8.5–10.5)
CALCIUM SERPL-MCNC: 8.6 MG/DL (ref 8.5–10.5)
CHLORIDE SERPL-SCNC: 97 MMOL/L (ref 96–112)
CO2 SERPL-SCNC: 28 MMOL/L (ref 20–33)
CREAT SERPL-MCNC: 0.86 MG/DL (ref 0.5–1.4)
EOSINOPHIL # BLD AUTO: 0.01 K/UL (ref 0–0.51)
EOSINOPHIL NFR BLD: 0.1 % (ref 0–6.9)
ERYTHROCYTE [DISTWIDTH] IN BLOOD BY AUTOMATED COUNT: 61.8 FL (ref 35.9–50)
GFR SERPLBLD CREATININE-BSD FMLA CKD-EPI: 90 ML/MIN/1.73 M 2
GLOBULIN SER CALC-MCNC: 3.9 G/DL (ref 1.9–3.5)
GLUCOSE SERPL-MCNC: 101 MG/DL (ref 65–99)
HCT VFR BLD AUTO: 36.9 % (ref 42–52)
HGB BLD-MCNC: 12.2 G/DL (ref 14–18)
IMM GRANULOCYTES # BLD AUTO: 0.06 K/UL (ref 0–0.11)
IMM GRANULOCYTES NFR BLD AUTO: 0.5 % (ref 0–0.9)
LYMPHOCYTES # BLD AUTO: 1.31 K/UL (ref 1–4.8)
LYMPHOCYTES NFR BLD: 10.3 % (ref 22–41)
MCH RBC QN AUTO: 34.7 PG (ref 27–33)
MCHC RBC AUTO-ENTMCNC: 33.1 G/DL (ref 32.3–36.5)
MCV RBC AUTO: 104.8 FL (ref 81.4–97.8)
MONOCYTES # BLD AUTO: 1.16 K/UL (ref 0–0.85)
MONOCYTES NFR BLD AUTO: 9.1 % (ref 0–13.4)
NEUTROPHILS # BLD AUTO: 10.12 K/UL (ref 1.82–7.42)
NEUTROPHILS NFR BLD: 79.8 % (ref 44–72)
NRBC # BLD AUTO: 0 K/UL
NRBC BLD-RTO: 0 /100 WBC (ref 0–0.2)
PLATELET # BLD AUTO: 180 K/UL (ref 164–446)
PMV BLD AUTO: 10.3 FL (ref 9–12.9)
POTASSIUM SERPL-SCNC: 4 MMOL/L (ref 3.6–5.5)
PROT SERPL-MCNC: 6.5 G/DL (ref 6–8.2)
RBC # BLD AUTO: 3.52 M/UL (ref 4.7–6.1)
SODIUM SERPL-SCNC: 135 MMOL/L (ref 135–145)
T4 FREE SERPL-MCNC: 1.3 NG/DL (ref 0.93–1.7)
TSH SERPL DL<=0.005 MIU/L-ACNC: 1.91 UIU/ML (ref 0.38–5.33)
WBC # BLD AUTO: 12.7 K/UL (ref 4.8–10.8)

## 2024-06-11 PROCEDURE — 700105 HCHG RX REV CODE 258: Performed by: STUDENT IN AN ORGANIZED HEALTH CARE EDUCATION/TRAINING PROGRAM

## 2024-06-11 PROCEDURE — 85025 COMPLETE CBC W/AUTO DIFF WBC: CPT

## 2024-06-11 PROCEDURE — 84443 ASSAY THYROID STIM HORMONE: CPT

## 2024-06-11 PROCEDURE — 99212 OFFICE O/P EST SF 10 MIN: CPT | Performed by: STUDENT IN AN ORGANIZED HEALTH CARE EDUCATION/TRAINING PROGRAM

## 2024-06-11 PROCEDURE — 700111 HCHG RX REV CODE 636 W/ 250 OVERRIDE (IP): Mod: JZ,JG | Performed by: STUDENT IN AN ORGANIZED HEALTH CARE EDUCATION/TRAINING PROGRAM

## 2024-06-11 PROCEDURE — 84439 ASSAY OF FREE THYROXINE: CPT

## 2024-06-11 PROCEDURE — 80053 COMPREHEN METABOLIC PANEL: CPT

## 2024-06-11 PROCEDURE — 96413 CHEMO IV INFUSION 1 HR: CPT

## 2024-06-11 PROCEDURE — 99214 OFFICE O/P EST MOD 30 MIN: CPT | Performed by: STUDENT IN AN ORGANIZED HEALTH CARE EDUCATION/TRAINING PROGRAM

## 2024-06-11 RX ORDER — 0.9 % SODIUM CHLORIDE 0.9 %
VIAL (ML) INJECTION PRN
Status: CANCELLED | OUTPATIENT
Start: 2024-06-11

## 2024-06-11 RX ORDER — 0.9 % SODIUM CHLORIDE 0.9 %
10 VIAL (ML) INJECTION PRN
Status: CANCELLED | OUTPATIENT
Start: 2024-06-11

## 2024-06-11 RX ORDER — 0.9 % SODIUM CHLORIDE 0.9 %
3 VIAL (ML) INJECTION PRN
Status: CANCELLED | OUTPATIENT
Start: 2024-06-11

## 2024-06-11 RX ADMIN — SODIUM CHLORIDE 400 MG: 9 INJECTION, SOLUTION INTRAVENOUS at 12:53

## 2024-06-11 ASSESSMENT — ENCOUNTER SYMPTOMS
TINGLING: 0
CHILLS: 0
ABDOMINAL PAIN: 0
BLURRED VISION: 0
BRUISES/BLEEDS EASILY: 0
NECK PAIN: 0
MEMORY LOSS: 0
NAUSEA: 0
VOMITING: 0
WHEEZING: 0
EYE DISCHARGE: 0
PALPITATIONS: 0
SORE THROAT: 0
DEPRESSION: 0
DIZZINESS: 0
SENSORY CHANGE: 0
FEVER: 0
SPUTUM PRODUCTION: 0
SHORTNESS OF BREATH: 1
FOCAL WEAKNESS: 0
ORTHOPNEA: 0
HEADACHES: 0
WEIGHT LOSS: 0
HEARTBURN: 0
NERVOUS/ANXIOUS: 1
TREMORS: 0
COUGH: 0

## 2024-06-11 ASSESSMENT — FIBROSIS 4 INDEX
FIB4 SCORE: 3.73
FIB4 SCORE: 3.73

## 2024-06-11 ASSESSMENT — PAIN SCALES - GENERAL: PAINLEVEL: NO PAIN

## 2024-06-11 NOTE — PROGRESS NOTES
Chemotherapy Verification - PRIMARY RN      Height = 1.77m  Weight = 69kg  BSA = 1.84m2       Medication: pembrolizumab (Keytruda)  Dose: 400mg (Set dose)  Calculated Dose: 400 mg                            (In mg/m2, AUC, mg/kg)     I confirm this process was performed independently with the BSA and all final chemotherapy dosing calculations congruent.  Any discrepancies of 10% or greater have been addressed with the chemotherapy pharmacist. The resolution of the discrepancy has been documented in the EPIC progress notes.

## 2024-06-11 NOTE — PROGRESS NOTES
Pt presented to Infusion for D1C16 OP NSC Lung Pembrolizumab. POC discussed and pt verbalized understanding. Pt is currently on 4L of oxygen. 24g PIV established in the left forearm without difficulty, brisk blood return noted, line flushes with ease and labs drawn. Pt tolerated well. Labs reviewed. Pembrolizumab administered per MAR. Pt tolerated well. No s/s of adverse reactions or complications noted. PIV flushed, removed with tip intact and site covered with sterile gauze/coban.  Educated pt on when to contact provider including fever, s/s of infection, worsening symptoms and/or defer judgment to ED for provider evaluation. Pt verbalized understanding.  was emailed to set up next appt. Pt discharged to self care, accompanied by son. Pt in NAD at time of discharge.

## 2024-06-11 NOTE — ADDENDUM NOTE
Encounter addended by: Sunday Galeas on: 6/11/2024 11:26 AM   Actions taken: Charge Capture section accepted

## 2024-06-11 NOTE — PROGRESS NOTES
Chemotherapy Verification - SECONDARY RN       Height = 177cm  Weight = 69kg  BSA = 1.84m^2       Medication: keytruda  Dose: set dose 400 mg  Calculated Dose: 400 mg                                     I confirm that this process was performed independently.
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
## 2024-07-12 ENCOUNTER — PATIENT OUTREACH (OUTPATIENT)
Dept: ONCOLOGY | Facility: MEDICAL CENTER | Age: 74
End: 2024-07-12
Payer: COMMERCIAL

## 2024-08-13 ENCOUNTER — APPOINTMENT (OUTPATIENT)
Dept: ONCOLOGY | Facility: MEDICAL CENTER | Age: 74
End: 2024-08-13
Attending: STUDENT IN AN ORGANIZED HEALTH CARE EDUCATION/TRAINING PROGRAM
Payer: COMMERCIAL

## 2024-08-20 ENCOUNTER — APPOINTMENT (OUTPATIENT)
Dept: ONCOLOGY | Facility: MEDICAL CENTER | Age: 74
End: 2024-08-20
Attending: STUDENT IN AN ORGANIZED HEALTH CARE EDUCATION/TRAINING PROGRAM
Payer: COMMERCIAL

## 2024-08-20 ENCOUNTER — TELEPHONE (OUTPATIENT)
Dept: HEMATOLOGY ONCOLOGY | Facility: MEDICAL CENTER | Age: 74
End: 2024-08-20
Payer: COMMERCIAL

## 2024-08-20 NOTE — TELEPHONE ENCOUNTER
Called pt's son Ran regarding patient no show for treatment this morning and appointment with Marcy Haddad yesterday. Pt's son stated that he tried to wake the patient up to come to treatment this morning but he was not feeling well enough to come in to [facility]. MA asked pt's son if he would like to reschedule the appointments that were missed and he stated that he would speak with the patient and see what he wished to do about his treatment. MA stated to call oncology office with what the patient would like to do going forward. Pt's son acknowledged understanding.

## 2024-09-13 ENCOUNTER — PATIENT OUTREACH (OUTPATIENT)
Dept: ONCOLOGY | Facility: MEDICAL CENTER | Age: 74
End: 2024-09-13
Payer: COMMERCIAL

## 2024-09-13 NOTE — PROGRESS NOTES
Chart review for navigation follow up.  Noted that patient's son, Ran, was going to speak with patient regarding what he wanted to do moving forward beginning of month with no updated on plan in EPIC.    Follow up call placed to patient for navigation, spoke with son Ran.  He reported had not had discussion with patient yet, but patient is with him today.  Ran saying he will talk with his dad and get back with navigator.  Requested he update medical oncology office as well.  Direct number for navigator provided, he has medical oncology's contact.

## 2025-01-29 ENCOUNTER — TELEPHONE (OUTPATIENT)
Dept: RESEARCH | Facility: MEDICAL CENTER | Age: 75
End: 2025-01-29
Payer: COMMERCIAL

## 2025-01-29 NOTE — TELEPHONE ENCOUNTER
Called to inquire about pt's interest in continuing with the  research study. LVM asking pt to call back - [phone].         Note:   This phone call was originally documented in the coordinator's subject log

## (undated) DEVICE — CATHETER GUIDING ION (1/EA)

## (undated) DEVICE — BAG IV VISION ION IF1000 (10EA/BX)

## (undated) DEVICE — SENSOR OXIMETER ADULT SPO2 RD SET (20EA/BX)

## (undated) DEVICE — PROBE ENDOSCOPIC PERIPHERAL VISION ION 1.5 IF1000 (1/EA)

## (undated) DEVICE — SET I.V. EXTENSION DIAL-A- - FLOW REGULATOR (48EA/CA)

## (undated) DEVICE — TUBE CONNECTING SUCTION - CLEAR PLASTIC STERILE 72 IN (50EA/CA)

## (undated) DEVICE — CANISTER SUCTION RIGID RED 1500CC (40EA/CA)

## (undated) DEVICE — WATER IRRIGATION STERILE 1000ML (12EA/CA)

## (undated) DEVICE — TUBING CLEARLINK DUO-VENT - C-FLO (48EA/CA)

## (undated) DEVICE — CANNULA O2 COMFORT SOFT EAR ADULT 7 FT TUBING (50/CA)

## (undated) DEVICE — DEVICE BIOPSY ACQUIRE NEEDLE EBUS 22GA (1EA)

## (undated) DEVICE — KIT  I.V. START (100EA/CA)

## (undated) DEVICE — ROBOTIC SURGERY SERVICES

## (undated) DEVICE — SPONGE GAUZE NON-STERILE 4X4 - (2000/CA 10PK/CA)